# Patient Record
Sex: FEMALE | Race: WHITE | Employment: OTHER | RURAL
[De-identification: names, ages, dates, MRNs, and addresses within clinical notes are randomized per-mention and may not be internally consistent; named-entity substitution may affect disease eponyms.]

---

## 2017-01-13 ENCOUNTER — OFFICE VISIT (OUTPATIENT)
Dept: FAMILY MEDICINE CLINIC | Age: 57
End: 2017-01-13
Payer: MEDICAID

## 2017-01-13 VITALS
OXYGEN SATURATION: 95 % | HEIGHT: 60 IN | SYSTOLIC BLOOD PRESSURE: 136 MMHG | DIASTOLIC BLOOD PRESSURE: 86 MMHG | WEIGHT: 247 LBS | HEART RATE: 75 BPM | BODY MASS INDEX: 48.49 KG/M2

## 2017-01-13 DIAGNOSIS — I10 ESSENTIAL HYPERTENSION: ICD-10-CM

## 2017-01-13 DIAGNOSIS — K74.60 CIRRHOSIS OF LIVER WITHOUT ASCITES, UNSPECIFIED HEPATIC CIRRHOSIS TYPE (HCC): ICD-10-CM

## 2017-01-13 DIAGNOSIS — M19.90 ARTHRITIS: ICD-10-CM

## 2017-01-13 DIAGNOSIS — B18.2 HEP C W/O COMA, CHRONIC (HCC): Primary | ICD-10-CM

## 2017-01-13 DIAGNOSIS — K21.9 GASTROESOPHAGEAL REFLUX DISEASE WITHOUT ESOPHAGITIS: ICD-10-CM

## 2017-01-13 PROCEDURE — 99204 OFFICE O/P NEW MOD 45 MIN: CPT | Performed by: NURSE PRACTITIONER

## 2017-01-13 RX ORDER — MELOXICAM 15 MG/1
15 TABLET ORAL DAILY
Qty: 30 TABLET | Refills: 5 | Status: SHIPPED | OUTPATIENT
Start: 2017-01-13 | End: 2017-03-29

## 2017-01-13 RX ORDER — OMEPRAZOLE 20 MG/1
20 CAPSULE, DELAYED RELEASE ORAL DAILY
COMMUNITY
End: 2017-01-13 | Stop reason: SDUPTHER

## 2017-01-13 RX ORDER — BENAZEPRIL HYDROCHLORIDE 20 MG/1
20 TABLET ORAL DAILY
COMMUNITY
End: 2017-01-13 | Stop reason: SDUPTHER

## 2017-01-13 RX ORDER — TRAMADOL HYDROCHLORIDE 50 MG/1
50 TABLET ORAL EVERY 6 HOURS PRN
COMMUNITY
End: 2017-03-29

## 2017-01-13 RX ORDER — OMEPRAZOLE 20 MG/1
20 CAPSULE, DELAYED RELEASE ORAL DAILY
Qty: 30 CAPSULE | Refills: 5 | Status: SHIPPED | OUTPATIENT
Start: 2017-01-13 | End: 2017-07-05 | Stop reason: SDUPTHER

## 2017-01-13 RX ORDER — BENAZEPRIL HYDROCHLORIDE 20 MG/1
20 TABLET ORAL DAILY
Qty: 30 TABLET | Refills: 5 | Status: SHIPPED | OUTPATIENT
Start: 2017-01-13 | End: 2017-07-05 | Stop reason: SDUPTHER

## 2017-01-13 RX ORDER — CYCLOBENZAPRINE HCL 10 MG
10 TABLET ORAL 3 TIMES DAILY PRN
COMMUNITY
End: 2017-09-07

## 2017-01-13 RX ORDER — MELOXICAM 15 MG/1
15 TABLET ORAL DAILY
COMMUNITY
End: 2017-01-13 | Stop reason: SDUPTHER

## 2017-01-13 ASSESSMENT — ENCOUNTER SYMPTOMS
COUGH: 0
EYES NEGATIVE: 1
ORTHOPNEA: 0
BACK PAIN: 0
SPUTUM PRODUCTION: 0
HEARTBURN: 1
HEMOPTYSIS: 0
RESPIRATORY NEGATIVE: 1
SHORTNESS OF BREATH: 0

## 2017-01-25 ENCOUNTER — OFFICE VISIT (OUTPATIENT)
Dept: GASTROENTEROLOGY | Age: 57
End: 2017-01-25
Payer: MEDICAID

## 2017-01-25 VITALS
BODY MASS INDEX: 49.08 KG/M2 | WEIGHT: 250 LBS | HEIGHT: 60 IN | SYSTOLIC BLOOD PRESSURE: 112 MMHG | HEART RATE: 59 BPM | RESPIRATION RATE: 20 BRPM | DIASTOLIC BLOOD PRESSURE: 78 MMHG | OXYGEN SATURATION: 98 %

## 2017-01-25 DIAGNOSIS — K76.6 PORTAL HYPERTENSION (HCC): ICD-10-CM

## 2017-01-25 DIAGNOSIS — K74.69 OTHER CIRRHOSIS OF LIVER (HCC): ICD-10-CM

## 2017-01-25 DIAGNOSIS — Z86.19 HISTORY OF HEPATITIS C: ICD-10-CM

## 2017-01-25 DIAGNOSIS — Z86.19 HISTORY OF HEPATITIS C: Primary | ICD-10-CM

## 2017-01-25 DIAGNOSIS — K64.8 INTERNAL HEMORRHOID: ICD-10-CM

## 2017-01-25 LAB
ALBUMIN SERPL-MCNC: 3.7 G/DL (ref 3.5–5.2)
ALP BLD-CCNC: 132 U/L (ref 35–104)
ALPHA FETOPROTEIN: 5.8 NG/ML (ref 0–8.3)
ALT SERPL-CCNC: 28 U/L (ref 5–33)
ANION GAP SERPL CALCULATED.3IONS-SCNC: 12 MMOL/L (ref 7–19)
AST SERPL-CCNC: 43 U/L (ref 5–32)
BILIRUB SERPL-MCNC: 1 MG/DL (ref 0.2–1.2)
BUN BLDV-MCNC: 14 MG/DL (ref 6–20)
CALCIUM SERPL-MCNC: 8.9 MG/DL (ref 8.6–10)
CHLORIDE BLD-SCNC: 103 MMOL/L (ref 98–111)
CO2: 25 MMOL/L (ref 22–29)
CREAT SERPL-MCNC: 0.4 MG/DL (ref 0.5–0.9)
GFR NON-AFRICAN AMERICAN: >60
GLOBULIN: 3.5 G/DL
GLUCOSE BLD-MCNC: 101 MG/DL (ref 74–109)
HAV IGM SER IA-ACNC: ABNORMAL
HBV SURFACE AB TITR SER: NORMAL MIU/ML
HCT VFR BLD CALC: 39 % (ref 37–47)
HEMOGLOBIN: 12.9 G/DL (ref 12–16)
HEPATITIS B CORE IGM ANTIBODY: ABNORMAL
HEPATITIS B SURFACE ANTIGEN INTERPRETATION: ABNORMAL
HEPATITIS C ANTIBODY INTERPRETATION: REACTIVE
INR BLD: 1.19 (ref 0.88–1.18)
MCH RBC QN AUTO: 31.2 PG (ref 27–31)
MCHC RBC AUTO-ENTMCNC: 33.1 G/DL (ref 33–37)
MCV RBC AUTO: 94.4 FL (ref 81–99)
PDW BLD-RTO: 13.5 % (ref 11.5–14.5)
PLATELET # BLD: 114 K/UL (ref 130–400)
PMV BLD AUTO: 11.1 FL (ref 7.4–10.4)
POTASSIUM SERPL-SCNC: 4.2 MMOL/L (ref 3.5–5)
PROTHROMBIN TIME: 15 SEC (ref 12–14.6)
RBC # BLD: 4.13 M/UL (ref 4.2–5.4)
SODIUM BLD-SCNC: 140 MMOL/L (ref 136–145)
TOTAL PROTEIN: 7.2 G/DL (ref 6.6–8.7)
WBC # BLD: 3.9 K/UL (ref 4.8–10.8)

## 2017-01-25 PROCEDURE — 99215 OFFICE O/P EST HI 40 MIN: CPT | Performed by: NURSE PRACTITIONER

## 2017-01-25 RX ORDER — NADOLOL 20 MG/1
20 TABLET ORAL DAILY
Qty: 30 TABLET | Refills: 3 | Status: SHIPPED | OUTPATIENT
Start: 2017-01-25 | End: 2017-06-22 | Stop reason: SDUPTHER

## 2017-01-25 ASSESSMENT — ENCOUNTER SYMPTOMS
ALLERGIC/IMMUNOLOGIC NEGATIVE: 1
RECTAL PAIN: 0
BLOOD IN STOOL: 0
SHORTNESS OF BREATH: 0
BACK PAIN: 0
DIARRHEA: 0
TROUBLE SWALLOWING: 0
SORE THROAT: 0
VOMITING: 0
VOICE CHANGE: 0
EYES NEGATIVE: 1
ABDOMINAL PAIN: 0
NAUSEA: 0
CHEST TIGHTNESS: 0
COUGH: 0
CONSTIPATION: 0

## 2017-01-26 ENCOUNTER — OFFICE VISIT (OUTPATIENT)
Dept: FAMILY MEDICINE CLINIC | Age: 57
End: 2017-01-26
Payer: MEDICAID

## 2017-01-26 VITALS
TEMPERATURE: 98.8 F | BODY MASS INDEX: 49.48 KG/M2 | SYSTOLIC BLOOD PRESSURE: 122 MMHG | DIASTOLIC BLOOD PRESSURE: 72 MMHG | HEIGHT: 60 IN | WEIGHT: 252 LBS

## 2017-01-26 DIAGNOSIS — J01.40 ACUTE NON-RECURRENT PANSINUSITIS: ICD-10-CM

## 2017-01-26 DIAGNOSIS — J06.9 ACUTE URI: Primary | ICD-10-CM

## 2017-01-26 PROCEDURE — 99213 OFFICE O/P EST LOW 20 MIN: CPT | Performed by: NURSE PRACTITIONER

## 2017-01-26 RX ORDER — AMOXICILLIN 875 MG/1
875 TABLET, COATED ORAL 2 TIMES DAILY
Qty: 20 TABLET | Refills: 0 | Status: SHIPPED | OUTPATIENT
Start: 2017-01-26 | End: 2017-02-05

## 2017-01-26 RX ORDER — LORATADINE AND PSEUDOEPHEDRINE 10; 240 MG/1; MG/1
1 TABLET, EXTENDED RELEASE ORAL DAILY PRN
Qty: 10 TABLET | Refills: 1 | Status: SHIPPED | OUTPATIENT
Start: 2017-01-26 | End: 2017-03-02

## 2017-01-26 RX ORDER — BENZONATATE 100 MG/1
100-200 CAPSULE ORAL 3 TIMES DAILY PRN
Qty: 30 CAPSULE | Refills: 1 | Status: SHIPPED | OUTPATIENT
Start: 2017-01-26 | End: 2017-03-29

## 2017-01-26 RX ORDER — METHYLPREDNISOLONE 4 MG/1
TABLET ORAL
Qty: 1 KIT | Refills: 0 | Status: SHIPPED | OUTPATIENT
Start: 2017-01-26 | End: 2017-02-01

## 2017-01-28 LAB
EER HCV RNA QNT PCR: NORMAL
HCV RNA QNT REAL-TIME PCR INTERP: NOT DETECTED
HCV RNA, QUANTITATIVE REAL TIME PCR: <1.2 LOG IU
HEPATITIS C RNA PCR QUANT: <15 IU/ML

## 2017-02-01 ENCOUNTER — HOSPITAL ENCOUNTER (OUTPATIENT)
Dept: ULTRASOUND IMAGING | Age: 57
Discharge: HOME | End: 2017-02-01
Payer: MEDICAID

## 2017-02-01 DIAGNOSIS — K74.69 OTHER CIRRHOSIS OF LIVER (HCC): ICD-10-CM

## 2017-02-01 DIAGNOSIS — Z86.19 HISTORY OF HEPATITIS C: ICD-10-CM

## 2017-02-01 PROCEDURE — 76705 ECHO EXAM OF ABDOMEN: CPT

## 2017-02-02 ENCOUNTER — ANESTHESIA EVENT (OUTPATIENT)
Dept: OPERATING ROOM | Age: 57
End: 2017-02-02

## 2017-02-07 ENCOUNTER — HOSPITAL ENCOUNTER (OUTPATIENT)
Age: 57
Setting detail: OUTPATIENT SURGERY
Discharge: HOME | End: 2017-02-07
Attending: INTERNAL MEDICINE | Admitting: INTERNAL MEDICINE
Payer: MEDICAID

## 2017-02-07 ENCOUNTER — ANESTHESIA (OUTPATIENT)
Dept: OPERATING ROOM | Age: 57
End: 2017-02-07
Payer: MEDICAID

## 2017-02-07 ENCOUNTER — HOSPITAL ENCOUNTER (OUTPATIENT)
Age: 57
Setting detail: SPECIMEN
Discharge: HOME | End: 2017-02-07
Payer: MEDICAID

## 2017-02-07 VITALS
RESPIRATION RATE: 18 BRPM | HEIGHT: 60 IN | BODY MASS INDEX: 47.12 KG/M2 | DIASTOLIC BLOOD PRESSURE: 75 MMHG | TEMPERATURE: 97.9 F | HEART RATE: 80 BPM | SYSTOLIC BLOOD PRESSURE: 124 MMHG | WEIGHT: 240 LBS | OXYGEN SATURATION: 98 %

## 2017-02-07 VITALS — SYSTOLIC BLOOD PRESSURE: 105 MMHG | OXYGEN SATURATION: 92 % | DIASTOLIC BLOOD PRESSURE: 62 MMHG

## 2017-02-07 PROCEDURE — 88305 TISSUE EXAM BY PATHOLOGIST: CPT

## 2017-02-07 PROCEDURE — G8907 PT DOC NO EVENTS ON DISCHARG: HCPCS

## 2017-02-07 PROCEDURE — 00740 PR ANESTH,UGI ENDOSCOPY: CPT | Performed by: NURSE ANESTHETIST, CERTIFIED REGISTERED

## 2017-02-07 PROCEDURE — 43239 EGD BIOPSY SINGLE/MULTIPLE: CPT

## 2017-02-07 PROCEDURE — 43239 EGD BIOPSY SINGLE/MULTIPLE: CPT | Performed by: INTERNAL MEDICINE

## 2017-02-07 PROCEDURE — G8918 PT W/O PREOP ORDER IV AB PRO: HCPCS

## 2017-02-07 RX ORDER — PROMETHAZINE HYDROCHLORIDE 25 MG/ML
12.5 INJECTION, SOLUTION INTRAMUSCULAR; INTRAVENOUS
Status: DISCONTINUED | OUTPATIENT
Start: 2017-02-07 | End: 2017-02-07 | Stop reason: HOSPADM

## 2017-02-07 RX ORDER — DIPHENHYDRAMINE HYDROCHLORIDE 50 MG/ML
12.5 INJECTION INTRAMUSCULAR; INTRAVENOUS
Status: DISCONTINUED | OUTPATIENT
Start: 2017-02-07 | End: 2017-02-07 | Stop reason: HOSPADM

## 2017-02-07 RX ORDER — PROPOFOL 10 MG/ML
INJECTION, EMULSION INTRAVENOUS PRN
Status: DISCONTINUED | OUTPATIENT
Start: 2017-02-07 | End: 2017-02-07 | Stop reason: SDUPTHER

## 2017-02-07 RX ORDER — MEPERIDINE HYDROCHLORIDE 25 MG/ML
12.5 INJECTION INTRAMUSCULAR; INTRAVENOUS; SUBCUTANEOUS EVERY 5 MIN PRN
Status: DISCONTINUED | OUTPATIENT
Start: 2017-02-07 | End: 2017-02-07 | Stop reason: HOSPADM

## 2017-02-07 RX ORDER — ONDANSETRON 2 MG/ML
4 INJECTION INTRAMUSCULAR; INTRAVENOUS
Status: DISCONTINUED | OUTPATIENT
Start: 2017-02-07 | End: 2017-02-07 | Stop reason: HOSPADM

## 2017-02-07 RX ORDER — LIDOCAINE HYDROCHLORIDE 10 MG/ML
1 INJECTION, SOLUTION EPIDURAL; INFILTRATION; INTRACAUDAL; PERINEURAL
Status: DISCONTINUED | OUTPATIENT
Start: 2017-02-07 | End: 2017-02-07 | Stop reason: HOSPADM

## 2017-02-07 RX ORDER — HYDRALAZINE HYDROCHLORIDE 20 MG/ML
5 INJECTION INTRAMUSCULAR; INTRAVENOUS EVERY 10 MIN PRN
Status: DISCONTINUED | OUTPATIENT
Start: 2017-02-07 | End: 2017-02-07 | Stop reason: HOSPADM

## 2017-02-07 RX ORDER — SODIUM CHLORIDE, SODIUM LACTATE, POTASSIUM CHLORIDE, CALCIUM CHLORIDE 600; 310; 30; 20 MG/100ML; MG/100ML; MG/100ML; MG/100ML
INJECTION, SOLUTION INTRAVENOUS CONTINUOUS
Status: DISCONTINUED | OUTPATIENT
Start: 2017-02-07 | End: 2017-02-07 | Stop reason: HOSPADM

## 2017-02-07 RX ORDER — LABETALOL HYDROCHLORIDE 5 MG/ML
5 INJECTION, SOLUTION INTRAVENOUS EVERY 10 MIN PRN
Status: DISCONTINUED | OUTPATIENT
Start: 2017-02-07 | End: 2017-02-07 | Stop reason: HOSPADM

## 2017-02-07 RX ADMIN — SODIUM CHLORIDE, SODIUM LACTATE, POTASSIUM CHLORIDE, CALCIUM CHLORIDE: 600; 310; 30; 20 INJECTION, SOLUTION INTRAVENOUS at 11:01

## 2017-02-07 RX ADMIN — PROPOFOL 175 MG: 10 INJECTION, EMULSION INTRAVENOUS at 12:14

## 2017-03-02 ENCOUNTER — OFFICE VISIT (OUTPATIENT)
Dept: GASTROENTEROLOGY | Age: 57
End: 2017-03-02
Payer: MEDICARE

## 2017-03-02 VITALS
DIASTOLIC BLOOD PRESSURE: 80 MMHG | HEART RATE: 70 BPM | WEIGHT: 245 LBS | SYSTOLIC BLOOD PRESSURE: 128 MMHG | BODY MASS INDEX: 48.1 KG/M2 | RESPIRATION RATE: 18 BRPM | HEIGHT: 60 IN

## 2017-03-02 DIAGNOSIS — K76.6 PORTAL HYPERTENSION (HCC): ICD-10-CM

## 2017-03-02 DIAGNOSIS — K74.69 OTHER CIRRHOSIS OF LIVER (HCC): ICD-10-CM

## 2017-03-02 DIAGNOSIS — I85.00 ESOPHAGEAL VARICES DETERMINED BY ENDOSCOPY (HCC): ICD-10-CM

## 2017-03-02 DIAGNOSIS — Z86.19 HISTORY OF HEPATITIS C: Primary | ICD-10-CM

## 2017-03-02 PROCEDURE — 99214 OFFICE O/P EST MOD 30 MIN: CPT | Performed by: NURSE PRACTITIONER

## 2017-03-02 ASSESSMENT — ENCOUNTER SYMPTOMS
VOICE CHANGE: 0
TROUBLE SWALLOWING: 0
ABDOMINAL PAIN: 0
BLOOD IN STOOL: 0
DIARRHEA: 0
ALLERGIC/IMMUNOLOGIC NEGATIVE: 1
CONSTIPATION: 0
SHORTNESS OF BREATH: 0
EYES NEGATIVE: 1
COUGH: 0
RECTAL PAIN: 0
VOMITING: 0
SORE THROAT: 0
BACK PAIN: 0
CHEST TIGHTNESS: 0
NAUSEA: 0

## 2017-03-27 RX ORDER — TRAMADOL HYDROCHLORIDE 50 MG/1
50 TABLET ORAL EVERY 6 HOURS PRN
Status: CANCELLED | OUTPATIENT
Start: 2017-03-27

## 2017-03-27 NOTE — TELEPHONE ENCOUNTER
When I first saw patient I told her I would fill her ultram if needed in the future but we would have to see her and do narcotic contract and drug screen first so she needs to come in for this.

## 2017-03-29 ENCOUNTER — OFFICE VISIT (OUTPATIENT)
Dept: FAMILY MEDICINE CLINIC | Age: 57
End: 2017-03-29
Payer: MEDICARE

## 2017-03-29 VITALS
BODY MASS INDEX: 48.1 KG/M2 | SYSTOLIC BLOOD PRESSURE: 132 MMHG | HEIGHT: 60 IN | DIASTOLIC BLOOD PRESSURE: 80 MMHG | WEIGHT: 245 LBS

## 2017-03-29 DIAGNOSIS — M19.90 ARTHRITIS: Primary | ICD-10-CM

## 2017-03-29 DIAGNOSIS — Z79.899 ENCOUNTER FOR LONG-TERM (CURRENT) USE OF HIGH-RISK MEDICATION: ICD-10-CM

## 2017-03-29 LAB
AMPHETAMINE SCREEN, URINE: NORMAL
BARBITURATE SCREEN, URINE: NORMAL
BENZODIAZEPINE SCREEN, URINE: NORMAL
COCAINE METABOLITE SCREEN URINE: NORMAL
MDMA URINE: NORMAL
METHADONE SCREEN, URINE: NORMAL
METHAMPHETAMINE, URINE: NORMAL
OPIATE SCREEN URINE: NORMAL
OXYCODONE SCREEN URINE: NORMAL
PHENCYCLIDINE SCREEN URINE: NORMAL
PROPOXYPHENE SCREEN, URINE: NORMAL
THC: NORMAL
TRICYCLIC ANTIDEPRESSANTS, UR: NORMAL

## 2017-03-29 PROCEDURE — 3017F COLORECTAL CA SCREEN DOC REV: CPT | Performed by: NURSE PRACTITIONER

## 2017-03-29 PROCEDURE — 3014F SCREEN MAMMO DOC REV: CPT | Performed by: NURSE PRACTITIONER

## 2017-03-29 PROCEDURE — G8484 FLU IMMUNIZE NO ADMIN: HCPCS | Performed by: NURSE PRACTITIONER

## 2017-03-29 PROCEDURE — G8417 CALC BMI ABV UP PARAM F/U: HCPCS | Performed by: NURSE PRACTITIONER

## 2017-03-29 PROCEDURE — 99213 OFFICE O/P EST LOW 20 MIN: CPT | Performed by: NURSE PRACTITIONER

## 2017-03-29 PROCEDURE — 4004F PT TOBACCO SCREEN RCVD TLK: CPT | Performed by: NURSE PRACTITIONER

## 2017-03-29 PROCEDURE — G8427 DOCREV CUR MEDS BY ELIG CLIN: HCPCS | Performed by: NURSE PRACTITIONER

## 2017-03-29 PROCEDURE — 80305 DRUG TEST PRSMV DIR OPT OBS: CPT | Performed by: NURSE PRACTITIONER

## 2017-03-29 RX ORDER — TRAMADOL HYDROCHLORIDE 50 MG/1
50 TABLET ORAL EVERY 6 HOURS PRN
Qty: 120 TABLET | Refills: 2 | Status: SHIPPED | OUTPATIENT
Start: 2017-03-29 | End: 2017-07-05 | Stop reason: SDUPTHER

## 2017-03-29 ASSESSMENT — ENCOUNTER SYMPTOMS
ORTHOPNEA: 0
HEMOPTYSIS: 0
EYES NEGATIVE: 1
BACK PAIN: 0
RESPIRATORY NEGATIVE: 1
COUGH: 0
GASTROINTESTINAL NEGATIVE: 1
SPUTUM PRODUCTION: 0

## 2017-06-22 RX ORDER — NADOLOL 20 MG/1
20 TABLET ORAL DAILY
Qty: 30 TABLET | Refills: 3 | Status: SHIPPED | OUTPATIENT
Start: 2017-06-22 | End: 2017-07-03 | Stop reason: SDUPTHER

## 2017-07-03 RX ORDER — NADOLOL 20 MG/1
20 TABLET ORAL DAILY
Qty: 30 TABLET | Refills: 3 | Status: SHIPPED | OUTPATIENT
Start: 2017-07-03 | End: 2018-02-09 | Stop reason: SDUPTHER

## 2017-07-05 ENCOUNTER — OFFICE VISIT (OUTPATIENT)
Dept: FAMILY MEDICINE CLINIC | Age: 57
End: 2017-07-05
Payer: MEDICARE

## 2017-07-05 VITALS
DIASTOLIC BLOOD PRESSURE: 66 MMHG | WEIGHT: 250 LBS | HEIGHT: 60 IN | SYSTOLIC BLOOD PRESSURE: 118 MMHG | BODY MASS INDEX: 49.08 KG/M2 | HEART RATE: 75 BPM | TEMPERATURE: 99.3 F | OXYGEN SATURATION: 97 %

## 2017-07-05 DIAGNOSIS — H65.01 RIGHT ACUTE SEROUS OTITIS MEDIA, RECURRENCE NOT SPECIFIED: ICD-10-CM

## 2017-07-05 DIAGNOSIS — K21.9 GASTROESOPHAGEAL REFLUX DISEASE, ESOPHAGITIS PRESENCE NOT SPECIFIED: ICD-10-CM

## 2017-07-05 DIAGNOSIS — R51.9 NONINTRACTABLE HEADACHE, UNSPECIFIED CHRONICITY PATTERN, UNSPECIFIED HEADACHE TYPE: Primary | ICD-10-CM

## 2017-07-05 DIAGNOSIS — J06.9 ACUTE URI: ICD-10-CM

## 2017-07-05 DIAGNOSIS — B37.2 INTERTRIGINOUS CANDIDIASIS: ICD-10-CM

## 2017-07-05 DIAGNOSIS — K74.60 CIRRHOSIS OF LIVER WITHOUT ASCITES, UNSPECIFIED HEPATIC CIRRHOSIS TYPE (HCC): ICD-10-CM

## 2017-07-05 DIAGNOSIS — I10 ESSENTIAL HYPERTENSION: ICD-10-CM

## 2017-07-05 PROCEDURE — 4004F PT TOBACCO SCREEN RCVD TLK: CPT | Performed by: NURSE PRACTITIONER

## 2017-07-05 PROCEDURE — G8427 DOCREV CUR MEDS BY ELIG CLIN: HCPCS | Performed by: NURSE PRACTITIONER

## 2017-07-05 PROCEDURE — 3014F SCREEN MAMMO DOC REV: CPT | Performed by: NURSE PRACTITIONER

## 2017-07-05 PROCEDURE — G8417 CALC BMI ABV UP PARAM F/U: HCPCS | Performed by: NURSE PRACTITIONER

## 2017-07-05 PROCEDURE — 99214 OFFICE O/P EST MOD 30 MIN: CPT | Performed by: NURSE PRACTITIONER

## 2017-07-05 PROCEDURE — 3017F COLORECTAL CA SCREEN DOC REV: CPT | Performed by: NURSE PRACTITIONER

## 2017-07-05 RX ORDER — OMEPRAZOLE 20 MG/1
20 CAPSULE, DELAYED RELEASE ORAL DAILY
Qty: 30 CAPSULE | Refills: 5 | Status: SHIPPED | OUTPATIENT
Start: 2017-07-05 | End: 2018-03-05 | Stop reason: SDUPTHER

## 2017-07-05 RX ORDER — NYSTATIN 100000 [USP'U]/G
POWDER TOPICAL
Qty: 45 G | Refills: 1 | Status: SHIPPED | OUTPATIENT
Start: 2017-07-05 | End: 2017-09-07

## 2017-07-05 RX ORDER — TRAMADOL HYDROCHLORIDE 50 MG/1
50 TABLET ORAL EVERY 8 HOURS PRN
Qty: 60 TABLET | Refills: 2 | Status: SHIPPED | OUTPATIENT
Start: 2017-07-05 | End: 2017-09-07 | Stop reason: SDUPTHER

## 2017-07-05 RX ORDER — BENAZEPRIL HYDROCHLORIDE 20 MG/1
20 TABLET ORAL DAILY
Qty: 30 TABLET | Refills: 5 | Status: SHIPPED | OUTPATIENT
Start: 2017-07-05

## 2017-07-05 RX ORDER — CETIRIZINE HYDROCHLORIDE, PSEUDOEPHEDRINE HYDROCHLORIDE 5; 120 MG/1; MG/1
1 TABLET, FILM COATED, EXTENDED RELEASE ORAL 2 TIMES DAILY
Qty: 60 TABLET | Refills: 0 | Status: SHIPPED | OUTPATIENT
Start: 2017-07-05 | End: 2017-08-04

## 2017-07-05 RX ORDER — AMOXICILLIN 875 MG/1
875 TABLET, COATED ORAL 2 TIMES DAILY
Qty: 20 TABLET | Refills: 0 | Status: SHIPPED | OUTPATIENT
Start: 2017-07-05 | End: 2017-07-15

## 2017-07-05 ASSESSMENT — ENCOUNTER SYMPTOMS
NAUSEA: 0
SHORTNESS OF BREATH: 0
SWOLLEN GLANDS: 1
ABDOMINAL PAIN: 0
WHEEZING: 0
CHOKING: 0
SORE THROAT: 1
DIARRHEA: 0
PHOTOPHOBIA: 0
BLURRED VISION: 0
COUGH: 1
VOMITING: 0

## 2017-07-28 DIAGNOSIS — K74.60 CIRRHOSIS OF LIVER WITHOUT ASCITES, UNSPECIFIED HEPATIC CIRRHOSIS TYPE (HCC): Primary | ICD-10-CM

## 2017-08-14 ENCOUNTER — TELEPHONE (OUTPATIENT)
Dept: FAMILY MEDICINE CLINIC | Age: 57
End: 2017-08-14

## 2017-08-14 RX ORDER — GUAIFENESIN AND CODEINE PHOSPHATE 100; 10 MG/5ML; MG/5ML
5-10 SOLUTION ORAL 4 TIMES DAILY PRN
Qty: 240 ML | Refills: 0 | Status: SHIPPED | OUTPATIENT
Start: 2017-08-14 | End: 2017-09-07

## 2017-08-14 RX ORDER — CEFDINIR 300 MG/1
300 CAPSULE ORAL 2 TIMES DAILY
Qty: 20 CAPSULE | Refills: 0 | Status: SHIPPED | OUTPATIENT
Start: 2017-08-14 | End: 2017-08-24

## 2017-08-30 ENCOUNTER — HOSPITAL ENCOUNTER (OUTPATIENT)
Dept: ULTRASOUND IMAGING | Age: 57
Discharge: HOME | End: 2017-08-30
Payer: MEDICARE

## 2017-08-30 ENCOUNTER — TELEPHONE (OUTPATIENT)
Dept: GASTROENTEROLOGY | Age: 57
End: 2017-08-30

## 2017-08-30 DIAGNOSIS — K74.60 CIRRHOSIS OF LIVER WITHOUT ASCITES, UNSPECIFIED HEPATIC CIRRHOSIS TYPE (HCC): ICD-10-CM

## 2017-08-30 LAB
ALBUMIN SERPL-MCNC: 3.9 G/DL (ref 3.5–5.2)
ALP BLD-CCNC: 98 U/L (ref 35–104)
ALT SERPL-CCNC: 20 U/L (ref 5–33)
ANION GAP SERPL CALCULATED.3IONS-SCNC: 16 MMOL/L (ref 7–19)
AST SERPL-CCNC: 44 U/L (ref 5–32)
BILIRUB SERPL-MCNC: 1.4 MG/DL (ref 0.2–1.2)
BUN BLDV-MCNC: 12 MG/DL (ref 6–20)
CALCIUM SERPL-MCNC: 9 MG/DL (ref 8.6–10)
CHLORIDE BLD-SCNC: 98 MMOL/L (ref 98–111)
CO2: 25 MMOL/L (ref 22–29)
CREAT SERPL-MCNC: 0.4 MG/DL (ref 0.5–0.9)
GFR NON-AFRICAN AMERICAN: >60
GLUCOSE BLD-MCNC: 119 MG/DL (ref 74–109)
HCT VFR BLD CALC: 39.4 % (ref 37–47)
HEMOGLOBIN: 14 G/DL (ref 12–16)
MCH RBC QN AUTO: 31.9 PG (ref 27–31)
MCHC RBC AUTO-ENTMCNC: 35.5 G/DL (ref 33–37)
MCV RBC AUTO: 89.7 FL (ref 81–99)
PDW BLD-RTO: 12.9 % (ref 11.5–14.5)
PLATELET # BLD: 137 K/UL (ref 130–400)
PMV BLD AUTO: 10.5 FL (ref 9.4–12.3)
POTASSIUM SERPL-SCNC: 3.9 MMOL/L (ref 3.5–5)
RBC # BLD: 4.39 M/UL (ref 4.2–5.4)
SODIUM BLD-SCNC: 139 MMOL/L (ref 136–145)
TOTAL PROTEIN: 7.8 G/DL (ref 6.6–8.7)
WBC # BLD: 4.3 K/UL (ref 4.8–10.8)

## 2017-08-30 PROCEDURE — 76705 ECHO EXAM OF ABDOMEN: CPT

## 2017-09-07 ENCOUNTER — OFFICE VISIT (OUTPATIENT)
Dept: GASTROENTEROLOGY | Age: 57
End: 2017-09-07
Payer: MEDICARE

## 2017-09-07 VITALS
SYSTOLIC BLOOD PRESSURE: 122 MMHG | OXYGEN SATURATION: 98 % | HEART RATE: 70 BPM | HEIGHT: 60 IN | DIASTOLIC BLOOD PRESSURE: 70 MMHG | WEIGHT: 241.6 LBS | BODY MASS INDEX: 47.43 KG/M2

## 2017-09-07 DIAGNOSIS — K21.9 CHRONIC GERD: ICD-10-CM

## 2017-09-07 DIAGNOSIS — K74.60 CIRRHOSIS OF LIVER WITHOUT ASCITES, UNSPECIFIED HEPATIC CIRRHOSIS TYPE (HCC): Primary | ICD-10-CM

## 2017-09-07 DIAGNOSIS — Z86.19 HISTORY OF HEPATITIS C: ICD-10-CM

## 2017-09-07 PROCEDURE — 3014F SCREEN MAMMO DOC REV: CPT | Performed by: NURSE PRACTITIONER

## 2017-09-07 PROCEDURE — 99214 OFFICE O/P EST MOD 30 MIN: CPT | Performed by: NURSE PRACTITIONER

## 2017-09-07 PROCEDURE — G8427 DOCREV CUR MEDS BY ELIG CLIN: HCPCS | Performed by: NURSE PRACTITIONER

## 2017-09-07 PROCEDURE — 4004F PT TOBACCO SCREEN RCVD TLK: CPT | Performed by: NURSE PRACTITIONER

## 2017-09-07 PROCEDURE — G8417 CALC BMI ABV UP PARAM F/U: HCPCS | Performed by: NURSE PRACTITIONER

## 2017-09-07 PROCEDURE — 3017F COLORECTAL CA SCREEN DOC REV: CPT | Performed by: NURSE PRACTITIONER

## 2017-09-07 RX ORDER — AMITRIPTYLINE HYDROCHLORIDE 10 MG/1
10 TABLET, FILM COATED ORAL NIGHTLY
Qty: 30 TABLET | Refills: 1 | Status: SHIPPED | OUTPATIENT
Start: 2017-09-07 | End: 2017-11-06 | Stop reason: SDUPTHER

## 2017-09-07 RX ORDER — TRAMADOL HYDROCHLORIDE 50 MG/1
50 TABLET ORAL EVERY 12 HOURS PRN
Qty: 60 TABLET | Refills: 2
Start: 2017-09-07

## 2017-09-07 ASSESSMENT — ENCOUNTER SYMPTOMS
VOMITING: 0
ABDOMINAL PAIN: 0
COUGH: 0
BACK PAIN: 1
RECTAL PAIN: 0
SHORTNESS OF BREATH: 1
VOICE CHANGE: 0
SORE THROAT: 0
DIARRHEA: 0
ABDOMINAL DISTENTION: 0
BLOOD IN STOOL: 0
NAUSEA: 0
CONSTIPATION: 0
CHEST TIGHTNESS: 0

## 2017-11-06 RX ORDER — AMITRIPTYLINE HYDROCHLORIDE 10 MG/1
TABLET, FILM COATED ORAL
Qty: 30 TABLET | Refills: 1 | Status: SHIPPED | OUTPATIENT
Start: 2017-11-06 | End: 2018-02-09 | Stop reason: SDUPTHER

## 2018-01-31 DIAGNOSIS — K74.60 CIRRHOSIS OF LIVER WITHOUT ASCITES, UNSPECIFIED HEPATIC CIRRHOSIS TYPE (HCC): Primary | ICD-10-CM

## 2018-02-01 ENCOUNTER — TELEPHONE (OUTPATIENT)
Dept: GASTROENTEROLOGY | Age: 58
End: 2018-02-01

## 2018-02-02 DIAGNOSIS — K74.60 CIRRHOSIS OF LIVER WITHOUT ASCITES, UNSPECIFIED HEPATIC CIRRHOSIS TYPE (HCC): ICD-10-CM

## 2018-02-02 LAB
ALBUMIN SERPL-MCNC: 4 G/DL (ref 3.5–5.2)
ALP BLD-CCNC: 109 U/L (ref 35–104)
ALT SERPL-CCNC: 19 U/L (ref 5–33)
ANION GAP SERPL CALCULATED.3IONS-SCNC: 11 MMOL/L (ref 7–19)
AST SERPL-CCNC: 29 U/L (ref 5–32)
BILIRUB SERPL-MCNC: 1.2 MG/DL (ref 0.2–1.2)
BILIRUBIN DIRECT: 0.3 MG/DL (ref 0–0.3)
BILIRUBIN, INDIRECT: 0.9 MG/DL (ref 0.1–1)
BUN BLDV-MCNC: 18 MG/DL (ref 6–20)
CALCIUM SERPL-MCNC: 9.3 MG/DL (ref 8.6–10)
CHLORIDE BLD-SCNC: 100 MMOL/L (ref 98–111)
CO2: 29 MMOL/L (ref 22–29)
CREAT SERPL-MCNC: <0.5 MG/DL (ref 0.5–0.9)
GFR NON-AFRICAN AMERICAN: >60
GLUCOSE BLD-MCNC: 135 MG/DL (ref 74–109)
HCT VFR BLD CALC: 42.4 % (ref 37–47)
HEMOGLOBIN: 14.5 G/DL (ref 12–16)
INR BLD: 1.16 (ref 0.88–1.18)
MCH RBC QN AUTO: 31.8 PG (ref 27–31)
MCHC RBC AUTO-ENTMCNC: 34.2 G/DL (ref 33–37)
MCV RBC AUTO: 93 FL (ref 81–99)
PDW BLD-RTO: 13.8 % (ref 11.5–14.5)
PLATELET # BLD: 164 K/UL (ref 130–400)
PMV BLD AUTO: 10.9 FL (ref 9.4–12.3)
POTASSIUM SERPL-SCNC: 4.3 MMOL/L (ref 3.5–5)
PROTHROMBIN TIME: 14.8 SEC (ref 12–14.6)
RBC # BLD: 4.56 M/UL (ref 4.2–5.4)
SODIUM BLD-SCNC: 140 MMOL/L (ref 136–145)
TOTAL PROTEIN: 7.7 G/DL (ref 6.6–8.7)
WBC # BLD: 7.2 K/UL (ref 4.8–10.8)

## 2018-02-15 ENCOUNTER — HOSPITAL ENCOUNTER (OUTPATIENT)
Dept: ULTRASOUND IMAGING | Age: 58
Discharge: HOME | End: 2018-02-15
Payer: MEDICARE

## 2018-02-15 DIAGNOSIS — K74.60 CIRRHOSIS OF LIVER WITHOUT ASCITES, UNSPECIFIED HEPATIC CIRRHOSIS TYPE (HCC): ICD-10-CM

## 2018-02-15 PROCEDURE — 76705 ECHO EXAM OF ABDOMEN: CPT

## 2018-03-05 ENCOUNTER — OFFICE VISIT (OUTPATIENT)
Dept: GASTROENTEROLOGY | Age: 58
End: 2018-03-05
Payer: MEDICARE

## 2018-03-05 VITALS
HEIGHT: 60 IN | DIASTOLIC BLOOD PRESSURE: 78 MMHG | HEART RATE: 54 BPM | WEIGHT: 238.6 LBS | SYSTOLIC BLOOD PRESSURE: 124 MMHG | BODY MASS INDEX: 46.84 KG/M2 | OXYGEN SATURATION: 99 %

## 2018-03-05 DIAGNOSIS — K21.9 CHRONIC GERD: ICD-10-CM

## 2018-03-05 DIAGNOSIS — Z86.19 HISTORY OF HEPATITIS C: ICD-10-CM

## 2018-03-05 DIAGNOSIS — K74.60 CIRRHOSIS OF LIVER WITHOUT ASCITES, UNSPECIFIED HEPATIC CIRRHOSIS TYPE (HCC): ICD-10-CM

## 2018-03-05 DIAGNOSIS — R77.2 ELEVATED AFP: ICD-10-CM

## 2018-03-05 DIAGNOSIS — K76.6 PORTAL HYPERTENSION (HCC): ICD-10-CM

## 2018-03-05 DIAGNOSIS — I85.00 ESOPHAGEAL VARICES DETERMINED BY ENDOSCOPY (HCC): Primary | ICD-10-CM

## 2018-03-05 PROCEDURE — 3014F SCREEN MAMMO DOC REV: CPT | Performed by: NURSE PRACTITIONER

## 2018-03-05 PROCEDURE — 4004F PT TOBACCO SCREEN RCVD TLK: CPT | Performed by: NURSE PRACTITIONER

## 2018-03-05 PROCEDURE — 3017F COLORECTAL CA SCREEN DOC REV: CPT | Performed by: NURSE PRACTITIONER

## 2018-03-05 PROCEDURE — G8417 CALC BMI ABV UP PARAM F/U: HCPCS | Performed by: NURSE PRACTITIONER

## 2018-03-05 PROCEDURE — G8484 FLU IMMUNIZE NO ADMIN: HCPCS | Performed by: NURSE PRACTITIONER

## 2018-03-05 PROCEDURE — G8427 DOCREV CUR MEDS BY ELIG CLIN: HCPCS | Performed by: NURSE PRACTITIONER

## 2018-03-05 PROCEDURE — 99214 OFFICE O/P EST MOD 30 MIN: CPT | Performed by: NURSE PRACTITIONER

## 2018-03-05 RX ORDER — NADOLOL 20 MG/1
TABLET ORAL
Qty: 30 TABLET | Refills: 5 | Status: SHIPPED | OUTPATIENT
Start: 2018-03-05 | End: 2018-08-27 | Stop reason: SDUPTHER

## 2018-03-05 RX ORDER — CYCLOBENZAPRINE HCL 10 MG
10 TABLET ORAL 3 TIMES DAILY PRN
COMMUNITY

## 2018-03-05 RX ORDER — AMITRIPTYLINE HYDROCHLORIDE 10 MG/1
TABLET, FILM COATED ORAL
Qty: 30 TABLET | Refills: 5 | Status: SHIPPED | OUTPATIENT
Start: 2018-03-05 | End: 2019-04-29 | Stop reason: SDUPTHER

## 2018-03-05 RX ORDER — OMEPRAZOLE 20 MG/1
20 CAPSULE, DELAYED RELEASE ORAL DAILY
Qty: 30 CAPSULE | Refills: 5 | Status: SHIPPED | OUTPATIENT
Start: 2018-03-05 | End: 2018-08-27 | Stop reason: SDUPTHER

## 2018-03-05 ASSESSMENT — ENCOUNTER SYMPTOMS
VOMITING: 0
ABDOMINAL PAIN: 0
SHORTNESS OF BREATH: 1
ABDOMINAL DISTENTION: 0
CONSTIPATION: 0
BACK PAIN: 1
DIARRHEA: 0
CHEST TIGHTNESS: 0
BLOOD IN STOOL: 0
VOICE CHANGE: 0
SORE THROAT: 0
COUGH: 0
RECTAL PAIN: 0
NAUSEA: 0

## 2018-03-05 NOTE — PROGRESS NOTES
Refill:  5    nadolol (CORGARD) 20 MG tablet     Sig: TAKE ONE TABLET BY MOUTH EVERY DAY     Dispense:  30 tablet     Refill:  5    amitriptyline (ELAVIL) 10 MG tablet     Sig: TAKE ONE TABLET BY MOUTH EVERY NIGHT     Dispense:  30 tablet     Refill:  5     Repeat u/s and labs in 6 months (cbc, bmp, hepatic function, pt/inr, afp)    rto annual or with problems.      Colonoscopy screening due 3/02/2021      Medications reviewed for adjustment:   Amitriptyline - caution advised  Cyclobenzaprine - avoid use with mod-severe impairment  Omeprazole - consider decreased dose  Tramadol - max 100 mg per day

## 2018-03-23 ENCOUNTER — HOSPITAL ENCOUNTER (OUTPATIENT)
Age: 58
Setting detail: SPECIMEN
Discharge: HOME | End: 2018-03-23
Payer: MEDICARE

## 2018-03-23 ENCOUNTER — HOSPITAL ENCOUNTER (OUTPATIENT)
Age: 58
Setting detail: OUTPATIENT SURGERY
Discharge: HOME | End: 2018-03-23
Attending: INTERNAL MEDICINE | Admitting: INTERNAL MEDICINE
Payer: MEDICARE

## 2018-03-23 ENCOUNTER — ANESTHESIA (OUTPATIENT)
Dept: OPERATING ROOM | Age: 58
End: 2018-03-23

## 2018-03-23 ENCOUNTER — ANESTHESIA EVENT (OUTPATIENT)
Dept: OPERATING ROOM | Age: 58
End: 2018-03-23

## 2018-03-23 VITALS
HEIGHT: 60 IN | OXYGEN SATURATION: 96 % | BODY MASS INDEX: 46.33 KG/M2 | WEIGHT: 236 LBS | DIASTOLIC BLOOD PRESSURE: 83 MMHG | SYSTOLIC BLOOD PRESSURE: 123 MMHG | RESPIRATION RATE: 16 BRPM | TEMPERATURE: 98 F | HEART RATE: 72 BPM

## 2018-03-23 VITALS — OXYGEN SATURATION: 98 % | SYSTOLIC BLOOD PRESSURE: 100 MMHG | DIASTOLIC BLOOD PRESSURE: 69 MMHG

## 2018-03-23 PROCEDURE — G8918 PT W/O PREOP ORDER IV AB PRO: HCPCS

## 2018-03-23 PROCEDURE — 88312 SPECIAL STAINS GROUP 1: CPT

## 2018-03-23 PROCEDURE — G8907 PT DOC NO EVENTS ON DISCHARG: HCPCS

## 2018-03-23 PROCEDURE — 43239 EGD BIOPSY SINGLE/MULTIPLE: CPT | Performed by: INTERNAL MEDICINE

## 2018-03-23 PROCEDURE — 88305 TISSUE EXAM BY PATHOLOGIST: CPT

## 2018-03-23 PROCEDURE — 43239 EGD BIOPSY SINGLE/MULTIPLE: CPT

## 2018-03-23 RX ORDER — SODIUM CHLORIDE 9 MG/ML
INJECTION, SOLUTION INTRAVENOUS CONTINUOUS
Status: DISCONTINUED | OUTPATIENT
Start: 2018-03-23 | End: 2018-03-23 | Stop reason: HOSPADM

## 2018-03-23 RX ORDER — GLYCOPYRROLATE 0.2 MG/ML
INJECTION INTRAMUSCULAR; INTRAVENOUS PRN
Status: DISCONTINUED | OUTPATIENT
Start: 2018-03-23 | End: 2018-03-23 | Stop reason: SDUPTHER

## 2018-03-23 RX ORDER — LIDOCAINE HYDROCHLORIDE 10 MG/ML
INJECTION, SOLUTION INFILTRATION; PERINEURAL PRN
Status: DISCONTINUED | OUTPATIENT
Start: 2018-03-23 | End: 2018-03-23 | Stop reason: SDUPTHER

## 2018-03-23 RX ORDER — PROPOFOL 10 MG/ML
INJECTION, EMULSION INTRAVENOUS PRN
Status: DISCONTINUED | OUTPATIENT
Start: 2018-03-23 | End: 2018-03-23 | Stop reason: SDUPTHER

## 2018-03-23 RX ORDER — SODIUM CHLORIDE 9 MG/ML
INJECTION, SOLUTION INTRAVENOUS CONTINUOUS PRN
Status: DISCONTINUED | OUTPATIENT
Start: 2018-03-23 | End: 2018-03-23 | Stop reason: SDUPTHER

## 2018-03-23 RX ADMIN — SODIUM CHLORIDE: 9 INJECTION, SOLUTION INTRAVENOUS at 11:20

## 2018-03-23 RX ADMIN — SODIUM CHLORIDE: 9 INJECTION, SOLUTION INTRAVENOUS at 12:00

## 2018-03-23 RX ADMIN — GLYCOPYRROLATE 0.2 MG: 0.2 INJECTION INTRAMUSCULAR; INTRAVENOUS at 12:04

## 2018-03-23 RX ADMIN — PROPOFOL 100 MG: 10 INJECTION, EMULSION INTRAVENOUS at 12:07

## 2018-03-23 RX ADMIN — LIDOCAINE HYDROCHLORIDE 40 MG: 10 INJECTION, SOLUTION INFILTRATION; PERINEURAL at 12:07

## 2018-03-23 ASSESSMENT — PAIN SCALES - GENERAL
PAINLEVEL_OUTOF10: 0
PAINLEVEL_OUTOF10: 0

## 2018-03-23 ASSESSMENT — LIFESTYLE VARIABLES: SMOKING_STATUS: 1

## 2018-03-23 ASSESSMENT — PAIN - FUNCTIONAL ASSESSMENT: PAIN_FUNCTIONAL_ASSESSMENT: 0-10

## 2018-03-23 ASSESSMENT — PAIN DESCRIPTION - ONSET: ONSET: AWAKENED FROM SLEEP

## 2018-03-23 NOTE — OP NOTE
Endoscopic Procedure Note    Patient: Chris Arnold: 1960  Med Rec#: [id number] Acc#: [id number]     Primary Care Provider Dr. Bob Salazar M.D., MD  Referring Provider: Joya BREWER    Endoscopist: Carli Hartmann MD    Date of Procedure:  3/23/2018    Procedure:   1. EGD with biopsy    Indications:   1. Known cirrhosis   2. Variceal screening    Anesthesia:  Sedation was administered by anesthesia who monitored the patient during the procedure. Estimated Blood Loss: minimal    Procedure:   After reviewing the patient's chart and obtaining informed consent, the patient was placed in the left lateral decubitus position. A forward-viewing Olympus endoscope was lubricated and inserted through the mouth into the oropharynx. Under direct visualization, the upper esophagus was intubated. The scope was advanced to the level of the third portion of duodenum. Scope was slowly withdrawn with careful inspection of the mucosal surfaces. The scope was retroflexed for inspection of the gastric fundus and incisura. Findings and maneuvers are listed in impression below. The patient tolerated the procedure well. The scope was removed. There were no immediate complications. Findings:   Esophagus: abnormal: grade I varices noted in the distal esophagus. These appear to flatten completely with insufflation. There is a small hiatal hernia present. Stomach:  abnormal: erosive mucosal changes suggestive of gastritis noted -  Gastric biopsies were taken from the antrum and body to rule out Helicobacter pylori infection.  - There is no evidence of gastric varices seen. Duodenum: normal      IMPRESSION:  1. Grade I esophageal varices  2. Erosive gastritis       RECOMMENDATIONS:    1. Await path results, the patient will be contacted in 7-10 days with biopsy results. 2.  Repeat EGD in 2 yrs for variceal screening  3. Continue non-selective beta blocker as prescribed.      The results were discussed

## 2018-03-23 NOTE — ANESTHESIA POSTPROCEDURE EVALUATION
Department of Anesthesiology  Postprocedure Note    Patient: Megan Licea  MRN: [id number]  YOB: 1960  Date of evaluation: 3/23/2018  Time:  12:13 PM     Procedure Summary     Date:  03/23/18 Room / Location:  [facility] ENDO 01 / [facility] OR    Anesthesia Start:  1200 Anesthesia Stop:      Procedure:  EGD BIOPSY (N/A ) Diagnosis:  ([address] [place] [address])    Surgeon:  Alexandra Garcia MD Responsible Provider:  Trace Dodson CRNA    Anesthesia Type:  general ASA Status:  3          Anesthesia Type: general    Daniele Phase I:      Daniele Phase II:      Last vitals: Reviewed and per EMR flowsheets.        Anesthesia Post Evaluation    Patient location during evaluation: bedside  Patient participation: complete - patient participated  Level of consciousness: sleepy but conscious  Pain score: 0  Airway patency: patent  Nausea & Vomiting: no nausea and no vomiting  Complications: no  Cardiovascular status: hemodynamically stable and blood pressure returned to baseline  Respiratory status: acceptable and nasal cannula  Hydration status: stable

## 2018-03-23 NOTE — H&P
TRANSORAL BIOPSY SINGLE/MULTIPLE N/A 2/7/2017    Dr Carlos Walter Grade I esophageal varices, gastritis/gastropathy    UPPER GASTROINTESTINAL ENDOSCOPY  03/2016    Dr Perkins-gr II esoph varices; portal hyptensive gastropathy       Social History:  Social History   Substance Use Topics    Smoking status: Current Every Day Smoker     Packs/day: 0.25    Smokeless tobacco: Never Used    Alcohol use No      Comment: Pt states that she stopped drinking 2014? Vital Signs:   Vitals:    03/23/18 1116   BP: 117/77   Pulse: 72   Resp: 20   Temp: 98.2 °F (36.8 °C)   SpO2: 97%        Physical Exam:  Cardiac:  [x]WNL  []Comments:  Pulmonary:  [x]WNL   []Comments:  Neuro/Mental Status:  [x]WNL  []Comments:  Abdominal:  [x]WNL    []Comments:  Other:   []WNL  []Comments:    Informed Consent:  The risks and benefits of the procedure have been discussed with either the patient or if they cannot consent, their representative. Assessment:  Patient examined and appropriate for planned sedation and procedure. Plan:  Proceed with planned sedation and procedure as above.     Estephania Abel MD

## 2018-11-19 RX ORDER — TRAMADOL HYDROCHLORIDE 50 MG/1
TABLET ORAL
Qty: 60 TABLET | Refills: 2 | Status: SHIPPED | OUTPATIENT
Start: 2018-11-19 | End: 2019-03-01 | Stop reason: SDUPTHER

## 2018-11-20 DIAGNOSIS — K74.60 CIRRHOSIS OF LIVER WITHOUT ASCITES, UNSPECIFIED HEPATIC CIRRHOSIS TYPE (HCC): Primary | ICD-10-CM

## 2018-12-05 ENCOUNTER — HOSPITAL ENCOUNTER (OUTPATIENT)
Dept: ULTRASOUND IMAGING | Age: 58
Discharge: HOME | End: 2018-12-05
Payer: MEDICARE

## 2018-12-05 DIAGNOSIS — K74.60 CIRRHOSIS OF LIVER WITHOUT ASCITES, UNSPECIFIED HEPATIC CIRRHOSIS TYPE (HCC): ICD-10-CM

## 2018-12-05 PROCEDURE — 76705 ECHO EXAM OF ABDOMEN: CPT

## 2018-12-21 ENCOUNTER — OFFICE VISIT (OUTPATIENT)
Dept: FAMILY MEDICINE CLINIC | Facility: CLINIC | Age: 58
End: 2018-12-21

## 2018-12-21 ENCOUNTER — RESULTS ENCOUNTER (OUTPATIENT)
Dept: FAMILY MEDICINE CLINIC | Facility: CLINIC | Age: 58
End: 2018-12-21

## 2018-12-21 VITALS
BODY MASS INDEX: 46.13 KG/M2 | OXYGEN SATURATION: 94 % | RESPIRATION RATE: 21 BRPM | TEMPERATURE: 97.2 F | SYSTOLIC BLOOD PRESSURE: 119 MMHG | HEART RATE: 58 BPM | HEIGHT: 60 IN | DIASTOLIC BLOOD PRESSURE: 78 MMHG | WEIGHT: 235 LBS

## 2018-12-21 DIAGNOSIS — J20.8 ACUTE VIRAL BRONCHITIS: ICD-10-CM

## 2018-12-21 DIAGNOSIS — B37.2 CUTANEOUS CANDIDIASIS: Primary | ICD-10-CM

## 2018-12-21 DIAGNOSIS — E11.628 TYPE 2 DIABETES MELLITUS WITH OTHER SKIN COMPLICATION, WITHOUT LONG-TERM CURRENT USE OF INSULIN (HCC): ICD-10-CM

## 2018-12-21 DIAGNOSIS — B34.9 ACUTE VIRAL SYNDROME: ICD-10-CM

## 2018-12-21 PROCEDURE — 99213 OFFICE O/P EST LOW 20 MIN: CPT | Performed by: NURSE PRACTITIONER

## 2018-12-21 RX ORDER — AMITRIPTYLINE HYDROCHLORIDE 10 MG/1
TABLET, FILM COATED ORAL
COMMUNITY
Start: 2018-03-05 | End: 2019-01-07 | Stop reason: DRUGHIGH

## 2018-12-21 RX ORDER — NADOLOL 20 MG/1
20 TABLET ORAL
COMMUNITY
Start: 2018-11-20 | End: 2019-02-20 | Stop reason: SDUPTHER

## 2018-12-21 RX ORDER — OMEPRAZOLE 20 MG/1
CAPSULE, DELAYED RELEASE ORAL
COMMUNITY
Start: 2018-11-20 | End: 2021-02-04 | Stop reason: SDUPTHER

## 2018-12-21 RX ORDER — NYSTATIN 100000 [USP'U]/G
POWDER TOPICAL
Qty: 30 G | Refills: 2 | Status: SHIPPED | OUTPATIENT
Start: 2018-12-21 | End: 2019-02-18 | Stop reason: ALTCHOICE

## 2018-12-21 RX ORDER — TRAMADOL HYDROCHLORIDE 50 MG/1
50 TABLET ORAL
COMMUNITY
Start: 2017-09-07 | End: 2018-12-21 | Stop reason: SDUPTHER

## 2018-12-21 RX ORDER — NYSTATIN 100000 U/G
CREAM TOPICAL
Qty: 30 G | Refills: 2 | Status: SHIPPED | OUTPATIENT
Start: 2018-12-21 | End: 2019-02-18 | Stop reason: ALTCHOICE

## 2018-12-21 RX ORDER — DEXTROMETHORPHAN HYDROBROMIDE AND PROMETHAZINE HYDROCHLORIDE 15; 6.25 MG/5ML; MG/5ML
5 SYRUP ORAL 4 TIMES DAILY PRN
Qty: 240 ML | Refills: 0 | Status: SHIPPED | OUTPATIENT
Start: 2018-12-21 | End: 2019-08-09

## 2018-12-21 RX ORDER — CYCLOBENZAPRINE HCL 10 MG
10 TABLET ORAL
COMMUNITY
End: 2019-04-01 | Stop reason: SDUPTHER

## 2018-12-21 RX ORDER — BENAZEPRIL HYDROCHLORIDE 20 MG/1
20 TABLET ORAL
COMMUNITY
Start: 2017-07-05 | End: 2019-03-16 | Stop reason: SDUPTHER

## 2018-12-21 NOTE — PROGRESS NOTES
"Chief Complaint   Patient presents with   • Rash     under left breast   • Cough        Subjective   Della Gonzalez is a 58 y.o.  female who presents today for rash under breast and in groins.  She also has a cough and stuffy nose.    HPI:  The rash has been present for over a month but has worsened this past week.  She says her sugars have been \"a little high.\"  The area under the breast is worse than the groins.  The stuffy nose and cough started a few days ago.  She relates that last night her throat was very sore last night.  The cough is worse at night and is productive, thick yellow mucus.    Della Gonzalez  has a past medical history of Cirrhosis of liver (CMS/HCC), Diabetes mellitus (CMS/HCC), GERD (gastroesophageal reflux disease), Hypertension, and Liver disease.    No Known Allergies    Current Outpatient Medications:   •  amitriptyline (ELAVIL) 10 MG tablet, TAKE ONE TABLET BY MOUTH EVERY NIGHT, Disp: , Rfl:   •  benazepril (LOTENSIN) 20 MG tablet, Take 20 mg by mouth., Disp: , Rfl:   •  cyclobenzaprine (FLEXERIL) 10 MG tablet, Take 10 mg by mouth., Disp: , Rfl:   •  Ertugliflozin L-PyroglutamicAc (STEGLATRO PO), Take  by mouth., Disp: , Rfl:   •  nadolol (CORGARD) 20 MG tablet, Take 20 mg by mouth., Disp: , Rfl:   •  omeprazole (priLOSEC) 20 MG capsule, TAKE ONE CAPSULE BY MOUTH EVERY DAY, Disp: , Rfl:   •  traMADol (ULTRAM) 50 MG tablet, TAKE ONE TABLET BY MOUTH THREE TIMES A DAY AS NEEDED, Disp: 60 tablet, Rfl: 2  Past Medical History:   Diagnosis Date   • Cirrhosis of liver (CMS/HCC)    • Diabetes mellitus (CMS/HCC)    • GERD (gastroesophageal reflux disease)    • Hypertension    • Liver disease      History reviewed. No pertinent surgical history.  Social History     Socioeconomic History   • Marital status:      Spouse name: Not on file   • Number of children: Not on file   • Years of education: Not on file   • Highest education level: Not on file   Tobacco Use   • Smoking status: Current " "Every Day Smoker     Packs/day: 0.50     Types: Cigarettes   • Smokeless tobacco: Never Used   Substance and Sexual Activity   • Alcohol use: No     Frequency: Never   • Drug use: No   • Sexual activity: Defer     Family History   Problem Relation Age of Onset   • Diabetes Mother    • No Known Problems Father        Family history, surgical history, past medical history, Allergies and med's reviewed with patient today and updated in [facility] EMR.     ROS:  Review of Systems   Constitutional: Positive for fatigue.   HENT: Positive for congestion, sinus pressure, sinus pain and sore throat.    Eyes: Negative.    Respiratory: Positive for cough.    Cardiovascular: Negative.    Gastrointestinal: Negative.    Endocrine: Negative.    Genitourinary: Negative.    Musculoskeletal: Negative.    Skin: Positive for rash.        Under breast and in groin   Allergic/Immunologic: Negative.    Neurological: Negative.    Hematological: Negative.    Psychiatric/Behavioral: Negative.        OBJECTIVE:  Vitals:    12/21/18 1030   BP: 119/78   BP Location: Right arm   Patient Position: Sitting   Cuff Size: Large Adult   Pulse: 58   Resp: 21   Temp: 97.2 °F (36.2 °C)   TempSrc: Temporal   SpO2: 94%   Weight: 107 kg (235 lb)   Height: 152.4 cm (60\")     Physical Exam   Constitutional: She is oriented to person, place, and time.   HENT:   Head: Normocephalic and atraumatic.   Right Ear: External ear normal.   Left Ear: External ear normal.   Nose: Nose normal.   Mouth/Throat: Oropharynx is clear and moist.   Eyes: Conjunctivae and EOM are normal. Pupils are equal, round, and reactive to light.   Neck: Normal range of motion. Neck supple.   Cardiovascular: Normal rate, regular rhythm and normal heart sounds.   Pulmonary/Chest: Effort normal and breath sounds normal.   Abdominal: Soft.   Musculoskeletal: Normal range of motion.   Neurological: She is alert and oriented to person, place, and time.   Skin: Skin is warm and dry. Rash noted.   Shiny " erythematous area under breasts, left much worse than right.  Consistent with candidiasis.  The groins are the same but not as large of an area or not as erythematous as the breast   Psychiatric: She has a normal mood and affect.   Nursing note and vitals reviewed.      ASSESSMENT/ PLAN:    Della was seen today for rash and cough.    Diagnoses and all orders for this visit:    Cutaneous candidiasis  -     nystatin (MYCOSTATIN) 015998 UNIT/GM powder; Apply to groins as directed twice daily until healed  -     nystatin (MYCOSTATIN) 140785 UNIT/GM cream; Apply to area under breasts as directed twice daily    Type 2 diabetes mellitus with other skin complication, without long-term current use of insulin (CMS/[facility])  -     Comprehensive Metabolic Panel; Future  -     Hemoglobin A1c; Future    Acute viral syndrome    Acute viral bronchitis  -     promethazine-dextromethorphan (PROMETHAZINE-DM) 6.25-15 MG/5ML syrup; Take 5 mL by mouth 4 (Four) Times a Day As Needed for Cough.        Orders Placed Today:     New Medications Ordered This Visit   Medications   • nystatin (MYCOSTATIN) 635316 UNIT/GM powder     Sig: Apply to groins as directed twice daily until healed     Dispense:  30 g     Refill:  2   • nystatin (MYCOSTATIN) 011029 UNIT/GM cream     Sig: Apply to area under breasts as directed twice daily     Dispense:  30 g     Refill:  2   • promethazine-dextromethorphan (PROMETHAZINE-DM) 6.25-15 MG/5ML syrup     Sig: Take 5 mL by mouth 4 (Four) Times a Day As Needed for Cough.     Dispense:  240 mL     Refill:  0        Management Plan:     An After Visit Summary was printed and given to the patient at discharge.    Follow-up: Return in about 3 months (around 3/21/2019) for Next scheduled follow up.    Vijaya Henao, APRN 12/21/2018 11:07 AM  This note was electronically signed.

## 2018-12-28 ENCOUNTER — TELEPHONE (OUTPATIENT)
Dept: FAMILY MEDICINE CLINIC | Facility: CLINIC | Age: 58
End: 2018-12-28

## 2018-12-28 NOTE — TELEPHONE ENCOUNTER
----- Message from MELECIO Throne sent at 12/27/2018  8:05 AM CST -----  Needs appointment to discuss abnormal lab values, no urgency.  
Called Pt and left vm to call back so we can make appt.    Thanks.   
T(C): 37 (12 Jul 2017 23:42), Max: 37 (12 Jul 2017 23:42)  T(F): 98.6 (12 Jul 2017 23:42), Max: 98.6 (12 Jul 2017 23:42)  HR: 88 (12 Jul 2017 23:42) (88 - 94)  BP: 100/52 (12 Jul 2017 23:42) (100/52 - 119/52)  BP(mean): --  RR: 18 (12 Jul 2017 23:42) (18 - 20)  SpO2: 94% (12 Jul 2017 23:42) (94% - 98%)        PHYSICAL EXAM:  GENERAL: NAD, well-groomed, well-developed  HEAD:  Atraumatic, Normocephalic  EYES: EOMI, PERRLA, pale conjunctiva and sclera clear  ENMT: No tonsillar erythema, exudates, or enlargement; Moist mucous membranes, No lesions  NECK: Supple, No JVD, Normal thyroid  NERVOUS SYSTEM:  Alert & Oriented X3, Good concentration; Motor Strength 5/5 B/L upper and lower extremities; DTRs 2+ intact and symmetric  CHEST/LUNG: Clear to percussion bilaterally; No rales, rhonchi, wheezing, or rubs  HEART: Regular rate and rhythm; No murmurs, rubs, or gallops  ABDOMEN: Soft, Nontender, Nondistended; Bowel sounds present  EXTREMITIES:  2+ Peripheral Pulses, No clubbing, cyanosis, 1+ edema  LYMPH: No lymphadenopathy noted  SKIN: No rashes or lesions

## 2019-01-07 ENCOUNTER — OFFICE VISIT (OUTPATIENT)
Dept: FAMILY MEDICINE CLINIC | Facility: CLINIC | Age: 59
End: 2019-01-07

## 2019-01-07 VITALS
BODY MASS INDEX: 46.77 KG/M2 | RESPIRATION RATE: 19 BRPM | TEMPERATURE: 97 F | HEIGHT: 60 IN | OXYGEN SATURATION: 94 % | WEIGHT: 238.2 LBS | DIASTOLIC BLOOD PRESSURE: 70 MMHG | SYSTOLIC BLOOD PRESSURE: 108 MMHG | HEART RATE: 60 BPM

## 2019-01-07 DIAGNOSIS — E11.9 TYPE 2 DIABETES MELLITUS WITHOUT COMPLICATION, WITHOUT LONG-TERM CURRENT USE OF INSULIN (HCC): Primary | ICD-10-CM

## 2019-01-07 DIAGNOSIS — R05.9 COUGH: ICD-10-CM

## 2019-01-07 DIAGNOSIS — F51.01 PRIMARY INSOMNIA: ICD-10-CM

## 2019-01-07 DIAGNOSIS — F51.01 PRIMARY INSOMNIA: Primary | ICD-10-CM

## 2019-01-07 DIAGNOSIS — K70.30 ALCOHOLIC CIRRHOSIS OF LIVER WITHOUT ASCITES (HCC): ICD-10-CM

## 2019-01-07 DIAGNOSIS — J01.11 ACUTE RECURRENT FRONTAL SINUSITIS: ICD-10-CM

## 2019-01-07 PROCEDURE — 99213 OFFICE O/P EST LOW 20 MIN: CPT | Performed by: NURSE PRACTITIONER

## 2019-01-07 RX ORDER — AMOXICILLIN 500 MG/1
500 CAPSULE ORAL 3 TIMES DAILY
Qty: 21 CAPSULE | Refills: 0 | Status: SHIPPED | OUTPATIENT
Start: 2019-01-07 | End: 2019-01-14

## 2019-01-07 RX ORDER — AMITRIPTYLINE HYDROCHLORIDE 100 MG/1
100 TABLET, FILM COATED ORAL NIGHTLY
Qty: 30 TABLET | Refills: 5 | Status: SHIPPED | OUTPATIENT
Start: 2019-01-07 | End: 2019-06-25 | Stop reason: SDUPTHER

## 2019-01-07 RX ORDER — AMITRIPTYLINE HYDROCHLORIDE 25 MG/1
25 TABLET, FILM COATED ORAL NIGHTLY
Qty: 30 TABLET | Refills: 5 | Status: SHIPPED | OUTPATIENT
Start: 2019-01-07 | End: 2019-01-07 | Stop reason: DRUGHIGH

## 2019-01-07 NOTE — PROGRESS NOTES
Subjective   Della Gonzalez is a 58 y.o. female for follow up lab work.     History of Present Illness Della presents today to follow up on her labs.  She has been checking her sugars but they continue around 200 most of the time.  She also complains of sinus symptoms and productive cough.  Afebrile.  She continues to smoke but states she is trying to quit.  She further states that the Elavil is working but not strong enough. She otherwise offers no complaints today.    The following portions of the patient's history were reviewed and updated as appropriate: allergies, current medications, past family history, past medical history, past social history, past surgical history and problem list.    Review of Systems   Constitutional: Positive for fatigue.   HENT: Positive for congestion, postnasal drip and sinus pressure.    Eyes: Negative.    Respiratory: Positive for cough and choking.    Gastrointestinal: Negative.    Endocrine: Negative.    Genitourinary: Negative.    Musculoskeletal: Positive for arthralgias.   Allergic/Immunologic: Negative.    Neurological: Positive for headache.   Hematological: Negative.    Psychiatric/Behavioral: Negative.        Objective   Physical Exam   Constitutional: She is oriented to person, place, and time. She appears well-developed and well-nourished.   HENT:   Head: Normocephalic and atraumatic.   Right Ear: External ear normal.   Left Ear: External ear normal.   Nose: Nose normal.   Mouth/Throat: Oropharynx is clear and moist.   Eyes: Conjunctivae and EOM are normal. Pupils are equal, round, and reactive to light.   Neck: Normal range of motion. Neck supple.   Cardiovascular: Normal rate, regular rhythm and normal heart sounds.   Pulmonary/Chest: Effort normal and breath sounds normal.   Abdominal: Soft.   Musculoskeletal: Normal range of motion.   Neurological: She is alert and oriented to person, place, and time.   Skin: Skin is warm and dry.   Psychiatric: She has a normal mood  and affect. Her behavior is normal. Judgment and thought content normal.   Nursing note and vitals reviewed.        Assessment/Plan   Della was seen today for results, rash, nasal congestion and insomnia.    Diagnoses and all orders for this visit:    Type 2 diabetes mellitus without complication, without long-term current use of insulin (CMS/HCC)    Acute recurrent frontal sinusitis  -     amoxicillin (AMOXIL) 500 MG capsule; Take 1 capsule by mouth 3 (Three) Times a Day for 7 days.    Cough    Alcoholic cirrhosis of liver without ascites (CMS/HCC)    Primary insomnia  -     amitriptyline (ELAVIL) 25 MG tablet; Take 1 tablet by mouth Every Night.    Follow up in 3 months with labs prior.

## 2019-01-24 ENCOUNTER — TELEPHONE (OUTPATIENT)
Dept: FAMILY MEDICINE CLINIC | Facility: CLINIC | Age: 59
End: 2019-01-24

## 2019-01-24 NOTE — TELEPHONE ENCOUNTER
PT called wanting test results sent over to [facility], could not provide fax # or phone # PT has appt. On 1/30 and can discuss at that time.

## 2019-02-04 ENCOUNTER — OFFICE VISIT (OUTPATIENT)
Dept: FAMILY MEDICINE CLINIC | Facility: CLINIC | Age: 59
End: 2019-02-04

## 2019-02-04 VITALS
DIASTOLIC BLOOD PRESSURE: 68 MMHG | SYSTOLIC BLOOD PRESSURE: 124 MMHG | TEMPERATURE: 97.6 F | WEIGHT: 232.4 LBS | OXYGEN SATURATION: 97 % | BODY MASS INDEX: 45.62 KG/M2 | HEART RATE: 79 BPM | HEIGHT: 60 IN

## 2019-02-04 DIAGNOSIS — E78.2 MIXED HYPERLIPIDEMIA: Primary | ICD-10-CM

## 2019-02-04 DIAGNOSIS — R63.4 WEIGHT LOSS: ICD-10-CM

## 2019-02-04 DIAGNOSIS — E66.01 MORBIDLY OBESE (HCC): ICD-10-CM

## 2019-02-04 DIAGNOSIS — E11.628 TYPE 2 DIABETES MELLITUS WITH OTHER SKIN COMPLICATION, WITHOUT LONG-TERM CURRENT USE OF INSULIN (HCC): ICD-10-CM

## 2019-02-04 PROBLEM — E11.9 DIABETES MELLITUS: Status: ACTIVE | Noted: 2019-02-04

## 2019-02-04 PROCEDURE — 99213 OFFICE O/P EST LOW 20 MIN: CPT | Performed by: FAMILY MEDICINE

## 2019-02-04 NOTE — PROGRESS NOTES
OFFICE VISIT NOTE:    Della Gonzalez is a 58 y.o. female who presents today for Diabetes (check up, discuss statins).     History of elevated liver enzymes with statins in [place] = not tolerant to them. No recent lipid tests.       Diabetes   She presents for her follow-up diabetic visit. She has type 2 diabetes mellitus. Her disease course has been stable. There are no hypoglycemic associated symptoms. There are no diabetic associated symptoms. Pertinent negatives for diabetes include no chest pain, no fatigue and no weight loss. There are no hypoglycemic complications. Symptoms are stable. There are no diabetic complications. Risk factors for coronary artery disease include diabetes mellitus, family history, dyslipidemia, obesity, sedentary lifestyle, post-menopausal, stress and tobacco exposure. Current diabetic treatment includes oral agent (dual therapy). She is compliant with treatment most of the time. She is following a diabetic and low fat/cholesterol diet. Meal planning includes avoidance of concentrated sweets. She has not had a previous visit with a dietitian. She participates in exercise weekly. There is no change in her home blood glucose trend. An ACE inhibitor/angiotensin II receptor blocker is being taken. She does not see a podiatrist.Eye exam is current.        Past medical/surgical history, Family history, Social history, Allergies and Medications have been reviewed with the patient today and are updated in [facility] EMR. See below.    Past Medical History:   Diagnosis Date   • Cirrhosis of liver (CMS/HCC)    • Diabetes mellitus (CMS/HCC)    • GERD (gastroesophageal reflux disease)    • Hypertension    • Liver disease      History reviewed. No pertinent surgical history.  Family History   Problem Relation Age of Onset   • Diabetes Mother    • No Known Problems Father      Social History     Tobacco Use   • Smoking status: Current Every Day Smoker     Packs/day: 0.50     Types: Cigarettes   •  "Smokeless tobacco: Never Used   Substance Use Topics   • Alcohol use: No     Frequency: Never     Binge frequency: Never   • Drug use: No       Allergies:  Patient has no known allergies.      Review of Systems:    Review of Systems   Constitutional: Negative for activity change, appetite change, fatigue, fever, unexpected weight gain and unexpected weight loss.   Respiratory: Negative for shortness of breath.    Cardiovascular: Negative for chest pain.   Gastrointestinal: Negative for abdominal pain.   Genitourinary: Negative for difficulty urinating.   Skin: Negative for rash.   Neurological: Negative for syncope and headache.         Physical Examination:  Vital Signs:  /68 (BP Location: Left arm, Patient Position: Sitting, Cuff Size: Adult)   Pulse 79   Temp 97.6 °F (36.4 °C) (Tympanic)   Ht 152.4 cm (60\")   Wt 105 kg (232 lb 6.4 oz)   SpO2 97%   BMI 45.39 kg/m²   Physical Exam   Constitutional: She is oriented to person, place, and time. She appears well-developed and well-nourished. No distress.   HENT:   Head: Normocephalic and atraumatic.   Mouth/Throat: Oropharynx is clear and moist.   Neck: Normal range of motion. Neck supple. No JVD present.   Cardiovascular: Normal rate, regular rhythm, normal heart sounds and intact distal pulses.   Pulmonary/Chest: Effort normal and breath sounds normal. No respiratory distress.   Abdominal: Soft. She exhibits no distension. There is no tenderness.   Musculoskeletal: Normal range of motion. She exhibits no edema.   Neurological: She is alert and oriented to person, place, and time. No cranial nerve deficit.   Skin: Skin is warm and dry. Capillary refill takes less than 2 seconds. No rash noted.   Psychiatric: She has a normal mood and affect. Her behavior is normal.   Nursing note and vitals reviewed.      Procedures      ASSESSMENT/ PLAN:    Della was seen today for diabetes.    Diagnoses and all orders for this visit:    Mixed hyperlipidemia  -     " Comprehensive Metabolic Panel; Future  -     Lipid Panel; Future  -     Lipid Panel  -     Comprehensive Metabolic Panel    Type 2 diabetes mellitus with other skin complication, without long-term current use of insulin (CMS/HCC)  -     Comprehensive Metabolic Panel; Future  -     Lipid Panel; Future  -     T4; Future  -     TSH; Future  -     TSH  -     T4  -     Lipid Panel  -     Comprehensive Metabolic Panel    Morbidly obese (CMS/HCC)  -     Comprehensive Metabolic Panel; Future  -     Lipid Panel; Future  -     T4; Future  -     TSH; Future  -     TSH  -     T4  -     Lipid Panel  -     Comprehensive Metabolic Panel    Weight loss  -     T4; Future  -     TSH; Future  -     TSH  -     T4        Specific Patient Instructions:  Regular exercise as tolerated. Aerobic activity 30 minutes per day, 3 times a week recommended for heart health. Risk, benefits, and merits of treatment, alternatives reviewed with the patient and questions answered.      Current Outpatient Medications:   •  amitriptyline (ELAVIL) 100 MG tablet, Take 1 tablet by mouth Every Night., Disp: 30 tablet, Rfl: 5  •  benazepril (LOTENSIN) 20 MG tablet, Take 20 mg by mouth., Disp: , Rfl:   •  cyclobenzaprine (FLEXERIL) 10 MG tablet, Take 10 mg by mouth., Disp: , Rfl:   •  Ertugliflozin L-PyroglutamicAc (STEGLATRO PO), Take  by mouth., Disp: , Rfl:   •  nadolol (CORGARD) 20 MG tablet, Take 20 mg by mouth., Disp: , Rfl:   •  nystatin (MYCOSTATIN) 125269 UNIT/GM cream, Apply to area under breasts as directed twice daily, Disp: 30 g, Rfl: 2  •  nystatin (MYCOSTATIN) 602054 UNIT/GM powder, Apply to groins as directed twice daily until healed, Disp: 30 g, Rfl: 2  •  omeprazole (priLOSEC) 20 MG capsule, TAKE ONE CAPSULE BY MOUTH EVERY DAY, Disp: , Rfl:   •  promethazine-dextromethorphan (PROMETHAZINE-DM) 6.25-15 MG/5ML syrup, Take 5 mL by mouth 4 (Four) Times a Day As Needed for Cough., Disp: 240 mL, Rfl: 0  •  traMADol (ULTRAM) 50 MG tablet, TAKE ONE  TABLET BY MOUTH THREE TIMES A DAY AS NEEDED, Disp: 60 tablet, Rfl: 2    FOLLOW-UP:    Return in about 2 weeks (around 2/18/2019) for Recheck.    I discussed the patients findings and my recommendations with patient.  An After Visit Summary (AVS) was printed and given to the patient at discharge.    Donny Aguayo MD, FAAFP  2/4/2019

## 2019-02-04 NOTE — PATIENT INSTRUCTIONS
Cholesterol  Cholesterol is a white, waxy, fat-like substance that is needed by the human body in small amounts. The liver makes all the cholesterol we need. Cholesterol is carried from the liver by the blood through the blood vessels. Deposits of cholesterol (plaques) may build up on blood vessel (artery) walls. Plaques make the arteries narrower and stiffer. Cholesterol plaques increase the risk for heart attack and stroke.  You cannot feel your cholesterol level even if it is very high. The only way to know that it is high is to have a blood test. Once you know your cholesterol levels, you should keep a record of the test results. Work with your health care provider to keep your levels in the desired range.  What do the results mean?  · Total cholesterol is a rough measure of all the cholesterol in your blood.  · LDL (low-density lipoprotein) is the “bad” cholesterol. This is the type that causes plaque to build up on the artery walls. You want this level to be low.  · HDL (high-density lipoprotein) is the “good” cholesterol because it cleans the arteries and carries the LDL away. You want this level to be high.  · Triglycerides are fat that the body can either burn for energy or store. High levels are closely linked to heart disease.  What are the desired levels of cholesterol?  · Total cholesterol below 200.  · LDL below 100 for people who are at risk, below 70 for people at very high risk.  · HDL above 40 is good. A level of 60 or higher is considered to be protective against heart disease.  · Triglycerides below 150.  How can I lower my cholesterol?  Diet  Follow your diet program as told by your health care provider.  · Choose fish or white meat chicken and turkey, roasted or baked. Limit fatty cuts of red meat, fried foods, and processed meats, such as sausage and lunch meats.  · Eat lots of fresh fruits and vegetables.  · Choose whole grains, beans, pasta, potatoes, and cereals.  · Choose olive oil, corn  oil, or canola oil, and use only small amounts.  · Avoid butter, mayonnaise, shortening, or palm kernel oils.  · Avoid foods with trans fats.  · Drink skim or nonfat milk and eat low-fat or nonfat yogurt and cheeses. Avoid whole milk, cream, ice cream, egg yolks, and full-fat cheeses.  · Healthier desserts include quang food cake, gemma snaps, animal crackers, hard candy, popsicles, and low-fat or nonfat frozen yogurt. Avoid pastries, cakes, pies, and cookies.    Exercise  · Follow your exercise program as told by your health care provider. A regular program:  ? Helps to decrease LDL and raise HDL.  ? Helps with weight control.  · Do things that increase your activity level, such as gardening, walking, and taking the stairs.  · Ask your health care provider about ways that you can be more active in your daily life.    Medicine  · Take over-the-counter and prescription medicines only as told by your health care provider.  ? Medicine may be prescribed by your health care provider to help lower cholesterol and decrease the risk for heart disease. This is usually done if diet and exercise have failed to bring down cholesterol levels.  ? If you have several risk factors, you may need medicine even if your levels are normal.    This information is not intended to replace advice given to you by your health care provider. Make sure you discuss any questions you have with your health care provider.  Document Released: 09/12/2002 Document Revised: 07/15/2017 Document Reviewed: 06/17/2017  [employer] Interactive Patient Education © 2018 [employer]    Diabetes: Insulin non dependent. Nephropathy, Retinopathy, Neuropahty status discussed.  Discussed goals of Diabetes today.  Goal Hgb A1C <7.0 for most patient.  Good BP control is encouraged with Goal BP based on JNC 8 guidelines 2014 <140/90.  Discussed role of Ace-I and ARB with DM.  DM imparts risk equivalence for CAD based on ATP III.  Current guidelines support moderate  intensity statin with goal of 30-50% reduction in LDL unless 10 yr risk ASCVD >7.5 then high intensity should be used. Close monitoring of Lipid levels encouraged. Recommend once yearly eye evaluation by optometry or ophthalmology.  Good foot health discussed and foot exam completed.  Recommended toe health, wear good shoes, cut nails straight across and tend calluses if present. Take medications as encouraged.  Monitor blood sugars as encouraged and bring log to future meetings. Weight needs to be monitored. Monitor portions and caloric intake.  Pneumovax frequency discussed.   a. Labs: CMP, Microalbumin, A1C  b. Encouraged pt to bring glucose logs to each appointment  c. Encouraged self foot exams, and yearly eye exams.  d. Encouraged to lose weight/please see information provided  e. Recommend regular exercise   f. Medications as listed in today's visit

## 2019-02-05 ENCOUNTER — TELEPHONE (OUTPATIENT)
Dept: FAMILY MEDICINE CLINIC | Facility: CLINIC | Age: 59
End: 2019-02-05

## 2019-02-05 DIAGNOSIS — E11.628 TYPE 2 DIABETES MELLITUS WITH OTHER SKIN COMPLICATION, WITHOUT LONG-TERM CURRENT USE OF INSULIN (HCC): Primary | ICD-10-CM

## 2019-02-08 DIAGNOSIS — K74.60 CIRRHOSIS OF LIVER WITHOUT ASCITES, UNSPECIFIED HEPATIC CIRRHOSIS TYPE (HCC): ICD-10-CM

## 2019-02-10 ENCOUNTER — RESULTS ENCOUNTER (OUTPATIENT)
Dept: FAMILY MEDICINE CLINIC | Facility: CLINIC | Age: 59
End: 2019-02-10

## 2019-02-10 DIAGNOSIS — E11.628 TYPE 2 DIABETES MELLITUS WITH OTHER SKIN COMPLICATION, WITHOUT LONG-TERM CURRENT USE OF INSULIN (HCC): ICD-10-CM

## 2019-02-12 ENCOUNTER — CLINICAL SUPPORT (OUTPATIENT)
Dept: FAMILY MEDICINE CLINIC | Facility: CLINIC | Age: 59
End: 2019-02-12

## 2019-02-12 DIAGNOSIS — E11.628 TYPE 2 DIABETES MELLITUS WITH OTHER SKIN COMPLICATION, WITHOUT LONG-TERM CURRENT USE OF INSULIN (HCC): Primary | ICD-10-CM

## 2019-02-12 RX ORDER — OMEPRAZOLE 20 MG/1
CAPSULE, DELAYED RELEASE ORAL
Qty: 30 CAPSULE | Refills: 1 | Status: SHIPPED | OUTPATIENT
Start: 2019-02-12 | End: 2019-03-19 | Stop reason: SDUPTHER

## 2019-02-17 ENCOUNTER — RESULTS ENCOUNTER (OUTPATIENT)
Dept: FAMILY MEDICINE CLINIC | Facility: CLINIC | Age: 59
End: 2019-02-17

## 2019-02-17 DIAGNOSIS — E11.628 TYPE 2 DIABETES MELLITUS WITH OTHER SKIN COMPLICATION, WITHOUT LONG-TERM CURRENT USE OF INSULIN (HCC): ICD-10-CM

## 2019-02-18 ENCOUNTER — OFFICE VISIT (OUTPATIENT)
Dept: FAMILY MEDICINE CLINIC | Facility: CLINIC | Age: 59
End: 2019-02-18

## 2019-02-18 VITALS
HEART RATE: 88 BPM | BODY MASS INDEX: 46.41 KG/M2 | TEMPERATURE: 97 F | OXYGEN SATURATION: 98 % | SYSTOLIC BLOOD PRESSURE: 128 MMHG | DIASTOLIC BLOOD PRESSURE: 62 MMHG | HEIGHT: 60 IN | WEIGHT: 236.4 LBS

## 2019-02-18 DIAGNOSIS — E11.628 TYPE 2 DIABETES MELLITUS WITH OTHER SKIN COMPLICATION, WITHOUT LONG-TERM CURRENT USE OF INSULIN (HCC): Primary | ICD-10-CM

## 2019-02-18 DIAGNOSIS — L20.84 INTRINSIC ECZEMA: ICD-10-CM

## 2019-02-18 DIAGNOSIS — B35.4 TINEA CORPORIS: ICD-10-CM

## 2019-02-18 PROBLEM — R51.9 NONINTRACTABLE HEADACHE: Status: ACTIVE | Noted: 2017-07-05

## 2019-02-18 PROBLEM — K29.70 GASTRITIS WITHOUT BLEEDING: Status: ACTIVE | Noted: 2019-02-18

## 2019-02-18 PROBLEM — I85.00 ESOPHAGEAL VARICES DETERMINED BY ENDOSCOPY (HCC): Status: ACTIVE | Noted: 2018-03-05

## 2019-02-18 PROBLEM — I10 ESSENTIAL HYPERTENSION: Status: ACTIVE | Noted: 2017-07-05

## 2019-02-18 PROBLEM — K74.60 CIRRHOSIS OF LIVER WITHOUT ASCITES: Status: ACTIVE | Noted: 2017-07-05

## 2019-02-18 PROBLEM — R77.2 ELEVATED AFP: Status: ACTIVE | Noted: 2018-03-05

## 2019-02-18 PROBLEM — K76.6 PORTAL HYPERTENSION: Status: ACTIVE | Noted: 2018-03-05

## 2019-02-18 PROBLEM — Z86.19 HISTORY OF HEPATITIS C: Status: ACTIVE | Noted: 2017-09-07

## 2019-02-18 PROCEDURE — 99214 OFFICE O/P EST MOD 30 MIN: CPT | Performed by: FAMILY MEDICINE

## 2019-02-18 RX ORDER — KETOCONAZOLE 20 MG/G
CREAM TOPICAL DAILY
Qty: 60 G | Refills: 2 | Status: SHIPPED | OUTPATIENT
Start: 2019-02-18 | End: 2020-01-07 | Stop reason: SDUPTHER

## 2019-02-18 NOTE — PATIENT INSTRUCTIONS
Body Ringworm  Body ringworm is an infection of the skin that often causes a ring-shaped rash. Body ringworm can affect any part of your skin. It can spread easily to others. Body ringworm is also called tinea corporis.  What are the causes?  This condition is caused by funguses called dermatophytes. The condition develops when these funguses grow out of control on the skin.  You can get this condition if you touch a person or animal that has it. You can also get it if you share clothing, bedding, towels, or any other object with an infected person or pet.  What increases the risk?  This condition is more likely to develop in:  · Athletes who often make skin-to-skin contact with other athletes, such as wrestlers.  · People who share equipment and mats.  · People with a weakened immune system.    What are the signs or symptoms?  Symptoms of this condition include:  · Itchy, raised red spots and bumps.  · Red scaly patches.  · A ring-shaped rash. The rash may have:  ? A clear center.  ? Scales or red bumps at its center.  ? Redness near its borders.  ? Dry and scaly skin on or around it.    How is this diagnosed?  This condition can usually be diagnosed with a skin exam. A skin scraping may be taken from the affected area and examined under a microscope to see if the fungus is present.  How is this treated?  This condition may be treated with:  · An antifungal cream or ointment.  · An antifungal shampoo.  · Antifungal medicines. These may be prescribed if your ringworm is severe, keeps coming back, or lasts a long time.    Follow these instructions at home:  · Take over-the-counter and prescription medicines only as told by your health care provider.  · If you were given an antifungal cream or ointment:  ? Use it as told by your health care provider.  ? Wash the infected area and dry it completely before applying the cream or ointment.  · If you were given an antifungal shampoo:  ? Use it as told by your health care  provider.  ? Leave the shampoo on your body for 3-5 minutes before rinsing.  · While you have a rash:  ? Wear loose clothing to stop clothes from rubbing and irritating it.  ? Wash or change your bed sheets every night.  · If your pet has the same infection, take your pet to see a .  How is this prevented?  · Practice good hygiene.  · Wear sandals or shoes in public places and showers.  · Do not share personal items with others.  · Avoid touching red patches of skin on other people.  · Avoid touching pets that have bald spots.  · If you touch an animal that has a bald spot, wash your hands.  Contact a health care provider if:  · Your rash continues to spread after 7 days of treatment.  · Your rash is not gone in 4 weeks.  · The area around your rash gets red, warm, tender, and swollen.  This information is not intended to replace advice given to you by your health care provider. Make sure you discuss any questions you have with your health care provider.  Document Released: 12/15/2001 Document Revised: 05/25/2017 Document Reviewed: 10/13/2016  [employer] Interactive Patient Education © 2018 [employer]  Eczema  Eczema is a broad term for a group of skin conditions that cause skin to become rough and inflamed. Each type of eczema has different triggers, symptoms, and treatments. Eczema of any type is usually itchy and symptoms range from mild to severe.  Eczema and its symptoms are not spread from person to person (are not contagious). It can appear on different parts of the body at different times. Your eczema may not look the same as someone else's eczema.  What are the types of eczema?  Atopic dermatitis  This is a long-term (chronic) skin disease that keeps coming back (recurring). Usual symptoms are dry skin and small, solid pimples that may swell and leak fluid (weep).  Contact dermatitis  This happens when something irritates the skin and causes a rash. The irritation can come from substances that  "you are allergic to (allergens), such as poison ivy, chemicals, or medicines that were applied to your skin.  Dyshidrotic eczema  This is a form of eczema on the hands and feet. It shows up as very itchy, fluid-filled blisters. It can affect people of any age, but is more common before age 40.  Hand eczema  This causes very itchy areas of skin on the palms and sides of the hands and fingers. This type of eczema is common in industrial jobs where you may be exposed to many different types of irritants.  Lichen simplex chronicus  This type of eczema occurs when a person constantly scratches one area of the body. Repeated scratching of the area leads to thickened skin (lichenification). Lichen simplex chronicus can occur along with other types of eczema. It is more common in adults, but may be seen in children as well.  Nummular eczema  This is a common type of eczema. It has no known cause. It typically causes a red, circular, crusty lesion (plaque) that may be itchy. Scratching may become a habit and can cause bleeding. Nummular eczema occurs most often in people of middle-age or older. It most often affects the hands.  Seborrheic dermatitis  This is a common skin disease that mainly affects the scalp. It may also affect any oily areas of the body, such as the face, sides of nose, eyebrows, ears, eyelids, and chest. It is marked by small scaling and redness of the skin (erythema). This can affect people of all ages. In infants, this condition is known as \"cradle cap.\"  Stasis dermatitis  This is a common skin disease that usually appears on the legs and feet. It most often occurs in people who have a condition that prevents blood from being pumped through the veins in the legs (chronic venous insufficiency). Stasis dermatitis is a chronic condition that needs long-term management.  How is eczema diagnosed?  Your health care provider will examine your skin and review your medical history. He or she may also give you " skin patch tests. These tests involve taking patches that contain possible allergens and placing them on your back. He or she will then check in a few days to see if an allergic reaction occurred.  What are the common treatments?  Treatment for eczema is based on the type of eczema you have. Hydrocortisone steroid medicine can relieve itching quickly and help reduce inflammation. This medicine may be prescribed or obtained over-the-counter, depending on the strength of the medicine that is needed.  Follow these instructions at home:  · Take over-the-counter and prescription medicines only as told by your health care provider.  · Use creams or ointments to moisturize your skin. Do not use lotions.  · Learn what triggers or irritates your symptoms. Avoid these things.  · Treat symptom flare-ups quickly.  · Do not itch your skin. This can make your rash worse.  · Keep all follow-up visits as told by your health care provider. This is important.  Where to find more information:  · The American Academy of Dermatology: www.aad.org  · The National Eczema Association: www.nationaleczema.org  Contact a health care provider if:  · You have serious itching, even with treatment.  · You regularly scratch your skin until it bleeds.  · Your rash looks different than usual.  · Your skin is painful, swollen, or more red than usual.  · You have a fever.  Summary  · There are eight general types of eczema. Each type has different triggers.  · Eczema of any type causes itching that may range from mild to severe.  · Treatment varies based on the type of eczema you have. Hydrocortisone steroid medicine can help with itching and inflammation.  · Protecting your skin is the best way to prevent eczema. Use moisturizers and lotions. Avoid triggers and irritants, and treat flare-ups quickly.  This information is not intended to replace advice given to you by your health care provider. Make sure you discuss any questions you have with your  health care provider.  Document Released: 05/03/2018 Document Revised: 05/03/2018 Document Reviewed: 05/03/2018  [employer] Interactive Patient Education © 2018 [employer]

## 2019-02-18 NOTE — PROGRESS NOTES
OFFICE VISIT NOTE:    Della Gonzalez is a 58 y.o. female who presents today for Diabetes (recheck).     Reports her sugars are doing well.      Diabetes   She presents for her follow-up diabetic visit. She has type 2 diabetes mellitus. MedicAlert identification noted. Her disease course has been fluctuating. There are no hypoglycemic associated symptoms. There are no diabetic associated symptoms. Pertinent negatives for diabetes include no chest pain, no fatigue and no weight loss. There are no hypoglycemic complications. Symptoms are stable. Diabetic complications include peripheral neuropathy. Risk factors for coronary artery disease include diabetes mellitus, family history, hypertension, obesity, sedentary lifestyle, post-menopausal, stress and tobacco exposure. Current diabetic treatment includes diet and oral agent (monotherapy). She is compliant with treatment all of the time. Her weight is fluctuating minimally. She is following a diabetic and low fat/cholesterol diet. Meal planning includes avoidance of concentrated sweets. She has had a previous visit with a dietitian. She participates in exercise weekly. There is no change in her home blood glucose trend. An ACE inhibitor/angiotensin II receptor blocker is being taken. She does not see a podiatrist.Eye exam is current.        Past medical/surgical history, Family history, Social history, Allergies and Medications have been reviewed with the patient today and are updated in [facility] EMR. See below.    Past Medical History:   Diagnosis Date   • Cirrhosis of liver (CMS/HCC)    • Diabetes mellitus (CMS/HCC)    • GERD (gastroesophageal reflux disease)    • Hypertension    • Liver disease      History reviewed. No pertinent surgical history.  Family History   Problem Relation Age of Onset   • Diabetes Mother    • No Known Problems Father      Social History     Tobacco Use   • Smoking status: Current Every Day Smoker     Packs/day: 0.50     Types: Cigarettes   •  "Smokeless tobacco: Never Used   Substance Use Topics   • Alcohol use: No     Frequency: Never     Binge frequency: Never   • Drug use: No       Allergies:  Patient has no known allergies.      Review of Systems:    Review of Systems   Constitutional: Negative for activity change, fatigue, fever, unexpected weight gain and unexpected weight loss.   Respiratory: Negative for shortness of breath.    Cardiovascular: Negative for chest pain.   Gastrointestinal: Negative for abdominal pain.   Genitourinary: Negative for difficulty urinating.   Skin: Negative for rash.   Neurological: Negative for syncope and headache.         Physical Examination:  Vital Signs:  /62 (BP Location: Left arm, Patient Position: Sitting, Cuff Size: Adult)   Pulse 88   Temp 97 °F (36.1 °C) (Tympanic)   Ht 152.4 cm (60\")   Wt 107 kg (236 lb 6.4 oz)   SpO2 98%   BMI 46.17 kg/m²   Physical Exam   Constitutional: She is oriented to person, place, and time. She appears well-developed and well-nourished. No distress.   HENT:   Head: Normocephalic and atraumatic.   Mouth/Throat: Oropharynx is clear and moist.   Neck: Normal range of motion. Neck supple. No JVD present.   Cardiovascular: Normal rate, regular rhythm, normal heart sounds and intact distal pulses.   Pulmonary/Chest: Effort normal and breath sounds normal. No respiratory distress.   Musculoskeletal: Normal range of motion. She exhibits no edema.   Neurological: She is alert and oriented to person, place, and time. No cranial nerve deficit.   Skin: Skin is warm and dry. Capillary refill takes less than 2 seconds. No rash noted.   Bilateral heel eczema and tinea corpora bilateral breasts, L>R.   Psychiatric: She has a normal mood and affect. Her behavior is normal.   Nursing note and vitals reviewed.      Procedures      ASSESSMENT/ PLAN:    Della was seen today for diabetes.    Diagnoses and all orders for this visit:    Type 2 diabetes mellitus with other skin complication, " without long-term current use of insulin (CMS/[facility])  -     Ambulatory Referral to Podiatry  -     hydrocortisone-eucerin; Apply  topically to the appropriate area as directed 2 (Two) Times a Day.    Intrinsic eczema  -     hydrocortisone-eucerin; Apply  topically to the appropriate area as directed 2 (Two) Times a Day.    Tinea corporis  -     ketoconazole (NIZORAL) 2 % cream; Apply  topically to the appropriate area as directed Daily.        Specific Patient Instructions:    MEDICATION Instructions: Encouraged patient to continue routine medicines as prescribed and maintain compliance. Patient instructed to report any adverse side effects or reactions to medicines promptly to the office. Patient instructed to make us aware of any OTC or herbal meds or supplement use.  DIET Recommendations: Diabetic (NCS, low carb, diet exchanges).  EXERCISE Instructions: Discussed with patient the need for routine aerobic activity for cardiovascular fitness, 3 times a week for about 30 minutes.    SMOKING Recommendations: Counseled patient and encouraged them on smoking cessation.  HEALTH MAINTENANCE:  N/A  MISCELLANEOUS Instructions: N/A      Medications at completion of visit:      Current Outpatient Medications:   •  amitriptyline (ELAVIL) 100 MG tablet, Take 1 tablet by mouth Every Night., Disp: 30 tablet, Rfl: 5  •  benazepril (LOTENSIN) 20 MG tablet, Take 20 mg by mouth., Disp: , Rfl:   •  cyclobenzaprine (FLEXERIL) 10 MG tablet, Take 10 mg by mouth., Disp: , Rfl:   •  Ertugliflozin L-PyroglutamicAc (STEGLATRO PO), Take  by mouth., Disp: , Rfl:   •  omeprazole (priLOSEC) 20 MG capsule, TAKE ONE CAPSULE BY MOUTH EVERY DAY, Disp: , Rfl:   •  promethazine-dextromethorphan (PROMETHAZINE-DM) 6.25-15 MG/5ML syrup, Take 5 mL by mouth 4 (Four) Times a Day As Needed for Cough., Disp: 240 mL, Rfl: 0  •  traMADol (ULTRAM) 50 MG tablet, TAKE ONE TABLET BY MOUTH THREE TIMES A DAY AS NEEDED, Disp: 60 tablet, Rfl: 2  •   hydrocortisone-eucerin, Apply  topically to the appropriate area as directed 2 (Two) Times a Day., Disp: 120 g, Rfl: 2  •  ketoconazole (NIZORAL) 2 % cream, Apply  topically to the appropriate area as directed Daily., Disp: 60 g, Rfl: 2  •  nadolol (CORGARD) 20 MG tablet, Take 1 tablet by mouth Daily., Disp: 30 tablet, Rfl: 2    FOLLOW-UP:    Return in about 4 weeks (around 3/18/2019) for Recheck.    I discussed the patients findings and my recommendations with patient.  An After Visit Summary (AVS) was printed and given to the patient at discharge.    Donny Aguayo MD, FAAFP  2/20/2019

## 2019-02-20 RX ORDER — NADOLOL 20 MG/1
20 TABLET ORAL DAILY
Qty: 30 TABLET | Refills: 2 | Status: SHIPPED | OUTPATIENT
Start: 2019-02-20 | End: 2021-03-11 | Stop reason: SDUPTHER

## 2019-02-21 DIAGNOSIS — K74.60 CIRRHOSIS OF LIVER WITHOUT ASCITES, UNSPECIFIED HEPATIC CIRRHOSIS TYPE (HCC): ICD-10-CM

## 2019-02-21 LAB
ALBUMIN SERPL-MCNC: 4.1 G/DL (ref 3.5–5.2)
ALP BLD-CCNC: 177 U/L (ref 35–104)
ALPHA FETOPROTEIN: 3.7 NG/ML (ref 0–8.3)
ALT SERPL-CCNC: 28 U/L (ref 5–33)
ANION GAP SERPL CALCULATED.3IONS-SCNC: 14 MMOL/L (ref 7–19)
AST SERPL-CCNC: 40 U/L (ref 5–32)
BILIRUB SERPL-MCNC: 0.5 MG/DL (ref 0.2–1.2)
BILIRUBIN DIRECT: 0.2 MG/DL (ref 0–0.3)
BILIRUBIN, INDIRECT: 0.3 MG/DL (ref 0.1–1)
BUN BLDV-MCNC: 12 MG/DL (ref 6–20)
CALCIUM SERPL-MCNC: 9 MG/DL (ref 8.6–10)
CHLORIDE BLD-SCNC: 103 MMOL/L (ref 98–111)
CO2: 25 MMOL/L (ref 22–29)
CREAT SERPL-MCNC: <0.5 MG/DL (ref 0.5–0.9)
GFR NON-AFRICAN AMERICAN: >60
GLUCOSE BLD-MCNC: 132 MG/DL (ref 74–109)
HCT VFR BLD CALC: 43.1 % (ref 37–47)
HEMOGLOBIN: 14.2 G/DL (ref 12–16)
INR BLD: 1.1 (ref 0.88–1.18)
MCH RBC QN AUTO: 30.1 PG (ref 27–31)
MCHC RBC AUTO-ENTMCNC: 32.9 G/DL (ref 33–37)
MCV RBC AUTO: 91.5 FL (ref 81–99)
PDW BLD-RTO: 13 % (ref 11.5–14.5)
PLATELET # BLD: 143 K/UL (ref 130–400)
PMV BLD AUTO: 10.8 FL (ref 9.4–12.3)
POTASSIUM SERPL-SCNC: 4.3 MMOL/L (ref 3.5–5)
PROTHROMBIN TIME: 13.6 SEC (ref 12–14.6)
RBC # BLD: 4.71 M/UL (ref 4.2–5.4)
SODIUM BLD-SCNC: 142 MMOL/L (ref 136–145)
TOTAL PROTEIN: 7.7 G/DL (ref 6.6–8.7)
WBC # BLD: 5.7 K/UL (ref 4.8–10.8)

## 2019-03-01 RX ORDER — TRAMADOL HYDROCHLORIDE 50 MG/1
TABLET ORAL
Qty: 60 TABLET | Refills: 2 | Status: SHIPPED | OUTPATIENT
Start: 2019-03-01 | End: 2019-06-25 | Stop reason: SDUPTHER

## 2019-03-18 RX ORDER — GLIMEPIRIDE 2 MG/1
TABLET ORAL
Qty: 30 TABLET | Refills: 5 | Status: SHIPPED | OUTPATIENT
Start: 2019-03-18 | End: 2019-04-01 | Stop reason: SDUPTHER

## 2019-03-18 RX ORDER — BENAZEPRIL HYDROCHLORIDE 20 MG/1
TABLET ORAL
Qty: 30 TABLET | Refills: 5 | Status: SHIPPED | OUTPATIENT
Start: 2019-03-18 | End: 2019-03-21

## 2019-03-21 ENCOUNTER — OFFICE VISIT (OUTPATIENT)
Dept: FAMILY MEDICINE CLINIC | Facility: CLINIC | Age: 59
End: 2019-03-21

## 2019-03-21 VITALS
OXYGEN SATURATION: 96 % | SYSTOLIC BLOOD PRESSURE: 94 MMHG | BODY MASS INDEX: 47.23 KG/M2 | HEART RATE: 64 BPM | HEIGHT: 60 IN | WEIGHT: 240.6 LBS | DIASTOLIC BLOOD PRESSURE: 65 MMHG | TEMPERATURE: 95.6 F

## 2019-03-21 DIAGNOSIS — J01.00 ACUTE NON-RECURRENT MAXILLARY SINUSITIS: ICD-10-CM

## 2019-03-21 DIAGNOSIS — J20.9 ACUTE BRONCHITIS, UNSPECIFIED ORGANISM: ICD-10-CM

## 2019-03-21 DIAGNOSIS — J02.9 ACUTE PHARYNGITIS, UNSPECIFIED ETIOLOGY: Primary | ICD-10-CM

## 2019-03-21 DIAGNOSIS — I15.9 SECONDARY HYPERTENSION: ICD-10-CM

## 2019-03-21 PROCEDURE — 99213 OFFICE O/P EST LOW 20 MIN: CPT | Performed by: NURSE PRACTITIONER

## 2019-03-21 RX ORDER — GUAIFENESIN 600 MG/1
1200 TABLET, EXTENDED RELEASE ORAL 2 TIMES DAILY
Qty: 14 TABLET | Refills: 0 | Status: SHIPPED | OUTPATIENT
Start: 2019-03-21 | End: 2019-08-09

## 2019-03-21 RX ORDER — BENAZEPRIL HYDROCHLORIDE 20 MG/1
10 TABLET ORAL DAILY
Qty: 15 TABLET | Refills: 0 | Status: SHIPPED | OUTPATIENT
Start: 2019-03-21 | End: 2019-11-08 | Stop reason: DRUGHIGH

## 2019-03-21 RX ORDER — AMOXICILLIN AND CLAVULANATE POTASSIUM 875; 125 MG/1; MG/1
1 TABLET, FILM COATED ORAL 2 TIMES DAILY
Qty: 14 TABLET | Refills: 0 | Status: SHIPPED | OUTPATIENT
Start: 2019-03-21 | End: 2019-08-09

## 2019-03-21 NOTE — PROGRESS NOTES
Chief Complaint   Patient presents with   • Sore Throat   • Cough   • Nausea        Subjective   Della Gonzalez is a 58 y.o.  female who presents today for  URI symptoms.    HPI:  Symptoms started 5-6 days ago and started with a sore throat.  Hurts to eat.  Hurts to swallow.  She admits to headache.  She has had fever at night with chills.  She does not have a thermometer.  She admits to nausea, no vomiting.  She has nasal congestion.  She a non-productive cough.  Noted, lower blood pressure today.      Della Gonzalez  has a past medical history of Cirrhosis of liver (CMS/HCC), Diabetes mellitus (CMS/HCC), GERD (gastroesophageal reflux disease), Hypertension, and Liver disease.    No Known Allergies    Current Outpatient Medications:   •  amitriptyline (ELAVIL) 100 MG tablet, Take 1 tablet by mouth Every Night., Disp: 30 tablet, Rfl: 5  •  benazepril (LOTENSIN) 20 MG tablet, TAKE ONE TABLET BY MOUTH EVERY DAY, Disp: 30 tablet, Rfl: 5  •  cyclobenzaprine (FLEXERIL) 10 MG tablet, Take 10 mg by mouth., Disp: , Rfl:   •  Ertugliflozin L-PyroglutamicAc (STEGLATRO PO), Take  by mouth., Disp: , Rfl:   •  glimepiride (AMARYL) 2 MG tablet, TAKE ONE TABLET BY MOUTH EVERY DAY IN THE MORNING, Disp: 30 tablet, Rfl: 5  •  hydrocortisone-eucerin, Apply  topically to the appropriate area as directed 2 (Two) Times a Day., Disp: 120 g, Rfl: 2  •  ketoconazole (NIZORAL) 2 % cream, Apply  topically to the appropriate area as directed Daily., Disp: 60 g, Rfl: 2  •  nadolol (CORGARD) 20 MG tablet, Take 1 tablet by mouth Daily., Disp: 30 tablet, Rfl: 2  •  omeprazole (priLOSEC) 20 MG capsule, TAKE ONE CAPSULE BY MOUTH EVERY DAY, Disp: , Rfl:   •  promethazine-dextromethorphan (PROMETHAZINE-DM) 6.25-15 MG/5ML syrup, Take 5 mL by mouth 4 (Four) Times a Day As Needed for Cough., Disp: 240 mL, Rfl: 0  •  traMADol (ULTRAM) 50 MG tablet, TAKE ONE TABLET BY MOUTH THREE TIMES A DAY AS NEEDED, Disp: 60 tablet, Rfl: 2  Past Medical History:  "  Diagnosis Date   • Cirrhosis of liver (CMS/HCC)    • Diabetes mellitus (CMS/HCC)    • GERD (gastroesophageal reflux disease)    • Hypertension    • Liver disease      No past surgical history on file.  Social History     Socioeconomic History   • Marital status:      Spouse name: Not on file   • Number of children: Not on file   • Years of education: Not on file   • Highest education level: Not on file   Tobacco Use   • Smoking status: Current Every Day Smoker     Packs/day: 0.50     Types: Cigarettes   • Smokeless tobacco: Never Used   Substance and Sexual Activity   • Alcohol use: No     Frequency: Never     Binge frequency: Never   • Drug use: No   • Sexual activity: No     Family History   Problem Relation Age of Onset   • Diabetes Mother    • No Known Problems Father        Family history, surgical history, past medical history, Allergies and med's reviewed with patient today and updated in [facility] EMR.     ROS:  Review of Systems   Constitutional: Positive for chills and fatigue.   HENT: Positive for congestion, sinus pressure, sinus pain and sore throat.    Eyes: Negative.    Respiratory: Positive for cough.    Cardiovascular: Negative.    Gastrointestinal: Positive for nausea.   Endocrine: Negative.    Genitourinary: Negative.    Musculoskeletal: Negative.    Skin: Negative.    Allergic/Immunologic: Negative.    Neurological: Positive for headaches.   Hematological: Negative.    Psychiatric/Behavioral: Negative.        OBJECTIVE:  Vitals:    03/21/19 0920   BP: 94/65   BP Location: Left arm   Patient Position: Sitting   Cuff Size: Adult   Pulse: 64   Temp: 95.6 °F (35.3 °C)   TempSrc: Tympanic   SpO2: 96%   Weight: 109 kg (240 lb 9.6 oz)   Height: 152.4 cm (60\")     Physical Exam   Constitutional: She is oriented to person, place, and time. She appears well-developed and well-nourished.   HENT:   Head: Normocephalic and atraumatic.   Right Ear: External ear normal.   Left Ear: External ear normal. "   Maxillary and ethmoid tenderness.  Ulcerations present in left nare. Oropharynx is beefy red.  TM's are clear.   Eyes: Conjunctivae and EOM are normal. Pupils are equal, round, and reactive to light.   Neck: Normal range of motion. Neck supple.   Cardiovascular: Normal rate, regular rhythm, normal heart sounds and intact distal pulses.   Pulmonary/Chest: Effort normal and breath sounds normal.   Thick small amount of deep yellow mucus produced and visualized.  Pt has harsh, deep frequent cough.   Abdominal: Soft. Bowel sounds are normal.   Musculoskeletal: Normal range of motion.   Neurological: She is alert and oriented to person, place, and time.   Skin: Skin is warm and dry. Capillary refill takes less than 2 seconds.   Psychiatric: She has a normal mood and affect. Her behavior is normal. Judgment and thought content normal.   Nursing note and vitals reviewed.      ASSESSMENT/ PLAN:    Della was seen today for sore throat, cough and nausea.    Diagnoses and all orders for this visit:    Acute pharyngitis, unspecified etiology  -     amoxicillin-clavulanate (AUGMENTIN) 875-125 MG per tablet; Take 1 tablet by mouth 2 (Two) Times a Day.    Acute non-recurrent maxillary sinusitis  -     amoxicillin-clavulanate (AUGMENTIN) 875-125 MG per tablet; Take 1 tablet by mouth 2 (Two) Times a Day.    Acute bronchitis, unspecified organism  -     amoxicillin-clavulanate (AUGMENTIN) 875-125 MG per tablet; Take 1 tablet by mouth 2 (Two) Times a Day.  -     guaiFENesin (MUCINEX) 600 MG 12 hr tablet; Take 2 tablets by mouth 2 (Two) Times a Day.    Secondary hypertension  -     benazepril (LOTENSIN) 20 MG tablet; Take 0.5 tablets by mouth Daily.        Orders Placed Today:     New Medications Ordered This Visit   Medications   • amoxicillin-clavulanate (AUGMENTIN) 875-125 MG per tablet     Sig: Take 1 tablet by mouth 2 (Two) Times a Day.     Dispense:  14 tablet     Refill:  0   • guaiFENesin (MUCINEX) 600 MG 12 hr tablet      Sig: Take 2 tablets by mouth 2 (Two) Times a Day.     Dispense:  14 tablet     Refill:  0   • benazepril (LOTENSIN) 20 MG tablet     Sig: Take 0.5 tablets by mouth Daily.     Dispense:  15 tablet     Refill:  0        Management Plan:     An After Visit Summary was printed and given to the patient at discharge.    Follow-up: Return in about 3 months (around 6/21/2019) for Next scheduled follow up with labs first.    Vijaya Henao, MELECIO 3/21/2019 9:49 AM  This note was electronically signed.

## 2019-04-01 ENCOUNTER — TELEPHONE (OUTPATIENT)
Dept: FAMILY MEDICINE CLINIC | Facility: CLINIC | Age: 59
End: 2019-04-01

## 2019-04-01 RX ORDER — GLIMEPIRIDE 2 MG/1
2 TABLET ORAL EVERY MORNING
Qty: 30 TABLET | Refills: 5 | Status: SHIPPED | OUTPATIENT
Start: 2019-04-01 | End: 2019-06-21 | Stop reason: ALTCHOICE

## 2019-04-01 RX ORDER — CYCLOBENZAPRINE HCL 10 MG
10 TABLET ORAL 3 TIMES DAILY PRN
Qty: 60 TABLET | Refills: 0 | Status: SHIPPED | OUTPATIENT
Start: 2019-04-01 | End: 2019-05-13 | Stop reason: SDUPTHER

## 2019-04-01 NOTE — TELEPHONE ENCOUNTER
PT stated she needs refill of Glimepiride 2 mg. PT also requests that Flexeril dosage be raised. Per PT this is currently being filled at [facility]. Explained that appt may need to be scheduled to discuss changing medications. PT can be reached at [phone].

## 2019-04-24 RX ORDER — ERTUGLIFLOZIN 5 MG/1
TABLET, FILM COATED ORAL
Qty: 30 TABLET | Refills: 5 | Status: SHIPPED | OUTPATIENT
Start: 2019-04-24 | End: 2019-10-21 | Stop reason: SDUPTHER

## 2019-04-29 ENCOUNTER — OFFICE VISIT (OUTPATIENT)
Dept: GASTROENTEROLOGY | Age: 59
End: 2019-04-29
Payer: MEDICARE

## 2019-04-29 VITALS
WEIGHT: 229.6 LBS | HEART RATE: 61 BPM | BODY MASS INDEX: 45.07 KG/M2 | SYSTOLIC BLOOD PRESSURE: 108 MMHG | OXYGEN SATURATION: 99 % | DIASTOLIC BLOOD PRESSURE: 74 MMHG | HEIGHT: 60 IN

## 2019-04-29 DIAGNOSIS — K74.60 CIRRHOSIS OF LIVER WITHOUT ASCITES, UNSPECIFIED HEPATIC CIRRHOSIS TYPE (HCC): Primary | ICD-10-CM

## 2019-04-29 DIAGNOSIS — K80.20 GALLSTONES: ICD-10-CM

## 2019-04-29 DIAGNOSIS — Z86.19 HISTORY OF HEPATITIS C: ICD-10-CM

## 2019-04-29 PROCEDURE — 99214 OFFICE O/P EST MOD 30 MIN: CPT | Performed by: NURSE PRACTITIONER

## 2019-04-29 PROCEDURE — G8427 DOCREV CUR MEDS BY ELIG CLIN: HCPCS | Performed by: NURSE PRACTITIONER

## 2019-04-29 PROCEDURE — 4004F PT TOBACCO SCREEN RCVD TLK: CPT | Performed by: NURSE PRACTITIONER

## 2019-04-29 PROCEDURE — G8417 CALC BMI ABV UP PARAM F/U: HCPCS | Performed by: NURSE PRACTITIONER

## 2019-04-29 PROCEDURE — 3017F COLORECTAL CA SCREEN DOC REV: CPT | Performed by: NURSE PRACTITIONER

## 2019-04-29 RX ORDER — AMITRIPTYLINE HYDROCHLORIDE 10 MG/1
TABLET, FILM COATED ORAL
Qty: 90 TABLET | Refills: 1 | Status: SHIPPED | OUTPATIENT
Start: 2019-04-29 | End: 2021-02-23 | Stop reason: ALTCHOICE

## 2019-04-29 RX ORDER — NADOLOL 20 MG/1
20 TABLET ORAL DAILY
Qty: 90 TABLET | Refills: 1 | Status: SHIPPED | OUTPATIENT
Start: 2019-04-29 | End: 2019-08-22 | Stop reason: SDUPTHER

## 2019-04-29 RX ORDER — OMEPRAZOLE 20 MG/1
CAPSULE, DELAYED RELEASE ORAL
Qty: 90 CAPSULE | Refills: 1 | Status: SHIPPED | OUTPATIENT
Start: 2019-04-29 | End: 2019-08-22 | Stop reason: SDUPTHER

## 2019-04-29 ASSESSMENT — ENCOUNTER SYMPTOMS
NAUSEA: 0
ABDOMINAL DISTENTION: 0
VOICE CHANGE: 0
CONSTIPATION: 0
SORE THROAT: 0
COUGH: 0
DIARRHEA: 0
CHEST TIGHTNESS: 0
RECTAL PAIN: 0
ABDOMINAL PAIN: 0
BLOOD IN STOOL: 0
VOMITING: 0
SHORTNESS OF BREATH: 0
BACK PAIN: 1

## 2019-04-29 NOTE — Clinical Note
She's getting a liver biopsy. I may want extra labs to r/o other liver disease pending the biopsy results. Can get them with her [facility] labs if so.

## 2019-04-29 NOTE — PROGRESS NOTES
Subjective:      Patient ID: Shiv Lewis is a 60 y.o. female  Dr. Stacia Price M.D., MD  No ref. provider found    HPI  Chief Complaint   Patient presents with    Cirrhosis     Patient seen for checkup, cirrhosis. History of alcohol use with last alcohol in 2014. History of hepatitis C treated another facility in 2016. She had viral load rechecked 1/2017, not detected.      Last EGD per Dr. Mervin Estrada 3/2018 noting grade 1 varices and erosive gastritis. 2 yr repeat EGD recommended. She has been prescribed nadolol. Has been doing well with this. She notes some easy bruising - has red spots appear randomly on arms and ankles. Labs recently completed and reviewed:   LIVER Latest Ref Rng & Units 2/21/2019   Alb 3.5 - 5.2 g/dL 4.1   Bili, Tot 0.2 - 1.2 mg/dL 0.5   Bili, Dir 0.0 - 0.3 mg/dL 0.2   Bili, Ind 0.1 - 1.0 mg/dL 0.3   Alk Phos 35 - 104 U/L 177 (H)   AST 5 - 32 U/L 40 (H)   ALT 5 - 33 U/L 28   Ca 8.6 - 10.0 mg/dL 9.0   PT 12.0 - 14.6 sec 13.6   INR 0.88-1.18 1.10   WBC 4.8 - 10.8 K/uL 5.7   RBC 4.20 - 5.40 M/uL 4.71   HEMOGLOBIN 12.0 - 16.0 g/dL 14.2   HEMATOCRIT 37.0 - 47.0 % 43.1   GLUCOSE 74 - 109 mg/dL 132 (H)   CREATININE 0.5 - 0.9 mg/dL <0.5    - 145 mmol/L 142     U/s done 12/2018: The liver appears normal. No focal intrinsic abnormality. There is a   normal hepatopedal portal venous circulation. The gallbladder is markedly distended. There is a large gallstone   measuring 2.8 cm in diameter. Gallbladder wall measures 3 mm in   thickness. Common bile duct measures 5 mm in diameter. The pancreas appear normal.   The spleen appears normal.     I have called radiology earlier today to request review of this u/s since her previous u/s noted cirrhotic changes in liver. She states she is doing well. She has lost some weight trying to manage diabetes.        Family History   Problem Relation Age of Onset    Liver Cancer Neg Hx     Liver Disease Neg Hx     Stomach Cancer Neg Hx  Rectal Cancer Neg Hx     Esophageal Cancer Neg Hx     Colon Cancer Neg Hx     Colon Polyps Neg Hx        Past Medical History:   Diagnosis Date    Arthritis     Asthma     COPD (chronic obstructive pulmonary disease) ([facility] [address]     Esophageal varices (HCC)     GERD (gastroesophageal reflux disease)     Hepatitis C     Hypertension        Past Surgical History:   Procedure Laterality Date    COLONOSCOPY  03/02/2016    Dr Xuan Kilgore bleeding internal hemorrhoids o/w normal; fair prep (5 yr)    NE EGD TRANSORAL BIOPSY SINGLE/MULTIPLE N/A 2/7/2017    Dr Hernando Bella Grade I esophageal varices, gastritis/gastropathy    NE EGD TRANSORAL BIOPSY SINGLE/MULTIPLE N/A 3/23/2018    Dr Rosmery Cifuentes I esophageal varices, erosive/active gastritis-2 yr recall    UPPER GASTROINTESTINAL ENDOSCOPY  03/2016    Dr Dorantes II esoph varices; portal hyptensive gastropathy       Current Outpatient Medications   Medication Sig Dispense Refill    omeprazole (PRILOSEC) 20 MG delayed release capsule TAKE ONE CAPSULE BY MOUTH EVERY DAY 90 capsule 1    amitriptyline (ELAVIL) 10 MG tablet TAKE ONE TABLET BY MOUTH EVERY NIGHT 90 tablet 1    nadolol (CORGARD) 20 MG tablet Take 1 tablet by mouth daily Last refill unless labs are completed 90 tablet 1    cyclobenzaprine (FLEXERIL) 10 MG tablet Take 10 mg by mouth 3 times daily as needed for Muscle spasms      traMADol (ULTRAM) 50 MG tablet Take 1 tablet by mouth every 12 hours as needed for Pain 60 tablet 2    benazepril (LOTENSIN) 20 MG tablet Take 1 tablet by mouth daily 30 tablet 5     No current facility-administered medications for this visit. No Known Allergies     reports that she has been smoking. She has been smoking about 0.50 packs per day. She has never used smokeless tobacco. She reports that she does not drink alcohol or use drugs. Review of Systems   Constitutional: Negative for appetite change, fever and unexpected weight change.    HENT: Negative for sore throat and voice change. Respiratory: Negative for cough, chest tightness and shortness of breath. Cardiovascular: Negative for chest pain, palpitations and leg swelling. Gastrointestinal: Negative for abdominal distention, abdominal pain, blood in stool, constipation, diarrhea, nausea, rectal pain and vomiting. Heartburn   Musculoskeletal: Positive for arthralgias and back pain. Negative for gait problem. Skin: Negative for pallor, rash and wound. Neurological: Negative for dizziness, weakness and light-headedness. Hematological: Negative for adenopathy. Does not bruise/bleed easily. All other systems reviewed and are negative. Objective:   Physical Exam   Constitutional: She is oriented to person, place, and time. She appears well-developed and well-nourished. No distress. /74   Pulse 61   Ht 5' (1.524 m)   Wt 229 lb 9.6 oz (104.1 kg)   SpO2 99%   BMI 44.84 kg/m²      Eyes: Conjunctivae are normal. No scleral icterus. Neck: No tracheal deviation present. Cardiovascular: Normal rate and regular rhythm. Exam reveals no gallop and no friction rub. No murmur heard. Pulmonary/Chest: Effort normal and breath sounds normal. No respiratory distress. She has no wheezes. She has no rhonchi. She has no rales. Abdominal: Soft. Normal appearance and bowel sounds are normal. She exhibits no distension and no mass. There is no hepatomegaly. There is no tenderness. There is no rebound and no guarding. Musculoskeletal: She exhibits no edema. Neurological: She is alert and oriented to person, place, and time. She has normal strength. Skin: Skin is warm, dry and intact. No cyanosis. No pallor. Psychiatric: She has a normal mood and affect. Her behavior is normal. Thought content normal. Cognition and memory are normal.       Assessment:      1. Cirrhosis of liver without ascites, unspecified hepatic cirrhosis type ([address]    2. History of hepatitis C    3.  Gallstones Plan:      Will schedule liver biopsy to confirm cirrhosis. Patient has not had previous biopsy. Results and recommendations to be called after completed. Patient advised on liver biopsy. Benefits and risks discussed including risk of bleeding, infection and death. Information provided to patient. Orders Placed This Encounter   Procedures    US BIOPSY LIVER PERCUTANEOUS     Continue same medications. Refilled. Orders Placed This Encounter   Medications    omeprazole (PRILOSEC) 20 MG delayed release capsule     Sig: TAKE ONE CAPSULE BY MOUTH EVERY DAY     Dispense:  90 capsule     Refill:  1    amitriptyline (ELAVIL) 10 MG tablet     Sig: TAKE ONE TABLET BY MOUTH EVERY NIGHT     Dispense:  90 tablet     Refill:  1    nadolol (CORGARD) 20 MG tablet     Sig: Take 1 tablet by mouth daily Last refill unless labs are completed     Dispense:  90 tablet     Refill:  1     Pt advised to call if any problems r/t medication. Discussed medication including administration and side effects     Labs and u/s due in June. Will see in office after to review. Consider workup for underlying liver disease with next labs done pending results of biopsy.

## 2019-04-29 NOTE — PATIENT INSTRUCTIONS
Patient Education        Percutaneous Liver Biopsy: Before Your Procedure  What is a percutaneous liver biopsy? Percutaneous liver biopsy is a procedure to take a very small sample of your liver tissue. Then a doctor looks at this tissue under a microscope. He or she checks it for infection or other liver problems. Percutaneous (say \"per-kew-GRAZYNA-nee-\") means \"through the skin. \" Sometimes this procedure is called aspiration biopsy or fine-needle aspiration. You will get medicine to help you relax. You will also get a shot of numbing medicine in the biopsy area. Then the doctor puts a long needle through your skin between two of your lower ribs on your right side. The needle goes into your liver to take the tissue sample. The doctor may use X-ray pictures on a screen to help guide the needle into the liver. When the needle goes into the liver, you may feel a pain in your shoulder. This is called referred pain. It's caused by pain that travels along a nerve near the biopsy area. After the doctor gets the sample, he or she removes the needle and puts a bandage on the spot where the needle went in. The procedure takes 15 to 20 minutes. But the needle is in your liver for just a few seconds. After the procedure, you will need to lie on your right side for an hour or two. This can help stop bleeding in the part of your liver where the biopsy was done. You will probably go home the same day. It can take several days to get the results of the biopsy. Your doctor will discuss the results with you. Follow-up care is a key part of your treatment and safety. Be sure to make and go to all appointments, and call your doctor if you are having problems. It's also a good idea to know your test results and keep a list of the medicines you take. What happens before the procedure?   Preparing for the procedure    · Understand exactly what procedure is planned, along with the risks, benefits, and other options.     · Tell will take about 15 to 20 minutes. Going home   · Be sure you have someone to drive you home. Anesthesia and pain medicine make it unsafe for you to drive.     · You will be given more specific instructions about recovering from your procedure. They will cover things like diet, wound care, follow-up care, driving, and getting back to your normal routine. When should you call your doctor? · You have questions or concerns.     · You don't understand how to prepare for your procedure.     · You become ill before the procedure (such as fever, flu, or a cold).     · You need to reschedule or have changed your mind about having the procedure. Where can you learn more? Go to [email or web address] and sign in to your [employer] account. Enter N199 in the [employer] Book box to learn more about \"Percutaneous Liver Biopsy: Before Your Procedure. \"     If you do not have an account, please click on the \"Sign Up Now\" link. Current as of: June 25, 2018  Content Version: 11.9  © 1309-0896 [employer]. Care instructions adapted under license by [facility] ([facility]). If you have questions about a medical condition or this instruction, always ask your healthcare professional. [place] [address] any warranty or liability for your use of this information.        Repeat labs and ultrasound every 6 months

## 2019-05-03 ENCOUNTER — TELEPHONE (OUTPATIENT)
Dept: GASTROENTEROLOGY | Age: 59
End: 2019-05-03

## 2019-05-03 NOTE — TELEPHONE ENCOUNTER
Last OV BG aprn 4-29-19. Liver Biopsy scheduled 5-7-19. No FU scheduled. Patient called today from [phone] leaving me a VM that she needs to know how long she will be at Hospital for her liver biopsy on 5-7-19, states she will be using the [employer] bus to get here and home. I tried calling the patient back and she did not answer, I left her a VM asking her to call me back, I need to make sure the patient knows she has to have someone with her to make sure she gets home safely post BX, she cannot arrive alone on public transportation for the liver biopsy as they will CA her if she does ( I spoke with KW and she did confirm she will be there form 4-7 hours and does have to have someone with her when she arrives and also to see her home safely. Again I have left the patient a VM to call me back. Garo patterson      NOTE: 5-3-19 @ 1:29 pm The patient did call me back and I informed her she will have to bring someone with her or they will not do her Liver Biopsy, she said she will try to make arrangements for someone to come with her and to see her home safely and if she cannot fine someone to come with her she will call us Monday to [place] and reschedule.  Garo patterson

## 2019-05-06 NOTE — TELEPHONE ENCOUNTER
5-6-19 11:00 am    I called this patient today to make sure she did make arrangements for someone to come with her to the Hospital for her liver biopsy and she said yes she did, she will ask at her check in in this person has to stay there the whole time or if they can come back and get her checked out and back home safely.  Garo patterson

## 2019-05-07 ENCOUNTER — HOSPITAL ENCOUNTER (OUTPATIENT)
Dept: ULTRASOUND IMAGING | Age: 59
Discharge: HOME | End: 2019-05-07
Payer: MEDICARE

## 2019-05-07 VITALS
SYSTOLIC BLOOD PRESSURE: 127 MMHG | OXYGEN SATURATION: 99 % | TEMPERATURE: 98 F | HEART RATE: 68 BPM | DIASTOLIC BLOOD PRESSURE: 57 MMHG | RESPIRATION RATE: 20 BRPM

## 2019-05-07 DIAGNOSIS — K74.60 CIRRHOSIS OF LIVER WITHOUT ASCITES, UNSPECIFIED HEPATIC CIRRHOSIS TYPE (HCC): ICD-10-CM

## 2019-05-07 LAB
APTT: 35.5 SEC (ref 26–36.2)
INR BLD: 1.14 (ref 0.88–1.18)
PLATELET # BLD: 176 K/UL (ref 130–400)
PROTHROMBIN TIME: 14 SEC (ref 12–14.6)

## 2019-05-07 PROCEDURE — 85610 PROTHROMBIN TIME: CPT

## 2019-05-07 PROCEDURE — 88313 SPECIAL STAINS GROUP 2: CPT

## 2019-05-07 PROCEDURE — 47000 NEEDLE BIOPSY OF LIVER PERQ: CPT

## 2019-05-07 PROCEDURE — 88307 TISSUE EXAM BY PATHOLOGIST: CPT

## 2019-05-07 PROCEDURE — 85049 AUTOMATED PLATELET COUNT: CPT

## 2019-05-07 PROCEDURE — 85730 THROMBOPLASTIN TIME PARTIAL: CPT

## 2019-05-07 ASSESSMENT — PAIN - FUNCTIONAL ASSESSMENT: PAIN_FUNCTIONAL_ASSESSMENT: 0-10

## 2019-05-07 NOTE — PROGRESS NOTES
Patient returned from ultrasound guided liver biopsy. Patient vitals stable on return. Patient connected to monitor and patient is awake alert oriented and now having lunch. Patient states she has a 2 out of 1-10 pain scale in right flank from the biopsy. Patient discharge instructions reviewed again and patient verbalized understanding. Will continue to monitor.

## 2019-05-07 NOTE — PROGRESS NOTES
Patient vitals stable. Ultrasound called for transport. Discharge and activity instructions were reviewed with the patient and she verbalized understanding.   The instructions will be reviewed again prior to discharge

## 2019-05-07 NOTE — PROGRESS NOTES
LABS have resulted. Called ultrasound to notify Hector Burrows of patient ready for procedure. No answer, will attempt to call back in 10 minutes.   Patient resting in room

## 2019-05-07 NOTE — PROGRESS NOTES
Patient here for ultrasound guided liver biopsy. Patient taken to room and oriented to room and call light. Patient procedure verified and medications reviewed and assessment completed. Blood drawn and sent to lab awaiting results.

## 2019-05-07 NOTE — PROGRESS NOTES
Patient discharge criteria has been met. Patient vitals stable and biopsy site clear of complications. No hematoma or oozing present. Discharge and activity level has been reviewed with the patient and her family and all verbalize understanding. Patient dressed and wheeled to the car with family.

## 2019-05-09 ENCOUNTER — TELEPHONE (OUTPATIENT)
Dept: GASTROENTEROLOGY | Age: 59
End: 2019-05-09

## 2019-05-09 NOTE — TELEPHONE ENCOUNTER
5/9/19  Pt has been notified of results and office visit sche 7/8/19 @ 10:40. Reminder put  In epic to sched US & labs at a later time.   VB

## 2019-05-09 NOTE — TELEPHONE ENCOUNTER
Pt. wants to know if she will get the results of her liver biopsy by phone or if she needs to make a appt. Please contact pt back.

## 2019-05-09 NOTE — TELEPHONE ENCOUNTER
Biopsy confirms cirrhosis. She is due for labs and u/s in June. When these labs are drawn I need to also get labs to r/o underlying liver disease. Please send a message to me and I will order them. She will need to get these done 3-4 weeks before her appt.

## 2019-05-15 RX ORDER — CYCLOBENZAPRINE HCL 10 MG
TABLET ORAL
Qty: 60 TABLET | Refills: 0 | Status: SHIPPED | OUTPATIENT
Start: 2019-05-15 | End: 2019-06-25 | Stop reason: SDUPTHER

## 2019-06-03 ENCOUNTER — TELEPHONE (OUTPATIENT)
Dept: GASTROENTEROLOGY | Age: 59
End: 2019-06-03

## 2019-06-03 DIAGNOSIS — K74.60 CIRRHOSIS OF LIVER WITHOUT ASCITES, UNSPECIFIED HEPATIC CIRRHOSIS TYPE (HCC): Primary | ICD-10-CM

## 2019-06-03 DIAGNOSIS — R77.2 ELEVATED AFP: ICD-10-CM

## 2019-06-03 DIAGNOSIS — K76.6 PORTAL HYPERTENSION (HCC): ICD-10-CM

## 2019-06-03 NOTE — TELEPHONE ENCOUNTER
----- Message from Amparo Sevilla sent at 4/29/2019 12:00 PM CDT -----  Liver labs per su. Check to see if she wants and US live done.

## 2019-06-13 ENCOUNTER — RESULTS ENCOUNTER (OUTPATIENT)
Dept: FAMILY MEDICINE CLINIC | Facility: CLINIC | Age: 59
End: 2019-06-13

## 2019-06-13 ENCOUNTER — CLINICAL SUPPORT (OUTPATIENT)
Dept: FAMILY MEDICINE CLINIC | Facility: CLINIC | Age: 59
End: 2019-06-13

## 2019-06-13 DIAGNOSIS — E78.2 MIXED HYPERLIPIDEMIA: ICD-10-CM

## 2019-06-13 DIAGNOSIS — I15.9 SECONDARY HYPERTENSION: ICD-10-CM

## 2019-06-13 DIAGNOSIS — R53.83 FATIGUE, UNSPECIFIED TYPE: ICD-10-CM

## 2019-06-13 DIAGNOSIS — Z00.00 ANNUAL PHYSICAL EXAM: Primary | ICD-10-CM

## 2019-06-13 DIAGNOSIS — Z00.00 ANNUAL PHYSICAL EXAM: ICD-10-CM

## 2019-06-13 DIAGNOSIS — E11.628 TYPE 2 DIABETES MELLITUS WITH OTHER SKIN COMPLICATION, WITHOUT LONG-TERM CURRENT USE OF INSULIN (HCC): ICD-10-CM

## 2019-06-21 ENCOUNTER — OFFICE VISIT (OUTPATIENT)
Dept: FAMILY MEDICINE CLINIC | Facility: CLINIC | Age: 59
End: 2019-06-21

## 2019-06-21 VITALS
OXYGEN SATURATION: 98 % | TEMPERATURE: 96.9 F | DIASTOLIC BLOOD PRESSURE: 74 MMHG | SYSTOLIC BLOOD PRESSURE: 122 MMHG | BODY MASS INDEX: 44.88 KG/M2 | HEIGHT: 60 IN | HEART RATE: 88 BPM | WEIGHT: 228.6 LBS

## 2019-06-21 DIAGNOSIS — E78.2 MIXED HYPERLIPIDEMIA: ICD-10-CM

## 2019-06-21 DIAGNOSIS — Z00.00 MEDICARE ANNUAL WELLNESS VISIT, SUBSEQUENT: Primary | ICD-10-CM

## 2019-06-21 DIAGNOSIS — E11.628 TYPE 2 DIABETES MELLITUS WITH OTHER SKIN COMPLICATION, WITHOUT LONG-TERM CURRENT USE OF INSULIN (HCC): ICD-10-CM

## 2019-06-21 DIAGNOSIS — I10 ESSENTIAL HYPERTENSION: ICD-10-CM

## 2019-06-21 DIAGNOSIS — K74.60 CIRRHOSIS OF LIVER WITHOUT ASCITES, UNSPECIFIED HEPATIC CIRRHOSIS TYPE (HCC): ICD-10-CM

## 2019-06-21 DIAGNOSIS — Z86.19 HISTORY OF HEPATITIS C: ICD-10-CM

## 2019-06-21 DIAGNOSIS — K76.6 PORTAL HYPERTENSION (HCC): ICD-10-CM

## 2019-06-21 PROBLEM — K80.20 GALLSTONES: Status: ACTIVE | Noted: 2019-04-29

## 2019-06-21 PROCEDURE — G0439 PPPS, SUBSEQ VISIT: HCPCS | Performed by: FAMILY MEDICINE

## 2019-06-21 RX ORDER — METFORMIN HYDROCHLORIDE EXTENDED-RELEASE TABLETS 1000 MG/1
1000 TABLET, FILM COATED, EXTENDED RELEASE ORAL
Qty: 60 TABLET | Refills: 5 | Status: SHIPPED | OUTPATIENT
Start: 2019-06-21 | End: 2019-12-30

## 2019-06-21 NOTE — PATIENT INSTRUCTIONS
Diabetes: Insulin non dependent. Nephropathy, Retinopathy, Neuropahty status discussed.  Discussed goals of Diabetes today.  Goal Hgb A1C <7.0 for most patient.  Good BP control is encouraged with Goal BP based on JNC 8 guidelines 2014 <140/90.  Discussed role of Ace-I and ARB with DM.  DM imparts risk equivalence for CAD based on ATP III.  Current guidelines support moderate intensity statin with goal of 30-50% reduction in LDL unless 10 yr risk ASCVD >7.5 then high intensity should be used. Close monitoring of Lipid levels encouraged. Recommend once yearly eye evaluation by optometry or ophthalmology.  Good foot health discussed and foot exam completed.  Recommended toe health, wear good shoes, cut nails straight across and tend calluses if present. Take medications as encouraged.  Monitor blood sugars as encouraged and bring log to future meetings. Weight needs to be monitored. Monitor portions and caloric intake.  Pneumovax frequency discussed.   a. Labs: CMP, Microalbumin, A1C  b. Encouraged pt to bring glucose logs to each appointment  c. Encouraged self foot exams, and yearly eye exams.  d. Encouraged to lose weight/please see information provided  e. Recommend regular exercise   f. Medications as listed in today's visit      Suspect Essential HTN.Good BP control is encouraged with Goal BP based on JNC 8 guidelines 2014 <140/90 for patients with known cardiac disease and diabetes. (CATALINA. 2014:322 (5):507-520. doi:10.1001/catalina.2013.07661): general population <60 yr old goal BP <140/90 and for those >60 <150/90.  For patients of all ages with Diabetes, CKD, Known CAD <140/90. Recommended to the patient to obtain electronic home BP machine with upper arm blood pressure cuff and to check regularly as instructed.  Keep BP log and bring to subsequent visits. Stable, at goal.  a. LABS: routine for hypertension recommended and ordered if necessary.  b. Recommend if you do not have a home BP machine to obtain an  electronic machine with arm blood pressure cuff.      c. Monitor BP over the next week and keep log to bring back to office. Discussed medication therapy however pt wants to try to control with diet exercise. .  Your provider  has recommended self-monitoring of your blood pressure.  If you do not have a blood pressure cuff you may purchase one from the local pharmacy.  You may ask the pharmacist which brand and model they recommend.  Obtain your blood pressure measurement at least 2x per week.  You should also check your blood pressure if you experience any symptoms of blurred visit, dizziness or headache.  Please record all blood pressure measurements and bring them to next office visit.  If you have any questions about the accuracy of your blood pressure machine please bring it in to the office and our staff will be happy to check accuracy.   d. Encouraged to eat a low sodium heart healthy diet  e. Offered handout on HTN educational topics.  These were provided if patient requested these today.  f. MEDS: as listed in today's visit.  g. Risks/benefits of current and new medications discussed with the patient and or family today.  The patient/family are aware and accept that if there any side effects they should call or return to clinic as soon as possible.  Appropriate F/U discussed for topics addressed today. All questions were answered to the  satisfactory state of patient/family.  Should symptoms fail to improve or worsen they agree to call or return to clinic or to go to the ER. Education handouts were offered on any new Rx if requested.  Discussed the importance of following up with any needed screening tests/labs/specialist appointments and any requested follow-up recommended by me today.  Importance of maintaining follow-up discussed and patient accepts that missed appointments can delay diagnosis and potentially lead to worsening of conditions.      Cholesterol  Cholesterol is a white, waxy, fat-like  substance that is needed by the human body in small amounts. The liver makes all the cholesterol we need. Cholesterol is carried from the liver by the blood through the blood vessels. Deposits of cholesterol (plaques) may build up on blood vessel (artery) walls. Plaques make the arteries narrower and stiffer. Cholesterol plaques increase the risk for heart attack and stroke.  You cannot feel your cholesterol level even if it is very high. The only way to know that it is high is to have a blood test. Once you know your cholesterol levels, you should keep a record of the test results. Work with your health care provider to keep your levels in the desired range.  What do the results mean?  · Total cholesterol is a rough measure of all the cholesterol in your blood.  · LDL (low-density lipoprotein) is the “bad” cholesterol. This is the type that causes plaque to build up on the artery walls. You want this level to be low.  · HDL (high-density lipoprotein) is the “good” cholesterol because it cleans the arteries and carries the LDL away. You want this level to be high.  · Triglycerides are fat that the body can either burn for energy or store. High levels are closely linked to heart disease.  What are the desired levels of cholesterol?  · Total cholesterol below 200.  · LDL below 100 for people who are at risk, below 70 for people at very high risk.  · HDL above 40 is good. A level of 60 or higher is considered to be protective against heart disease.  · Triglycerides below 150.  How can I lower my cholesterol?  Diet  Follow your diet program as told by your health care provider.  · Choose fish or white meat chicken and turkey, roasted or baked. Limit fatty cuts of red meat, fried foods, and processed meats, such as sausage and lunch meats.  · Eat lots of fresh fruits and vegetables.  · Choose whole grains, beans, pasta, potatoes, and cereals.  · Choose olive oil, corn oil, or canola oil, and use only small  amounts.  · Avoid butter, mayonnaise, shortening, or palm kernel oils.  · Avoid foods with trans fats.  · Drink skim or nonfat milk and eat low-fat or nonfat yogurt and cheeses. Avoid whole milk, cream, ice cream, egg yolks, and full-fat cheeses.  · Healthier desserts include quang food cake, gemma snaps, animal crackers, hard candy, popsicles, and low-fat or nonfat frozen yogurt. Avoid pastries, cakes, pies, and cookies.    Exercise  · Follow your exercise program as told by your health care provider. A regular program:  ? Helps to decrease LDL and raise HDL.  ? Helps with weight control.  · Do things that increase your activity level, such as gardening, walking, and taking the stairs.  · Ask your health care provider about ways that you can be more active in your daily life.    Medicine  · Take over-the-counter and prescription medicines only as told by your health care provider.  ? Medicine may be prescribed by your health care provider to help lower cholesterol and decrease the risk for heart disease. This is usually done if diet and exercise have failed to bring down cholesterol levels.  ? If you have several risk factors, you may need medicine even if your levels are normal.    This information is not intended to replace advice given to you by your health care provider. Make sure you discuss any questions you have with your health care provider.  Document Released: 09/12/2002 Document Revised: 07/15/2017 Document Reviewed: 06/17/2017  [employer] Interactive Patient Education © 2019 [employer]      Cholesterol  Cholesterol is a white, waxy, fat-like substance that is needed by the human body in small amounts. The liver makes all the cholesterol we need. Cholesterol is carried from the liver by the blood through the blood vessels. Deposits of cholesterol (plaques) may build up on blood vessel (artery) walls. Plaques make the arteries narrower and stiffer. Cholesterol plaques increase the risk for heart attack  and stroke.  You cannot feel your cholesterol level even if it is very high. The only way to know that it is high is to have a blood test. Once you know your cholesterol levels, you should keep a record of the test results. Work with your health care provider to keep your levels in the desired range.  What do the results mean?  · Total cholesterol is a rough measure of all the cholesterol in your blood.  · LDL (low-density lipoprotein) is the “bad” cholesterol. This is the type that causes plaque to build up on the artery walls. You want this level to be low.  · HDL (high-density lipoprotein) is the “good” cholesterol because it cleans the arteries and carries the LDL away. You want this level to be high.  · Triglycerides are fat that the body can either burn for energy or store. High levels are closely linked to heart disease.  What are the desired levels of cholesterol?  · Total cholesterol below 200.  · LDL below 100 for people who are at risk, below 70 for people at very high risk.  · HDL above 40 is good. A level of 60 or higher is considered to be protective against heart disease.  · Triglycerides below 150.  How can I lower my cholesterol?  Diet  Follow your diet program as told by your health care provider.  · Choose fish or white meat chicken and turkey, roasted or baked. Limit fatty cuts of red meat, fried foods, and processed meats, such as sausage and lunch meats.  · Eat lots of fresh fruits and vegetables.  · Choose whole grains, beans, pasta, potatoes, and cereals.  · Choose olive oil, corn oil, or canola oil, and use only small amounts.  · Avoid butter, mayonnaise, shortening, or palm kernel oils.  · Avoid foods with trans fats.  · Drink skim or nonfat milk and eat low-fat or nonfat yogurt and cheeses. Avoid whole milk, cream, ice cream, egg yolks, and full-fat cheeses.  · Healthier desserts include quang food cake, gemma snaps, animal crackers, hard candy, popsicles, and low-fat or nonfat frozen  yogurt. Avoid pastries, cakes, pies, and cookies.    Exercise  · Follow your exercise program as told by your health care provider. A regular program:  ? Helps to decrease LDL and raise HDL.  ? Helps with weight control.  · Do things that increase your activity level, such as gardening, walking, and taking the stairs.  · Ask your health care provider about ways that you can be more active in your daily life.    Medicine  · Take over-the-counter and prescription medicines only as told by your health care provider.  ? Medicine may be prescribed by your health care provider to help lower cholesterol and decrease the risk for heart disease. This is usually done if diet and exercise have failed to bring down cholesterol levels.  ? If you have several risk factors, you may need medicine even if your levels are normal.    This information is not intended to replace advice given to you by your health care provider. Make sure you discuss any questions you have with your health care provider.  Document Released: 09/12/2002 Document Revised: 07/15/2017 Document Reviewed: 06/17/2017  [employer] Interactive Patient Education © 2019 [employer]      Diabetes Mellitus and Nutrition, Adult  When you have diabetes (diabetes mellitus), it is very important to have healthy eating habits because your blood sugar (glucose) levels are greatly affected by what you eat and drink. Eating healthy foods in the appropriate amounts, at about the same times every day, can help you:  · Control your blood glucose.  · Lower your risk of heart disease.  · Improve your blood pressure.  · Reach or maintain a healthy weight.    Every person with diabetes is different, and each person has different needs for a meal plan. Your health care provider may recommend that you work with a diet and nutrition specialist (dietitian) to make a meal plan that is best for you. Your meal plan may vary depending on factors such as:  · The calories you need.  · The  medicines you take.  · Your weight.  · Your blood glucose, blood pressure, and cholesterol levels.  · Your activity level.  · Other health conditions you have, such as heart or kidney disease.    How do carbohydrates affect me?  Carbohydrates, also called carbs, affect your blood glucose level more than any other type of food. Eating carbs naturally raises the amount of glucose in your blood. Carb counting is a method for keeping track of how many carbs you eat. Counting carbs is important to keep your blood glucose at a healthy level, especially if you use insulin or take certain oral diabetes medicines.  It is important to know how many carbs you can safely have in each meal. This is different for every person. Your dietitian can help you calculate how many carbs you should have at each meal and for each snack.  Foods that contain carbs include:  · Bread, cereal, rice, pasta, and crackers.  · Potatoes and corn.  · Peas, beans, and lentils.  · Milk and yogurt.  · Fruit and juice.  · Desserts, such as cakes, cookies, ice cream, and candy.    How does alcohol affect me?  Alcohol can cause a sudden decrease in blood glucose (hypoglycemia), especially if you use insulin or take certain oral diabetes medicines. Hypoglycemia can be a life-threatening condition. Symptoms of hypoglycemia (sleepiness, dizziness, and confusion) are similar to symptoms of having too much alcohol.  If your health care provider says that alcohol is safe for you, follow these guidelines:  · Limit alcohol intake to no more than 1 drink per day for nonpregnant women and 2 drinks per day for men. One drink equals 12 oz of beer, 5 oz of wine, or 1½ oz of hard liquor.  · Do not drink on an empty stomach.  · Keep yourself hydrated with water, diet soda, or unsweetened iced tea.  · Keep in mind that regular soda, juice, and other mixers may contain a lot of sugar and must be counted as carbs.    What are tips for following this plan?  Reading food  "labels  · Start by checking the serving size on the \"Nutrition Facts\" label of packaged foods and drinks. The amount of calories, carbs, fats, and other nutrients listed on the label is based on one serving of the item. Many items contain more than one serving per package.  · Check the total grams (g) of carbs in one serving. You can calculate the number of servings of carbs in one serving by dividing the total carbs by 15. For example, if a food has 30 g of total carbs, it would be equal to 2 servings of carbs.  · Check the number of grams (g) of saturated and trans fats in one serving. Choose foods that have low or no amount of these fats.  · Check the number of milligrams (mg) of salt (sodium) in one serving. Most people should limit total sodium intake to less than 2,300 mg per day.  · Always check the nutrition information of foods labeled as \"low-fat\" or \"nonfat\". These foods may be higher in added sugar or refined carbs and should be avoided.  · Talk to your dietitian to identify your daily goals for nutrients listed on the label.  Shopping    · Avoid buying canned, premade, or processed foods. These foods tend to be high in fat, sodium, and added sugar.  · Shop around the outside edge of the grocery store. This includes fresh fruits and vegetables, bulk grains, fresh meats, and fresh dairy.  Cooking  · Use low-heat cooking methods, such as baking, instead of high-heat cooking methods like deep frying.  · Cook using healthy oils, such as olive, canola, or sunflower oil.  · Avoid cooking with butter, cream, or high-fat meats.  Meal planning  · Eat meals and snacks regularly, preferably at the same times every day. Avoid going long periods of time without eating.  · Eat foods high in fiber, such as fresh fruits, vegetables, beans, and whole grains. Talk to your dietitian about how many servings of carbs you can eat at each meal.  · Eat 4-6 ounces (oz) of lean protein each day, such as lean meat, chicken, fish, " eggs, or tofu. One oz of lean protein is equal to:  ? 1 oz of meat, chicken, or fish.  ? 1 egg.  ? ¼ cup of tofu.  · Eat some foods each day that contain healthy fats, such as avocado, nuts, seeds, and fish.  Lifestyle  · Check your blood glucose regularly.  · Exercise regularly as told by your health care provider. This may include:  ? 150 minutes of moderate-intensity or vigorous-intensity exercise each week. This could be brisk walking, biking, or water aerobics.  ? Stretching and doing strength exercises, such as yoga or weightlifting, at least 2 times a week.  · Take medicines as told by your health care provider.  · Do not use any products that contain nicotine or tobacco, such as cigarettes and e-cigarettes. If you need help quitting, ask your health care provider.  · Work with a counselor or diabetes educator to identify strategies to manage stress and any emotional and social challenges.  Questions to ask a health care provider  · Do I need to meet with a diabetes educator?  · Do I need to meet with a dietitian?  · What number can I call if I have questions?  · When are the best times to check my blood glucose?  Where to find more information:  · American Diabetes Association: diabetes.org  · Academy of Nutrition and Dietetics: www.eatright.org  · National Finger of Diabetes and Digestive and Kidney Diseases (NIH): www.niddk.nih.gov  Summary  · A healthy meal plan will help you control your blood glucose and maintain a healthy lifestyle.  · Working with a diet and nutrition specialist (dietitian) can help you make a meal plan that is best for you.  · Keep in mind that carbohydrates (carbs) and alcohol have immediate effects on your blood glucose levels. It is important to count carbs and to use alcohol carefully.  This information is not intended to replace advice given to you by your health care provider. Make sure you discuss any questions you have with your health care provider.  Document Released:  09/14/2006 Document Revised: 07/18/2018 Document Reviewed: 01/22/2018  [employer] Interactive Patient Education © 2019 [employer]

## 2019-06-25 ENCOUNTER — TELEPHONE (OUTPATIENT)
Dept: VASCULAR SURGERY | Facility: CLINIC | Age: 59
End: 2019-06-25

## 2019-06-25 DIAGNOSIS — F51.01 PRIMARY INSOMNIA: ICD-10-CM

## 2019-06-25 RX ORDER — TRAMADOL HYDROCHLORIDE 50 MG/1
TABLET ORAL
Qty: 60 TABLET | Refills: 2 | Status: SHIPPED | OUTPATIENT
Start: 2019-06-25 | End: 2020-03-16 | Stop reason: SDUPTHER

## 2019-06-25 RX ORDER — AMITRIPTYLINE HYDROCHLORIDE 100 MG/1
TABLET, FILM COATED ORAL
Qty: 30 TABLET | Refills: 5 | Status: SHIPPED | OUTPATIENT
Start: 2019-06-25 | End: 2019-12-30

## 2019-06-25 RX ORDER — CYCLOBENZAPRINE HCL 10 MG
TABLET ORAL
Qty: 60 TABLET | Refills: 0 | Status: SHIPPED | OUTPATIENT
Start: 2019-06-25 | End: 2019-07-22 | Stop reason: SDUPTHER

## 2019-06-27 ENCOUNTER — TELEPHONE (OUTPATIENT)
Dept: GASTROENTEROLOGY | Age: 59
End: 2019-06-27

## 2019-06-27 NOTE — TELEPHONE ENCOUNTER
I have called Homero Barthel back to confirm the patient's appt time and date. As far as the hippa form, I told Homero Barthel the patient has to do that in person when she comes in on Monday. I have called the patient to let her know she has to update her hippa form on Monday. She expressed understanding.  LM

## 2019-06-27 NOTE — TELEPHONE ENCOUNTER
Last OV with BG johnston 4-29-19. FU scheduled 7-8-19 @ 10:40 am, Liver US scheduled 7-1-19 @ 10:00 am.      Patient called from [phone], said she needs Estuardo to call [employer] at [phone] and speak to Gerald Roberts so arrangements can be made to transport the patient to her Ultrasound appointment and her FU with BG johnston, patient said ok to call her back if you have any questions. I will forward to YANN patterson.  Garo patterson

## 2019-07-01 ENCOUNTER — HOSPITAL ENCOUNTER (OUTPATIENT)
Dept: ULTRASOUND IMAGING | Age: 59
Discharge: HOME | End: 2019-07-01
Payer: MEDICARE

## 2019-07-01 DIAGNOSIS — K76.6 PORTAL HYPERTENSION (HCC): ICD-10-CM

## 2019-07-01 DIAGNOSIS — R77.2 ELEVATED AFP: ICD-10-CM

## 2019-07-01 DIAGNOSIS — K74.60 CIRRHOSIS OF LIVER WITHOUT ASCITES, UNSPECIFIED HEPATIC CIRRHOSIS TYPE (HCC): ICD-10-CM

## 2019-07-01 LAB
ALBUMIN SERPL-MCNC: 4.2 G/DL (ref 3.5–5.2)
ALP BLD-CCNC: 135 U/L (ref 35–104)
ALPHA FETOPROTEIN: 3 NG/ML (ref 0–8.3)
ALT SERPL-CCNC: 25 U/L (ref 5–33)
ANION GAP SERPL CALCULATED.3IONS-SCNC: 11 MMOL/L (ref 7–19)
AST SERPL-CCNC: 29 U/L (ref 5–32)
BILIRUB SERPL-MCNC: 0.8 MG/DL (ref 0.2–1.2)
BILIRUBIN DIRECT: 0.3 MG/DL (ref 0–0.3)
BILIRUBIN, INDIRECT: 0.5 MG/DL (ref 0.1–1)
BUN BLDV-MCNC: 9 MG/DL (ref 8–23)
CALCIUM SERPL-MCNC: 9.2 MG/DL (ref 8.8–10.2)
CHLORIDE BLD-SCNC: 101 MMOL/L (ref 98–111)
CO2: 29 MMOL/L (ref 22–29)
CREAT SERPL-MCNC: <0.5 MG/DL (ref 0.5–0.9)
FERRITIN: 80.6 NG/ML (ref 13–150)
GFR NON-AFRICAN AMERICAN: >60
GLUCOSE BLD-MCNC: 220 MG/DL (ref 74–109)
HAV IGM SER IA-ACNC: ABNORMAL
HCT VFR BLD CALC: 42.9 % (ref 37–47)
HEMOGLOBIN: 14.3 G/DL (ref 12–16)
HEPATITIS B CORE IGM ANTIBODY: ABNORMAL
HEPATITIS B SURFACE ANTIGEN INTERPRETATION: ABNORMAL
HEPATITIS C ANTIBODY INTERPRETATION: REACTIVE
INR BLD: 1.15 (ref 0.88–1.18)
MCH RBC QN AUTO: 30.4 PG (ref 27–31)
MCHC RBC AUTO-ENTMCNC: 33.3 G/DL (ref 33–37)
MCV RBC AUTO: 91.1 FL (ref 81–99)
PDW BLD-RTO: 13 % (ref 11.5–14.5)
PLATELET # BLD: 151 K/UL (ref 130–400)
PMV BLD AUTO: 10.5 FL (ref 9.4–12.3)
POTASSIUM SERPL-SCNC: 4.2 MMOL/L (ref 3.5–5)
PROTHROMBIN TIME: 14.1 SEC (ref 12–14.6)
RBC # BLD: 4.71 M/UL (ref 4.2–5.4)
SODIUM BLD-SCNC: 141 MMOL/L (ref 136–145)
TOTAL PROTEIN: 7.7 G/DL (ref 6.6–8.7)
WBC # BLD: 4.8 K/UL (ref 4.8–10.8)

## 2019-07-01 PROCEDURE — 76705 ECHO EXAM OF ABDOMEN: CPT

## 2019-07-02 LAB — CERULOPLASMIN: 38 MG/DL (ref 16–45)

## 2019-07-03 LAB — ALPHA-1 ANTITRYPSIN: 148 MG/DL (ref 90–200)

## 2019-07-04 LAB
ANA IGG, ELISA: NORMAL
F-ACTIN AB IGG: 22 UNITS (ref 0–19)
IGA: 68 MG/DL (ref 68–408)
IGG: 1480 MG/DL (ref 768–1632)
IGM: 85 MG/DL (ref 35–263)
MITOCHONDRIAL M2 AB, IGG: 2.9 UNITS (ref 0–20)
SMOOTH MUSCLE AB IGG TITER: ABNORMAL

## 2019-07-08 ENCOUNTER — OFFICE VISIT (OUTPATIENT)
Dept: GASTROENTEROLOGY | Age: 59
End: 2019-07-08
Payer: MEDICARE

## 2019-07-08 VITALS
BODY MASS INDEX: 45.12 KG/M2 | HEART RATE: 70 BPM | OXYGEN SATURATION: 98 % | HEIGHT: 60 IN | WEIGHT: 229.8 LBS | SYSTOLIC BLOOD PRESSURE: 104 MMHG | DIASTOLIC BLOOD PRESSURE: 72 MMHG

## 2019-07-08 DIAGNOSIS — I85.00 ESOPHAGEAL VARICES WITHOUT BLEEDING, UNSPECIFIED ESOPHAGEAL VARICES TYPE (HCC): ICD-10-CM

## 2019-07-08 DIAGNOSIS — K80.20 GALLSTONES: ICD-10-CM

## 2019-07-08 DIAGNOSIS — Z86.19 HISTORY OF HEPATITIS C: ICD-10-CM

## 2019-07-08 DIAGNOSIS — K21.9 CHRONIC GERD: ICD-10-CM

## 2019-07-08 DIAGNOSIS — K76.6 PORTAL HYPERTENSION (HCC): ICD-10-CM

## 2019-07-08 DIAGNOSIS — K74.60 CIRRHOSIS OF LIVER WITHOUT ASCITES, UNSPECIFIED HEPATIC CIRRHOSIS TYPE (HCC): Primary | ICD-10-CM

## 2019-07-08 PROCEDURE — 3017F COLORECTAL CA SCREEN DOC REV: CPT | Performed by: NURSE PRACTITIONER

## 2019-07-08 PROCEDURE — G8417 CALC BMI ABV UP PARAM F/U: HCPCS | Performed by: NURSE PRACTITIONER

## 2019-07-08 PROCEDURE — 4004F PT TOBACCO SCREEN RCVD TLK: CPT | Performed by: NURSE PRACTITIONER

## 2019-07-08 PROCEDURE — 99214 OFFICE O/P EST MOD 30 MIN: CPT | Performed by: NURSE PRACTITIONER

## 2019-07-08 PROCEDURE — G8427 DOCREV CUR MEDS BY ELIG CLIN: HCPCS | Performed by: NURSE PRACTITIONER

## 2019-07-08 RX ORDER — METFORMIN HYDROCHLORIDE EXTENDED-RELEASE TABLETS 1000 MG/1
1000 TABLET, FILM COATED, EXTENDED RELEASE ORAL
COMMUNITY
Start: 2019-06-21 | End: 2022-03-28 | Stop reason: ALTCHOICE

## 2019-07-08 ASSESSMENT — ENCOUNTER SYMPTOMS
BACK PAIN: 1
ABDOMINAL PAIN: 0
BLOOD IN STOOL: 0
ABDOMINAL DISTENTION: 0
VOMITING: 0
RECTAL PAIN: 0
NAUSEA: 0
SORE THROAT: 0
VOICE CHANGE: 0
SHORTNESS OF BREATH: 0
CHEST TIGHTNESS: 0
CONSTIPATION: 0
COUGH: 0
DIARRHEA: 0

## 2019-07-08 NOTE — PATIENT INSTRUCTIONS
Cirrhosis    What are the signs and symptoms of cirrhosis? Not all patients have symptoms. If present, symptoms can include:  · Swelling in the abdomen and/or legs  · Fluid build up in the lungs (difficulty breathing)  · Bruising or bleeding easily  · Feeling full and/or tired  · Sleep difficulties  · Itching  · Hair loss   · Yellowing or the skin or whites of the eyes (janudice)  · Confusion - may come on suddenly  · Coma   · Increased risk of infections  · Increased risk of liver cancer    Cirrhosis is often without symptoms until the disease is advanced    What causes cirrhosis? A number of chronic liver diseases can lead to cirrhosis. It can take weeks to many years to develop depending on the underlying cause and other factors. Common causes of cirrhosis include:  · Excessive alcohol use  · Hepatitis B or Hepatitis C  · Non-alcoholic fatty liver disease     Progression from fatty liver disease to cirrhosis is more likely to occur if you have these risk factors:  · Older age  · Obesity  · Type 2 diabetes  · Hypertension  · Hyperlipidemia (high cholesterol, triglycerides)    There is no treatment or cure for cirrhosis. The disease can be managed. The aim of management is to delay progression and to prevent complications. The following measures must be followed to ensure the best outcome. · Strict avoidance of alcohol. · Vaccinate against hepatitis A, influenza and pneumococci  · Avoid medications that could potentially harm your liver. Take only medications prescribed for you by a healthcare provider that is aware of your disease. · Proper nutrition and exercise with weight management  · Maintain good control of diabetes, high blood pressure and cholesterol. · Keep all scheduled appointments for office visits and diagnostic tests. Liver transplant is the only option for cirrhosis and end-stage liver disease.    If you are currently drinking alcohol or using recreational drugs you will not be a

## 2019-07-08 NOTE — PROGRESS NOTES
Subjective:      Patient ID: Alexys Garduno is a 60 y.o. female  Dr. Radha Reyes M.D., MD  No ref. provider found    HPI  Chief Complaint   Patient presents with    Cirrhosis       Patient seen in f/u, cirrhosis. REcent labs and u/s completed. SUGAR neg, F act Ab 22, ASMA 1:40,  AFP normal  Alt 25, ast 29, alk phos 135, tot bili 0.8  hgb 14.3, plt 151  INR 1/15  Other labs available in epic were reviewed. Unremarkable. U/s of liver:   Impression 1. Probable chronic liver disease. Patent portal vein. 2. Gallstone. No evidence of acute cholecystitis. 3. Borderline spleen size, measures less than prior, which could be due to technique. Signed by Dr Johann Reyes on 7/1/2019 11:11 AM     She has history of hepatitis C. Last egd 3/2018 noting grade 1 varices. Liver biopsy completed and positive for cirrhosis. Patient denies any recent fever, illness. No jaundice or icterus. She denies rapid weight gain or abdominal swelling. No confusion or memory problems. No easy bruising or bleeding. No melena, hematemesis. She is taking nadolol for portal hypertension and esophageal varices. Also taking omeprazole daily for this and chronic GERD. She is due for EGD for variceal surveillance in March next year. The patient denies abdominal or flank pain, anorexia, nausea or vomiting, dysphagia, change in bowel habits or black or bloody stools or weight loss. Assessment:      1. Cirrhosis of liver without ascites, unspecified hepatic cirrhosis type ([address]    2. Portal hypertension (HCC)    3. Esophageal varices without bleeding, unspecified esophageal varices type ([address]    4. History of hepatitis C    5. Chronic GERD    6. Gallstones            Plan:      Continue same medications. EGD due 3/2020    rto in 6 mo with u/s of liver and labs prior to appt. Labs needed: cbc, bmp, hepatic function, pt/inr, afp, F-actin Ab, SUGAR    Patient recommended to follow low fat, low sodium diet. tablet by mouth daily 30 tablet 5     No current facility-administered medications for this visit. No Known Allergies     reports that she has been smoking. She has been smoking about 0.50 packs per day. She has never used smokeless tobacco. She reports that she does not drink alcohol or use drugs. Review of Systems   Constitutional: Negative for appetite change, fever and unexpected weight change. HENT: Negative for sore throat and voice change. Respiratory: Negative for cough, chest tightness and shortness of breath. Cardiovascular: Negative for chest pain, palpitations and leg swelling. Gastrointestinal: Negative for abdominal distention, abdominal pain, blood in stool, constipation, diarrhea, nausea, rectal pain and vomiting. Heartburn   Musculoskeletal: Positive for arthralgias and back pain. Negative for gait problem. Skin: Positive for rash (feet). Negative for pallor and wound. Neurological: Negative for dizziness, weakness and light-headedness. Hematological: Negative for adenopathy. Does not bruise/bleed easily. All other systems reviewed and are negative. Objective:   Physical Exam   Constitutional: She is oriented to person, place, and time. She appears well-developed and well-nourished. No distress. /72   Pulse 70   Ht 5' (1.524 m)   Wt 229 lb 12.8 oz (104.2 kg)   SpO2 98%   BMI 44.88 kg/m²      Eyes: Conjunctivae are normal. No scleral icterus. Neck: No tracheal deviation present. Cardiovascular: Normal rate and regular rhythm. Exam reveals no gallop and no friction rub. No murmur heard. Pulmonary/Chest: Effort normal and breath sounds normal. No respiratory distress. She has no wheezes. She has no rhonchi. She has no rales. Abdominal: Soft. Normal appearance and bowel sounds are normal. She exhibits no distension and no mass. There is no hepatomegaly. There is no tenderness. There is no rebound and no guarding.    Musculoskeletal: She

## 2019-07-22 RX ORDER — CYCLOBENZAPRINE HCL 10 MG
TABLET ORAL
Qty: 60 TABLET | Refills: 0 | Status: SHIPPED | OUTPATIENT
Start: 2019-07-22 | End: 2019-10-21 | Stop reason: SDUPTHER

## 2019-08-08 ENCOUNTER — TELEPHONE (OUTPATIENT)
Dept: PODIATRY | Facility: CLINIC | Age: 59
End: 2019-08-08

## 2019-08-09 ENCOUNTER — OFFICE VISIT (OUTPATIENT)
Dept: PODIATRY | Facility: CLINIC | Age: 59
End: 2019-08-09

## 2019-08-09 VITALS
WEIGHT: 225 LBS | DIASTOLIC BLOOD PRESSURE: 78 MMHG | HEIGHT: 60 IN | OXYGEN SATURATION: 98 % | BODY MASS INDEX: 44.17 KG/M2 | SYSTOLIC BLOOD PRESSURE: 130 MMHG | HEART RATE: 86 BPM

## 2019-08-09 DIAGNOSIS — Z72.0 TOBACCO ABUSE: ICD-10-CM

## 2019-08-09 DIAGNOSIS — E11.8 DIABETIC FOOT (HCC): ICD-10-CM

## 2019-08-09 DIAGNOSIS — B35.3 TINEA PEDIS OF BOTH FEET: ICD-10-CM

## 2019-08-09 DIAGNOSIS — E11.49 DIABETES MELLITUS TYPE 2 WITH NEUROLOGICAL MANIFESTATIONS (HCC): ICD-10-CM

## 2019-08-09 DIAGNOSIS — B35.1 ONYCHOMYCOSIS: Primary | ICD-10-CM

## 2019-08-09 PROCEDURE — 11721 DEBRIDE NAIL 6 OR MORE: CPT | Performed by: PODIATRIST

## 2019-08-09 PROCEDURE — 99203 OFFICE O/P NEW LOW 30 MIN: CPT | Performed by: PODIATRIST

## 2019-08-09 RX ORDER — PRENATAL VIT 91/IRON/FOLIC/DHA 28-975-200
COMBINATION PACKAGE (EA) ORAL 2 TIMES DAILY
Qty: 36 G | Refills: 8 | Status: SHIPPED | OUTPATIENT
Start: 2019-08-09 | End: 2019-11-08

## 2019-08-09 RX ORDER — ATORVASTATIN CALCIUM 10 MG/1
10 TABLET, FILM COATED ORAL DAILY
COMMUNITY
End: 2019-12-30

## 2019-08-09 NOTE — PATIENT INSTRUCTIONS
Diabetes Mellitus and Foot Care  Foot care is an important part of your health, especially when you have diabetes. Diabetes may cause you to have problems because of poor blood flow (circulation) to your feet and legs, which can cause your skin to:  · Become thinner and drier.  · Break more easily.  · Heal more slowly.  · Peel and crack.  You may also have nerve damage (neuropathy) in your legs and feet, causing decreased feeling in them. This means that you may not notice minor injuries to your feet that could lead to more serious problems. Noticing and addressing any potential problems early is the best way to prevent future foot problems.  How to care for your feet  Foot hygiene  · Wash your feet daily with warm water and mild soap. Do not use hot water. Then, pat your feet and the areas between your toes until they are completely dry. Do not soak your feet as this can dry your skin.  · Trim your toenails straight across. Do not dig under them or around the cuticle. File the edges of your nails with an emery board or nail file.  · Apply a moisturizing lotion or petroleum jelly to the skin on your feet and to dry, brittle toenails. Use lotion that does not contain alcohol and is unscented. Do not apply lotion between your toes.  Shoes and socks  · Wear clean socks or stockings every day. Make sure they are not too tight. Do not wear knee-high stockings since they may decrease blood flow to your legs.  · Wear shoes that fit properly and have enough cushioning. Always look in your shoes before you put them on to be sure there are no objects inside.  · To break in new shoes, wear them for just a few hours a day. This prevents injuries on your feet.  Wounds, scrapes, corns, and calluses  · Check your feet daily for blisters, cuts, bruises, sores, and redness. If you cannot see the bottom of your feet, use a mirror or ask someone for help.  · Do not cut corns or calluses or try to remove them with medicine.  · If you  find a minor scrape, cut, or break in the skin on your feet, keep it and the skin around it clean and dry. You may clean these areas with mild soap and water. Do not clean the area with peroxide, alcohol, or iodine.  · If you have a wound, scrape, corn, or callus on your foot, look at it several times a day to make sure it is healing and not infected. Check for:  ? Redness, swelling, or pain.  ? Fluid or blood.  ? Warmth.  ? Pus or a bad smell.  General instructions  · Do not cross your legs. This may decrease blood flow to your feet.  · Do not use heating pads or hot water bottles on your feet. They may burn your skin. If you have lost feeling in your feet or legs, you may not know this is happening until it is too late.  · Protect your feet from hot and cold by wearing shoes, such as at the beach or on hot pavement.  · Schedule a complete foot exam at least once a year (annually) or more often if you have foot problems. If you have foot problems, report any cuts, sores, or bruises to your health care provider immediately.  Contact a health care provider if:  · You have a medical condition that increases your risk of infection and you have any cuts, sores, or bruises on your feet.  · You have an injury that is not healing.  · You have redness on your legs or feet.  · You feel burning or tingling in your legs or feet.  · You have pain or cramps in your legs and feet.  · Your legs or feet are numb.  · Your feet always feel cold.  · You have pain around a toenail.  Get help right away if:  · You have a wound, scrape, corn, or callus on your foot and:  ? You have pain, swelling, or redness that gets worse.  ? You have fluid or blood coming from the wound, scrape, corn, or callus.  ? Your wound, scrape, corn, or callus feels warm to the touch.  ? You have pus or a bad smell coming from the wound, scrape, corn, or callus.  ? You have a fever.  ? You have a red line going up your leg.  Summary  · Check your feet every day  for cuts, sores, red spots, swelling, and blisters.  · Moisturize feet and legs daily.  · Wear shoes that fit properly and have enough cushioning.  · If you have foot problems, report any cuts, sores, or bruises to your health care provider immediately.  · Schedule a complete foot exam at least once a year (annually) or more often if you have foot problems.  This information is not intended to replace advice given to you by your health care provider. Make sure you discuss any questions you have with your health care provider.  Document Released: 12/15/2001 Document Revised: 01/30/2019 Document Reviewed: 01/19/2018  [employer] Interactive Patient Education © 2019 [employer]    Athlete's Foot    Athlete's foot (tinea pedis) is a fungal infection of the skin on your feet. It often occurs on the skin that is between or underneath your toes. It can also occur on the soles of your feet. Symptoms include itchy or white and flaky areas on the skin. The infection can spread from person to person (is contagious). It can also spread when a person's bare feet come in contact with the fungus on shower floors or on items such as shoes.  Follow these instructions at home:  Medicines  · Apply or take over-the-counter and prescription medicines only as told by your doctor.  · Apply your antifungal medicine as told by your doctor. Do not stop using the medicine even if your feet start to get better.  Foot care  · Do not scratch your feet.  · Keep your feet dry:  ? Wear cotton or wool socks. Change your socks every day or if they become wet.  ? Wear shoes that allow air to move around, such as sandals or canvas tennis shoes.  · Wash and dry your feet:  ? Every day or as told by your doctor.  ? After exercising.  ? Including the area between your toes.  General instructions  · Do not share any of these items that touch your feet:  ? Towels.  ? Shoes.  ? Nail clippers.  ? Other personal items.  · Protect your feet by wearing sandals in  wet areas, such as locker rooms and shared showers.  · Keep all follow-up visits as told by your doctor. This is important.  · If you have diabetes, keep your blood sugar under control.  Contact a doctor if:  · You have a fever.  · You have swelling, pain, warmth, or redness in your foot.  · Your feet are not getting better with treatment.  · Your symptoms get worse.  · You have new symptoms.  Summary  · Athlete's foot is a fungal infection of the skin on your feet.  · Symptoms include itchy or white and flaky areas on the skin.  · Apply your antifungal medicine as told by your doctor.  · Keep your feet clean and dry.  This information is not intended to replace advice given to you by your health care provider. Make sure you discuss any questions you have with your health care provider.  Document Released: 06/05/2009 Document Revised: 10/08/2018 Document Reviewed: 10/08/2018  [employer] Interactive Patient Education © 2019 [employer]

## 2019-08-22 DIAGNOSIS — K74.60 CIRRHOSIS OF LIVER WITHOUT ASCITES, UNSPECIFIED HEPATIC CIRRHOSIS TYPE (HCC): ICD-10-CM

## 2019-08-22 RX ORDER — OMEPRAZOLE 20 MG/1
CAPSULE, DELAYED RELEASE ORAL
Qty: 90 CAPSULE | Refills: 1 | Status: SHIPPED | OUTPATIENT
Start: 2019-08-22 | End: 2020-03-31

## 2019-08-22 RX ORDER — NADOLOL 20 MG/1
TABLET ORAL
Qty: 90 TABLET | Refills: 1 | Status: SHIPPED | OUTPATIENT
Start: 2019-08-22 | End: 2020-03-31

## 2019-09-16 ENCOUNTER — OFFICE VISIT (OUTPATIENT)
Dept: FAMILY MEDICINE CLINIC | Facility: CLINIC | Age: 59
End: 2019-09-16

## 2019-09-16 VITALS
SYSTOLIC BLOOD PRESSURE: 122 MMHG | DIASTOLIC BLOOD PRESSURE: 60 MMHG | HEIGHT: 60 IN | HEART RATE: 76 BPM | OXYGEN SATURATION: 95 % | WEIGHT: 223.4 LBS | BODY MASS INDEX: 43.86 KG/M2

## 2019-09-16 DIAGNOSIS — E66.01 MORBIDLY OBESE (HCC): ICD-10-CM

## 2019-09-16 DIAGNOSIS — S29.9XXA INJURY OF CHEST WALL, INITIAL ENCOUNTER: ICD-10-CM

## 2019-09-16 DIAGNOSIS — S80.01XA CONTUSION OF RIGHT KNEE, INITIAL ENCOUNTER: ICD-10-CM

## 2019-09-16 DIAGNOSIS — I10 ESSENTIAL HYPERTENSION: ICD-10-CM

## 2019-09-16 DIAGNOSIS — E11.628 TYPE 2 DIABETES MELLITUS WITH OTHER SKIN COMPLICATION, WITHOUT LONG-TERM CURRENT USE OF INSULIN (HCC): Primary | ICD-10-CM

## 2019-09-16 PROCEDURE — 99214 OFFICE O/P EST MOD 30 MIN: CPT | Performed by: FAMILY MEDICINE

## 2019-09-16 NOTE — PROGRESS NOTES
OFFICE VISIT NOTE:    Della Gonzalez is a 59 y.o. female who presents today for Diabetes (f/u) and Fall (bruising).     Fell near her home beginning of last week - sustained bruising, no loss of consciousness. Hurts to cough or move in the chest on the right side. No hemoptysis.   No recent labs - reports her sugars to be doing well.       Diabetes   She presents for her follow-up diabetic visit. She has type 2 diabetes mellitus. Her disease course has been stable. There are no hypoglycemic associated symptoms. There are no diabetic associated symptoms. Pertinent negatives for diabetes include no chest pain, no fatigue and no weight loss. There are no hypoglycemic complications. Symptoms are stable. Risk factors for coronary artery disease include diabetes mellitus, family history, obesity, sedentary lifestyle and stress. Current diabetic treatment includes diet. She is compliant with treatment most of the time. Her weight is stable. She is following a diabetic and low fat/cholesterol diet. Meal planning includes avoidance of concentrated sweets. She participates in exercise intermittently. There is no change in her home blood glucose trend. She does not see a podiatrist.Eye exam is current.   Fall   The accident occurred more than 1 week ago. The fall occurred while walking. She fell from a height of 3 to 5 ft. She landed on hard floor. There was no blood loss. The point of impact was the right shoulder and right knee. The pain is present in the right shoulder and right knee. The symptoms are aggravated by movement. Pertinent negatives include no abdominal pain, fever, numbness or tingling. She has tried acetaminophen, rest and immobilization for the symptoms. The treatment provided moderate relief.        Past medical/surgical history, Family history, Social history, Allergies and Medications have been reviewed with the patient today and are updated in [facility] EMR. See below.    Past Medical History:   Diagnosis Date    • Cirrhosis of liver (CMS/HCC)    • Diabetes mellitus (CMS/HCC)    • GERD (gastroesophageal reflux disease)    • Liver disease      History reviewed. No pertinent surgical history.  Family History   Problem Relation Age of Onset   • Diabetes Mother    • No Known Problems Father      Social History     Tobacco Use   • Smoking status: Current Every Day Smoker     Packs/day: 0.50     Types: Cigarettes   • Smokeless tobacco: Never Used   Substance Use Topics   • Alcohol use: No     Frequency: Never     Binge frequency: Never   • Drug use: No       Allergies:  Patient has no known allergies.    Current Meds:    Current Outpatient Medications:   •  amitriptyline (ELAVIL) 100 MG tablet, TAKE ONE TABLET BY MOUTH EVERY EVENING, Disp: 30 tablet, Rfl: 5  •  atorvastatin (LIPITOR) 10 MG tablet, Take 10 mg by mouth Daily., Disp: , Rfl:   •  benazepril (LOTENSIN) 20 MG tablet, Take 0.5 tablets by mouth Daily., Disp: 15 tablet, Rfl: 0  •  cyclobenzaprine (FLEXERIL) 10 MG tablet, TAKE ONE TABLET BY MOUTH THREE TIMES A DAY AS NEEDED FOR MUSCLE SPASMS, Disp: 60 tablet, Rfl: 0  •  Ertugliflozin L-PyroglutamicAc (STEGLATRO PO), Take  by mouth., Disp: , Rfl:   •  hydrocortisone-eucerin, Apply  topically to the appropriate area as directed 2 (Two) Times a Day., Disp: 120 g, Rfl: 2  •  ketoconazole (NIZORAL) 2 % cream, Apply  topically to the appropriate area as directed Daily., Disp: 60 g, Rfl: 2  •  metFORMIN (FORTAMET) 1000 MG (OSM) 24 hr tablet, Take 1 tablet by mouth Daily With Breakfast., Disp: 60 tablet, Rfl: 5  •  nadolol (CORGARD) 20 MG tablet, Take 1 tablet by mouth Daily., Disp: 30 tablet, Rfl: 2  •  omeprazole (priLOSEC) 20 MG capsule, TAKE ONE CAPSULE BY MOUTH EVERY DAY, Disp: , Rfl:   •  STEGLATRO 5 MG tablet, TAKE ONE TABLET BY MOUTH EVERY DAY, Disp: 30 tablet, Rfl: 5  •  terbinafine (lamISIL) 1 % cream, Apply  topically to the appropriate area as directed 2 (Two) Times a Day., Disp: 36 g, Rfl: 8  •  traMADol (ULTRAM) 50  "MG tablet, TAKE ONE TABLET BY MOUTH THREE TIMES A DAY AS NEEDED, Disp: 60 tablet, Rfl: 2    Review of Systems:  Review of Systems   Constitutional: Negative for activity change, fatigue, fever, unexpected weight gain and unexpected weight loss.   Respiratory: Negative for shortness of breath.    Cardiovascular: Negative for chest pain.   Gastrointestinal: Negative for abdominal pain.   Genitourinary: Negative for difficulty urinating.   Skin: Negative for rash.   Neurological: Negative for tingling, syncope, numbness and headache.       Physical Examination:  Vital Signs:  /60 (BP Location: Left arm, Patient Position: Sitting, Cuff Size: Adult)   Pulse 76   Ht 152.4 cm (60\")   Wt 101 kg (223 lb 6.4 oz)   LMP  (LMP Unknown)   SpO2 95%   Breastfeeding? No   BMI 43.63 kg/m²   Physical Exam   Constitutional: She is oriented to person, place, and time. She appears well-developed and well-nourished. No distress.   HENT:   Head: Normocephalic and atraumatic.   Mouth/Throat: Oropharynx is clear and moist.   Neck: Normal range of motion. Neck supple. No JVD present.   Cardiovascular: Normal rate, regular rhythm, normal heart sounds and intact distal pulses.   Pulmonary/Chest: Effort normal and breath sounds normal. No respiratory distress. She exhibits tenderness (chest wall without contusion right lower chest wall without crepitus.).   Musculoskeletal: Normal range of motion. She exhibits tenderness (right anterior knee on the insertion of the patellar tendon with contusion.). She exhibits no edema.    Della had a diabetic foot exam performed today.   During the foot exam she had a monofilament test performed.    Neurological Sensory Findings -  Unaltered sharp/dull right ankle/foot discrimination and unaltered sharp/dull left ankle/foot discrimination.  Vascular Status -  Her right foot exhibits normal foot vasculature  and no edema. Her left foot exhibits normal foot vasculature  and no edema.  Skin Integrity "  -  Her right foot skin is intact.Her left foot skin is intact..  Neurological: She is alert and oriented to person, place, and time. No cranial nerve deficit.   Skin: Skin is warm and dry. Capillary refill takes less than 2 seconds. No rash noted.   Psychiatric: She has a normal mood and affect. Her behavior is normal.   Nursing note and vitals reviewed.      Procedures    ASSESSMENT/ PLAN:        Problem List Items Addressed This Visit        Cardiovascular and Mediastinum    Essential hypertension    Relevant Orders    Comprehensive Metabolic Panel    Hemoglobin A1c       Digestive    Morbidly obese (CMS/[facility])    Relevant Orders    Comprehensive Metabolic Panel    Hemoglobin A1c       Endocrine    Diabetes mellitus (CMS/[facility]) - Primary    Relevant Orders    Comprehensive Metabolic Panel    Hemoglobin A1c      Other Visit Diagnoses     Injury of chest wall, initial encounter        Contusion of right knee, initial encounter                       Specific Patient Instructions:  MEDICATION Instructions: Encouraged patient to continue routine medicines as prescribed and maintain compliance. Patient instructed to report any adverse side effects or reactions to medicines promptly to the office. Patient instructed to make us aware of any OTC or herbal meds or supplement use.  DIET Recommendations: Patient instructed and provided information on the following nutrition and DIET(s): Diabetic (NCS, low carb, diet exchanges). Calorie restriction for weight reduction and maintenance. Necessity for adequate daily intake of fluids/water.  EXERCISE Instructions: Discussed with patient the need for routine aerobic activity for cardiovascular fitness, 3 times a week for about 30 minutes. Daily exercise for increased fitness and weight reduction goals.    Patient's Body mass index is 43.63 kg/m². BMI is above normal parameters. Recommendations include: exercise counseling and nutrition counseling.      SMOKING  Recommendations: Counseled patient and encouraged them on smoking cessation.  HEALTH MAINTENANCE:  N/A  MISCELLANEOUS Instructions: N/A      Medications ordered or changed this visit:  No orders of the defined types were placed in this encounter.       FOLLOW-UP:  Return in about 3 months (around 12/16/2019) for Recheck.    I discussed the patients findings and my recommendations with patient.  An After Visit Summary (AVS) was printed and given to the patient at discharge.      Donny Aguayo MD, FAAFP  9/16/2019

## 2019-09-16 NOTE — PATIENT INSTRUCTIONS
Chest Wall Pain    Chest wall pain is pain in or around the bones and muscles of your chest. Sometimes, an injury causes this pain. Sometimes, the cause may not be known. This pain may take several weeks or longer to get better.  Follow these instructions at home:  Pay attention to any changes in your symptoms. Take these actions to help with your pain:  · Rest as told by your health care provider.  · Avoid activities that cause pain. These include any activities that use your chest muscles or your abdominal and side muscles to lift heavy items.  · If directed, apply ice to the painful area:  ? Put ice in a plastic bag.  ? Place a towel between your skin and the bag.  ? Leave the ice on for 20 minutes, 2-3 times per day.  · Take over-the-counter and prescription medicines only as told by your health care provider.  · Do not use tobacco products, including cigarettes, chewing tobacco, and e-cigarettes. If you need help quitting, ask your health care provider.  · Keep all follow-up visits as told by your health care provider. This is important.  Contact a health care provider if:  · You have a fever.  · Your chest pain becomes worse.  · You have new symptoms.  Get help right away if:  · You have nausea or vomiting.  · You feel sweaty or light-headed.  · You have a cough with phlegm (sputum) or you cough up blood.  · You develop shortness of breath.  This information is not intended to replace advice given to you by your health care provider. Make sure you discuss any questions you have with your health care provider.  Document Released: 12/18/2006 Document Revised: 04/27/2017 Document Reviewed: 03/14/2016  [employer] Interactive Patient Education © 2019 [employer]

## 2019-09-20 RX ORDER — BENAZEPRIL HYDROCHLORIDE 20 MG/1
TABLET ORAL
Qty: 30 TABLET | Refills: 5 | Status: SHIPPED | OUTPATIENT
Start: 2019-09-20 | End: 2020-01-02

## 2019-10-21 RX ORDER — ERTUGLIFLOZIN 5 MG/1
TABLET, FILM COATED ORAL
Qty: 30 TABLET | Refills: 5 | Status: SHIPPED | OUTPATIENT
Start: 2019-10-21 | End: 2020-01-07 | Stop reason: DRUGHIGH

## 2019-10-21 RX ORDER — CYCLOBENZAPRINE HCL 10 MG
TABLET ORAL
Qty: 60 TABLET | Refills: 0 | Status: SHIPPED | OUTPATIENT
Start: 2019-10-21 | End: 2020-01-02 | Stop reason: ALTCHOICE

## 2019-11-08 ENCOUNTER — OFFICE VISIT (OUTPATIENT)
Dept: FAMILY MEDICINE CLINIC | Facility: CLINIC | Age: 59
End: 2019-11-08

## 2019-11-08 VITALS
WEIGHT: 215.8 LBS | OXYGEN SATURATION: 95 % | BODY MASS INDEX: 42.37 KG/M2 | DIASTOLIC BLOOD PRESSURE: 75 MMHG | SYSTOLIC BLOOD PRESSURE: 111 MMHG | RESPIRATION RATE: 20 BRPM | TEMPERATURE: 97 F | HEIGHT: 60 IN | HEART RATE: 80 BPM

## 2019-11-08 DIAGNOSIS — J01.00 ACUTE NON-RECURRENT MAXILLARY SINUSITIS: ICD-10-CM

## 2019-11-08 DIAGNOSIS — B37.2 CUTANEOUS CANDIDIASIS: ICD-10-CM

## 2019-11-08 DIAGNOSIS — R05.9 COUGH: ICD-10-CM

## 2019-11-08 DIAGNOSIS — J20.9 ACUTE BRONCHITIS, UNSPECIFIED ORGANISM: Primary | ICD-10-CM

## 2019-11-08 PROCEDURE — 99213 OFFICE O/P EST LOW 20 MIN: CPT | Performed by: NURSE PRACTITIONER

## 2019-11-08 RX ORDER — NYSTATIN 100000 U/G
CREAM TOPICAL 2 TIMES DAILY
Qty: 30 G | Refills: 2 | Status: SHIPPED | OUTPATIENT
Start: 2019-11-08 | End: 2019-11-27

## 2019-11-08 RX ORDER — AMOXICILLIN 500 MG/1
500 CAPSULE ORAL 3 TIMES DAILY
Qty: 21 CAPSULE | Refills: 0 | Status: SHIPPED | OUTPATIENT
Start: 2019-11-08 | End: 2019-11-15

## 2019-11-08 RX ORDER — DEXTROMETHORPHAN HYDROBROMIDE AND PROMETHAZINE HYDROCHLORIDE 15; 6.25 MG/5ML; MG/5ML
5 SYRUP ORAL 4 TIMES DAILY PRN
Qty: 240 ML | Refills: 0 | Status: SHIPPED | OUTPATIENT
Start: 2019-11-08 | End: 2020-01-02

## 2019-11-08 NOTE — PROGRESS NOTES
Chief Complaint   Patient presents with   • Nasal Congestion   • Cough   • Rash     under right breast        Subjective   Della Gonzalez is a 59 y.o.  female who presents today for URI and rash.    HPI:  URI:  Symptoms started on Monday with nasal congestion and productive cough of yellow-green mucus.  She has seen streaks of blood in the mucus. Mild sore throat.  No fever.  Headache, mainly painful in sinus area.    RASH:  She has rash present under the right breast.  There is some burning.    Della Gonzalez  has a past medical history of Cirrhosis of liver (CMS/HCC), Diabetes mellitus (CMS/HCC), GERD (gastroesophageal reflux disease), and Liver disease.    No Known Allergies    Current Outpatient Medications:   •  amitriptyline (ELAVIL) 100 MG tablet, TAKE ONE TABLET BY MOUTH EVERY EVENING, Disp: 30 tablet, Rfl: 5  •  atorvastatin (LIPITOR) 10 MG tablet, Take 10 mg by mouth Daily., Disp: , Rfl:   •  benazepril (LOTENSIN) 20 MG tablet, TAKE ONE TABLET BY MOUTH EVERY DAY, Disp: 30 tablet, Rfl: 5  •  cyclobenzaprine (FLEXERIL) 10 MG tablet, TAKE ONE TABLET BY MOUTH THREE TIMES A DAY AS NEEDED FOR MUSCLE SPASMS, Disp: 60 tablet, Rfl: 0  •  hydrocortisone-eucerin, Apply  topically to the appropriate area as directed 2 (Two) Times a Day., Disp: 120 g, Rfl: 2  •  ketoconazole (NIZORAL) 2 % cream, Apply  topically to the appropriate area as directed Daily., Disp: 60 g, Rfl: 2  •  metFORMIN (FORTAMET) 1000 MG (OSM) 24 hr tablet, Take 1 tablet by mouth Daily With Breakfast., Disp: 60 tablet, Rfl: 5  •  nadolol (CORGARD) 20 MG tablet, Take 1 tablet by mouth Daily., Disp: 30 tablet, Rfl: 2  •  omeprazole (priLOSEC) 20 MG capsule, TAKE ONE CAPSULE BY MOUTH EVERY DAY, Disp: , Rfl:   •  STEGLATRO 5 MG tablet, TAKE ONE TABLET BY MOUTH EVERY DAY, Disp: 30 tablet, Rfl: 5  •  traMADol (ULTRAM) 50 MG tablet, TAKE ONE TABLET BY MOUTH THREE TIMES A DAY AS NEEDED, Disp: 60 tablet, Rfl: 2  •  amoxicillin (AMOXIL) 500 MG capsule, Take  1 capsule by mouth 3 (Three) Times a Day for 7 days., Disp: 21 capsule, Rfl: 0  •  nystatin (MYCOSTATIN) 280606 UNIT/GM cream, Apply  topically to the appropriate area as directed 2 (Two) Times a Day., Disp: 30 g, Rfl: 2  •  promethazine-dextromethorphan (PROMETHAZINE-DM) 6.25-15 MG/5ML syrup, Take 5 mL by mouth 4 (Four) Times a Day As Needed for Cough., Disp: 240 mL, Rfl: 0  Past Medical History:   Diagnosis Date   • Cirrhosis of liver (CMS/HCC)    • Diabetes mellitus (CMS/HCC)    • GERD (gastroesophageal reflux disease)    • Liver disease      Past Surgical History:   Procedure Laterality Date   • COLONOSCOPY       Social History     Socioeconomic History   • Marital status:      Spouse name: Not on file   • Number of children: Not on file   • Years of education: Not on file   • Highest education level: Not on file   Tobacco Use   • Smoking status: Current Every Day Smoker     Packs/day: 0.50     Types: Cigarettes   • Smokeless tobacco: Never Used   Substance and Sexual Activity   • Alcohol use: No     Frequency: Never     Binge frequency: Never   • Drug use: No   • Sexual activity: No     Family History   Problem Relation Age of Onset   • Diabetes Mother    • No Known Problems Father        Family history, surgical history, past medical history, Allergies and med's reviewed with patient today and updated in ip.access EMR.     ROS:  Review of Systems   Constitutional: Positive for fatigue. Negative for fever and unexpected weight change.   HENT: Positive for congestion, postnasal drip, sinus pressure and sinus pain. Negative for facial swelling, sore throat and trouble swallowing.    Eyes: Negative.  Negative for photophobia, discharge and visual disturbance.   Respiratory: Positive for cough. Negative for chest tightness and shortness of breath.    Cardiovascular: Negative.  Negative for chest pain and palpitations.   Gastrointestinal: Negative.  Negative for abdominal pain, diarrhea, nausea and vomiting.  "  Endocrine: Negative.  Negative for polydipsia, polyphagia and polyuria.   Genitourinary: Negative.  Negative for dysuria, flank pain and frequency.   Musculoskeletal: Negative.  Negative for back pain, gait problem and neck pain.   Skin: Positive for rash.   Allergic/Immunologic: Negative.    Neurological: Positive for headaches. Negative for dizziness and light-headedness.   Hematological: Negative.    Psychiatric/Behavioral: Negative.  Negative for self-injury and suicidal ideas.       OBJECTIVE:  Vitals:    11/08/19 0921   BP: 111/75   BP Location: Right arm   Patient Position: Sitting   Cuff Size: Large Adult   Pulse: 80   Resp: 20   Temp: 97 °F (36.1 °C)   TempSrc: Temporal   SpO2: 95%   Weight: 97.9 kg (215 lb 12.8 oz)   Height: 152.4 cm (60\")     Physical Exam   Constitutional: She is oriented to person, place, and time. She appears well-developed and well-nourished. No distress.   HENT:   Head: Normocephalic and atraumatic.   Right Ear: External ear normal.   Left Ear: External ear normal.   Thick post nasal drainage observed with mild oropharyngeal erythema.  Tenderness to maxillary and ethmoid sinuses.   Eyes: Conjunctivae and EOM are normal. Pupils are equal, round, and reactive to light.   Neck: Normal range of motion. Neck supple.   Cardiovascular: Normal rate, regular rhythm, normal heart sounds and intact distal pulses.   No murmur heard.  Pulmonary/Chest: Effort normal and breath sounds normal. No respiratory distress.   Frequent harsh cough.   Abdominal: Soft. Bowel sounds are normal. She exhibits no distension. There is no tenderness.   Musculoskeletal: Normal range of motion. She exhibits no edema.   Neurological: She is alert and oriented to person, place, and time.   Skin: Skin is warm and dry. Capillary refill takes less than 2 seconds. Rash noted. She is not diaphoretic. No erythema.   Erythematous shiny rash present under the right breast.   Psychiatric: She has a normal mood and affect. " Her behavior is normal. Judgment and thought content normal.   Nursing note and vitals reviewed.      ASSESSMENT/ PLAN:    Della was seen today for nasal congestion, cough and rash.    Diagnoses and all orders for this visit:    Acute bronchitis, unspecified organism  -     amoxicillin (AMOXIL) 500 MG capsule; Take 1 capsule by mouth 3 (Three) Times a Day for 7 days.  -     promethazine-dextromethorphan (PROMETHAZINE-DM) 6.25-15 MG/5ML syrup; Take 5 mL by mouth 4 (Four) Times a Day As Needed for Cough.    Acute non-recurrent maxillary sinusitis  -     amoxicillin (AMOXIL) 500 MG capsule; Take 1 capsule by mouth 3 (Three) Times a Day for 7 days.  -     promethazine-dextromethorphan (PROMETHAZINE-DM) 6.25-15 MG/5ML syrup; Take 5 mL by mouth 4 (Four) Times a Day As Needed for Cough.    Cough  -     amoxicillin (AMOXIL) 500 MG capsule; Take 1 capsule by mouth 3 (Three) Times a Day for 7 days.  -     promethazine-dextromethorphan (PROMETHAZINE-DM) 6.25-15 MG/5ML syrup; Take 5 mL by mouth 4 (Four) Times a Day As Needed for Cough.    Cutaneous candidiasis  -     nystatin (MYCOSTATIN) 659506 UNIT/GM cream; Apply  topically to the appropriate area as directed 2 (Two) Times a Day.        Orders Placed Today:     New Medications Ordered This Visit   Medications   • nystatin (MYCOSTATIN) 231507 UNIT/GM cream     Sig: Apply  topically to the appropriate area as directed 2 (Two) Times a Day.     Dispense:  30 g     Refill:  2   • amoxicillin (AMOXIL) 500 MG capsule     Sig: Take 1 capsule by mouth 3 (Three) Times a Day for 7 days.     Dispense:  21 capsule     Refill:  0   • promethazine-dextromethorphan (PROMETHAZINE-DM) 6.25-15 MG/5ML syrup     Sig: Take 5 mL by mouth 4 (Four) Times a Day As Needed for Cough.     Dispense:  240 mL     Refill:  0        Management Plan:     An After Visit Summary was printed and given to the patient at discharge.    Follow-up: Return if symptoms worsen or fail to improve.    Vijaya Henao  APRN 11/8/2019 9:56 AM  This note was electronically signed.

## 2019-11-27 ENCOUNTER — TELEPHONE (OUTPATIENT)
Dept: VASCULAR SURGERY | Facility: CLINIC | Age: 59
End: 2019-11-27

## 2019-11-27 ENCOUNTER — OFFICE VISIT (OUTPATIENT)
Dept: FAMILY MEDICINE CLINIC | Facility: CLINIC | Age: 59
End: 2019-11-27

## 2019-11-27 VITALS
WEIGHT: 216.8 LBS | DIASTOLIC BLOOD PRESSURE: 72 MMHG | SYSTOLIC BLOOD PRESSURE: 100 MMHG | RESPIRATION RATE: 18 BRPM | HEART RATE: 63 BPM | BODY MASS INDEX: 42.56 KG/M2 | HEIGHT: 60 IN | TEMPERATURE: 98.3 F | OXYGEN SATURATION: 98 %

## 2019-11-27 DIAGNOSIS — M79.18 MUSCULOSKELETAL PAIN: Primary | ICD-10-CM

## 2019-11-27 DIAGNOSIS — G62.9 NEUROPATHY: ICD-10-CM

## 2019-11-27 LAB
BILIRUB BLD-MCNC: NEGATIVE MG/DL
CLARITY, POC: ABNORMAL
COLOR UR: ABNORMAL
GLUCOSE UR STRIP-MCNC: ABNORMAL MG/DL
KETONES UR QL: NEGATIVE
LEUKOCYTE EST, POC: NEGATIVE
NITRITE UR-MCNC: NEGATIVE MG/ML
PH UR: 5 [PH] (ref 5–8)
PROT UR STRIP-MCNC: NEGATIVE MG/DL
RBC # UR STRIP: ABNORMAL /UL
SP GR UR: 1.01 (ref 1–1.03)
UROBILINOGEN UR QL: NORMAL

## 2019-11-27 PROCEDURE — 96372 THER/PROPH/DIAG INJ SC/IM: CPT | Performed by: NURSE PRACTITIONER

## 2019-11-27 PROCEDURE — 99214 OFFICE O/P EST MOD 30 MIN: CPT | Performed by: NURSE PRACTITIONER

## 2019-11-27 PROCEDURE — 81003 URINALYSIS AUTO W/O SCOPE: CPT | Performed by: NURSE PRACTITIONER

## 2019-11-27 RX ORDER — GABAPENTIN 600 MG/1
600 TABLET ORAL 2 TIMES DAILY
COMMUNITY
End: 2020-03-16 | Stop reason: SDUPTHER

## 2019-11-27 RX ORDER — PREDNISONE 10 MG/1
TABLET ORAL
Qty: 21 TABLET | Refills: 0 | Status: SHIPPED | OUTPATIENT
Start: 2019-11-27 | End: 2020-01-02

## 2019-11-27 RX ORDER — METHYLPREDNISOLONE ACETATE 40 MG/ML
40 INJECTION, SUSPENSION INTRA-ARTICULAR; INTRALESIONAL; INTRAMUSCULAR; SOFT TISSUE ONCE
Status: COMPLETED | OUTPATIENT
Start: 2019-11-27 | End: 2019-11-27

## 2019-11-27 RX ADMIN — METHYLPREDNISOLONE ACETATE 40 MG: 40 INJECTION, SUSPENSION INTRA-ARTICULAR; INTRALESIONAL; INTRAMUSCULAR; SOFT TISSUE at 10:46

## 2019-11-27 NOTE — PROGRESS NOTES
CC: vibrating in arms and flank pain    History:  Della Gonzalez is a 59 y.o. female who presents today for evaluation of the above problems.    Pain on left side ribs, under arm.  Thoracic.  Wraps around to back. Started on Sunday.  Hadn't been doing any unusual activity.  Hurts when she takes a deep breath, but pain is superficial, not deep.   Also hurts when she rolls over.  Pain is sharp, rates at 8-9 currently.  Carrying on conversation and seems comfortable in exam room.  Patient is already taking gabpentin, flexeril and ultram.  Also complains of vibrating sensation in arms bilaterally.  Has had some numbness in her hands, but hasn't noticed any weakness.  No issues with speech or any facial drooping.       HPI  ROS:  Review of Systems   Respiratory:        Painful to take deep breath, but states that pain is superficial, in soft tissue   Musculoskeletal: Positive for back pain (thoracic - reproducible with palpation and movement).   Neurological: Positive for numbness (and tingling - arms bilateral). Negative for weakness.       No Known Allergies  Past Medical History:   Diagnosis Date   • Cirrhosis of liver (CMS/HCC)    • Diabetes mellitus (CMS/HCC)    • GERD (gastroesophageal reflux disease)    • Liver disease      Past Surgical History:   Procedure Laterality Date   • COLONOSCOPY       Family History   Problem Relation Age of Onset   • Diabetes Mother    • No Known Problems Father       reports that she has been smoking cigarettes.  She has been smoking about 0.50 packs per day. She has never used smokeless tobacco. She reports that she does not drink alcohol or use drugs.      Current Outpatient Medications:   •  amitriptyline (ELAVIL) 100 MG tablet, TAKE ONE TABLET BY MOUTH EVERY EVENING, Disp: 30 tablet, Rfl: 5  •  atorvastatin (LIPITOR) 10 MG tablet, Take 10 mg by mouth Daily., Disp: , Rfl:   •  benazepril (LOTENSIN) 20 MG tablet, TAKE ONE TABLET BY MOUTH EVERY DAY, Disp: 30 tablet, Rfl: 5  •   "cyclobenzaprine (FLEXERIL) 10 MG tablet, TAKE ONE TABLET BY MOUTH THREE TIMES A DAY AS NEEDED FOR MUSCLE SPASMS, Disp: 60 tablet, Rfl: 0  •  gabapentin (NEURONTIN) 600 MG tablet, Take 600 mg by mouth 2 (Two) Times a Day., Disp: , Rfl:   •  hydrocortisone-eucerin, Apply  topically to the appropriate area as directed 2 (Two) Times a Day., Disp: 120 g, Rfl: 2  •  ketoconazole (NIZORAL) 2 % cream, Apply  topically to the appropriate area as directed Daily., Disp: 60 g, Rfl: 2  •  metFORMIN (FORTAMET) 1000 MG (OSM) 24 hr tablet, Take 1 tablet by mouth Daily With Breakfast. (Patient taking differently: Take 1,000 mg by mouth 2 (Two) Times a Day With Meals.), Disp: 60 tablet, Rfl: 5  •  nadolol (CORGARD) 20 MG tablet, Take 1 tablet by mouth Daily., Disp: 30 tablet, Rfl: 2  •  omeprazole (priLOSEC) 20 MG capsule, TAKE ONE CAPSULE BY MOUTH EVERY DAY, Disp: , Rfl:   •  promethazine-dextromethorphan (PROMETHAZINE-DM) 6.25-15 MG/5ML syrup, Take 5 mL by mouth 4 (Four) Times a Day As Needed for Cough., Disp: 240 mL, Rfl: 0  •  STEGLATRO 5 MG tablet, TAKE ONE TABLET BY MOUTH EVERY DAY, Disp: 30 tablet, Rfl: 5  •  traMADol (ULTRAM) 50 MG tablet, TAKE ONE TABLET BY MOUTH THREE TIMES A DAY AS NEEDED, Disp: 60 tablet, Rfl: 2  •  nystatin (MYCOSTATIN) 124574 UNIT/GM cream, Apply  topically to the appropriate area as directed 2 (Two) Times a Day., Disp: 30 g, Rfl: 2  •  predniSONE (DELTASONE) 10 MG (21) tablet pack, Use as directed. Start on 11/28/2019., Disp: 21 tablet, Rfl: 0    Current Facility-Administered Medications:   •  methylPREDNISolone acetate (DEPO-medrol) injection 40 mg, 40 mg, Intramuscular, Once, [place], MELECIO    OBJECTIVE:  /72 (BP Location: Left arm, Patient Position: Sitting, Cuff Size: Adult)   Pulse 63   Temp 98.3 °F (36.8 °C) (Temporal)   Resp 18   Ht 152.4 cm (60\")   Wt 98.3 kg (216 lb 12.8 oz)   LMP  (LMP Unknown)   SpO2 98%   BMI 42.34 kg/m²    Physical Exam   Constitutional: She is " oriented to person, place, and time. Vital signs are normal. She appears well-developed and well-nourished.   Cardiovascular: Normal rate and normal heart sounds.   Pulmonary/Chest: Effort normal and breath sounds normal.   Musculoskeletal:        Thoracic back: She exhibits tenderness and pain (with palpation and movement). She exhibits no swelling and no deformity.        Back:    Positive phalen bilaterally    Neurological: She is alert and oriented to person, place, and time.   Psychiatric: She has a normal mood and affect. Her behavior is normal.   Vitals reviewed.      Assessment/Plan    Della was seen today for vibratiing in arms and flank pain.    Diagnoses and all orders for this visit:    Musculoskeletal pain  -     POC Urinalysis Dipstick, Multipro  -     methylPREDNISolone acetate (DEPO-medrol) injection 40 mg  -     predniSONE (DELTASONE) 10 MG (21) tablet pack; Use as directed. Start on 11/28/2019.    Neuropathy  -     methylPREDNISolone acetate (DEPO-medrol) injection 40 mg  -     predniSONE (DELTASONE) 10 MG (21) tablet pack; Use as directed. Start on 11/28/2019.    Vibrating /tingling in arms appears neuropathic, potential carpal tunnel vs nerve compression in elbow, shoulder, or neck.   Urine clear - no infection.  A lot of glucose, but patient is on steglatro.     An After Visit Summary was printed and given to the patient at discharge.  Return if symptoms worsen or fail to improve, for Next scheduled follow up.       MELECIO Villalobos 11/27/2019    Electronically signed.

## 2019-11-27 NOTE — TELEPHONE ENCOUNTER
Left message reminding Mrs Gonzalez of her appointment for Monday, December 2nd, 2019 at 1045 am with Dr White. Also advised if she had any questions or needed to reschedule to please call the office at [phone].

## 2019-12-02 ENCOUNTER — OFFICE VISIT (OUTPATIENT)
Dept: PODIATRY | Facility: CLINIC | Age: 59
End: 2019-12-02

## 2019-12-02 VITALS
DIASTOLIC BLOOD PRESSURE: 60 MMHG | HEART RATE: 62 BPM | OXYGEN SATURATION: 99 % | SYSTOLIC BLOOD PRESSURE: 140 MMHG | BODY MASS INDEX: 42.41 KG/M2 | WEIGHT: 216 LBS | HEIGHT: 60 IN

## 2019-12-02 DIAGNOSIS — E11.49 DIABETES MELLITUS TYPE 2 WITH NEUROLOGICAL MANIFESTATIONS (HCC): ICD-10-CM

## 2019-12-02 DIAGNOSIS — Z72.0 TOBACCO ABUSE: ICD-10-CM

## 2019-12-02 DIAGNOSIS — B35.1 ONYCHOMYCOSIS: Primary | ICD-10-CM

## 2019-12-02 PROCEDURE — 11721 DEBRIDE NAIL 6 OR MORE: CPT | Performed by: PODIATRIST

## 2019-12-02 NOTE — PROGRESS NOTES
[facility] - PODIATRY    Today's Date: 12/02/19    Patient Name: Della Gonzalez  MRN: [id number]  CSN: [id number]  PCP: Donny Aguayo MD  Referring Provider: No ref. provider found    SUBJECTIVE     Chief Complaint   Patient presents with   • Follow-up     pt c/o long, thickened toenails, calluses - denies pain at present time    • Diabetes     pt cannot remember last blood sugar reading -  PCP Dr. Aguayo last visit 9/16/19     HPI: Della Gonzalez, a 59 y.o.female, comes to clinic as a(n) established patient presenting for diabetic foot exam and complaining of thick fungal toenails. Patient has h/o cirrhosis, DM2, GERD, tobacco use. Patient is NIDDM and unsure of last BG level. Admits occasional numbness and tingling to feet. Denies open wounds or sores. States that she used the antifungal cream and the athletes foot improved. Notes that her toenails are long, thick, discolored and crumbly. She is unable to care for them herself adequately. Denies pain. Relates previous treatment(s) including steroid cream. Denies any constitutional symptoms. No other pedal complaints at this time.    Past Medical History:   Diagnosis Date   • Cirrhosis of liver (CMS/HCC)    • Diabetes mellitus (CMS/HCC)    • GERD (gastroesophageal reflux disease)    • Liver disease      Past Surgical History:   Procedure Laterality Date   • COLONOSCOPY       Family History   Problem Relation Age of Onset   • Diabetes Mother    • No Known Problems Father      Social History     Socioeconomic History   • Marital status:      Spouse name: Not on file   • Number of children: Not on file   • Years of education: Not on file   • Highest education level: Not on file   Tobacco Use   • Smoking status: Current Every Day Smoker     Packs/day: 0.50     Types: Cigarettes   • Smokeless tobacco: Never Used   Substance and Sexual Activity   • Alcohol use: No     Frequency: Never     Binge frequency: Never   • Drug use: No   • Sexual  activity: No     No Known Allergies  Current Outpatient Medications   Medication Sig Dispense Refill   • amitriptyline (ELAVIL) 100 MG tablet TAKE ONE TABLET BY MOUTH EVERY EVENING 30 tablet 5   • atorvastatin (LIPITOR) 10 MG tablet Take 10 mg by mouth Daily.     • benazepril (LOTENSIN) 20 MG tablet TAKE ONE TABLET BY MOUTH EVERY DAY 30 tablet 5   • cyclobenzaprine (FLEXERIL) 10 MG tablet TAKE ONE TABLET BY MOUTH THREE TIMES A DAY AS NEEDED FOR MUSCLE SPASMS 60 tablet 0   • gabapentin (NEURONTIN) 600 MG tablet Take 600 mg by mouth 2 (Two) Times a Day.     • hydrocortisone-eucerin Apply  topically to the appropriate area as directed 2 (Two) Times a Day. 120 g 2   • ketoconazole (NIZORAL) 2 % cream Apply  topically to the appropriate area as directed Daily. 60 g 2   • metFORMIN (FORTAMET) 1000 MG (OSM) 24 hr tablet Take 1 tablet by mouth Daily With Breakfast. (Patient taking differently: Take 1,000 mg by mouth 2 (Two) Times a Day With Meals.) 60 tablet 5   • nadolol (CORGARD) 20 MG tablet Take 1 tablet by mouth Daily. 30 tablet 2   • omeprazole (priLOSEC) 20 MG capsule TAKE ONE CAPSULE BY MOUTH EVERY DAY     • predniSONE (DELTASONE) 10 MG (21) tablet pack Use as directed. Start on 11/28/2019. 21 tablet 0   • promethazine-dextromethorphan (PROMETHAZINE-DM) 6.25-15 MG/5ML syrup Take 5 mL by mouth 4 (Four) Times a Day As Needed for Cough. 240 mL 0   • STEGLATRO 5 MG tablet TAKE ONE TABLET BY MOUTH EVERY DAY 30 tablet 5   • traMADol (ULTRAM) 50 MG tablet TAKE ONE TABLET BY MOUTH THREE TIMES A DAY AS NEEDED 60 tablet 2     No current facility-administered medications for this visit.      Review of Systems   Constitutional: Negative for chills and fever.   HENT: Negative for congestion.    Respiratory: Negative for shortness of breath.    Cardiovascular: Positive for leg swelling. Negative for chest pain.   Gastrointestinal: Negative for constipation, diarrhea, nausea and vomiting.   Musculoskeletal: Positive for  arthralgias.        Foot pain   Skin: Positive for rash. Negative for wound.   Neurological: Positive for numbness.       OBJECTIVE     Vitals:    12/02/19 1104   BP: 140/60   Pulse: 62   SpO2: 99%       PHYSICAL EXAM  GEN:   Accompanied by none.     Foot/Ankle Exam:       General:   Diabetic Foot Exam Performed    Appearance: appears stated age and healthy    Orientation: AAOx3    Affect: appropriate    Gait: unimpaired    Assistance: independent    Shoe Gear:  Casual shoes     Right Foot/Ankle:      Vascular   Dorsalis pedis:  2+  Posterior tibial:  2+  Skin Temperature: warm    Edema Grading:  None  CFT:  3  Pedal Hair Growth:  Present  Varicosities: mild varicosities       Neurologic   Light touch sensation:  Diminished  Vibratory sensation:  Diminished  Hot/Cold sensation: diminished    Protective Sensation using [place] Monofilament:  9     Musculoskeletal   Ecchymosis:  None  Tenderness: toenails    Arch:  Normal  Hammertoe:  Absent  Claw toe:  Absent  Mallet toe:  Absent  Hallux valgus: No    Hallux limitus: No       Muscle Strength   Foot dorsiflexion:  5  Foot plantar flexion:  5  Foot inversion:  5  Foot eversion:  5     Range of Motion   Foot and ankle ROM within normal limits       Dermatologic   Skin: tinea (moccasin distribution - improved)    Nails: onychomycosis, abnormally thick, subungual debris and dystrophic nails       Left Foot/Ankle:      Vascular   Dorsalis pedis:  2+  Posterior tibial:  2+  Skin Temperature: warm    Edema Grading:  None  CFT:  3  Pedal Hair Growth:  Present  Varicosities: mild varicosities       Neurologic   Light touch sensation:  Diminished  Vibratory sensation:  Diminished  Hot/cold sensation: diminished    Protective Sensation using [place] Monofilament:  9     Musculoskeletal   Ecchymosis:  None  Tenderness: toenails    Arch:  Normal  Hammertoe:  Absent  Claw toe:  Absent  Mallet Toe:  Absent  Hallux valgus: No    Hallux limitus: No       Muscle  Strength   Foot dorsiflexion:  5  Foot plantar flexion:  5  Foot inversion:  5  Foot eversion:  5     Range of Motion   Foot and ankle ROM within normal limits       Dermatologic   Skin: tinea (moccasin distribution - improved)    Nails: onychomycosis, abnormally thick, subungual debris and dystrophic nails        RADIOLOGY/NUCLEAR:  No results found.    LABORATORY/CULTURE RESULTS:      PATHOLOGY RESULTS:       ASSESSMENT/PLAN     Della was seen today for follow-up and diabetes.    Diagnoses and all orders for this visit:    Onychomycosis    Diabetes mellitus type 2 with neurological manifestations (CMS/[facility])    Tobacco abuse      Comprehensive lower extremity examination and evaluation was performed.  Discussed findings and treatment plan including risks, benefits, and treatment options with patient in detail. Patient agreed with treatment plan.  After verbal consent obtained, nail(s) x10 debrided of length and thickness with nail nipper without incidence  Patient may maintain nails and calluses at home utilizing emery board or pumice stone between visits as needed  Reviewed at home diabetic foot care including daily foot checks   Discussed smoking cessation.   Antifungal cream PRN  An After Visit Summary was printed and given to the patient at discharge, including (if requested) any available informative/educational handouts regarding diagnosis, treatment, or medications. All questions were answered to patient/family satisfaction. Should symptoms fail to improve or worsen they agree to call or return to clinic or to go to the Emergency Department. Discussed the importance of following up with any needed screening tests/labs/specialist appointments and any requested follow-up recommended by me today. Importance of maintaining follow-up discussed and patient accepts that missed appointments can delay diagnosis and potentially lead to worsening of conditions.  Return in about 3 months (around 3/2/2020)., or sooner if  acute issues arise.        This document has been electronically signed by Cortes White DPM on December 2, 2019 11:40 AM

## 2019-12-16 ENCOUNTER — OFFICE VISIT (OUTPATIENT)
Dept: FAMILY MEDICINE CLINIC | Facility: CLINIC | Age: 59
End: 2019-12-16

## 2019-12-16 VITALS
BODY MASS INDEX: 42.33 KG/M2 | OXYGEN SATURATION: 98 % | SYSTOLIC BLOOD PRESSURE: 122 MMHG | DIASTOLIC BLOOD PRESSURE: 60 MMHG | WEIGHT: 215.6 LBS | HEART RATE: 64 BPM | HEIGHT: 60 IN

## 2019-12-16 DIAGNOSIS — R51.9 FRONTAL HEADACHE: ICD-10-CM

## 2019-12-16 DIAGNOSIS — Z23 FLU VACCINE NEED: Primary | ICD-10-CM

## 2019-12-16 DIAGNOSIS — J01.00 ACUTE NON-RECURRENT MAXILLARY SINUSITIS: ICD-10-CM

## 2019-12-16 DIAGNOSIS — R09.1 PLEURISY: ICD-10-CM

## 2019-12-16 DIAGNOSIS — J20.9 ACUTE BRONCHITIS, UNSPECIFIED ORGANISM: ICD-10-CM

## 2019-12-16 PROCEDURE — G0008 ADMIN INFLUENZA VIRUS VAC: HCPCS | Performed by: FAMILY MEDICINE

## 2019-12-16 PROCEDURE — 90674 CCIIV4 VAC NO PRSV 0.5 ML IM: CPT | Performed by: FAMILY MEDICINE

## 2019-12-16 PROCEDURE — 96372 THER/PROPH/DIAG INJ SC/IM: CPT | Performed by: FAMILY MEDICINE

## 2019-12-16 PROCEDURE — 99214 OFFICE O/P EST MOD 30 MIN: CPT | Performed by: FAMILY MEDICINE

## 2019-12-16 RX ORDER — CEFTRIAXONE 500 MG/1
500 INJECTION, POWDER, FOR SOLUTION INTRAMUSCULAR; INTRAVENOUS EVERY 24 HOURS
Status: DISCONTINUED | OUTPATIENT
Start: 2019-12-16 | End: 2019-12-17

## 2019-12-16 RX ORDER — GUAIFENESIN AND DEXTROMETHORPHAN HYDROBROMIDE 600; 30 MG/1; MG/1
1 TABLET, EXTENDED RELEASE ORAL 2 TIMES DAILY PRN
Qty: 30 EACH | Refills: 1 | Status: SHIPPED | OUTPATIENT
Start: 2019-12-16 | End: 2020-01-02

## 2019-12-16 RX ORDER — KETOROLAC TROMETHAMINE 30 MG/ML
30 INJECTION, SOLUTION INTRAMUSCULAR; INTRAVENOUS EVERY 6 HOURS PRN
Status: DISCONTINUED | OUTPATIENT
Start: 2019-12-16 | End: 2019-12-17

## 2019-12-16 RX ORDER — CEPHALEXIN 500 MG/1
500 CAPSULE ORAL 3 TIMES DAILY
Qty: 30 CAPSULE | Refills: 0 | Status: SHIPPED | OUTPATIENT
Start: 2019-12-16 | End: 2020-01-02

## 2019-12-16 RX ADMIN — KETOROLAC TROMETHAMINE 30 MG: 30 INJECTION, SOLUTION INTRAMUSCULAR; INTRAVENOUS at 14:25

## 2019-12-16 NOTE — PROGRESS NOTES
OFFICE VISIT NOTE:    Della Gonzalez is a 59 y.o. female who presents today for Diabetes (3 mth f/u); Nasal Congestion; and Facial Pain.     Recent frontal headache - likely due to sinus congestion and the weather.     Diabetes   She presents for her follow-up diabetic visit. She has type 2 diabetes mellitus. Her disease course has been stable. There are no hypoglycemic associated symptoms. There are no diabetic associated symptoms. Pertinent negatives for diabetes include no chest pain, no fatigue and no weight loss. There are no hypoglycemic complications. Symptoms are stable. Current diabetic treatment includes diet. She is compliant with treatment most of the time. Her weight is stable. She is following a diabetic and low fat/cholesterol diet. Meal planning includes avoidance of concentrated sweets. She participates in exercise intermittently. There is no change in her home blood glucose trend. She does not see a podiatrist.Eye exam is current.   Facial Pain   This is a new problem. The current episode started 1 to 4 weeks ago. The problem occurs constantly. The problem has been unchanged. Pertinent negatives include no abdominal pain, chest pain, fatigue, fever or rash. The symptoms are aggravated by bending. She has tried acetaminophen, drinking and rest for the symptoms. The treatment provided moderate relief.        Past medical/surgical history, Family history, Social history, Allergies and Medications have been reviewed with the patient today and are updated in [facility] EMR. See below.    Past Medical History:   Diagnosis Date   • Cirrhosis of liver (CMS/HCC)    • Diabetes mellitus (CMS/HCC)    • GERD (gastroesophageal reflux disease)    • Liver disease      Past Surgical History:   Procedure Laterality Date   • COLONOSCOPY       Family History   Problem Relation Age of Onset   • Diabetes Mother    • No Known Problems Father      Social History     Tobacco Use   • Smoking status: Current Every Day Smoker      Packs/day: 0.50     Types: Cigarettes   • Smokeless tobacco: Never Used   Substance Use Topics   • Alcohol use: No     Frequency: Never     Binge frequency: Never   • Drug use: No       ALLERGIES:  Patient has no known allergies.    CURRENT MEDS:    Current Outpatient Medications:   •  amitriptyline (ELAVIL) 100 MG tablet, TAKE ONE TABLET BY MOUTH EVERY EVENING, Disp: 30 tablet, Rfl: 5  •  atorvastatin (LIPITOR) 10 MG tablet, Take 10 mg by mouth Daily., Disp: , Rfl:   •  benazepril (LOTENSIN) 20 MG tablet, TAKE ONE TABLET BY MOUTH EVERY DAY, Disp: 30 tablet, Rfl: 5  •  cyclobenzaprine (FLEXERIL) 10 MG tablet, TAKE ONE TABLET BY MOUTH THREE TIMES A DAY AS NEEDED FOR MUSCLE SPASMS, Disp: 60 tablet, Rfl: 0  •  gabapentin (NEURONTIN) 600 MG tablet, Take 600 mg by mouth 2 (Two) Times a Day., Disp: , Rfl:   •  hydrocortisone-eucerin, Apply  topically to the appropriate area as directed 2 (Two) Times a Day., Disp: 120 g, Rfl: 2  •  ketoconazole (NIZORAL) 2 % cream, Apply  topically to the appropriate area as directed Daily., Disp: 60 g, Rfl: 2  •  metFORMIN (FORTAMET) 1000 MG (OSM) 24 hr tablet, Take 1 tablet by mouth Daily With Breakfast. (Patient taking differently: Take 1,000 mg by mouth 2 (Two) Times a Day With Meals.), Disp: 60 tablet, Rfl: 5  •  nadolol (CORGARD) 20 MG tablet, Take 1 tablet by mouth Daily., Disp: 30 tablet, Rfl: 2  •  omeprazole (priLOSEC) 20 MG capsule, TAKE ONE CAPSULE BY MOUTH EVERY DAY, Disp: , Rfl:   •  predniSONE (DELTASONE) 10 MG (21) tablet pack, Use as directed. Start on 11/28/2019., Disp: 21 tablet, Rfl: 0  •  promethazine-dextromethorphan (PROMETHAZINE-DM) 6.25-15 MG/5ML syrup, Take 5 mL by mouth 4 (Four) Times a Day As Needed for Cough., Disp: 240 mL, Rfl: 0  •  STEGLATRO 5 MG tablet, TAKE ONE TABLET BY MOUTH EVERY DAY, Disp: 30 tablet, Rfl: 5  •  traMADol (ULTRAM) 50 MG tablet, TAKE ONE TABLET BY MOUTH THREE TIMES A DAY AS NEEDED, Disp: 60 tablet, Rfl: 2  •  cephalexin (KEFLEX) 500 MG  "capsule, Take 1 capsule by mouth 3 (Three) Times a Day., Disp: 30 capsule, Rfl: 0  •  guaifenesin-dextromethorphan (MUCINEX DM)  MG tablet sustained-release 12 hour tablet, Take 1 tablet by mouth 2 (Two) Times a Day As Needed (cough)., Disp: 30 each, Rfl: 1    REVIEW OF SYSTEMS:  Review of Systems   Constitutional: Negative for activity change, fatigue, fever, unexpected weight gain and unexpected weight loss.   Respiratory: Negative for shortness of breath.    Cardiovascular: Negative for chest pain.   Gastrointestinal: Negative for abdominal pain.   Genitourinary: Negative for difficulty urinating.   Skin: Negative for rash.   Neurological: Negative for syncope and headache.       PHYSICAL EXAMINATION:  Vital Signs:  /60 (BP Location: Left arm, Patient Position: Sitting, Cuff Size: Adult)   Pulse 64   Ht 152.4 cm (60\")   Wt 97.8 kg (215 lb 9.6 oz)   LMP  (LMP Unknown)   SpO2 98%   Breastfeeding No   BMI 42.11 kg/m²   Physical Exam   Constitutional: She is oriented to person, place, and time. She appears well-developed and well-nourished. No distress.   HENT:   Head: Normocephalic and atraumatic.   Nose: Mucosal edema and rhinorrhea present. Right sinus exhibits maxillary sinus tenderness. Left sinus exhibits maxillary sinus tenderness.   Mouth/Throat: Posterior oropharyngeal edema and posterior oropharyngeal erythema present. No oropharyngeal exudate.   Neck: Normal range of motion. Neck supple. No JVD present.   Cardiovascular: Normal rate, regular rhythm, normal heart sounds and intact distal pulses.   Pulmonary/Chest: Effort normal. No respiratory distress. She has rales.   Musculoskeletal: Normal range of motion. She exhibits no edema.   Neurological: She is alert and oriented to person, place, and time. No cranial nerve deficit.   Skin: Skin is warm and dry. Capillary refill takes less than 2 seconds. No rash noted.   Psychiatric: She has a normal mood and affect. Her behavior is normal. "   Nursing note and vitals reviewed.      Procedures    ASSESSMENT/ PLAN:  Problem List Items Addressed This Visit     None      Visit Diagnoses     Flu vaccine need    -  Primary    Relevant Orders    Flucelvax Quad=>4Years ([id number]) (Completed)    Acute non-recurrent maxillary sinusitis        Relevant Medications    cephalexin (KEFLEX) 500 MG capsule    guaifenesin-dextromethorphan (MUCINEX DM)  MG tablet sustained-release 12 hour tablet    Acute bronchitis, unspecified organism        Relevant Medications    cephalexin (KEFLEX) 500 MG capsule    guaifenesin-dextromethorphan (MUCINEX DM)  MG tablet sustained-release 12 hour tablet    Pleurisy        Relevant Medications    guaifenesin-dextromethorphan (MUCINEX DM)  MG tablet sustained-release 12 hour tablet    Frontal headache        Relevant Medications    ketorolac (TORADOL) injection 30 mg (Completed)            Specific Patient Instructions:  MEDICATION Instructions: Encouraged patient to continue routine medicines as prescribed and maintain compliance. Patient instructed to report any adverse side effects or reactions to medicines promptly to the office. Patient instructed to make us aware of any OTC or herbal meds or supplement use.  DIET Recommendations: Patient instructed and provided information on the following nutrition and DIET(s): Calorie restriction for weight reduction and maintenance. Necessity for adequate daily intake of fluids/water.  EXERCISE Instructions: Discussed with patient the need for routine aerobic activity for cardiovascular fitness, 3 times a week for about 30 minutes. Daily exercise for increased fitness and weight reduction goals.    Patient's Body mass index is 42.11 kg/m². BMI is above normal parameters. Recommendations include: exercise counseling and nutrition counseling.      SMOKING Recommendations: Counseled patient and encouraged them on smoking cessation. Discussed the benefits to all body systems with  smoking cessation, including decreased cardiac/lung/stroke/cancer risk.  HEALTH MAINTENANCE:  Counseling provided to patient/family about routine health maintenance and ANNUAL physicals/labs. Counseling on recommended Vaccinations appropriate for age needed.  MISCELLANEOUS Instructions: N/A      Medications or Orders placed this visit:  Orders Placed This Encounter   Procedures   • Flucelvax Quad=>4Years ([id number])       Medications DISCONTINUED this visit:  Medications Discontinued During This Encounter   Medication Reason   • cefTRIAXone (ROCEPHIN) injection 500 mg    • ketorolac (TORADOL) injection 30 mg        FOLLOW-UP:  Return in about 3 months (around 3/16/2020) for Recheck.    I discussed the patients findings and my recommendations with patient.  An After Visit Summary (AVS) was printed and given to the patient at discharge.      Donny Aguayo MD, FAAFP  12/19/2019

## 2019-12-16 NOTE — PATIENT INSTRUCTIONS
Sinusitis, Adult  Sinusitis is inflammation of your sinuses. Sinuses are hollow spaces in the bones around your face. Your sinuses are located:  · Around your eyes.  · In the middle of your forehead.  · Behind your nose.  · In your cheekbones.  Mucus normally drains out of your sinuses. When your nasal tissues become inflamed or swollen, mucus can become trapped or blocked. This allows bacteria, viruses, and fungi to grow, which leads to infection. Most infections of the sinuses are caused by a virus.  Sinusitis can develop quickly. It can last for up to 4 weeks (acute) or for more than 12 weeks (chronic). Sinusitis often develops after a cold.  What are the causes?  This condition is caused by anything that creates swelling in the sinuses or stops mucus from draining. This includes:  · Allergies.  · Asthma.  · Infection from bacteria or viruses.  · Deformities or blockages in your nose or sinuses.  · Abnormal growths in the nose (nasal polyps).  · Pollutants, such as chemicals or irritants in the air.  · Infection from fungi (rare).  What increases the risk?  You are more likely to develop this condition if you:  · Have a weak body defense system (immune system).  · Do a lot of swimming or diving.  · Overuse nasal sprays.  · Smoke.  What are the signs or symptoms?  The main symptoms of this condition are pain and a feeling of pressure around the affected sinuses. Other symptoms include:  · Stuffy nose or congestion.  · Thick drainage from your nose.  · Swelling and warmth over the affected sinuses.  · Headache.  · Upper toothache.  · A cough that may get worse at night.  · Extra mucus that collects in the throat or the back of the nose (postnasal drip).  · Decreased sense of smell and taste.  · Fatigue.  · A fever.  · Sore throat.  · Bad breath.  How is this diagnosed?  This condition is diagnosed based on:  · Your symptoms.  · Your medical history.  · A physical exam.  · Tests to find out if your condition is  acute or chronic. This may include:  ? Checking your nose for nasal polyps.  ? Viewing your sinuses using a device that has a light (endoscope).  ? Testing for allergies or bacteria.  ? Imaging tests, such as an MRI or CT scan.  In rare cases, a bone biopsy may be done to rule out more serious types of fungal sinus disease.  How is this treated?  Treatment for sinusitis depends on the cause and whether your condition is chronic or acute.  · If caused by a virus, your symptoms should go away on their own within 10 days. You may be given medicines to relieve symptoms. They include:  ? Medicines that shrink swollen nasal passages (topical intranasal decongestants).  ? Medicines that treat allergies (antihistamines).  ? A spray that eases inflammation of the nostrils (topical intranasal corticosteroids).  ? Rinses that help get rid of thick mucus in your nose (nasal saline washes).  · If caused by bacteria, your health care provider may recommend waiting to see if your symptoms improve. Most bacterial infections will get better without antibiotic medicine. You may be given antibiotics if you have:  ? A severe infection.  ? A weak immune system.  · If caused by narrow nasal passages or nasal polyps, you may need to have surgery.  Follow these instructions at home:  Medicines  · Take, use, or apply over-the-counter and prescription medicines only as told by your health care provider. These may include nasal sprays.  · If you were prescribed an antibiotic medicine, take it as told by your health care provider. Do not stop taking the antibiotic even if you start to feel better.  Hydrate and humidify    · Drink enough fluid to keep your urine pale yellow. Staying hydrated will help to thin your mucus.  · Use a cool mist humidifier to keep the humidity level in your home above 50%.  · Inhale steam for 10-15 minutes, 3-4 times a day, or as told by your health care provider. You can do this in the bathroom while a hot shower is  running.  · Limit your exposure to cool or dry air.  Rest  · Rest as much as possible.  · Sleep with your head raised (elevated).  · Make sure you get enough sleep each night.  General instructions    · Apply a warm, moist washcloth to your face 3-4 times a day or as told by your health care provider. This will help with discomfort.  · Wash your hands often with soap and water to reduce your exposure to germs. If soap and water are not available, use hand .  · Do not smoke. Avoid being around people who are smoking (secondhand smoke).  · Keep all follow-up visits as told by your health care provider. This is important.  Contact a health care provider if:  · You have a fever.  · Your symptoms get worse.  · Your symptoms do not improve within 10 days.  Get help right away if:  · You have a severe headache.  · You have persistent vomiting.  · You have severe pain or swelling around your face or eyes.  · You have vision problems.  · You develop confusion.  · Your neck is stiff.  · You have trouble breathing.  Summary  · Sinusitis is soreness and inflammation of your sinuses. Sinuses are hollow spaces in the bones around your face.  · This condition is caused by nasal tissues that become inflamed or swollen. The swelling traps or blocks the flow of mucus. This allows bacteria, viruses, and fungi to grow, which leads to infection.  · If you were prescribed an antibiotic medicine, take it as told by your health care provider. Do not stop taking the antibiotic even if you start to feel better.  · Keep all follow-up visits as told by your health care provider. This is important.  This information is not intended to replace advice given to you by your health care provider. Make sure you discuss any questions you have with your health care provider.  Document Released: 12/18/2006 Document Revised: 05/20/2019 Document Reviewed: 05/20/2019  [employer] Interactive Patient Education © 2019 [employer]      Acute  Bronchitis, Adult  Acute bronchitis is when air tubes (bronchi) in the lungs suddenly get swollen. The condition can make it hard to breathe. It can also cause these symptoms:  · A cough.  · Coughing up clear, yellow, or green mucus.  · Wheezing.  · Chest congestion.  · Shortness of breath.  · A fever.  · Body aches.  · Chills.  · A sore throat.  Follow these instructions at home:    Medicines  · Take over-the-counter and prescription medicines only as told by your doctor.  · If you were prescribed an antibiotic medicine, take it as told by your doctor. Do not stop taking the antibiotic even if you start to feel better.  General instructions  · Rest.  · Drink enough fluids to keep your pee (urine) pale yellow.  · Avoid smoking and secondhand smoke. If you smoke and you need help quitting, ask your doctor. Quitting will help your lungs heal faster.  · Use an inhaler, cool mist vaporizer, or humidifier as told by your doctor.  · Keep all follow-up visits as told by your doctor. This is important.  How is this prevented?  To lower your risk of getting this condition again:  · Wash your hands often with soap and water. If you cannot use soap and water, use hand .  · Avoid contact with people who have cold symptoms.  · Try not to touch your hands to your mouth, nose, or eyes.  · Make sure to get the flu shot every year.  Contact a doctor if:  · Your symptoms do not get better in 2 weeks.  Get help right away if:  · You cough up blood.  · You have chest pain.  · You have very bad shortness of breath.  · You become dehydrated.  · You faint (pass out) or keep feeling like you are going to pass out.  · You keep throwing up (vomiting).  · You have a very bad headache.  · Your fever or chills gets worse.  This information is not intended to replace advice given to you by your health care provider. Make sure you discuss any questions you have with your health care provider.  Document Released: 06/05/2009 Document  Revised: 08/01/2018 Document Reviewed: 06/07/2017  [employer] Interactive Patient Education © 2019 [employer] Inc[employer]

## 2019-12-17 RX ORDER — KETOROLAC TROMETHAMINE 30 MG/ML
30 INJECTION, SOLUTION INTRAMUSCULAR; INTRAVENOUS ONCE
Status: COMPLETED | OUTPATIENT
Start: 2019-12-16 | End: 2019-12-16

## 2019-12-17 RX ADMIN — CEFTRIAXONE 500 MG: 500 INJECTION, POWDER, FOR SOLUTION INTRAMUSCULAR; INTRAVENOUS at 14:21

## 2019-12-30 DIAGNOSIS — E11.628 TYPE 2 DIABETES MELLITUS WITH OTHER SKIN COMPLICATION, WITHOUT LONG-TERM CURRENT USE OF INSULIN (HCC): ICD-10-CM

## 2019-12-30 DIAGNOSIS — F51.01 PRIMARY INSOMNIA: ICD-10-CM

## 2019-12-30 RX ORDER — METFORMIN HYDROCHLORIDE 500 MG/1
TABLET, EXTENDED RELEASE ORAL
Qty: 60 TABLET | Refills: 5 | Status: SHIPPED | OUTPATIENT
Start: 2019-12-30 | End: 2020-08-28

## 2019-12-30 RX ORDER — AMITRIPTYLINE HYDROCHLORIDE 100 MG/1
TABLET, FILM COATED ORAL
Qty: 30 TABLET | Refills: 5 | Status: SHIPPED | OUTPATIENT
Start: 2019-12-30 | End: 2020-06-29

## 2019-12-30 RX ORDER — ATORVASTATIN CALCIUM 10 MG/1
TABLET, FILM COATED ORAL
Qty: 30 TABLET | Refills: 5 | Status: SHIPPED | OUTPATIENT
Start: 2019-12-30 | End: 2020-06-29

## 2020-01-02 ENCOUNTER — TELEPHONE (OUTPATIENT)
Dept: FAMILY MEDICINE CLINIC | Facility: CLINIC | Age: 60
End: 2020-01-02

## 2020-01-02 ENCOUNTER — OFFICE VISIT (OUTPATIENT)
Dept: FAMILY MEDICINE CLINIC | Facility: CLINIC | Age: 60
End: 2020-01-02

## 2020-01-02 VITALS
HEIGHT: 60 IN | DIASTOLIC BLOOD PRESSURE: 64 MMHG | RESPIRATION RATE: 22 BRPM | HEART RATE: 65 BPM | SYSTOLIC BLOOD PRESSURE: 95 MMHG | OXYGEN SATURATION: 96 % | BODY MASS INDEX: 40.44 KG/M2 | WEIGHT: 206 LBS

## 2020-01-02 DIAGNOSIS — E66.01 MORBIDLY OBESE (HCC): ICD-10-CM

## 2020-01-02 DIAGNOSIS — I10 ESSENTIAL HYPERTENSION: ICD-10-CM

## 2020-01-02 DIAGNOSIS — R09.1 PLEURISY: Primary | ICD-10-CM

## 2020-01-02 DIAGNOSIS — E11.628 TYPE 2 DIABETES MELLITUS WITH OTHER SKIN COMPLICATION, WITHOUT LONG-TERM CURRENT USE OF INSULIN (HCC): ICD-10-CM

## 2020-01-02 PROCEDURE — 96372 THER/PROPH/DIAG INJ SC/IM: CPT | Performed by: NURSE PRACTITIONER

## 2020-01-02 PROCEDURE — 99214 OFFICE O/P EST MOD 30 MIN: CPT | Performed by: NURSE PRACTITIONER

## 2020-01-02 RX ORDER — METHYLPREDNISOLONE ACETATE 40 MG/ML
40 INJECTION, SUSPENSION INTRA-ARTICULAR; INTRALESIONAL; INTRAMUSCULAR; SOFT TISSUE ONCE
Status: COMPLETED | OUTPATIENT
Start: 2020-01-02 | End: 2020-01-02

## 2020-01-02 RX ORDER — BENAZEPRIL HYDROCHLORIDE 10 MG/1
10 TABLET ORAL DAILY
Qty: 30 TABLET | Refills: 5 | Status: SHIPPED | OUTPATIENT
Start: 2020-01-02 | End: 2020-06-29

## 2020-01-02 RX ORDER — TIZANIDINE 4 MG/1
4 TABLET ORAL
COMMUNITY
Start: 2019-12-30 | End: 2020-07-06 | Stop reason: ALTCHOICE

## 2020-01-02 RX ORDER — NAPROXEN 500 MG/1
500 TABLET ORAL 2 TIMES DAILY WITH MEALS
Qty: 30 TABLET | Refills: 0 | Status: SHIPPED | OUTPATIENT
Start: 2020-01-02 | End: 2020-07-06 | Stop reason: ALTCHOICE

## 2020-01-02 RX ADMIN — METHYLPREDNISOLONE ACETATE 40 MG: 40 INJECTION, SUSPENSION INTRA-ARTICULAR; INTRALESIONAL; INTRAMUSCULAR; SOFT TISSUE at 10:30

## 2020-01-02 NOTE — PROGRESS NOTES
"Chief Complaint   Patient presents with   • Abdominal Pain     Left        Subjective   Della Gonzalez is a 59 y.o.  female who presents today for left sided abdominal pain.    HPI:  ABDOMINAL PAIN:  Patient states it started on the right side about 5 days ago and now it is primarily on the left side.  It is \"like a catch under my ribs\" that is so very painful.   It does hurt worse with a deep breath.  After discussion with patient, the pain is more chest wall pain than abdominal pain.    Della Gonzalez  has a past medical history of Cirrhosis of liver (CMS/HCC), Diabetes mellitus (CMS/HCC), GERD (gastroesophageal reflux disease), and Liver disease.    No Known Allergies    Current Outpatient Medications:   •  amitriptyline (ELAVIL) 100 MG tablet, TAKE ONE TABLET BY MOUTH EVERY EVENING, Disp: 30 tablet, Rfl: 5  •  atorvastatin (LIPITOR) 10 MG tablet, TAKE ONE TABLET BY MOUTH EVERY DAY, Disp: 30 tablet, Rfl: 5  •  benazepril (LOTENSIN) 10 MG tablet, Take 1 tablet by mouth Daily., Disp: 30 tablet, Rfl: 5  •  gabapentin (NEURONTIN) 600 MG tablet, Take 600 mg by mouth 2 (Two) Times a Day., Disp: , Rfl:   •  hydrocortisone-eucerin, Apply  topically to the appropriate area as directed 2 (Two) Times a Day., Disp: 120 g, Rfl: 2  •  ketoconazole (NIZORAL) 2 % cream, Apply  topically to the appropriate area as directed Daily., Disp: 60 g, Rfl: 2  •  metFORMIN ER (GLUCOPHAGE-XR) 500 MG 24 hr tablet, TAKE TWO TABLETS BY MOUTH EVERY DAY WITH BREAKFAST, Disp: 60 tablet, Rfl: 5  •  nadolol (CORGARD) 20 MG tablet, Take 1 tablet by mouth Daily., Disp: 30 tablet, Rfl: 2  •  omeprazole (priLOSEC) 20 MG capsule, TAKE ONE CAPSULE BY MOUTH EVERY DAY, Disp: , Rfl:   •  STEGLATRO 5 MG tablet, TAKE ONE TABLET BY MOUTH EVERY DAY, Disp: 30 tablet, Rfl: 5  •  tiZANidine (ZANAFLEX) 4 MG tablet, Take 4 mg by mouth., Disp: , Rfl:   •  traMADol (ULTRAM) 50 MG tablet, TAKE ONE TABLET BY MOUTH THREE TIMES A DAY AS NEEDED, Disp: 60 tablet, Rfl: " 2  •  naproxen (NAPROSYN) 500 MG tablet, Take 1 tablet by mouth 2 (Two) Times a Day With Meals., Disp: 30 tablet, Rfl: 0  No current facility-administered medications for this visit.   Past Medical History:   Diagnosis Date   • Cirrhosis of liver (CMS/HCC)    • Diabetes mellitus (CMS/HCC)    • GERD (gastroesophageal reflux disease)    • Liver disease      Past Surgical History:   Procedure Laterality Date   • COLONOSCOPY       Social History     Socioeconomic History   • Marital status:      Spouse name: Not on file   • Number of children: Not on file   • Years of education: Not on file   • Highest education level: Not on file   Tobacco Use   • Smoking status: Current Every Day Smoker     Packs/day: 0.50     Types: Cigarettes   • Smokeless tobacco: Never Used   Substance and Sexual Activity   • Alcohol use: No     Frequency: Never     Binge frequency: Never   • Drug use: No   • Sexual activity: Never     Family History   Problem Relation Age of Onset   • Diabetes Mother    • No Known Problems Father        Family history, surgical history, past medical history, Allergies and med's reviewed with patient today and updated in [facility] EMR.     ROS:  Review of Systems   Constitutional: Negative.  Negative for fatigue, fever and unexpected weight change.   HENT: Negative.  Negative for facial swelling, sore throat and trouble swallowing.    Eyes: Negative.  Negative for photophobia, discharge and visual disturbance.   Respiratory: Negative.  Negative for cough, chest tightness and shortness of breath.    Cardiovascular: Negative.  Negative for chest pain and palpitations.   Gastrointestinal: Positive for abdominal pain. Negative for diarrhea, nausea and vomiting.   Endocrine: Negative.  Negative for polydipsia, polyphagia and polyuria.   Genitourinary: Negative.  Negative for dysuria, flank pain and frequency.   Musculoskeletal: Negative.  Negative for back pain, gait problem and neck pain.   Skin: Negative.  Negative  "for rash.   Allergic/Immunologic: Negative.    Neurological: Negative.  Negative for dizziness, light-headedness and headaches.   Hematological: Negative.    Psychiatric/Behavioral: Negative.  Negative for self-injury and suicidal ideas.       OBJECTIVE:  Vitals:    01/02/20 0944   BP: 95/64   BP Location: Right arm   Patient Position: Sitting   Cuff Size: Large Adult   Pulse: 65   Resp: 22   SpO2: 96%   Weight: 93.4 kg (206 lb)   Height: 152.4 cm (60\")     Physical Exam   Constitutional: She is oriented to person, place, and time. She appears well-developed and well-nourished. No distress.   HENT:   Head: Normocephalic and atraumatic.   Eyes: Pupils are equal, round, and reactive to light. Conjunctivae and EOM are normal.   Neck: Normal range of motion. Neck supple.   Cardiovascular: Normal rate, regular rhythm, normal heart sounds and intact distal pulses.   No murmur heard.  Pulmonary/Chest: Effort normal and breath sounds normal. No respiratory distress.   Abdominal: Soft. Bowel sounds are normal. She exhibits no distension. There is no tenderness.   Musculoskeletal: Normal range of motion. She exhibits no edema.   Neurological: She is alert and oriented to person, place, and time.   Skin: Skin is warm and dry. Capillary refill takes less than 2 seconds. She is not diaphoretic. No erythema.   Psychiatric: She has a normal mood and affect. Her behavior is normal. Judgment and thought content normal.   Nursing note and vitals reviewed.      ASSESSMENT/ PLAN:    Della was seen today for abdominal pain.    Diagnoses and all orders for this visit:    Pleurisy  -     naproxen (NAPROSYN) 500 MG tablet; Take 1 tablet by mouth 2 (Two) Times a Day With Meals.  -     methylPREDNISolone acetate (DEPO-medrol) injection 40 mg    Type 2 diabetes mellitus with other skin complication, without long-term current use of insulin (CMS/[facility])  -     Hemoglobin A1c  -     Comprehensive metabolic panel    Essential hypertension  -     " benazepril (LOTENSIN) 10 MG tablet; Take 1 tablet by mouth Daily.    Morbidly obese (CMS/HCC)        Orders Placed Today:     New Medications Ordered This Visit   Medications   • benazepril (LOTENSIN) 10 MG tablet     Sig: Take 1 tablet by mouth Daily.     Dispense:  30 tablet     Refill:  5     Patient will take 1/2 tab of remaining script.  Please d/c refills of the 20 mg.   • naproxen (NAPROSYN) 500 MG tablet     Sig: Take 1 tablet by mouth 2 (Two) Times a Day With Meals.     Dispense:  30 tablet     Refill:  0   • methylPREDNISolone acetate (DEPO-medrol) injection 40 mg        Management Plan:     An After Visit Summary was printed and given to the patient at discharge.    Follow-up: Return for keep scheduled appointment.    Vijaya Henao, MELECIO 1/2/2020 10:59 AM  This note was electronically signed.

## 2020-01-02 NOTE — TELEPHONE ENCOUNTER
This should be under Yani Gonzalez (Patty).  This patient was reporting that her sister in law would miss her appt today.

## 2020-01-02 NOTE — TELEPHONE ENCOUNTER
Patient stated that she could not come to appt today because Timothy is in the hospital at [facility] with fluid around his heart.  Patient wanted providers to know.

## 2020-01-03 LAB
ALBUMIN SERPL-MCNC: 4.4 G/DL (ref 3.6–4.8)
ALBUMIN/GLOB SERPL: 1.6 {RATIO} (ref 1.2–2.2)
ALP SERPL-CCNC: 99 IU/L (ref 39–117)
ALT SERPL-CCNC: 14 IU/L (ref 0–32)
AST SERPL-CCNC: 23 IU/L (ref 0–40)
BILIRUB SERPL-MCNC: 0.9 MG/DL (ref 0–1.2)
BUN SERPL-MCNC: 10 MG/DL (ref 8–27)
BUN/CREAT SERPL: 20 (ref 12–28)
CALCIUM SERPL-MCNC: 9 MG/DL (ref 8.7–10.3)
CHLORIDE SERPL-SCNC: 98 MMOL/L (ref 96–106)
CO2 SERPL-SCNC: 24 MMOL/L (ref 20–29)
CREAT SERPL-MCNC: 0.51 MG/DL (ref 0.57–1)
GLOBULIN SER CALC-MCNC: 2.7 G/DL (ref 1.5–4.5)
GLUCOSE SERPL-MCNC: 355 MG/DL (ref 65–99)
HBA1C MFR BLD: 8.9 % (ref 4.8–5.6)
POTASSIUM SERPL-SCNC: 3.7 MMOL/L (ref 3.5–5.2)
PROT SERPL-MCNC: 7.1 G/DL (ref 6–8.5)
SODIUM SERPL-SCNC: 137 MMOL/L (ref 134–144)

## 2020-01-07 ENCOUNTER — OFFICE VISIT (OUTPATIENT)
Dept: FAMILY MEDICINE CLINIC | Facility: CLINIC | Age: 60
End: 2020-01-07

## 2020-01-07 VITALS
RESPIRATION RATE: 20 BRPM | DIASTOLIC BLOOD PRESSURE: 82 MMHG | SYSTOLIC BLOOD PRESSURE: 130 MMHG | BODY MASS INDEX: 41.03 KG/M2 | WEIGHT: 209 LBS | HEART RATE: 76 BPM | OXYGEN SATURATION: 98 % | HEIGHT: 60 IN

## 2020-01-07 DIAGNOSIS — E11.628 TYPE 2 DIABETES MELLITUS WITH OTHER SKIN COMPLICATION, WITHOUT LONG-TERM CURRENT USE OF INSULIN (HCC): Primary | ICD-10-CM

## 2020-01-07 DIAGNOSIS — E66.01 MORBID OBESITY WITH BMI OF 40.0-44.9, ADULT (HCC): ICD-10-CM

## 2020-01-07 DIAGNOSIS — B35.4 TINEA CORPORIS: ICD-10-CM

## 2020-01-07 DIAGNOSIS — E11.65 UNCONTROLLED TYPE 2 DIABETES MELLITUS WITH HYPERGLYCEMIA (HCC): ICD-10-CM

## 2020-01-07 PROCEDURE — 99213 OFFICE O/P EST LOW 20 MIN: CPT | Performed by: NURSE PRACTITIONER

## 2020-01-07 RX ORDER — KETOCONAZOLE 20 MG/G
CREAM TOPICAL DAILY
Qty: 60 G | Refills: 2 | Status: SHIPPED | OUTPATIENT
Start: 2020-01-07 | End: 2021-02-03

## 2020-01-07 NOTE — PROGRESS NOTES
Chief Complaint   Patient presents with   • Diabetes     FU        Subjective   Della Gonzalez is a 59 y.o.  female who presents today for diabetes follow up.    HPI:  DM:  Patient is here to discuss her recent lab work. Her BG and her A1c are both elevated.  Discussion with patient regarding her diet.  She states she has not cheated much at all over the holidays.  She only had a small amount of infrequent desserts and states she watches daily her carb intake.  RASH:  Patient has scaly patches on hands and feet.  They are burning and itching.    Della Gonzalez  has a past medical history of Cirrhosis of liver (CMS/HCC), Diabetes mellitus (CMS/HCC), GERD (gastroesophageal reflux disease), and Liver disease.    No Known Allergies    Current Outpatient Medications:   •  amitriptyline (ELAVIL) 100 MG tablet, TAKE ONE TABLET BY MOUTH EVERY EVENING, Disp: 30 tablet, Rfl: 5  •  atorvastatin (LIPITOR) 10 MG tablet, TAKE ONE TABLET BY MOUTH EVERY DAY, Disp: 30 tablet, Rfl: 5  •  benazepril (LOTENSIN) 10 MG tablet, Take 1 tablet by mouth Daily., Disp: 30 tablet, Rfl: 5  •  gabapentin (NEURONTIN) 600 MG tablet, Take 600 mg by mouth 2 (Two) Times a Day., Disp: , Rfl:   •  hydrocortisone-eucerin, Apply  topically to the appropriate area as directed 2 (Two) Times a Day., Disp: 120 g, Rfl: 2  •  ketoconazole (NIZORAL) 2 % cream, Apply  topically to the appropriate area as directed Daily., Disp: 60 g, Rfl: 2  •  metFORMIN ER (GLUCOPHAGE-XR) 500 MG 24 hr tablet, TAKE TWO TABLETS BY MOUTH EVERY DAY WITH BREAKFAST, Disp: 60 tablet, Rfl: 5  •  nadolol (CORGARD) 20 MG tablet, Take 1 tablet by mouth Daily., Disp: 30 tablet, Rfl: 2  •  naproxen (NAPROSYN) 500 MG tablet, Take 1 tablet by mouth 2 (Two) Times a Day With Meals., Disp: 30 tablet, Rfl: 0  •  omeprazole (priLOSEC) 20 MG capsule, TAKE ONE CAPSULE BY MOUTH EVERY DAY, Disp: , Rfl:   •  tiZANidine (ZANAFLEX) 4 MG tablet, Take 4 mg by mouth., Disp: , Rfl:   •  traMADol (ULTRAM) 50  MG tablet, TAKE ONE TABLET BY MOUTH THREE TIMES A DAY AS NEEDED, Disp: 60 tablet, Rfl: 2  •  Ertugliflozin L-PyroglutamicAc (STEGLATRO) 15 MG tablet, Take 1 tablet by mouth Every Morning., Disp: 30 tablet, Rfl: 5  Past Medical History:   Diagnosis Date   • Cirrhosis of liver (CMS/HCC)    • Diabetes mellitus (CMS/HCC)    • GERD (gastroesophageal reflux disease)    • Liver disease      Past Surgical History:   Procedure Laterality Date   • COLONOSCOPY       Social History     Socioeconomic History   • Marital status:      Spouse name: Not on file   • Number of children: Not on file   • Years of education: Not on file   • Highest education level: Not on file   Tobacco Use   • Smoking status: Current Every Day Smoker     Packs/day: 0.50     Types: Cigarettes   • Smokeless tobacco: Never Used   Substance and Sexual Activity   • Alcohol use: No     Frequency: Never     Binge frequency: Never   • Drug use: No   • Sexual activity: Never     Family History   Problem Relation Age of Onset   • Diabetes Mother    • No Known Problems Father        Family history, surgical history, past medical history, Allergies and med's reviewed with patient today and updated in [employer] EMR.     ROS:  Review of Systems   Constitutional: Negative.  Negative for fatigue, fever and unexpected weight change.   HENT: Negative.  Negative for facial swelling, sore throat and trouble swallowing.    Eyes: Negative.  Negative for photophobia, discharge and visual disturbance.   Respiratory: Negative.  Negative for cough, chest tightness and shortness of breath.    Cardiovascular: Negative.  Negative for chest pain and palpitations.   Gastrointestinal: Negative.  Negative for abdominal pain, diarrhea, nausea and vomiting.   Endocrine: Negative.  Negative for polydipsia, polyphagia and polyuria.   Genitourinary: Negative.  Negative for dysuria, flank pain and frequency.   Musculoskeletal: Negative.  Negative for back pain, gait problem and neck pain.  "  Skin: Positive for rash.   Allergic/Immunologic: Negative.    Neurological: Negative.  Negative for dizziness, light-headedness and headaches.   Hematological: Negative.    Psychiatric/Behavioral: Negative.  Negative for self-injury and suicidal ideas.       OBJECTIVE:  Vitals:    01/07/20 0916   BP: 130/82   BP Location: Right arm   Patient Position: Sitting   Cuff Size: Large Adult   Pulse: 76   Resp: 20   SpO2: 98%   Weight: 94.8 kg (209 lb)   Height: 152.4 cm (60\")     Physical Exam   Constitutional: She is oriented to person, place, and time. She appears well-developed and well-nourished. No distress.   HENT:   Head: Normocephalic and atraumatic.   Eyes: Pupils are equal, round, and reactive to light. Conjunctivae and EOM are normal.   Neck: Normal range of motion. Neck supple.   Cardiovascular: Normal rate, regular rhythm, normal heart sounds and intact distal pulses.   No murmur heard.  Pulmonary/Chest: Effort normal and breath sounds normal. No respiratory distress.   Abdominal: Soft. Bowel sounds are normal. She exhibits no distension. There is no tenderness.   Musculoskeletal: Normal range of motion. She exhibits no edema.   Neurological: She is alert and oriented to person, place, and time.   Skin: Skin is warm and dry. Capillary refill takes less than 2 seconds. Rash noted. She is not diaphoretic. No erythema.   Dry scaly patches to palms of hands and soles of feet.   Psychiatric: She has a normal mood and affect. Her behavior is normal. Judgment and thought content normal.       ASSESSMENT/ PLAN:    Della was seen today for diabetes.    Diagnoses and all orders for this visit:    Type 2 diabetes mellitus with other skin complication, without long-term current use of insulin (CMS/[facility])  -     Ertugliflozin L-PyroglutamicAc (STEGLATRO) 15 MG tablet; Take 1 tablet by mouth Every Morning.    Uncontrolled type 2 diabetes mellitus with hyperglycemia (CMS/[facility])    Morbid obesity with BMI of 40.0-44.9, adult " (CMS/[facility])    Tinea corporis  -     ketoconazole (NIZORAL) 2 % cream; Apply  topically to the appropriate area as directed Daily.    Patient instructed to continue her dietary control of carb counting.    Orders Placed Today:     New Medications Ordered This Visit   Medications   • Ertugliflozin L-PyroglutamicAc (STEGLATRO) 15 MG tablet     Sig: Take 1 tablet by mouth Every Morning.     Dispense:  30 tablet     Refill:  5     Patient will take 3 daily of current supply of 5 mg.  Please d/c any refills on the 5 mg.   • ketoconazole (NIZORAL) 2 % cream     Sig: Apply  topically to the appropriate area as directed Daily.     Dispense:  60 g     Refill:  2        Management Plan:     An After Visit Summary was printed and given to the patient at discharge.    Follow-up: Return for keep scheduled appointment.    Vijaya Henao, MELECIO 1/7/2020 9:46 AM  This note was electronically signed.

## 2020-01-13 ENCOUNTER — TELEPHONE (OUTPATIENT)
Dept: GASTROENTEROLOGY | Age: 60
End: 2020-01-13

## 2020-01-13 NOTE — TELEPHONE ENCOUNTER
Plan:      Continue same medications. EGD due 3/2020     rto in 6 mo with u/s of liver and labs prior to appt. Labs needed: cbc, bmp, hepatic function, pt/inr, afp, F-actin Ab, SUGAR     Patient recommended to follow low fat, low sodium diet.      Recommend to f/u with pcp for Hepatitis A/B vaccination     Call office with any problems       Dawna Dial will you place the exact orders you want on this patient please.   cma

## 2020-01-20 ENCOUNTER — HOSPITAL ENCOUNTER (OUTPATIENT)
Dept: ULTRASOUND IMAGING | Age: 60
Discharge: HOME | End: 2020-01-20
Payer: MEDICARE

## 2020-01-20 DIAGNOSIS — K74.60 CIRRHOSIS OF LIVER WITHOUT ASCITES, UNSPECIFIED HEPATIC CIRRHOSIS TYPE (HCC): ICD-10-CM

## 2020-01-20 DIAGNOSIS — I85.00 ESOPHAGEAL VARICES WITHOUT BLEEDING, UNSPECIFIED ESOPHAGEAL VARICES TYPE (HCC): ICD-10-CM

## 2020-01-20 DIAGNOSIS — R77.2 ELEVATED AFP: ICD-10-CM

## 2020-01-20 DIAGNOSIS — K76.6 PORTAL HYPERTENSION (HCC): ICD-10-CM

## 2020-01-20 LAB
ALBUMIN SERPL-MCNC: 4.6 G/DL (ref 3.5–5.2)
ALP BLD-CCNC: 141 U/L (ref 35–104)
ALPHA FETOPROTEIN: 3 NG/ML (ref 0–8.3)
ALT SERPL-CCNC: 30 U/L (ref 5–33)
ANION GAP SERPL CALCULATED.3IONS-SCNC: 16 MMOL/L (ref 7–19)
AST SERPL-CCNC: 36 U/L (ref 5–32)
BILIRUB SERPL-MCNC: 0.9 MG/DL (ref 0.2–1.2)
BILIRUBIN DIRECT: 0.3 MG/DL (ref 0–0.3)
BILIRUBIN, INDIRECT: 0.6 MG/DL (ref 0.1–1)
BUN BLDV-MCNC: 15 MG/DL (ref 8–23)
CALCIUM SERPL-MCNC: 9.6 MG/DL (ref 8.8–10.2)
CHLORIDE BLD-SCNC: 99 MMOL/L (ref 98–111)
CO2: 25 MMOL/L (ref 22–29)
CREAT SERPL-MCNC: <0.5 MG/DL (ref 0.5–0.9)
GFR NON-AFRICAN AMERICAN: >60
GLUCOSE BLD-MCNC: 191 MG/DL (ref 74–109)
HCT VFR BLD CALC: 44.2 % (ref 37–47)
HEMOGLOBIN: 14.7 G/DL (ref 12–16)
INR BLD: 1.11 (ref 0.88–1.18)
MCH RBC QN AUTO: 30.6 PG (ref 27–31)
MCHC RBC AUTO-ENTMCNC: 33.3 G/DL (ref 33–37)
MCV RBC AUTO: 91.9 FL (ref 81–99)
PDW BLD-RTO: 13.6 % (ref 11.5–14.5)
PLATELET # BLD: 165 K/UL (ref 130–400)
PMV BLD AUTO: 11 FL (ref 9.4–12.3)
POTASSIUM SERPL-SCNC: 4 MMOL/L (ref 3.5–5)
PROTHROMBIN TIME: 13.7 SEC (ref 12–14.6)
RBC # BLD: 4.81 M/UL (ref 4.2–5.4)
SODIUM BLD-SCNC: 140 MMOL/L (ref 136–145)
TOTAL PROTEIN: 8.1 G/DL (ref 6.6–8.7)
WBC # BLD: 6.2 K/UL (ref 4.8–10.8)

## 2020-01-20 PROCEDURE — 76705 ECHO EXAM OF ABDOMEN: CPT

## 2020-01-21 ENCOUNTER — OFFICE VISIT (OUTPATIENT)
Dept: FAMILY MEDICINE CLINIC | Facility: CLINIC | Age: 60
End: 2020-01-21

## 2020-01-21 VITALS
BODY MASS INDEX: 40.17 KG/M2 | SYSTOLIC BLOOD PRESSURE: 117 MMHG | HEART RATE: 71 BPM | HEIGHT: 60 IN | OXYGEN SATURATION: 97 % | WEIGHT: 204.6 LBS | DIASTOLIC BLOOD PRESSURE: 75 MMHG

## 2020-01-21 DIAGNOSIS — E11.65 UNCONTROLLED TYPE 2 DIABETES MELLITUS WITH HYPERGLYCEMIA (HCC): ICD-10-CM

## 2020-01-21 DIAGNOSIS — E66.01 MORBID OBESITY WITH BMI OF 40.0-44.9, ADULT (HCC): ICD-10-CM

## 2020-01-21 DIAGNOSIS — R63.4 WEIGHT LOSS: ICD-10-CM

## 2020-01-21 DIAGNOSIS — R10.9 RIGHT SIDED ABDOMINAL PAIN: Primary | ICD-10-CM

## 2020-01-21 PROCEDURE — 99213 OFFICE O/P EST LOW 20 MIN: CPT | Performed by: NURSE PRACTITIONER

## 2020-01-21 NOTE — PROGRESS NOTES
Chief Complaint   Patient presents with   • Hair/Scalp Problem   • Abdominal Pain        Subjective   Della Gonzalez is a 59 y.o.  female who presents today for abd pain and scalp problem.    HPI:  ABDOMINAL PAIN:  Patient is having some pain in the right abdomen and ribcage.  It is worse with extreme movement and deep breathing.    SCALP:  Patient has a small area that was open on her scalp.  It is not bothering her at present.  She has been putting triple antibiotic ointment on it.    Della Gonzalez  has a past medical history of Cirrhosis of liver (CMS/HCC), Diabetes mellitus (CMS/HCC), GERD (gastroesophageal reflux disease), and Liver disease.    No Known Allergies    Current Outpatient Medications:   •  amitriptyline (ELAVIL) 100 MG tablet, TAKE ONE TABLET BY MOUTH EVERY EVENING, Disp: 30 tablet, Rfl: 5  •  atorvastatin (LIPITOR) 10 MG tablet, TAKE ONE TABLET BY MOUTH EVERY DAY, Disp: 30 tablet, Rfl: 5  •  benazepril (LOTENSIN) 10 MG tablet, Take 1 tablet by mouth Daily., Disp: 30 tablet, Rfl: 5  •  Ertugliflozin L-PyroglutamicAc (STEGLATRO) 15 MG tablet, Take 1 tablet by mouth Every Morning., Disp: 30 tablet, Rfl: 5  •  gabapentin (NEURONTIN) 600 MG tablet, Take 600 mg by mouth 2 (Two) Times a Day., Disp: , Rfl:   •  hydrocortisone-eucerin, Apply  topically to the appropriate area as directed 2 (Two) Times a Day., Disp: 120 g, Rfl: 2  •  ketoconazole (NIZORAL) 2 % cream, Apply  topically to the appropriate area as directed Daily., Disp: 60 g, Rfl: 2  •  metFORMIN ER (GLUCOPHAGE-XR) 500 MG 24 hr tablet, TAKE TWO TABLETS BY MOUTH EVERY DAY WITH BREAKFAST, Disp: 60 tablet, Rfl: 5  •  nadolol (CORGARD) 20 MG tablet, Take 1 tablet by mouth Daily., Disp: 30 tablet, Rfl: 2  •  naproxen (NAPROSYN) 500 MG tablet, Take 1 tablet by mouth 2 (Two) Times a Day With Meals., Disp: 30 tablet, Rfl: 0  •  omeprazole (priLOSEC) 20 MG capsule, TAKE ONE CAPSULE BY MOUTH EVERY DAY, Disp: , Rfl:   •  tiZANidine (ZANAFLEX) 4 MG  tablet, Take 4 mg by mouth., Disp: , Rfl:   •  traMADol (ULTRAM) 50 MG tablet, TAKE ONE TABLET BY MOUTH THREE TIMES A DAY AS NEEDED, Disp: 60 tablet, Rfl: 2  Past Medical History:   Diagnosis Date   • Cirrhosis of liver (CMS/HCC)    • Diabetes mellitus (CMS/HCC)    • GERD (gastroesophageal reflux disease)    • Liver disease      Past Surgical History:   Procedure Laterality Date   • COLONOSCOPY       Social History     Socioeconomic History   • Marital status:      Spouse name: Not on file   • Number of children: Not on file   • Years of education: Not on file   • Highest education level: Not on file   Tobacco Use   • Smoking status: Current Every Day Smoker     Packs/day: 0.50     Types: Cigarettes   • Smokeless tobacco: Never Used   Substance and Sexual Activity   • Alcohol use: No     Frequency: Never     Binge frequency: Never   • Drug use: No   • Sexual activity: Never     Family History   Problem Relation Age of Onset   • Diabetes Mother    • No Known Problems Father        Family history, surgical history, past medical history, Allergies and med's reviewed with patient today and updated in [employer] EMR.     ROS:  Review of Systems   Constitutional: Negative.  Negative for fatigue, fever and unexpected weight change.   HENT: Negative.  Negative for facial swelling, sore throat and trouble swallowing.    Eyes: Negative.  Negative for photophobia, discharge and visual disturbance.   Respiratory: Negative.  Negative for cough, chest tightness and shortness of breath.    Cardiovascular: Negative.  Negative for chest pain and palpitations.   Gastrointestinal: Positive for abdominal pain. Negative for diarrhea, nausea and vomiting.        Possibly chest wall pain.   Endocrine: Negative.  Negative for polydipsia, polyphagia and polyuria.   Genitourinary: Negative.  Negative for dysuria, flank pain and frequency.   Musculoskeletal: Negative.  Negative for back pain, gait problem and neck pain.   Skin: Negative.   "Negative for rash.   Allergic/Immunologic: Negative.    Neurological: Negative.  Negative for dizziness, light-headedness and headaches.   Hematological: Negative.    Psychiatric/Behavioral: Negative.  Negative for self-injury and suicidal ideas.       OBJECTIVE:  Vitals:    01/21/20 1256   BP: 117/75   BP Location: Right arm   Patient Position: Sitting   Cuff Size: Large Adult   Pulse: 71   SpO2: 97%   Weight: 92.8 kg (204 lb 9.6 oz)   Height: 152.4 cm (60\")     Physical Exam   Constitutional: She is oriented to person, place, and time. She appears well-developed and well-nourished. No distress.   HENT:   Head: Normocephalic and atraumatic.   Eyes: Pupils are equal, round, and reactive to light. Conjunctivae and EOM are normal.   Neck: Normal range of motion. Neck supple.   Cardiovascular: Normal rate, regular rhythm, normal heart sounds and intact distal pulses.   No murmur heard.  Pulmonary/Chest: Effort normal and breath sounds normal. No respiratory distress.   Abdominal: Soft. Bowel sounds are normal. She exhibits no distension. There is no tenderness.   Musculoskeletal: Normal range of motion. She exhibits tenderness. She exhibits no edema.   Right mid  to deep palpation.   Neurological: She is alert and oriented to person, place, and time.   Skin: Skin is warm and dry. Capillary refill takes less than 2 seconds. She is not diaphoretic. No erythema.   Left posterior scalp has a 3mm area that is flat with no drainage and mild erythema.  No purulence noted.  Appears to be healing.   Psychiatric: She has a normal mood and affect. Her behavior is normal. Judgment and thought content normal.   Nursing note and vitals reviewed.      ASSESSMENT/ PLAN:    Della was seen today for hair/scalp problem and abdominal pain.    Diagnoses and all orders for this visit:    Right sided abdominal pain    Weight loss    Uncontrolled type 2 diabetes mellitus with hyperglycemia (CMS/HCC)    Morbid obesity with BMI of " 40.0-44.9, adult (CMS/HCC)    Reassurance given that exam was normal.  Patient to follow up with GI regarding cirrhosis/hepatitis C/ abdominal pain.    Orders Placed Today:     No orders of the defined types were placed in this encounter.       Management Plan:     An After Visit Summary was printed and given to the patient at discharge.    Follow-up: Return for keep scheduled appointment.    MELECIO Thorne 1/21/2020 1:27 PM  This note was electronically signed.

## 2020-01-22 LAB — F-ACTIN AB IGG: 19 UNITS (ref 0–19)

## 2020-01-23 LAB
ANTINUCLEAR AB INTERPRETIVE COMMENT: ABNORMAL
ANTINUCLEAR ANTIBODY, HEP-2, IGG: DETECTED

## 2020-02-03 ENCOUNTER — OFFICE VISIT (OUTPATIENT)
Dept: GASTROENTEROLOGY | Age: 60
End: 2020-02-03
Payer: MEDICARE

## 2020-02-03 VITALS
DIASTOLIC BLOOD PRESSURE: 60 MMHG | BODY MASS INDEX: 39.66 KG/M2 | HEART RATE: 64 BPM | SYSTOLIC BLOOD PRESSURE: 120 MMHG | HEIGHT: 60 IN | OXYGEN SATURATION: 98 % | WEIGHT: 202 LBS

## 2020-02-03 PROCEDURE — 3017F COLORECTAL CA SCREEN DOC REV: CPT | Performed by: NURSE PRACTITIONER

## 2020-02-03 PROCEDURE — 4004F PT TOBACCO SCREEN RCVD TLK: CPT | Performed by: NURSE PRACTITIONER

## 2020-02-03 PROCEDURE — 99214 OFFICE O/P EST MOD 30 MIN: CPT | Performed by: NURSE PRACTITIONER

## 2020-02-03 PROCEDURE — G8417 CALC BMI ABV UP PARAM F/U: HCPCS | Performed by: NURSE PRACTITIONER

## 2020-02-03 PROCEDURE — G8427 DOCREV CUR MEDS BY ELIG CLIN: HCPCS | Performed by: NURSE PRACTITIONER

## 2020-02-03 PROCEDURE — G8482 FLU IMMUNIZE ORDER/ADMIN: HCPCS | Performed by: NURSE PRACTITIONER

## 2020-02-03 RX ORDER — GABAPENTIN 600 MG/1
600 TABLET ORAL 3 TIMES DAILY
COMMUNITY
End: 2022-03-28 | Stop reason: ALTCHOICE

## 2020-02-03 RX ORDER — ATORVASTATIN CALCIUM 10 MG/1
10 TABLET, FILM COATED ORAL DAILY
COMMUNITY

## 2020-02-03 ASSESSMENT — ENCOUNTER SYMPTOMS
SHORTNESS OF BREATH: 0
SORE THROAT: 0
CONSTIPATION: 0
ABDOMINAL DISTENTION: 0
ABDOMINAL PAIN: 0
VOICE CHANGE: 0
NAUSEA: 0
CHEST TIGHTNESS: 0
RECTAL PAIN: 0
DIARRHEA: 0
VOMITING: 0
BLOOD IN STOOL: 0
BACK PAIN: 1
COUGH: 0

## 2020-02-03 NOTE — PROGRESS NOTES
APPOINTMENT FOR LAB WORK BEFORE NEXT REFILL DUE 90 tablet 1    metFORMIN, OSM, (FORTAMET) 1000 MG extended release tablet Take 1,000 mg by mouth daily (with breakfast)       Ertugliflozin L-PyroglutamicAc (STEGLATRO) 5 MG TABS Take 5 mg by mouth daily       amitriptyline (ELAVIL) 10 MG tablet TAKE ONE TABLET BY MOUTH EVERY NIGHT 90 tablet 1    cyclobenzaprine (FLEXERIL) 10 MG tablet Take 10 mg by mouth 3 times daily as needed for Muscle spasms      traMADol (ULTRAM) 50 MG tablet Take 1 tablet by mouth every 12 hours as needed for Pain 60 tablet 2    benazepril (LOTENSIN) 20 MG tablet Take 1 tablet by mouth daily 30 tablet 5     No current facility-administered medications for this visit. No Known Allergies     reports that she has been smoking. She has been smoking about 0.25 packs per day. She has never used smokeless tobacco. She reports that she does not drink alcohol or use drugs. Review of Systems   Constitutional: Negative for appetite change, fever and unexpected weight change. HENT: Negative for sore throat and voice change. Respiratory: Negative for cough, chest tightness and shortness of breath. Cardiovascular: Negative for chest pain, palpitations and leg swelling. Gastrointestinal: Negative for abdominal distention, abdominal pain, blood in stool, constipation, diarrhea, nausea, rectal pain and vomiting. Heartburn   Musculoskeletal: Positive for arthralgias and back pain. Negative for gait problem. Skin: Positive for rash (feet). Negative for pallor and wound. Neurological: Negative for dizziness, weakness and light-headedness. Hematological: Negative for adenopathy. Does not bruise/bleed easily. All other systems reviewed and are negative. Objective:   Physical Exam  Constitutional:       General: She is not in acute distress. Appearance: Normal appearance. She is well-developed.       Comments: /60   Pulse 64   Ht 5' (1.524 m)   Wt 202 lb

## 2020-02-03 NOTE — PATIENT INSTRUCTIONS
Schedule endoscopy  Nothing to eat or drink after midnight. You will not be able to drive for 24 hours after the procedure due to sedation. Bring a  with you the day of the procedure. No aspirin, ibuprofen, naproxen, fish oil or vitamin E for 5 days before procedure. Continue current medications. If you are on blood thinners, clearance from the prescribing physician will be obtained before your procedure is scheduled. If biopsies are taken during the procedure they will be sent to a pathologist for analysis. You will be notified by mail of the pathology results in 2-3 weeks. Your physician may also schedule a follow up appointment with the nurse practitioner to discuss pathology, symptoms or to check if you have had any problems related to your procedure. If you prefer not to return to the office after your procedure please discuss this with your physician on the day of your procedure. Return to office in 6 months with labs and ultrasound before appointment.

## 2020-03-03 ENCOUNTER — ANESTHESIA EVENT (OUTPATIENT)
Dept: ENDOSCOPY | Age: 60
End: 2020-03-03
Payer: MEDICARE

## 2020-03-03 ENCOUNTER — HOSPITAL ENCOUNTER (OUTPATIENT)
Age: 60
Setting detail: OUTPATIENT SURGERY
Discharge: HOME | End: 2020-03-03
Attending: INTERNAL MEDICINE | Admitting: INTERNAL MEDICINE
Payer: MEDICARE

## 2020-03-03 ENCOUNTER — TELEPHONE (OUTPATIENT)
Dept: GASTROENTEROLOGY | Age: 60
End: 2020-03-03

## 2020-03-03 ENCOUNTER — ANESTHESIA (OUTPATIENT)
Dept: ENDOSCOPY | Age: 60
End: 2020-03-03
Payer: MEDICARE

## 2020-03-03 VITALS
RESPIRATION RATE: 24 BRPM | OXYGEN SATURATION: 98 % | DIASTOLIC BLOOD PRESSURE: 91 MMHG | SYSTOLIC BLOOD PRESSURE: 117 MMHG

## 2020-03-03 VITALS
RESPIRATION RATE: 20 BRPM | BODY MASS INDEX: 39.85 KG/M2 | WEIGHT: 203 LBS | HEIGHT: 60 IN | TEMPERATURE: 98.1 F | DIASTOLIC BLOOD PRESSURE: 86 MMHG | SYSTOLIC BLOOD PRESSURE: 168 MMHG | HEART RATE: 64 BPM | OXYGEN SATURATION: 99 %

## 2020-03-03 LAB
GLUCOSE BLD-MCNC: 175 MG/DL (ref 70–99)
PERFORMED ON: ABNORMAL

## 2020-03-03 PROCEDURE — 2580000003 HC RX 258: Performed by: INTERNAL MEDICINE

## 2020-03-03 PROCEDURE — 3700000000 HC ANESTHESIA ATTENDED CARE: Performed by: INTERNAL MEDICINE

## 2020-03-03 PROCEDURE — 2720000010 HC SURG SUPPLY STERILE: Performed by: INTERNAL MEDICINE

## 2020-03-03 PROCEDURE — 43244 EGD VARICES LIGATION: CPT | Performed by: INTERNAL MEDICINE

## 2020-03-03 PROCEDURE — 3700000001 HC ADD 15 MINUTES (ANESTHESIA): Performed by: INTERNAL MEDICINE

## 2020-03-03 PROCEDURE — 3609012300 HC EGD BAND LIGATION ESOPHGEAL/GASTRIC VARICES: Performed by: INTERNAL MEDICINE

## 2020-03-03 PROCEDURE — 6360000002 HC RX W HCPCS: Performed by: INTERNAL MEDICINE

## 2020-03-03 PROCEDURE — 7100000011 HC PHASE II RECOVERY - ADDTL 15 MIN: Performed by: INTERNAL MEDICINE

## 2020-03-03 PROCEDURE — 6360000002 HC RX W HCPCS: Performed by: NURSE ANESTHETIST, CERTIFIED REGISTERED

## 2020-03-03 PROCEDURE — 82947 ASSAY GLUCOSE BLOOD QUANT: CPT

## 2020-03-03 PROCEDURE — 7100000010 HC PHASE II RECOVERY - FIRST 15 MIN: Performed by: INTERNAL MEDICINE

## 2020-03-03 PROCEDURE — 2500000003 HC RX 250 WO HCPCS: Performed by: NURSE ANESTHETIST, CERTIFIED REGISTERED

## 2020-03-03 PROCEDURE — 6370000000 HC RX 637 (ALT 250 FOR IP): Performed by: INTERNAL MEDICINE

## 2020-03-03 RX ORDER — PROPOFOL 10 MG/ML
INJECTION, EMULSION INTRAVENOUS PRN
Status: DISCONTINUED | OUTPATIENT
Start: 2020-03-03 | End: 2020-03-03 | Stop reason: SDUPTHER

## 2020-03-03 RX ORDER — SODIUM CHLORIDE, SODIUM LACTATE, POTASSIUM CHLORIDE, CALCIUM CHLORIDE 600; 310; 30; 20 MG/100ML; MG/100ML; MG/100ML; MG/100ML
INJECTION, SOLUTION INTRAVENOUS CONTINUOUS
Status: DISCONTINUED | OUTPATIENT
Start: 2020-03-03 | End: 2020-03-03 | Stop reason: HOSPADM

## 2020-03-03 RX ORDER — METOCLOPRAMIDE HYDROCHLORIDE 5 MG/ML
10 INJECTION INTRAMUSCULAR; INTRAVENOUS EVERY 6 HOURS
Status: DISCONTINUED | OUTPATIENT
Start: 2020-03-03 | End: 2020-03-03

## 2020-03-03 RX ORDER — LIDOCAINE HYDROCHLORIDE 20 MG/ML
INJECTION, SOLUTION INFILTRATION; PERINEURAL PRN
Status: DISCONTINUED | OUTPATIENT
Start: 2020-03-03 | End: 2020-03-03 | Stop reason: SDUPTHER

## 2020-03-03 RX ORDER — ONDANSETRON 2 MG/ML
INJECTION INTRAMUSCULAR; INTRAVENOUS PRN
Status: DISCONTINUED | OUTPATIENT
Start: 2020-03-03 | End: 2020-03-03 | Stop reason: SDUPTHER

## 2020-03-03 RX ORDER — METOCLOPRAMIDE HYDROCHLORIDE 5 MG/ML
5 INJECTION INTRAMUSCULAR; INTRAVENOUS EVERY 6 HOURS
Status: DISCONTINUED | OUTPATIENT
Start: 2020-03-03 | End: 2020-03-03 | Stop reason: HOSPADM

## 2020-03-03 RX ADMIN — LIDOCAINE HYDROCHLORIDE 40 MG: 20 INJECTION, SOLUTION INFILTRATION; PERINEURAL at 12:11

## 2020-03-03 RX ADMIN — PROPOFOL 200 MG: 10 INJECTION, EMULSION INTRAVENOUS at 12:11

## 2020-03-03 RX ADMIN — LIDOCAINE HYDROCHLORIDE: 20 SOLUTION ORAL; TOPICAL at 13:07

## 2020-03-03 RX ADMIN — SODIUM CHLORIDE, POTASSIUM CHLORIDE, SODIUM LACTATE AND CALCIUM CHLORIDE: 600; 310; 30; 20 INJECTION, SOLUTION INTRAVENOUS at 10:53

## 2020-03-03 RX ADMIN — METOCLOPRAMIDE 5 MG: 5 INJECTION, SOLUTION INTRAMUSCULAR; INTRAVENOUS at 13:06

## 2020-03-03 RX ADMIN — ONDANSETRON HYDROCHLORIDE 4 MG: 2 INJECTION, SOLUTION INTRAMUSCULAR; INTRAVENOUS at 12:25

## 2020-03-03 ASSESSMENT — PAIN - FUNCTIONAL ASSESSMENT: PAIN_FUNCTIONAL_ASSESSMENT: 0-10

## 2020-03-03 ASSESSMENT — PAIN DESCRIPTION - PAIN TYPE: TYPE: ACUTE PAIN

## 2020-03-03 ASSESSMENT — PAIN SCALES - GENERAL: PAINLEVEL_OUTOF10: 10

## 2020-03-03 NOTE — ANESTHESIA PRE PROCEDURE
07/08/19 104/72       NPO Status: Time of last liquid consumption: 2100                        Time of last solid consumption: 1900                        Date of last liquid consumption: 03/02/20                        Date of last solid food consumption: 03/02/20    BMI:   Wt Readings from Last 3 Encounters:   03/03/20 203 lb (92.1 kg)   02/03/20 202 lb (91.6 kg)   07/08/19 229 lb 12.8 oz (104.2 kg)     Body mass index is 39.65 kg/m². CBC:   Lab Results   Component Value Date    WBC 6.2 01/20/2020    RBC 4.81 01/20/2020    HGB 14.7 01/20/2020    HCT 44.2 01/20/2020    MCV 91.9 01/20/2020    RDW 13.6 01/20/2020     01/20/2020       CMP:   Lab Results   Component Value Date     01/20/2020    K 4.0 01/20/2020    CL 99 01/20/2020    CO2 25 01/20/2020    BUN 15 01/20/2020    CREATININE <0.5 01/20/2020    LABGLOM >60 01/20/2020    GLUCOSE 191 01/20/2020    PROT 8.1 01/20/2020    CALCIUM 9.6 01/20/2020    BILITOT 0.9 01/20/2020    ALKPHOS 141 01/20/2020    AST 36 01/20/2020    ALT 30 01/20/2020       POC Tests: No results for input(s): POCGLU, POCNA, POCK, POCCL, POCBUN, POCHEMO, POCHCT in the last 72 hours.     Coags:   Lab Results   Component Value Date    PROTIME 13.7 01/20/2020    INR 1.11 01/20/2020    APTT 35.5 05/07/2019       HCG (If Applicable): No results found for: PREGTESTUR, PREGSERUM, HCG, HCGQUANT     ABGs: No results found for: PHART, PO2ART, YVW3PJP, IMX8ARY, BEART, N8UZOFIN     Type & Screen (If Applicable):  No results found for: LABABO, [address]    Anesthesia Evaluation  Patient summary reviewed and Nursing notes reviewed no history of anesthetic complications:   Airway: Mallampati: II  TM distance: >3 FB   Neck ROM: full  Mouth opening: < 3 FB Dental: normal exam         Pulmonary:normal exam    (+) COPD:  asthma:                            Cardiovascular:    (+) hypertension:, hyperlipidemia         Beta Blocker:  Dose within 24 Hrs         Neuro/Psych:   (+) headaches:, GI/Hepatic/Renal:   (+) GERD:, hepatitis: C, liver disease: esophageal varices,          ROS comment: bmi 40. Endo/Other: Negative Endo/Other ROS                    Abdominal:           Vascular: negative vascular ROS. Anesthesia Plan      general and TIVA     ASA 3       Induction: intravenous. Anesthetic plan and risks discussed with patient.                       DANIELLA Romero - CRNA   3/3/2020

## 2020-03-03 NOTE — TELEPHONE ENCOUNTER
Sonya Brooks,    Per Dr Toshia Salomon today:    IMPRESSION:  1. Esophageal varices - banded as above        RECOMMENDATIONS:    1. Liquid diet x 2-3 days  2. Repeat EGD in 6-8 weeks with further banding as indicated. 3.  Reglan 5 mg in recovery. 4.  Schedule f/u OV with GI APRN in 4 weeks.      The results were discussed with the patient and family.   A copy of the images obtained were given to the patient.      Quentin Garcia MD  3/3/2020  12:23 PM

## 2020-03-04 NOTE — TELEPHONE ENCOUNTER
Called and spoke with patient, repeat EGD scheduled for 4/18/20 to arrive at 10:30AM.  Advised patient NPO after midnight, may take morning medications with sip of water. Advised to check in at Outpatient Registration at the [facility]. Patient is not on any blood thinners. Patient instructed to have someone to drive them home as well.  Patient verbally understood

## 2020-03-04 NOTE — TELEPHONE ENCOUNTER
3/4/20 - LM asking patient to call back to schedule EGD [id number] banding in 6-8 weeks (around April 14-28)

## 2020-03-16 DIAGNOSIS — M79.2 NEUROPATHIC PAIN: Primary | ICD-10-CM

## 2020-03-16 RX ORDER — GABAPENTIN 600 MG/1
600 TABLET ORAL 2 TIMES DAILY
Qty: 60 TABLET | Refills: 0 | Status: SHIPPED | OUTPATIENT
Start: 2020-03-16 | End: 2021-04-27 | Stop reason: DRUGHIGH

## 2020-03-16 RX ORDER — TRAMADOL HYDROCHLORIDE 50 MG/1
50 TABLET ORAL 3 TIMES DAILY PRN
Qty: 90 TABLET | Refills: 0 | Status: SHIPPED | OUTPATIENT
Start: 2020-03-16 | End: 2021-04-21 | Stop reason: SDUPTHER

## 2020-03-17 ENCOUNTER — TELEPHONE (OUTPATIENT)
Dept: FAMILY MEDICINE CLINIC | Facility: CLINIC | Age: 60
End: 2020-03-17

## 2020-03-17 NOTE — TELEPHONE ENCOUNTER
I have asked to see if Mrs Lilly can sign off on this since Olivier is out of the office today. If she can then I will fax to PACS

## 2020-03-17 NOTE — TELEPHONE ENCOUNTER
Alisha Talley from [facility] faxed a referral over for pt on 3/16/2020 to go to [place] on 3/19/20.     Alisha Ventura can be reached at [phone] if not received    12:00 PM on 3/18/20 is deadline or referral will be canceled.

## 2020-03-18 ENCOUNTER — TELEPHONE (OUTPATIENT)
Dept: PODIATRY | Facility: CLINIC | Age: 60
End: 2020-03-18

## 2020-03-19 ENCOUNTER — OFFICE VISIT (OUTPATIENT)
Dept: PODIATRY | Facility: CLINIC | Age: 60
End: 2020-03-19

## 2020-03-19 VITALS
HEART RATE: 63 BPM | HEIGHT: 60 IN | WEIGHT: 202.8 LBS | SYSTOLIC BLOOD PRESSURE: 145 MMHG | OXYGEN SATURATION: 98 % | DIASTOLIC BLOOD PRESSURE: 80 MMHG | BODY MASS INDEX: 39.82 KG/M2

## 2020-03-19 DIAGNOSIS — E11.40 TYPE 2 DIABETES MELLITUS WITH DIABETIC NEUROPATHY, WITH LONG-TERM CURRENT USE OF INSULIN (HCC): ICD-10-CM

## 2020-03-19 DIAGNOSIS — Z79.4 TYPE 2 DIABETES MELLITUS WITH DIABETIC NEUROPATHY, WITH LONG-TERM CURRENT USE OF INSULIN (HCC): ICD-10-CM

## 2020-03-19 DIAGNOSIS — Z72.0 TOBACCO ABUSE: ICD-10-CM

## 2020-03-19 DIAGNOSIS — B35.1 ONYCHOMYCOSIS: Primary | ICD-10-CM

## 2020-03-19 PROCEDURE — 99213 OFFICE O/P EST LOW 20 MIN: CPT | Performed by: PODIATRIST

## 2020-03-19 PROCEDURE — 11721 DEBRIDE NAIL 6 OR MORE: CPT | Performed by: PODIATRIST

## 2020-03-19 NOTE — PROGRESS NOTES
[facility] - PODIATRY    Today's Date: 03/19/20    Patient Name: Della Gonzalez  MRN: [id number]  CSN: [id number]  PCP: Donny Augayo MD  Referring Provider: No ref. provider found    SUBJECTIVE     Chief Complaint   Patient presents with   • Follow-up     Pt is here today for f/u on long, thickened toenails, calluses - denies pain at present time - pt last seen PCP on 12/17/2019   • Diabetes     pt last blood sugar reading is 170mg/dl      HPI: Della Gonzalez, a 59 y.o.female, comes to clinic as a(n) established patient presenting for diabetic foot exam and complaining of thick fungal toenails. Patient has h/o cirrhosis, DM2, GERD, tobacco use. 1/2 ppd tobacco use. Patient is NIDDM with last stated BG level of 170mg/dl. Admits occasional numbness and tingling to feet. Denies open wounds or sores.  Notes that her toenails are long, thick, discolored and crumbly. She is unable to care for them herself adequately. Denies pain. Relates previous treatment(s) including steroid cream. Denies any constitutional symptoms. No other pedal complaints at this time.    Past Medical History:   Diagnosis Date   • Cirrhosis of liver (CMS/HCC)    • Diabetes mellitus (CMS/HCC)    • GERD (gastroesophageal reflux disease)    • Liver disease      Past Surgical History:   Procedure Laterality Date   • COLONOSCOPY       Family History   Problem Relation Age of Onset   • Diabetes Mother    • No Known Problems Father      Social History     Socioeconomic History   • Marital status:      Spouse name: Not on file   • Number of children: Not on file   • Years of education: Not on file   • Highest education level: Not on file   Tobacco Use   • Smoking status: Current Every Day Smoker     Packs/day: 0.50     Types: Cigarettes   • Smokeless tobacco: Never Used   Substance and Sexual Activity   • Alcohol use: No     Frequency: Never     Binge frequency: Never   • Drug use: No   • Sexual activity: Never     No Known  Allergies  Current Outpatient Medications   Medication Sig Dispense Refill   • amitriptyline (ELAVIL) 100 MG tablet TAKE ONE TABLET BY MOUTH EVERY EVENING 30 tablet 5   • atorvastatin (LIPITOR) 10 MG tablet TAKE ONE TABLET BY MOUTH EVERY DAY 30 tablet 5   • benazepril (LOTENSIN) 10 MG tablet Take 1 tablet by mouth Daily. 30 tablet 5   • Ertugliflozin L-PyroglutamicAc (STEGLATRO) 15 MG tablet Take 1 tablet by mouth Every Morning. 30 tablet 5   • gabapentin (NEURONTIN) 600 MG tablet Take 1 tablet by mouth 2 (Two) Times a Day. 60 tablet 0   • hydrocortisone-eucerin Apply  topically to the appropriate area as directed 2 (Two) Times a Day. 120 g 2   • ketoconazole (NIZORAL) 2 % cream Apply  topically to the appropriate area as directed Daily. 60 g 2   • metFORMIN ER (GLUCOPHAGE-XR) 500 MG 24 hr tablet TAKE TWO TABLETS BY MOUTH EVERY DAY WITH BREAKFAST 60 tablet 5   • nadolol (CORGARD) 20 MG tablet Take 1 tablet by mouth Daily. 30 tablet 2   • naproxen (NAPROSYN) 500 MG tablet Take 1 tablet by mouth 2 (Two) Times a Day With Meals. 30 tablet 0   • omeprazole (priLOSEC) 20 MG capsule TAKE ONE CAPSULE BY MOUTH EVERY DAY     • tiZANidine (ZANAFLEX) 4 MG tablet Take 4 mg by mouth.     • traMADol (ULTRAM) 50 MG tablet Take 1 tablet by mouth 3 (Three) Times a Day As Needed (TID as needed). 90 tablet 0     No current facility-administered medications for this visit.      Review of Systems   Constitutional: Negative for chills and fever.   HENT: Negative for congestion.    Respiratory: Negative for shortness of breath.    Cardiovascular: Positive for leg swelling. Negative for chest pain.   Gastrointestinal: Negative for constipation, diarrhea, nausea and vomiting.   Musculoskeletal: Positive for arthralgias.        Foot pain   Skin: Negative for wound.   Neurological: Positive for numbness.       OBJECTIVE     Vitals:    03/19/20 1003   BP: 145/80   Pulse: 63   SpO2: 98%       PHYSICAL EXAM  GEN:   Accompanied by none.        Foot/Ankle Exam:       General:   Diabetic Foot Exam Performed    Appearance: appears stated age and healthy    Orientation: AAOx3    Affect: appropriate    Gait: unimpaired    Assistance: independent    Shoe Gear:  Casual shoes    VASCULAR      Right Foot Vascularity   Dorsalis pedis:  2+  Posterior tibial:  2+  Skin Temperature: warm    Edema Grading:  None  CFT:  3  Pedal Hair Growth:  Present  Varicosities: mild varicosities       Left Foot Vascularity   Dorsalis pedis:  2+  Posterior tibial:  2+  Skin Temperature: warm    Edema Grading:  None  CFT:  3  Pedal Hair Growth:  Present  Varicosities: mild varicosities        NEUROLOGIC     Right Foot Neurologic   Light touch sensation:  Diminished  Vibratory sensation:  Diminished  Hot/Cold sensation: diminished    Protective Sensation using [place] Monofilament:  9     Left Foot Neurologic   Light touch sensation:  Diminished  Vibratory sensation:  Diminished  Hot/cold sensation: diminished    Protective Sensation using [place] Monofilament:  9     MUSCULOSKELETAL      Right Foot Musculoskeletal   Ecchymosis:  None  Tenderness: toenails    Arch:  Normal  Hallux valgus: No    Hallux limitus: No       Left Foot Musculoskeletal   Ecchymosis:  None  Tenderness: toenails    Arch:  Normal  Hallux valgus: No    Hallux limitus: No       MUSCLE STRENGTH     Right Foot Muscle Strength   Foot dorsiflexion:  5  Foot plantar flexion:  5  Foot inversion:  5  Foot eversion:  5     Left Foot Muscle Strength   Foot dorsiflexion:  5  Foot plantar flexion:  5  Foot inversion:  5  Foot eversion:  5     RANGE OF MOTION      Right Foot Range of Motion   Foot and ankle ROM within normal limits       Left Foot Range of Motion   Foot and ankle ROM within normal limits       DERMATOLOGIC     Right Foot Dermatologic   Skin: skin intact    Skin: no right foot tinea (moccasin distribution - improved)    Nails: onychomycosis, abnormally thick, subungual debris and dystrophic  nails       Left Foot Dermatologic   Skin: skin intact    Skin: no left foot tinea (moccasin distribution - improved)    Nails: onychomycosis, abnormally thick, subungual debris and dystrophic nails        RADIOLOGY/NUCLEAR:  No results found.    LABORATORY/CULTURE RESULTS:      PATHOLOGY RESULTS:       ASSESSMENT/PLAN     Della was seen today for follow-up and diabetes.    Diagnoses and all orders for this visit:    Onychomycosis    Type 2 diabetes mellitus with diabetic neuropathy, with long-term current use of insulin (CMS/[facility])    Tobacco abuse      Comprehensive lower extremity examination and evaluation was performed.  Discussed findings and treatment plan including risks, benefits, and treatment options with patient in detail. Patient agreed with treatment plan.  After verbal consent obtained, nail(s) x10 debrided of length and thickness with nail nipper without incidence  Patient may maintain nails and calluses at home utilizing emery board or pumice stone between visits as needed  Reviewed at home diabetic foot care including daily foot checks   Discussed smoking cessation.   Antifungal cream PRN  An After Visit Summary was printed and given to the patient at discharge, including (if requested) any available informative/educational handouts regarding diagnosis, treatment, or medications. All questions were answered to patient/family satisfaction. Should symptoms fail to improve or worsen they agree to call or return to clinic or to go to the Emergency Department. Discussed the importance of following up with any needed screening tests/labs/specialist appointments and any requested follow-up recommended by me today. Importance of maintaining follow-up discussed and patient accepts that missed appointments can delay diagnosis and potentially lead to worsening of conditions.  Return in about 3 months (around 6/19/2020) for Recheck., or sooner if acute issues arise.        This document has been electronically  signed by Cortes White DPM on March 19, 2020 10:28

## 2020-03-25 PROBLEM — K21.9 GASTROESOPHAGEAL REFLUX DISEASE: Status: RESOLVED | Noted: 2017-07-05 | Resolved: 2020-03-24

## 2020-03-26 ENCOUNTER — TELEPHONE (OUTPATIENT)
Dept: FAMILY MEDICINE CLINIC | Facility: CLINIC | Age: 60
End: 2020-03-26

## 2020-03-30 ENCOUNTER — TELEPHONE (OUTPATIENT)
Dept: GASTROENTEROLOGY | Age: 60
End: 2020-03-30

## 2020-03-31 RX ORDER — OMEPRAZOLE 20 MG/1
CAPSULE, DELAYED RELEASE ORAL
Qty: 90 CAPSULE | Refills: 1 | Status: SHIPPED | OUTPATIENT
Start: 2020-03-31 | End: 2020-10-07

## 2020-03-31 RX ORDER — NADOLOL 20 MG/1
TABLET ORAL
Qty: 90 TABLET | Refills: 1 | Status: SHIPPED | OUTPATIENT
Start: 2020-03-31 | End: 2020-10-07

## 2020-04-08 ENCOUNTER — TELEPHONE (OUTPATIENT)
Dept: URGENT CARE | Age: 60
End: 2020-04-08

## 2020-04-10 ENCOUNTER — TELEPHONE (OUTPATIENT)
Dept: GASTROENTEROLOGY | Age: 60
End: 2020-04-10

## 2020-04-14 ENCOUNTER — ANESTHESIA EVENT (OUTPATIENT)
Dept: ENDOSCOPY | Age: 60
End: 2020-04-14
Payer: MEDICARE

## 2020-04-14 ENCOUNTER — ANESTHESIA (OUTPATIENT)
Dept: ENDOSCOPY | Age: 60
End: 2020-04-14
Payer: MEDICARE

## 2020-04-14 ENCOUNTER — TELEPHONE (OUTPATIENT)
Dept: GASTROENTEROLOGY | Age: 60
End: 2020-04-14

## 2020-04-14 ENCOUNTER — HOSPITAL ENCOUNTER (OUTPATIENT)
Age: 60
Setting detail: OUTPATIENT SURGERY
Discharge: HOME | End: 2020-04-14
Attending: INTERNAL MEDICINE | Admitting: INTERNAL MEDICINE
Payer: MEDICARE

## 2020-04-14 VITALS
DIASTOLIC BLOOD PRESSURE: 77 MMHG | OXYGEN SATURATION: 97 % | WEIGHT: 202 LBS | RESPIRATION RATE: 18 BRPM | SYSTOLIC BLOOD PRESSURE: 175 MMHG | HEART RATE: 67 BPM | TEMPERATURE: 97.8 F | BODY MASS INDEX: 39.66 KG/M2 | HEIGHT: 60 IN

## 2020-04-14 VITALS
SYSTOLIC BLOOD PRESSURE: 150 MMHG | RESPIRATION RATE: 27 BRPM | DIASTOLIC BLOOD PRESSURE: 96 MMHG | OXYGEN SATURATION: 96 %

## 2020-04-14 PROBLEM — I85.10 SECONDARY ESOPHAGEAL VARICES WITHOUT BLEEDING (HCC): Status: ACTIVE | Noted: 2018-03-05

## 2020-04-14 LAB
GLUCOSE BLD-MCNC: 180 MG/DL (ref 70–99)
PERFORMED ON: ABNORMAL

## 2020-04-14 PROCEDURE — 3700000000 HC ANESTHESIA ATTENDED CARE: Performed by: INTERNAL MEDICINE

## 2020-04-14 PROCEDURE — 6360000002 HC RX W HCPCS: Performed by: INTERNAL MEDICINE

## 2020-04-14 PROCEDURE — 7100000010 HC PHASE II RECOVERY - FIRST 15 MIN: Performed by: INTERNAL MEDICINE

## 2020-04-14 PROCEDURE — 2500000003 HC RX 250 WO HCPCS

## 2020-04-14 PROCEDURE — 3609017100 HC EGD: Performed by: INTERNAL MEDICINE

## 2020-04-14 PROCEDURE — 2580000003 HC RX 258: Performed by: INTERNAL MEDICINE

## 2020-04-14 PROCEDURE — 6360000002 HC RX W HCPCS

## 2020-04-14 PROCEDURE — 2720000010 HC SURG SUPPLY STERILE: Performed by: INTERNAL MEDICINE

## 2020-04-14 PROCEDURE — 82947 ASSAY GLUCOSE BLOOD QUANT: CPT

## 2020-04-14 PROCEDURE — 7100000011 HC PHASE II RECOVERY - ADDTL 15 MIN: Performed by: INTERNAL MEDICINE

## 2020-04-14 PROCEDURE — 3700000001 HC ADD 15 MINUTES (ANESTHESIA): Performed by: INTERNAL MEDICINE

## 2020-04-14 PROCEDURE — 43244 EGD VARICES LIGATION: CPT | Performed by: INTERNAL MEDICINE

## 2020-04-14 RX ORDER — SODIUM CHLORIDE, SODIUM LACTATE, POTASSIUM CHLORIDE, CALCIUM CHLORIDE 600; 310; 30; 20 MG/100ML; MG/100ML; MG/100ML; MG/100ML
INJECTION, SOLUTION INTRAVENOUS CONTINUOUS
Status: DISCONTINUED | OUTPATIENT
Start: 2020-04-14 | End: 2020-04-14 | Stop reason: HOSPADM

## 2020-04-14 RX ORDER — LIDOCAINE HYDROCHLORIDE 10 MG/ML
INJECTION, SOLUTION EPIDURAL; INFILTRATION; INTRACAUDAL; PERINEURAL PRN
Status: DISCONTINUED | OUTPATIENT
Start: 2020-04-14 | End: 2020-04-14 | Stop reason: SDUPTHER

## 2020-04-14 RX ORDER — PROPOFOL 10 MG/ML
INJECTION, EMULSION INTRAVENOUS PRN
Status: DISCONTINUED | OUTPATIENT
Start: 2020-04-14 | End: 2020-04-14 | Stop reason: SDUPTHER

## 2020-04-14 RX ORDER — ONDANSETRON 2 MG/ML
4 INJECTION INTRAMUSCULAR; INTRAVENOUS ONCE
Status: COMPLETED | OUTPATIENT
Start: 2020-04-14 | End: 2020-04-14

## 2020-04-14 RX ADMIN — ONDANSETRON 4 MG: 2 INJECTION INTRAMUSCULAR; INTRAVENOUS at 12:04

## 2020-04-14 RX ADMIN — SODIUM CHLORIDE, POTASSIUM CHLORIDE, SODIUM LACTATE AND CALCIUM CHLORIDE: 600; 310; 30; 20 INJECTION, SOLUTION INTRAVENOUS at 09:20

## 2020-04-14 RX ADMIN — HYDROMORPHONE HYDROCHLORIDE 0.25 MG: 1 INJECTION, SOLUTION INTRAMUSCULAR; INTRAVENOUS; SUBCUTANEOUS at 12:14

## 2020-04-14 RX ADMIN — PROPOFOL 200 MG: 10 INJECTION, EMULSION INTRAVENOUS at 11:23

## 2020-04-14 RX ADMIN — LIDOCAINE HYDROCHLORIDE 3 ML: 10 INJECTION, SOLUTION EPIDURAL; INFILTRATION; INTRACAUDAL; PERINEURAL at 11:23

## 2020-04-14 ASSESSMENT — PAIN DESCRIPTION - DESCRIPTORS: DESCRIPTORS: HEADACHE

## 2020-04-14 ASSESSMENT — PAIN DESCRIPTION - LOCATION: LOCATION: THROAT

## 2020-04-14 ASSESSMENT — LIFESTYLE VARIABLES: SMOKING_STATUS: 1

## 2020-04-14 ASSESSMENT — COPD QUESTIONNAIRES: CAT_SEVERITY: MODERATE

## 2020-04-14 ASSESSMENT — PAIN SCALES - GENERAL
PAINLEVEL_OUTOF10: 4
PAINLEVEL_OUTOF10: 4

## 2020-04-14 ASSESSMENT — PAIN - FUNCTIONAL ASSESSMENT: PAIN_FUNCTIONAL_ASSESSMENT: 0-10

## 2020-04-14 NOTE — ANESTHESIA PRE PROCEDURE
Problem List   Diagnosis Code    Nonintractable headache R51    Cirrhosis of liver without ascites (HCC) K74.60    Essential hypertension I10    Chronic GERD K21.9    History of hepatitis C Z86.19    Elevated AFP R77.2    Portal hypertension (HCC) K76.6    Es[facility] ([facility] I85.00    Gastritis without bleeding K29.70    Gallstones K80.20       Past Medical History:        Diagnosis Date    Arthritis     Asthma     Cirrhosis ([facility]     COPD (chronic obstructive pulmonary disease) (HCC)     Esophageal varices (HCC)     GERD (gastroesophageal reflux disease)     Hepatitis C     Hyperlipidemia     Hypertension        Past Surgical History:        Procedure Laterality Date    COLONOSCOPY  03/02/2016    Dr Cee Vernon bleeding internal hemorrhoids o/w normal; fair prep (5 yr)    NM EGD TRANSORAL BIOPSY SINGLE/MULTIPLE N/A 2/7/2017    Dr Maddie Lombardi Grade I esophageal varices, gastritis/gastropathy    NM EGD TRANSORAL BIOPSY SINGLE/MULTIPLE N/A 3/23/2018    Dr Catherine Bravo I esophageal varices, erosive/active gastritis-2 yr recall    UPPER GASTROINTESTINAL ENDOSCOPY  03/2016    Dr Perkins-gr II esoph varices; portal hyptensive gastropathy    UPPER GASTROINTESTINAL ENDOSCOPY N/A 3/3/2020    Dr Amanda Torres-w/variceal banding x 4, 3 columns of Grade II varices, repeat in 6-8 wks       Social History:    Social History     Tobacco Use    Smoking status: Current Every Day Smoker     Packs/day: 0.25     Years: 45.00     Pack years: 11.25    Smokeless tobacco: Never Used   Substance Use Topics    Alcohol use: No     Comment: Pt states that she stopped drinking 2014?                                 Ready to quit: Not Answered  Counseling given: Not Answered      Vital Signs (Current):   Vitals:    04/14/20 0906   BP: (!) 152/92   Pulse: 61   Resp: 20   Temp: 97.8 °F (36.6 °C)   TempSrc: Temporal   SpO2: 98%   Weight: 202 lb (91.6 kg)   Height: 5' (1.524 m) BP Readings from Last 3 Encounters:   04/14/20 (!) 152/92   03/03/20 (!) 168/86   03/03/20 (!) 117/91       NPO Status: Time of last liquid consumption: 2000                        Time of last solid consumption: 1900                        Date of last liquid consumption: 04/13/20                        Date of last solid food consumption: 04/13/20    BMI:   Wt Readings from Last 3 Encounters:   04/14/20 202 lb (91.6 kg)   03/03/20 203 lb (92.1 kg)   02/03/20 202 lb (91.6 kg)     Body mass index is 39.45 kg/m². CBC:   Lab Results   Component Value Date    WBC 6.2 01/20/2020    RBC 4.81 01/20/2020    HGB 14.7 01/20/2020    HCT 44.2 01/20/2020    MCV 91.9 01/20/2020    RDW 13.6 01/20/2020     01/20/2020       CMP:   Lab Results   Component Value Date     01/20/2020    K 4.0 01/20/2020    CL 99 01/20/2020    CO2 25 01/20/2020    BUN 15 01/20/2020    CREATININE <0.5 01/20/2020    LABGLOM >60 01/20/2020    GLUCOSE 191 01/20/2020    PROT 8.1 01/20/2020    CALCIUM 9.6 01/20/2020    BILITOT 0.9 01/20/2020    ALKPHOS 141 01/20/2020    AST 36 01/20/2020    ALT 30 01/20/2020       POC Tests:   Recent Labs     04/14/20  0918   POCGLU 180*       Coags:   Lab Results   Component Value Date    PROTIME 13.7 01/20/2020    INR 1.11 01/20/2020    APTT 35.5 05/07/2019       HCG (If Applicable): No results found for: PREGTESTUR, PREGSERUM, HCG, HCGQUANT     ABGs: No results found for: PHART, PO2ART, RSD1QKV, BKZ3UHM, BEART, Y7RXGYGI     Type & Screen (If Applicable):  No results found for: LABABO, [address]    Anesthesia Evaluation  Patient summary reviewed and Nursing notes reviewed no history of anesthetic complications:   Airway: Mallampati: II  TM distance: >3 FB   Neck ROM: full  Mouth opening: < 3 FB Dental:          Pulmonary:normal exam    (+) COPD: moderate,  asthma: allergic asthma, current smoker          Patient smoked on day of surgery.                  Cardiovascular:Negative CV ROS    (+) hypertension: moderate, hyperlipidemia        Rhythm: regular             Beta Blocker:  Not on Beta Blocker         Neuro/Psych:   (+) headaches: migraine headaches,             GI/Hepatic/Renal:   (+) GERD:, hepatitis (Pt states this is resolved): C, liver disease: portal hypertension, esophageal varices, morbid obesity          Endo/Other:    (+) DiabetesType II DM, well controlled, , .          Pt had PAT visit. Abdominal:   (+) obese,     Abdomen: soft. Vascular:                                      Anesthesia Plan      general     ASA 3       Induction: intravenous. Anesthetic plan and risks discussed with patient.                       Devante Sherman, APRN - CRNA   4/14/2020

## 2020-04-14 NOTE — H&P
Patient Name: Namrata Renee  : 1960  MRN: [id number]  DATE: 20    Allergies: No Known Allergies     ENDOSCOPY  History and Physical    Procedure:    [] Diagnostic Colonoscopy       [] Screening Colonoscopy  [x] EGD      [] ERCP      [] EUS       [] Other    [x] Previous office notes/History and Physical reviewed from the patients chart. Please see EMR for further details of HPI. I have examined the patient's status immediately prior to the procedure and:      Indications/HPI:    []Abdominal Pain   []Cancer- GI/Lung     []Fhx of colon CA/polyps  []History of Polyps  []Barretts            []Melena  []Abnormal Imaging              []Dysphagia              []Persistent Pneumonia   []Anemia                            []Food Impaction        []History of Polyps  [] GI Bleed             []Pulmonary nodule/Mass   []Change in bowel habits []Heartburn/Reflux  []Rectal Bleed (BRBPR)  []Chest Pain - Non Cardiac []Heme (+) Stool []Ulcers  []Constipation  []Hemoptysis  [x]Varices  []Diarrhea  []Hypoxemia    []Nausea/Vomiting   []Screening   []Crohns/Colitis  []Other:     Anesthesia:   [x] MAC [] Moderate Sedation   [] General   [] None     ROS: 12 pt Review of Symptoms was negative unless mentioned above    Medications:   Prior to Admission medications    Medication Sig Start Date End Date Taking?  Authorizing Provider   omeprazole (PRILOSEC) 20 MG delayed release capsule TAKE ONE CAPSULE BY MOUTH EVERY DAY 3/31/20  Yes DANIELLA Aguila   nadolol (CORGARD) 20 MG tablet TAKE ONE TABLET BY MOUTH EVERY DAY -- MUST MAKE APPOINTMENT FOR LAB WORK BEFORE NEXT REFILL 3/31/20  Yes DANIELLA Aguila   atorvastatin (LIPITOR) 10 MG tablet Take 10 mg by mouth daily   Yes Historical Provider, MD   metFORMIN, OSM, (FORTAMET) 1000 MG extended release tablet Take 1,000 mg by mouth daily (with breakfast)  19  Yes Historical Provider, MD   Ertugliflozin L-PyroglutamicAc (STEGLATRO) 5 MG TABS Take 5 mg by mouth daily

## 2020-04-21 ENCOUNTER — TELEPHONE (OUTPATIENT)
Dept: GASTROENTEROLOGY | Age: 60
End: 2020-04-21

## 2020-06-08 ENCOUNTER — TELEPHONE (OUTPATIENT)
Dept: GASTROENTEROLOGY | Age: 60
End: 2020-06-08

## 2020-06-23 ENCOUNTER — TELEPHONE (OUTPATIENT)
Dept: PODIATRY | Facility: CLINIC | Age: 60
End: 2020-06-23

## 2020-06-24 ENCOUNTER — OFFICE VISIT (OUTPATIENT)
Dept: PODIATRY | Facility: CLINIC | Age: 60
End: 2020-06-24

## 2020-06-24 VITALS
OXYGEN SATURATION: 98 % | BODY MASS INDEX: 38.95 KG/M2 | WEIGHT: 198.4 LBS | SYSTOLIC BLOOD PRESSURE: 130 MMHG | HEART RATE: 65 BPM | DIASTOLIC BLOOD PRESSURE: 80 MMHG | HEIGHT: 60 IN

## 2020-06-24 DIAGNOSIS — E11.42 TYPE 2 DIABETES MELLITUS WITH DIABETIC POLYNEUROPATHY, WITHOUT LONG-TERM CURRENT USE OF INSULIN (HCC): ICD-10-CM

## 2020-06-24 DIAGNOSIS — Z72.0 TOBACCO ABUSE: ICD-10-CM

## 2020-06-24 DIAGNOSIS — B35.3 TINEA PEDIS OF BOTH FEET: Primary | ICD-10-CM

## 2020-06-24 DIAGNOSIS — B35.1 ONYCHOMYCOSIS: ICD-10-CM

## 2020-06-24 PROCEDURE — 11721 DEBRIDE NAIL 6 OR MORE: CPT | Performed by: NURSE PRACTITIONER

## 2020-06-24 PROCEDURE — 99213 OFFICE O/P EST LOW 20 MIN: CPT | Performed by: NURSE PRACTITIONER

## 2020-06-24 RX ORDER — PRENATAL VIT 91/IRON/FOLIC/DHA 28-975-200
COMBINATION PACKAGE (EA) ORAL 2 TIMES DAILY
Qty: 42 G | Refills: 5 | Status: SHIPPED | OUTPATIENT
Start: 2020-06-24 | End: 2020-07-15

## 2020-06-24 NOTE — PROGRESS NOTES
"    [facility] - PODIATRY    Today's Date: 06/24/20    Patient Name: Della Gonzalez  MRN: [id number]  CSN: [id number]  PCP: Donny Aguayo MD  Referring Provider: No ref. provider found    SUBJECTIVE     Chief Complaint   Patient presents with   • Follow-up     Pt is here today for 3 month f/u on diabetic foot/nail care - Pt denies pain at this moment - Pt presents with thickened and long toenails with discoloration - PCP 12/17/2019   • Diabetes     Pt last blood sugar reading is 140mg/dl.      HPI: Della Gonzalez, a 60 y.o.female, comes to clinic as a(n) established patient presenting for diabetic foot exam and complaining of thick fungal toenails and blistering of feet. Patient has h/o cirrhosis, DM2, GERD, tobacco use. 1/2 ppd tobacco use. Patient is NIDDM with last stated BG level of 140mg/dl. Admits occasional numbness and tingling to feet but overall notes retained sensation. Denies open wounds or sores but does note that she had \"blisters\" between her 4th and 5th toes of the left foot and on the dorsum of the left foot yesterday. Admits that she believes she has had cream for athletes foot previously but is not completely sure.  Notes that her toenails are long, thick, discolored and crumbly. She is unable to care for them herself adequately. Denies pain. Relates previous treatment(s) including steroid cream. Denies any constitutional symptoms. No other pedal complaints at this time.    Past Medical History:   Diagnosis Date   • Cirrhosis of liver (CMS/HCC)    • Diabetes mellitus (CMS/HCC)    • GERD (gastroesophageal reflux disease)    • Liver disease      Past Surgical History:   Procedure Laterality Date   • COLONOSCOPY       Family History   Problem Relation Age of Onset   • Diabetes Mother    • No Known Problems Father      Social History     Socioeconomic History   • Marital status:      Spouse name: Not on file   • Number of children: Not on file   • Years of education: Not on file   "   • Highest education level: Not on file   Tobacco Use   • Smoking status: Current Every Day Smoker     Packs/day: 0.50     Types: Cigarettes   • Smokeless tobacco: Never Used   Substance and Sexual Activity   • Alcohol use: No     Frequency: Never     Binge frequency: Never   • Drug use: No   • Sexual activity: Never     No Known Allergies  Current Outpatient Medications   Medication Sig Dispense Refill   • amitriptyline (ELAVIL) 100 MG tablet TAKE ONE TABLET BY MOUTH EVERY EVENING 30 tablet 5   • atorvastatin (LIPITOR) 10 MG tablet TAKE ONE TABLET BY MOUTH EVERY DAY 30 tablet 5   • benazepril (LOTENSIN) 10 MG tablet Take 1 tablet by mouth Daily. 30 tablet 5   • Ertugliflozin L-PyroglutamicAc (STEGLATRO) 15 MG tablet Take 1 tablet by mouth Every Morning. 30 tablet 5   • gabapentin (NEURONTIN) 600 MG tablet Take 1 tablet by mouth 2 (Two) Times a Day. 60 tablet 0   • hydrocortisone-eucerin Apply  topically to the appropriate area as directed 2 (Two) Times a Day. 120 g 2   • ketoconazole (NIZORAL) 2 % cream Apply  topically to the appropriate area as directed Daily. 60 g 2   • metFORMIN ER (GLUCOPHAGE-XR) 500 MG 24 hr tablet TAKE TWO TABLETS BY MOUTH EVERY DAY WITH BREAKFAST 60 tablet 5   • nadolol (CORGARD) 20 MG tablet Take 1 tablet by mouth Daily. 30 tablet 2   • naproxen (NAPROSYN) 500 MG tablet Take 1 tablet by mouth 2 (Two) Times a Day With Meals. 30 tablet 0   • omeprazole (priLOSEC) 20 MG capsule TAKE ONE CAPSULE BY MOUTH EVERY DAY     • tiZANidine (ZANAFLEX) 4 MG tablet Take 4 mg by mouth.     • traMADol (ULTRAM) 50 MG tablet Take 1 tablet by mouth 3 (Three) Times a Day As Needed (TID as needed). 90 tablet 0   • terbinafine (lamISIL) 1 % cream Apply  topically to the appropriate area as directed 2 (Two) Times a Day for 21 days. 42 g 5     No current facility-administered medications for this visit.      Review of Systems   Constitutional: Negative for chills and fever.   HENT: Negative for congestion.     Respiratory: Negative for shortness of breath.    Cardiovascular: Positive for leg swelling. Negative for chest pain.   Gastrointestinal: Negative for constipation, diarrhea, nausea and vomiting.   Musculoskeletal: Positive for arthralgias. Negative for gait problem.        Foot pain   Skin: Negative for wound.        Blisters on feet with dry, flaky skin   Neurological: Positive for numbness.       OBJECTIVE     Vitals:    06/24/20 0958   BP: 130/80   Pulse: 65   SpO2: 98%       PHYSICAL EXAM  GEN:   Accompanied by none.     Foot/Ankle Exam:       General:   Appearance: appears stated age and healthy    Orientation: AAOx3    Affect: appropriate    Gait: unimpaired    Assistance: independent    Shoe Gear:  Casual shoes    VASCULAR      Right Foot Vascularity   Dorsalis pedis:  2+  Posterior tibial:  2+  Skin Temperature: warm    Edema Grading:  None  CFT:  3  Pedal Hair Growth:  Present  Varicosities: mild varicosities       Left Foot Vascularity   Dorsalis pedis:  2+  Posterior tibial:  2+  Skin Temperature: warm    Edema Grading:  None  CFT:  3  Pedal Hair Growth:  Present  Varicosities: mild varicosities        NEUROLOGIC     Right Foot Neurologic   Light touch sensation:  Diminished  Vibratory sensation:  Diminished  Hot/Cold sensation: diminished    Protective Sensation using [place] Monofilament:  Diminished     Left Foot Neurologic   Light touch sensation:  Diminished  Vibratory sensation:  Diminished  Hot/cold sensation: diminished    Protective Sensation using [place] Monofilament:  Diminished     MUSCULOSKELETAL      Right Foot Musculoskeletal   Ecchymosis:  None  Tenderness: toenails    Arch:  Normal  Hallux valgus: No    Hallux limitus: No       Left Foot Musculoskeletal   Ecchymosis:  None  Tenderness: toenails    Arch:  Normal  Hallux valgus: No    Hallux limitus: No       MUSCLE STRENGTH     Right Foot Muscle Strength   Foot dorsiflexion:  5  Foot plantar flexion:  5  Foot  inversion:  5  Foot eversion:  5     Left Foot Muscle Strength   Foot dorsiflexion:  5  Foot plantar flexion:  5  Foot inversion:  5  Foot eversion:  5     RANGE OF MOTION      Right Foot Range of Motion   Foot and ankle ROM within normal limits       Left Foot Range of Motion   Foot and ankle ROM within normal limits       DERMATOLOGIC     Right Foot Dermatologic   Skin: skin intact    Skin: no right foot tinea (moccasin distribution - improved)    Nails: onychomycosis, abnormally thick, subungual debris and dystrophic nails       Left Foot Dermatologic   Skin: skin intact    Skin: no left foot tinea (moccasin distribution - improved)    Nails: onychomycosis, abnormally thick, subungual debris and dystrophic nails        RADIOLOGY/NUCLEAR:  No results found.    LABORATORY/CULTURE RESULTS:      PATHOLOGY RESULTS:       ASSESSMENT/PLAN     Della was seen today for follow-up and diabetes.    Diagnoses and all orders for this visit:    Tinea pedis of both feet  -     terbinafine (lamISIL) 1 % cream; Apply  topically to the appropriate area as directed 2 (Two) Times a Day for 21 days.    Onychomycosis    Type 2 diabetes mellitus with diabetic polyneuropathy, without long-term current use of insulin (CMS/[facility])    Tobacco abuse      Comprehensive lower extremity examination and evaluation was performed.  Discussed findings and treatment plan including risks, benefits, and treatment options with patient in detail. Patient agreed with treatment plan.  After verbal consent obtained, nail(s) x10 debrided of length and thickness with nail nipper without incidence  Patient may maintain nails and calluses at home utilizing emery board or pumice stone between visits as needed  Reviewed at home diabetic foot care including daily foot checks   Discussed smoking cessation.   RX for Lamisil 1% cream to be applied twice daily for at least 3 weeks then may use PRN.   Advised on cleaning measures including washing socks in hot water and  bleach if able and spraying shoes at least once weekly with Lysol to kill fungal spores.   Continue monitoring blood sugar and maintain diabetic control.   An After Visit Summary was printed and given to the patient at discharge, including (if requested) any available informative/educational handouts regarding diagnosis, treatment, or medications. All questions were answered to patient/family satisfaction. Should symptoms fail to improve or worsen they agree to call or return to clinic or to go to the Emergency Department. Discussed the importance of following up with any needed screening tests/labs/specialist appointments and any requested follow-up recommended by me today. Importance of maintaining follow-up discussed and patient accepts that missed appointments can delay diagnosis and potentially lead to worsening of conditions.  Return in about 3 months (around 9/24/2020)., or sooner if acute issues arise.        This document has been electronically signed by MELECIO Hassan on June 24, 2020 10:24

## 2020-06-24 NOTE — PATIENT INSTRUCTIONS
Athlete's Foot    Athlete's foot (tinea pedis) is a fungal infection of the skin on your feet. It often occurs on the skin that is between or underneath your toes. It can also occur on the soles of your feet. Symptoms include itchy or white and flaky areas on the skin. The infection can spread from person to person (is contagious). It can also spread when a person's bare feet come in contact with the fungus on shower floors or on items such as shoes.  Follow these instructions at home:  Medicines  · Apply or take over-the-counter and prescription medicines only as told by your doctor.  · Apply your antifungal medicine as told by your doctor. Do not stop using the medicine even if your feet start to get better.  Foot care  · Do not scratch your feet.  · Keep your feet dry:  ? Wear cotton or wool socks. Change your socks every day or if they become wet.  ? Wear shoes that allow air to move around, such as sandals or canvas tennis shoes.  · Wash and dry your feet:  ? Every day or as told by your doctor.  ? After exercising.  ? Including the area between your toes.  General instructions  · Do not share any of these items that touch your feet:  ? Towels.  ? Shoes.  ? Nail clippers.  ? Other personal items.  · Protect your feet by wearing sandals in wet areas, such as locker rooms and shared showers.  · Keep all follow-up visits as told by your doctor. This is important.  · If you have diabetes, keep your blood sugar under control.  Contact a doctor if:  · You have a fever.  · You have swelling, pain, warmth, or redness in your foot.  · Your feet are not getting better with treatment.  · Your symptoms get worse.  · You have new symptoms.  Summary  · Athlete's foot is a fungal infection of the skin on your feet.  · Symptoms include itchy or white and flaky areas on the skin.  · Apply your antifungal medicine as told by your doctor.  · Keep your feet clean and dry.  This information is not intended to replace advice given  to you by your health care provider. Make sure you discuss any questions you have with your health care provider.  Document Released: 06/05/2009 Document Revised: 12/13/2018 Document Reviewed: 10/08/2018  Elsevier Patient Education © 2020 [employer]

## 2020-06-29 DIAGNOSIS — F51.01 PRIMARY INSOMNIA: ICD-10-CM

## 2020-06-29 DIAGNOSIS — I10 ESSENTIAL HYPERTENSION: ICD-10-CM

## 2020-06-29 RX ORDER — BENAZEPRIL HYDROCHLORIDE 10 MG/1
TABLET ORAL
Qty: 30 TABLET | Refills: 5 | Status: SHIPPED | OUTPATIENT
Start: 2020-06-29 | End: 2021-01-05 | Stop reason: SDUPTHER

## 2020-06-29 RX ORDER — AMITRIPTYLINE HYDROCHLORIDE 100 MG/1
TABLET, FILM COATED ORAL
Qty: 30 TABLET | Refills: 5 | Status: SHIPPED | OUTPATIENT
Start: 2020-06-29 | End: 2021-01-15 | Stop reason: ALTCHOICE

## 2020-06-29 RX ORDER — ATORVASTATIN CALCIUM 10 MG/1
TABLET, FILM COATED ORAL
Qty: 30 TABLET | Refills: 5 | Status: SHIPPED | OUTPATIENT
Start: 2020-06-29 | End: 2021-01-05 | Stop reason: SDUPTHER

## 2020-07-02 ENCOUNTER — CLINICAL SUPPORT (OUTPATIENT)
Dept: FAMILY MEDICINE CLINIC | Facility: CLINIC | Age: 60
End: 2020-07-02

## 2020-07-02 DIAGNOSIS — E78.2 MIXED HYPERLIPIDEMIA: ICD-10-CM

## 2020-07-02 DIAGNOSIS — E11.65 UNCONTROLLED TYPE 2 DIABETES MELLITUS WITH HYPERGLYCEMIA (HCC): ICD-10-CM

## 2020-07-02 DIAGNOSIS — Z00.00 MEDICARE ANNUAL WELLNESS VISIT, SUBSEQUENT: Primary | ICD-10-CM

## 2020-07-02 DIAGNOSIS — I10 ESSENTIAL HYPERTENSION: ICD-10-CM

## 2020-07-03 LAB
ALBUMIN SERPL-MCNC: 4.4 G/DL (ref 3.8–4.8)
ALBUMIN/GLOB SERPL: 1.5 {RATIO} (ref 1.2–2.2)
ALP SERPL-CCNC: 149 IU/L (ref 39–117)
ALT SERPL-CCNC: 37 IU/L (ref 0–32)
AST SERPL-CCNC: 39 IU/L (ref 0–40)
BASOPHILS # BLD AUTO: 0.1 X10E3/UL (ref 0–0.2)
BASOPHILS NFR BLD AUTO: 2 %
BILIRUB SERPL-MCNC: 0.9 MG/DL (ref 0–1.2)
BUN SERPL-MCNC: 10 MG/DL (ref 8–27)
BUN/CREAT SERPL: 19 (ref 12–28)
CALCIUM SERPL-MCNC: 9.3 MG/DL (ref 8.7–10.3)
CHLORIDE SERPL-SCNC: 100 MMOL/L (ref 96–106)
CHOLEST SERPL-MCNC: 167 MG/DL (ref 100–199)
CO2 SERPL-SCNC: 24 MMOL/L (ref 20–29)
CREAT SERPL-MCNC: 0.53 MG/DL (ref 0.57–1)
EOSINOPHIL # BLD AUTO: 0.2 X10E3/UL (ref 0–0.4)
EOSINOPHIL NFR BLD AUTO: 4 %
ERYTHROCYTE [DISTWIDTH] IN BLOOD BY AUTOMATED COUNT: 13.6 % (ref 11.7–15.4)
GLOBULIN SER CALC-MCNC: 2.9 G/DL (ref 1.5–4.5)
GLUCOSE SERPL-MCNC: 174 MG/DL (ref 65–99)
HBA1C MFR BLD: 8.5 % (ref 4.8–5.6)
HCT VFR BLD AUTO: 43.7 % (ref 34–46.6)
HDLC SERPL-MCNC: 48 MG/DL
HGB BLD-MCNC: 14.5 G/DL (ref 11.1–15.9)
IMM GRANULOCYTES # BLD AUTO: 0 X10E3/UL (ref 0–0.1)
IMM GRANULOCYTES NFR BLD AUTO: 0 %
LDLC SERPL CALC-MCNC: 91 MG/DL (ref 0–99)
LDLC/HDLC SERPL: 1.9 RATIO (ref 0–3.2)
LYMPHOCYTES # BLD AUTO: 1.2 X10E3/UL (ref 0.7–3.1)
LYMPHOCYTES NFR BLD AUTO: 19 %
MCH RBC QN AUTO: 29.5 PG (ref 26.6–33)
MCHC RBC AUTO-ENTMCNC: 33.2 G/DL (ref 31.5–35.7)
MCV RBC AUTO: 89 FL (ref 79–97)
MONOCYTES # BLD AUTO: 0.5 X10E3/UL (ref 0.1–0.9)
MONOCYTES NFR BLD AUTO: 8 %
NEUTROPHILS # BLD AUTO: 4.1 X10E3/UL (ref 1.4–7)
NEUTROPHILS NFR BLD AUTO: 67 %
PLATELET # BLD AUTO: 180 X10E3/UL (ref 150–450)
POTASSIUM SERPL-SCNC: 3.9 MMOL/L (ref 3.5–5.2)
PROT SERPL-MCNC: 7.3 G/DL (ref 6–8.5)
RBC # BLD AUTO: 4.91 X10E6/UL (ref 3.77–5.28)
SODIUM SERPL-SCNC: 139 MMOL/L (ref 134–144)
T4 SERPL-MCNC: 7.1 UG/DL (ref 4.5–12)
TRIGL SERPL-MCNC: 138 MG/DL (ref 0–149)
TSH SERPL DL<=0.005 MIU/L-ACNC: 4.2 UIU/ML (ref 0.45–4.5)
VLDLC SERPL CALC-MCNC: 28 MG/DL (ref 5–40)
WBC # BLD AUTO: 6.1 X10E3/UL (ref 3.4–10.8)

## 2020-07-06 ENCOUNTER — TELEPHONE (OUTPATIENT)
Dept: FAMILY MEDICINE CLINIC | Facility: CLINIC | Age: 60
End: 2020-07-06

## 2020-07-06 ENCOUNTER — OFFICE VISIT (OUTPATIENT)
Dept: FAMILY MEDICINE CLINIC | Facility: CLINIC | Age: 60
End: 2020-07-06

## 2020-07-06 VITALS
DIASTOLIC BLOOD PRESSURE: 60 MMHG | TEMPERATURE: 96 F | BODY MASS INDEX: 39.34 KG/M2 | OXYGEN SATURATION: 97 % | WEIGHT: 200.4 LBS | SYSTOLIC BLOOD PRESSURE: 120 MMHG | HEART RATE: 65 BPM | HEIGHT: 60 IN

## 2020-07-06 DIAGNOSIS — S83.411A SPRAIN OF MEDIAL COLLATERAL LIGAMENT OF RIGHT KNEE, INITIAL ENCOUNTER: ICD-10-CM

## 2020-07-06 DIAGNOSIS — I10 ESSENTIAL HYPERTENSION: ICD-10-CM

## 2020-07-06 DIAGNOSIS — M25.571 ACUTE RIGHT ANKLE PAIN: Primary | ICD-10-CM

## 2020-07-06 DIAGNOSIS — M79.2 NEUROPATHIC PAIN: ICD-10-CM

## 2020-07-06 DIAGNOSIS — E11.628 TYPE 2 DIABETES MELLITUS WITH OTHER SKIN COMPLICATION, WITHOUT LONG-TERM CURRENT USE OF INSULIN (HCC): ICD-10-CM

## 2020-07-06 PROCEDURE — 99214 OFFICE O/P EST MOD 30 MIN: CPT | Performed by: FAMILY MEDICINE

## 2020-07-06 RX ORDER — NABUMETONE 500 MG/1
500 TABLET, FILM COATED ORAL 2 TIMES DAILY PRN
Qty: 60 TABLET | Refills: 5 | Status: SHIPPED | OUTPATIENT
Start: 2020-07-06 | End: 2022-05-11 | Stop reason: SDUPTHER

## 2020-07-06 NOTE — PATIENT INSTRUCTIONS
Diabetes: Insulin non dependent. Nephropathy, Retinopathy, Neuropahty status discussed.  Discussed goals of Diabetes today.  Goal Hgb A1C <7.0 for most patient.  Good BP control is encouraged with Goal BP based on JNC 8 guidelines 2014 <140/90.  Discussed role of Ace-I and ARB with DM.  DM imparts risk equivalence for CAD based on ATP III.  Current guidelines support moderate intensity statin with goal of 30-50% reduction in LDL unless 10 yr risk ASCVD >7.5 then high intensity should be used. Close monitoring of Lipid levels encouraged. Recommend once yearly eye evaluation by optometry or ophthalmology.  Good foot health discussed and foot exam completed.  Recommended toe health, wear good shoes, cut nails straight across and tend calluses if present. Take medications as encouraged.  Monitor blood sugars as encouraged and bring log to future meetings. Weight needs to be monitored. Monitor portions and caloric intake.  Pneumovax frequency discussed.   a. Labs: CMP, Microalbumin, A1C  b. Encouraged pt to bring glucose logs to each appointment  c. Encouraged self foot exams, and yearly eye exams.  d. Encouraged to lose weight/please see information provided  e. Recommend regular exercise   f. Medications as listed in today's visit        Ankle Pain  The ankle joint holds your body weight and allows you to move around. Ankle pain can occur on either side or the back of one ankle or both ankles. Ankle pain may be sharp and burning or dull and aching. There may be tenderness, stiffness, redness, or warmth around the ankle. Many things can cause ankle pain, including an injury to the area and overuse of the ankle.  Follow these instructions at home:  Activity  · Rest your ankle as told by your health care provider. Avoid any activities that cause ankle pain.  · Do not use the injured limb to support your body weight until your health care provider says that you can. Use crutches as told by your health care provider.  · Do  exercises as told by your health care provider.  · Ask your health care provider when it is safe to drive if you have a brace on your ankle.  If you have a brace:  · Wear the brace as told by your health care provider. Remove it only as told by your health care provider.  · Loosen the brace if your toes tingle, become numb, or turn cold and blue.  · Keep the brace clean.  · If the brace is not waterproof:  ? Do not let it get wet.  ? Cover it with a watertight covering when you take a bath or shower.  If you were given an elastic bandage:    · Remove it when you take a bath or a shower.  · Try not to move your ankle very much, but wiggle your toes from time to time. This helps to prevent swelling.  · Adjust the bandage to make it more comfortable if it feels too tight.  · Loosen the bandage if you have numbness or tingling in your foot or if your foot turns cold and blue.  Managing pain, stiffness, and swelling    · If directed, put ice on the painful area.  ? If you have a removable brace or elastic bandage, remove it as told by your health care provider.  ? Put ice in a plastic bag.  ? Place a towel between your skin and the bag.  ? Leave the ice on for 20 minutes, 2-3 times a day.  · Move your toes often to avoid stiffness and to lessen swelling.  · Raise (elevate) your ankle above the level of your heart while you are sitting or lying down.  General instructions  · Record information about your pain. Writing down the following may be helpful for you and your health care provider:  ? How often you have ankle pain.  ? Where the pain is located.  ? What the pain feels like.  · If treatment involves wearing a prescribed shoe or insole, make sure you wear it correctly and for as long as told by your health care provider.  · Take over-the-counter and prescription medicines only as told by your health care provider.  · Keep all follow-up visits as told by your health care provider. This is important.  Contact a health  care provider if:  · Your pain gets worse.  · Your pain is not relieved with medicines.  · You have a fever or chills.  · You are having more trouble with walking.  · You have new symptoms.  Get help right away if:  · Your foot, leg, toes, or ankle:  ? Tingles or becomes numb.  ? Becomes swollen.  ? Turns pale or blue.  Summary  · Ankle pain can occur on either side or the back of one ankle or both ankles.  · Ankle pain may be sharp and burning or dull and aching.  · Rest your ankle as told by your health care provider. If told, apply ice to the area.  · Take over-the-counter and prescription medicines only as told by your health care provider.  This information is not intended to replace advice given to you by your health care provider. Make sure you discuss any questions you have with your health care provider.  Document Released: 06/07/2011 Document Revised: 04/07/2020 Document Reviewed: 06/26/2019  Elsevier Patient Education © 2020 [employer].    Knee Sprain, Adult    A knee sprain is a stretch or tear in a knee ligament. Knee ligaments are bands of tissue that connect bones in the knee to each other.  What are the causes?  This condition often results from:  · A fall.  · An injury to the knee.  What are the signs or symptoms?  Symptoms of this condition include:  · Trouble bending the leg.  · Swelling in the knee.  · Bruising around the knee.  · Tenderness or pain in the knee.  · Muscle spasms around the knee.  How is this diagnosed?  This condition may be diagnosed based on:  · A physical exam.  · What happened just before you started to have symptoms.  · Tests, including:  ? An X-ray. This may be done to make sure no bones are broken.  ? An MRI. This may be done to check if the ligament is torn.  ? Stress testing of the knee. This may be done to check ligament damage.  How is this treated?  Treatment for this condition may involve:  · Keeping the knee still (immobilized) with a cast, brace, or  splint.  · Applying ice to the knee. This helps with pain and swelling.  · Keeping the knee raised (elevated) above the level of your heart when you are resting. This helps with pain and swelling.  · Taking medicine for pain.  · Exercises to prevent or limit permanent weakness or stiffness in your knee.  · Surgery to reconnect the ligament to the bone or to reconstruct it. This may be needed if the ligament tore all the way.  Follow these instructions at home:  If you have a splint or brace:  · Wear the splint or brace as told by your health care provider. Remove it only as told by your health care provider.  · Loosen the splint or brace if your toes tingle, become numb, or turn cold and blue.  · Keep the splint or brace clean.  · If the splint or brace is not waterproof:  ? Do not let it get wet.  ? Cover it with a watertight covering when you take a bath or a shower.  If you have a cast:  · Do not stick anything inside the cast to scratch your skin. Doing that increases your risk of infection.  · Check the skin around the cast every day. Tell your health care provider about any concerns.  · You may put lotion on dry skin around the edges of the cast. Do not put lotion on the skin underneath the cast.  · Keep the cast clean.  · If the cast is not waterproof:  ? Do not let it get wet.  ? Cover it with a watertight covering when you take a bath or a shower.  Managing pain, stiffness, and swelling    · If directed, put ice on the injured area.  ? If you have a removable splint or brace, remove it as told by your health care provider.  ? Put ice in a plastic bag.  ? Place a towel between your skin and the bag or between your cast and the bag.  ? Leave the ice on for 20 minutes, 2-3 times a day.  · Gently move your toes often to avoid stiffness and to lessen swelling.  · Elevate the injured area above the level of your heart while you are sitting or lying down.  · Take over-the-counter and prescription medicines only as  told by your health care provider.  General instructions  · Do exercises as told by your health care provider.  · Keep all follow-up visits as told by your health care provider. This is important.  Contact a health care provider if:  · You have pain that gets worse.  · The cast, brace, or splint does not fit right.  · The cast, brace, or splint gets damaged.  Get help right away if:  · You cannot use your injured joint to support any of your body weight (cannot bear weight).  · You cannot move the injured joint.  · You cannot walk more than a few steps without pain or without your knee buckling.  · You have significant pain, swelling, or numbness below the cast, brace, or splint.  This information is not intended to replace advice given to you by your health care provider. Make sure you discuss any questions you have with your health care provider.  Document Released: 12/18/2006 Document Revised: 04/10/2020 Document Reviewed: 07/07/2017  Elsevier Patient Education © 2020 [employer] Inc.

## 2020-07-06 NOTE — TELEPHONE ENCOUNTER
Pt called, stated that at appt today, her and Dr. Aguayo discussed bug bite that she has above eye. She was under the impression an antibiotic was to be sent to [facility]. Please advise.

## 2020-07-06 NOTE — PROGRESS NOTES
OFFICE VISIT NOTE:    Della Gonzalez is a 60 y.o. female who presents today for Ankle Pain (R, x 1 week).     Over the weekend, was bitten by some bug likely - left temple area. Tender and swollen, without fluctuance.     Leg Pain    The incident occurred more than 1 week ago. The incident occurred at home. There was no injury mechanism. The pain is present in the right knee (right lower leg (from knee to ankle)). The quality of the pain is described as aching, shooting, cramping and burning. The pain is severe. The pain has been fluctuating since onset. Associated symptoms include numbness. Pertinent negatives include no inability to bear weight, loss of motion, muscle weakness or tingling. She reports no foreign bodies present. The symptoms are aggravated by movement and weight bearing. She has tried acetaminophen for the symptoms. The treatment provided moderate relief.   Diabetes   She presents for her follow-up diabetic visit. She has type 2 diabetes mellitus. Her disease course has been stable. There are no hypoglycemic associated symptoms. There are no diabetic associated symptoms. Pertinent negatives for diabetes include no chest pain, no fatigue and no weight loss. There are no hypoglycemic complications. Symptoms are stable. Current diabetic treatment includes diet. She is compliant with treatment most of the time. Her weight is stable. She is following a diabetic and low fat/cholesterol diet. Meal planning includes avoidance of concentrated sweets. She participates in exercise three times a week. There is no change in her home blood glucose trend. An ACE inhibitor/angiotensin II receptor blocker is being taken. She does not see a podiatrist.Eye exam is current.        Past medical/surgical history, Family history, Social history, Allergies and Medications have been reviewed with the patient today and are updated in [facility] EMR. See below.  Past Medical History:   Diagnosis Date   • Cirrhosis of liver (CMS/HCC)     • Diabetes mellitus (CMS/HCC)    • GERD (gastroesophageal reflux disease)    • Liver disease      Past Surgical History:   Procedure Laterality Date   • COLONOSCOPY       Family History   Problem Relation Age of Onset   • Diabetes Mother    • No Known Problems Father      Social History     Tobacco Use   • Smoking status: Current Every Day Smoker     Packs/day: 0.50     Types: Cigarettes   • Smokeless tobacco: Never Used   Substance Use Topics   • Alcohol use: No     Frequency: Never     Binge frequency: Never   • Drug use: No       ALLERGIES:  Patient has no known allergies.    CURRENT MEDS:    Current Outpatient Medications:   •  amitriptyline (ELAVIL) 100 MG tablet, TAKE ONE TABLET BY MOUTH EVERY EVENING, Disp: 30 tablet, Rfl: 5  •  atorvastatin (LIPITOR) 10 MG tablet, TAKE ONE TABLET BY MOUTH EVERY DAY, Disp: 30 tablet, Rfl: 5  •  benazepril (LOTENSIN) 10 MG tablet, TAKE ONE TABLET BY MOUTH EVERY DAY, Disp: 30 tablet, Rfl: 5  •  Ertugliflozin L-PyroglutamicAc (STEGLATRO) 15 MG tablet, Take 1 tablet by mouth Every Morning., Disp: 30 tablet, Rfl: 5  •  gabapentin (NEURONTIN) 600 MG tablet, Take 1 tablet by mouth 2 (Two) Times a Day., Disp: 60 tablet, Rfl: 0  •  hydrocortisone-eucerin, Apply  topically to the appropriate area as directed 2 (Two) Times a Day., Disp: 120 g, Rfl: 2  •  ketoconazole (NIZORAL) 2 % cream, Apply  topically to the appropriate area as directed Daily., Disp: 60 g, Rfl: 2  •  metFORMIN ER (GLUCOPHAGE-XR) 500 MG 24 hr tablet, TAKE TWO TABLETS BY MOUTH EVERY DAY WITH BREAKFAST, Disp: 60 tablet, Rfl: 5  •  nadolol (CORGARD) 20 MG tablet, Take 1 tablet by mouth Daily., Disp: 30 tablet, Rfl: 2  •  omeprazole (priLOSEC) 20 MG capsule, TAKE ONE CAPSULE BY MOUTH EVERY DAY, Disp: , Rfl:   •  terbinafine (lamISIL) 1 % cream, Apply  topically to the appropriate area as directed 2 (Two) Times a Day for 21 days., Disp: 42 g, Rfl: 5  •  traMADol (ULTRAM) 50 MG tablet, Take 1 tablet by mouth 3 (Three)  "Times a Day As Needed (TID as needed)., Disp: 90 tablet, Rfl: 0  •  nabumetone (RELAFEN) 500 MG tablet, Take 1 tablet by mouth 2 (Two) Times a Day As Needed for Moderate Pain ., Disp: 60 tablet, Rfl: 5    REVIEW OF SYSTEMS:  Review of Systems   Constitutional: Negative for activity change, fatigue, fever, unexpected weight gain and unexpected weight loss.   Respiratory: Negative for shortness of breath.    Cardiovascular: Negative for chest pain.   Gastrointestinal: Negative for abdominal pain.   Genitourinary: Negative for difficulty urinating.   Musculoskeletal: Positive for arthralgias.   Skin: Negative for rash.   Neurological: Positive for numbness. Negative for tingling, syncope and headache.       PHYSICAL EXAMINATION:  Vital Signs:  /60 (BP Location: Left arm, Patient Position: Sitting, Cuff Size: Adult)   Pulse 65   Temp 96 °F (35.6 °C) (Skin)   Ht 152.4 cm (60\")   Wt 90.9 kg (200 lb 6.4 oz)   LMP  (LMP Unknown)   SpO2 97%   Breastfeeding No   BMI 39.14 kg/m²   Vitals:    07/06/20 0937   Patient Position: Sitting       Physical Exam   Constitutional: She is oriented to person, place, and time. She appears well-developed and well-nourished. No distress.   HENT:   Head: Normocephalic and atraumatic.   Mouth/Throat: Oropharynx is clear and moist.   Neck: Normal range of motion. Neck supple. No JVD present.   Cardiovascular: Normal rate, regular rhythm, normal heart sounds and intact distal pulses.   Pulmonary/Chest: Effort normal and breath sounds normal. No respiratory distress.   Musculoskeletal: Normal range of motion. She exhibits tenderness (right MCL area and anterior right ankle near syndesmosis area.). She exhibits no edema.   Neurological: She is alert and oriented to person, place, and time. A sensory deficit (DPN as before) is present. No cranial nerve deficit.   Skin: Skin is warm and dry. Capillary refill takes less than 2 seconds. No rash noted.   Psychiatric: She has a normal mood " and affect. Her behavior is normal.   Nursing note and vitals reviewed.      Procedures    ASSESSMENT/ PLAN:  Problem List Items Addressed This Visit        Cardiovascular and Mediastinum    Essential hypertension       Endocrine    Diabetes mellitus (CMS/HCC)      Other Visit Diagnoses     Acute right ankle pain    -  Primary    Relevant Medications    nabumetone (RELAFEN) 500 MG tablet    Other Relevant Orders    XR Ankle 3+ View Right    Neuropathic pain        Sprain of medial collateral ligament of right knee, initial encounter        Relevant Medications    nabumetone (RELAFEN) 500 MG tablet    Other Relevant Orders    XR Knee 1 or 2 View Right            Specific Patient Instructions:  MEDICATION Instructions: Encouraged patient to continue routine medicines as prescribed and maintain compliance. Patient instructed to report any adverse side effects or reactions to medicines promptly to the office. Patient instructed to make us aware of any OTC or herbal meds or supplement use.    DIET Recommendations: Patient instructed and provided information on the following nutrition and diet recommendations: Calorie restriction for weight reduction and maintenance. Necessity for adequate daily intake of fluids/water. Also, diet information provided in AVS for specifics.    EXERCISE Instructions: Discussed with patient the need for routine aerobic activity for cardiovascular fitness, 3 times a week for about 30 minutes. Daily exercise for increased fitness and weight reduction goals.    SMOKING Recommendations: Counseled patient and encouraged them on smoking and tobacco cessation if applicable. Discussed the benefits to all body systems with smoking/tobacco cessation, including decreased cardiac/lung/stroke/cancer risk. Encouraged no vaping use.    HEALTH MAINTENANCE:  Counseling provided to patient/family about routine health maintenance and ANNUAL physicals/labs. Counseling on recommended Vaccinations appropriate for  age needed.        Patient's Body mass index is 39.14 kg/m². BMI is above normal parameters. Recommendations include: exercise counseling and nutrition counseling.      Medications or Orders placed this visit:  Orders Placed This Encounter   Procedures   • XR Knee 1 or 2 View Right     Standing Status:   Future     Standing Expiration Date:   7/6/2021     Scheduling Instructions:      Pt requests [facility].     Order Specific Question:   Reason for Exam:     Answer:   right knee pain, medial MCL area   • XR Ankle 3+ View Right     Standing Status:   Future     Standing Expiration Date:   7/6/2021     Scheduling Instructions:      Pt requests [facility].     Order Specific Question:   Reason for Exam:     Answer:   right ankle pain, no trauma, near syndesmosis area.       Medications DISCONTINUED this visit:  Medications Discontinued During This Encounter   Medication Reason   • naproxen (NAPROSYN) 500 MG tablet Alternate therapy   • tiZANidine (ZANAFLEX) 4 MG tablet Alternate therapy       FOLLOW-UP:  Return in about 3 months (around 10/6/2020) for Recheck, Next scheduled follow up.    I discussed the patients findings and my recommendations with patient.  An After Visit Summary (AVS) was printed and given to the patient at discharge.        Donny Aguayo MD, FAAFP  7/6/2020

## 2020-07-07 DIAGNOSIS — M25.571 ACUTE RIGHT ANKLE PAIN: ICD-10-CM

## 2020-07-09 ENCOUNTER — TELEPHONE (OUTPATIENT)
Dept: FAMILY MEDICINE CLINIC | Facility: CLINIC | Age: 60
End: 2020-07-09

## 2020-07-09 RX ORDER — CEPHALEXIN 500 MG/1
500 CAPSULE ORAL 3 TIMES DAILY
Qty: 21 CAPSULE | Refills: 0 | Status: SHIPPED | OUTPATIENT
Start: 2020-07-09 | End: 2020-07-16

## 2020-07-09 NOTE — TELEPHONE ENCOUNTER
Pt called, stated that she has a very painful spider bite on her face near her eye. She said it is swelling and needs draining. Pt requested appt for today, however there were no slots to do do. Please advise.

## 2020-07-09 NOTE — TELEPHONE ENCOUNTER
"Called Pt, advised that medication had been sent to pharmacy for her. Pt voiced understanding and stated \"she will try that.\"  "

## 2020-07-22 ENCOUNTER — TELEPHONE (OUTPATIENT)
Dept: GASTROENTEROLOGY | Age: 60
End: 2020-07-22

## 2020-07-22 NOTE — TELEPHONE ENCOUNTER
Last OV BG aprn 2-3-20. Had appointment yesterday and she said she CA but in Epic is says no show? Patient called from [phone] said she needs to get some labs done and is asking for a return call to discuss this. I do not see any active lab orders in Epic. I will forward to [facility] to review and call the patient to discuss. Patient did not mention RS her FU.  [facility]

## 2020-07-24 ENCOUNTER — OFFICE VISIT (OUTPATIENT)
Dept: FAMILY MEDICINE CLINIC | Facility: CLINIC | Age: 60
End: 2020-07-24

## 2020-07-24 VITALS
HEART RATE: 58 BPM | TEMPERATURE: 96.3 F | OXYGEN SATURATION: 95 % | DIASTOLIC BLOOD PRESSURE: 57 MMHG | WEIGHT: 199.2 LBS | BODY MASS INDEX: 39.11 KG/M2 | HEIGHT: 60 IN | SYSTOLIC BLOOD PRESSURE: 93 MMHG

## 2020-07-24 DIAGNOSIS — Z00.00 MEDICARE ANNUAL WELLNESS VISIT, SUBSEQUENT: Primary | ICD-10-CM

## 2020-07-24 DIAGNOSIS — Z87.891 PERSONAL HISTORY OF NICOTINE DEPENDENCE: ICD-10-CM

## 2020-07-24 DIAGNOSIS — I10 ESSENTIAL HYPERTENSION: ICD-10-CM

## 2020-07-24 DIAGNOSIS — Z78.0 POST-MENOPAUSAL: ICD-10-CM

## 2020-07-24 DIAGNOSIS — E66.01 CLASS 2 SEVERE OBESITY WITH SERIOUS COMORBIDITY AND BODY MASS INDEX (BMI) OF 38.0 TO 38.9 IN ADULT, UNSPECIFIED OBESITY TYPE (HCC): ICD-10-CM

## 2020-07-24 DIAGNOSIS — E11.65 UNCONTROLLED TYPE 2 DIABETES MELLITUS WITH HYPERGLYCEMIA (HCC): ICD-10-CM

## 2020-07-24 DIAGNOSIS — E78.2 MIXED HYPERLIPIDEMIA: ICD-10-CM

## 2020-07-24 DIAGNOSIS — F17.200 TOBACCO USE DISORDER: ICD-10-CM

## 2020-07-24 DIAGNOSIS — Z12.31 ENCOUNTER FOR SCREENING MAMMOGRAM FOR BREAST CANCER: ICD-10-CM

## 2020-07-24 DIAGNOSIS — E11.42 TYPE 2 DIABETES MELLITUS WITH DIABETIC POLYNEUROPATHY, WITHOUT LONG-TERM CURRENT USE OF INSULIN (HCC): Primary | ICD-10-CM

## 2020-07-24 PROCEDURE — G0439 PPPS, SUBSEQ VISIT: HCPCS | Performed by: NURSE PRACTITIONER

## 2020-07-24 NOTE — PROGRESS NOTES
The ABCs of the Annual Wellness Visit  Subsequent Medicare Wellness Visit    Chief Complaint   Patient presents with   • Medicare Wellness-subsequent       Subjective   History of Present Illness:  Della Gonzalez is a 60 y.o. female who presents for a Subsequent Medicare Wellness Visit.    HEALTH RISK ASSESSMENT    Recent Hospitalizations:  No hospitalization(s) within the last year.    Current Medical Providers:  Patient Care Team:  Donny Aguayo MD as PCP - General (Family Medicine)  Vijaya Henao APRN as PCP - Claims Attributed    Smoking Status:  Social History     Tobacco Use   Smoking Status Current Every Day Smoker   • Packs/day: 0.50   • Years: 40.00   • Pack years: 20.00   • Types: Cigarettes   Smokeless Tobacco Never Used       Alcohol Consumption:  Social History     Substance and Sexual Activity   Alcohol Use No   • Frequency: Never   • Binge frequency: Never       Depression Screen:   PHQ-2/PHQ-9 Depression Screening 7/24/2020   Little interest or pleasure in doing things 0   Feeling down, depressed, or hopeless 0   Trouble falling or staying asleep, or sleeping too much 0   Feeling tired or having little energy 0   Poor appetite or overeating 0   Feeling bad about yourself - or that you are a failure or have let yourself or your family down 0   Trouble concentrating on things, such as reading the newspaper or watching television 0   Moving or speaking so slowly that other people could have noticed. Or the opposite - being so fidgety or restless that you have been moving around a lot more than usual 0   Thoughts that you would be better off dead, or of hurting yourself in some way 0   Total Score 0       Fall Risk Screen:  STEADI Fall Risk Assessment was completed, and patient is at HIGH risk for falls. Assessment completed on:7/6/2020    Health Habits and Functional and Cognitive Screening:  Functional & Cognitive Status 7/24/2020   Do you have difficulty preparing food and eating? No   Do  you have difficulty bathing yourself, getting dressed or grooming yourself? No   Do you have difficulty using the toilet? No   Do you have difficulty moving around from place to place? No   Do you have trouble with steps or getting out of a bed or a chair? No   Current Diet Well Balanced Diet   Dental Exam Not up to date   Eye Exam Not up to date   Exercise (times per week) 3 times per week   Current Exercise Activities Include Walking   Do you need help using the phone?  No   Are you deaf or do you have serious difficulty hearing?  No   Do you need help with transportation? Yes   Do you need help shopping? No   Do you need help preparing meals?  No   Do you need help with housework?  No   Do you need help with laundry? No   Do you need help taking your medications? No   Do you need help managing money? No   Do you ever drive or ride in a car without wearing a seat belt? No   Have you felt unusual stress, anger or loneliness in the last month? Yes   Who do you live with? Alone   If you need help, do you have trouble finding someone available to you? No   Have you been bothered in the last four weeks by sexual problems? No   Do you have difficulty concentrating, remembering or making decisions? No         Does the patient have evidence of cognitive impairment? No    Asprin use counseling:Start ASA 81 mg daily     Age-appropriate Screening Schedule:  Refer to the list below for future screening recommendations based on patient's age, sex and/or medical conditions. Orders for these recommended tests are listed in the plan section. The patient has been provided with a written plan.    Health Maintenance   Topic Date Due   • URINE MICROALBUMIN  07/24/2020 (Originally 6/13/2020)   • MAMMOGRAM  08/24/2020 (Originally 12/4/2019)   • DIABETIC EYE EXAM  08/24/2020 (Originally 11/19/2018)   • ZOSTER VACCINE (1 of 2) 07/06/2021 (Originally 4/17/2009)   • TDAP/TD VACCINES (1 - Tdap) 07/30/2021 (Originally 4/17/1971)   •  COLONOSCOPY  02/16/2028 (Originally 11/19/2018)   • INFLUENZA VACCINE  08/01/2020   • PAP SMEAR  11/06/2020   • HEMOGLOBIN A1C  01/02/2021   • DIABETIC FOOT EXAM  03/19/2021   • LIPID PANEL  07/02/2021   • PNEUMOCOCCAL VACCINE (19-64 MEDIUM RISK)  Discontinued          The following portions of the patient's history were reviewed and updated as appropriate: allergies, current medications, past family history, past medical history, past social history, past surgical history and problem list.    Outpatient Medications Prior to Visit   Medication Sig Dispense Refill   • amitriptyline (ELAVIL) 100 MG tablet TAKE ONE TABLET BY MOUTH EVERY EVENING 30 tablet 5   • atorvastatin (LIPITOR) 10 MG tablet TAKE ONE TABLET BY MOUTH EVERY DAY 30 tablet 5   • benazepril (LOTENSIN) 10 MG tablet TAKE ONE TABLET BY MOUTH EVERY DAY 30 tablet 5   • Ertugliflozin L-PyroglutamicAc (STEGLATRO) 15 MG tablet Take 1 tablet by mouth Every Morning. 30 tablet 5   • gabapentin (NEURONTIN) 600 MG tablet Take 1 tablet by mouth 2 (Two) Times a Day. 60 tablet 0   • hydrocortisone-eucerin Apply  topically to the appropriate area as directed 2 (Two) Times a Day. 120 g 2   • ketoconazole (NIZORAL) 2 % cream Apply  topically to the appropriate area as directed Daily. 60 g 2   • metFORMIN ER (GLUCOPHAGE-XR) 500 MG 24 hr tablet TAKE TWO TABLETS BY MOUTH EVERY DAY WITH BREAKFAST 60 tablet 5   • nabumetone (RELAFEN) 500 MG tablet Take 1 tablet by mouth 2 (Two) Times a Day As Needed for Moderate Pain . 60 tablet 5   • nadolol (CORGARD) 20 MG tablet Take 1 tablet by mouth Daily. 30 tablet 2   • omeprazole (priLOSEC) 20 MG capsule TAKE ONE CAPSULE BY MOUTH EVERY DAY     • traMADol (ULTRAM) 50 MG tablet Take 1 tablet by mouth 3 (Three) Times a Day As Needed (TID as needed). 90 tablet 0     No facility-administered medications prior to visit.        Patient Active Problem List   Diagnosis   • Diabetes mellitus (CMS/HCC)   • Morbidly obese (CMS/HCC)   • Mixed  hyperlipidemia   • Cirrhosis of liver without ascites (CMS/HCC)   • Elevated AFP   • Esophageal varices determined by endoscopy (CMS/HCC)   • Essential hypertension   • Gastritis without bleeding   • History of hepatitis C   • Nonintractable headache   • Portal hypertension (CMS/HCC)   • Gallstones       Advanced Care Planning:  ACP discussion was declined by the patient. Patient does not have an advance directive, declines further assistance.    Review of Systems   Constitutional: Negative.  Negative for fatigue, fever and unexpected weight change.   HENT: Negative.  Negative for facial swelling, sore throat and trouble swallowing.    Eyes: Negative.  Negative for photophobia, discharge and visual disturbance.   Respiratory: Negative.  Negative for cough, chest tightness and shortness of breath.    Cardiovascular: Negative.  Negative for chest pain and palpitations.   Gastrointestinal: Negative.  Negative for abdominal pain, diarrhea, nausea and vomiting.   Endocrine: Negative.  Negative for polydipsia, polyphagia and polyuria.   Genitourinary: Negative.  Negative for dysuria, flank pain and frequency.   Musculoskeletal: Positive for arthralgias and back pain. Negative for gait problem and neck pain.        Chronic, stable.   Skin: Negative.  Negative for rash.   Allergic/Immunologic: Negative.    Neurological: Negative.  Negative for dizziness, light-headedness and headaches.   Hematological: Negative.    Psychiatric/Behavioral: Negative.  Negative for self-injury and suicidal ideas.       Compared to one year ago, the patient feels her physical health is the same.  Compared to one year ago, the patient feels her mental health is the same.    Reviewed chart for potential of high risk medication in the elderly: yes  Reviewed chart for potential of harmful drug interactions in the elderly:yes    Objective         Vitals:    07/24/20 1004   BP: 93/57   BP Location: Right arm   Patient Position: Sitting   Cuff Size:  "Adult   Pulse: 58   Temp: 96.3 °F (35.7 °C)   SpO2: 95%   Weight: 90.4 kg (199 lb 3.2 oz)   Height: 152.4 cm (60\")   PainSc: 0-No pain       Body mass index is 38.9 kg/m².  Discussed the patient's BMI with her. The BMI is above average; BMI management plan is completed.    Physical Exam   Constitutional: She is oriented to person, place, and time. She appears well-developed and well-nourished. No distress.   HENT:   Head: Normocephalic and atraumatic.   Eyes: Pupils are equal, round, and reactive to light. Conjunctivae and EOM are normal.   Neck: Normal range of motion. Neck supple.   Cardiovascular: Normal rate, regular rhythm, normal heart sounds and intact distal pulses.   No murmur heard.  Pulmonary/Chest: Effort normal and breath sounds normal. No respiratory distress.   Abdominal: Soft. Bowel sounds are normal. She exhibits no distension. There is no tenderness.   Musculoskeletal: Normal range of motion. She exhibits no edema.    Della had a diabetic foot exam performed today.   During the foot exam she had a monofilament test performed.    Neurological Sensory Findings - Unaltered hot/cold right ankle/foot discrimination and unaltered hot/cold left ankle/foot discrimination. Unaltered sharp/dull right ankle/foot discrimination and unaltered sharp/dull left ankle/foot discrimination. No right ankle/foot altered proprioception and no left ankle/foot altered proprioception  Vascular Status -  Her right foot exhibits no edema. Her left foot exhibits normal foot vasculature  and no edema.  Skin Integrity  -  Her right foot skin is intact.Her left foot skin is intact..  Neurological: She is alert and oriented to person, place, and time.   Skin: Skin is warm and dry. Capillary refill takes less than 2 seconds. She is not diaphoretic. No erythema.   Psychiatric: She has a normal mood and affect. Her behavior is normal. Judgment and thought content normal.   Nursing note and vitals reviewed.      Lab Results " "  Component Value Date     (H) 07/02/2020    CHLPL 167 07/02/2020    TRIG 138 07/02/2020    HDL 48 07/02/2020    LDL 91 07/02/2020    VLDL 28 07/02/2020    HGBA1C 8.5 (H) 07/02/2020        Assessment/Plan   Medicare Risks and Personalized Health Plan  CMS Preventative Services Quick Reference  Advance Directive Discussion  Breast Cancer/Mammogram Screening  Cardiovascular risk  Chronic Pain   Depression/Dysphoria  Diabetic Lab Screening   Immunizations Discussed/Encouraged (specific immunizations; Pneumococcal 23 and Prevnar )  Lung Cancer Risk  Obesity/Overweight   Osteoprorosis Risk  Polypharmacy    The above risks/problems have been discussed with the patient.  Pertinent information has been shared with the patient in the After Visit Summary.  Follow up plans and orders are seen below in the Assessment/Plan Section.    Diagnoses and all orders for this visit:    1. Medicare annual wellness visit, subsequent (Primary)    2. Uncontrolled type 2 diabetes mellitus with hyperglycemia (CMS/[facility])    3. Mixed hyperlipidemia    4. Essential hypertension    5. Encounter for screening mammogram for breast cancer  -     Mammo Screening Bilateral With CAD; Future    6. Tobacco use disorder  -     CT Chest Low Dose Wo; Future    7. Post-menopausal  -     DEXA Assess Fracture Vertebral; Future    8. Personal history of nicotine dependence   -     CT Chest Low Dose Wo; Future    9. Class 2 severe obesity with serious comorbidity and body mass index (BMI) of 38.0 to 38.9 in adult, unspecified obesity type (CMS/[facility])    Patient encouraged to \"step up\" her low impact exercises to minimum of 30 minutes at least 3 days weekly.  Patient encouraged to start \"carb counting\" and handout with instructions provided.  Patient encouraged to continue attempts at smoking cessation.  Patient encouraged to continue social distancing with use of mask in public spaces.      Follow Up:  Return in about 3 months (around 10/24/2020) for Next " scheduled follow up with CMP and Hgb A1c prior.     An After Visit Summary and PPPS were given to the patient.

## 2020-07-28 DIAGNOSIS — E11.628 TYPE 2 DIABETES MELLITUS WITH OTHER SKIN COMPLICATION, WITHOUT LONG-TERM CURRENT USE OF INSULIN (HCC): ICD-10-CM

## 2020-07-31 RX ORDER — ERTUGLIFLOZIN 15 MG/1
TABLET, FILM COATED ORAL
Qty: 90 TABLET | Refills: 0 | Status: SHIPPED | OUTPATIENT
Start: 2020-07-31 | End: 2020-11-12

## 2020-08-04 ENCOUNTER — HOSPITAL ENCOUNTER (OUTPATIENT)
Dept: ULTRASOUND IMAGING | Age: 60
Discharge: HOME | End: 2020-08-04
Payer: MEDICARE

## 2020-08-04 DIAGNOSIS — K74.60 CIRRHOSIS OF LIVER WITHOUT ASCITES, UNSPECIFIED HEPATIC CIRRHOSIS TYPE (HCC): ICD-10-CM

## 2020-08-04 LAB
ALBUMIN SERPL-MCNC: 4.4 G/DL (ref 3.5–5.2)
ALP BLD-CCNC: 120 U/L (ref 35–104)
ALPHA FETOPROTEIN: 3.1 NG/ML (ref 0–8.3)
ALT SERPL-CCNC: 20 U/L (ref 5–33)
ANION GAP SERPL CALCULATED.3IONS-SCNC: 15 MMOL/L (ref 7–19)
AST SERPL-CCNC: 24 U/L (ref 5–32)
BILIRUB SERPL-MCNC: 0.7 MG/DL (ref 0.2–1.2)
BUN BLDV-MCNC: 10 MG/DL (ref 8–23)
CALCIUM SERPL-MCNC: 9.7 MG/DL (ref 8.8–10.2)
CHLORIDE BLD-SCNC: 103 MMOL/L (ref 98–111)
CO2: 26 MMOL/L (ref 22–29)
CREAT SERPL-MCNC: 0.4 MG/DL (ref 0.5–0.9)
GFR AFRICAN AMERICAN: >59
GFR NON-AFRICAN AMERICAN: >60
GLUCOSE BLD-MCNC: 172 MG/DL (ref 74–109)
HCT VFR BLD CALC: 40.5 % (ref 37–47)
HEMOGLOBIN: 13.6 G/DL (ref 12–16)
INR BLD: 1.06 (ref 0.88–1.18)
MCH RBC QN AUTO: 29.4 PG (ref 27–31)
MCHC RBC AUTO-ENTMCNC: 33.6 G/DL (ref 33–37)
MCV RBC AUTO: 87.5 FL (ref 81–99)
PDW BLD-RTO: 13.6 % (ref 11.5–14.5)
PLATELET # BLD: 124 K/UL (ref 130–400)
PMV BLD AUTO: 11 FL (ref 9.4–12.3)
POTASSIUM SERPL-SCNC: 4.3 MMOL/L (ref 3.5–5)
PROTHROMBIN TIME: 13.7 SEC (ref 12–14.6)
RBC # BLD: 4.63 M/UL (ref 4.2–5.4)
SODIUM BLD-SCNC: 144 MMOL/L (ref 136–145)
TOTAL PROTEIN: 7.6 G/DL (ref 6.6–8.7)
WBC # BLD: 4.6 K/UL (ref 4.8–10.8)

## 2020-08-04 PROCEDURE — 76705 ECHO EXAM OF ABDOMEN: CPT

## 2020-08-05 DIAGNOSIS — Z12.31 ENCOUNTER FOR SCREENING MAMMOGRAM FOR BREAST CANCER: ICD-10-CM

## 2020-08-05 DIAGNOSIS — Z78.0 POST-MENOPAUSAL: ICD-10-CM

## 2020-08-10 ENCOUNTER — DOCUMENTATION (OUTPATIENT)
Dept: CT IMAGING | Facility: HOSPITAL | Age: 60
End: 2020-08-10

## 2020-08-10 ENCOUNTER — HOSPITAL ENCOUNTER (OUTPATIENT)
Dept: CT IMAGING | Facility: HOSPITAL | Age: 60
Discharge: HOME | End: 2020-08-10
Admitting: NURSE PRACTITIONER

## 2020-08-10 DIAGNOSIS — Z87.891 PERSONAL HISTORY OF NICOTINE DEPENDENCE: ICD-10-CM

## 2020-08-10 DIAGNOSIS — F17.200 TOBACCO USE DISORDER: ICD-10-CM

## 2020-08-10 PROCEDURE — G0297 LDCT FOR LUNG CA SCREEN: HCPCS

## 2020-08-11 ENCOUNTER — OFFICE VISIT (OUTPATIENT)
Dept: GASTROENTEROLOGY | Age: 60
End: 2020-08-11
Payer: MEDICARE

## 2020-08-11 VITALS
HEIGHT: 60 IN | WEIGHT: 196 LBS | BODY MASS INDEX: 38.48 KG/M2 | SYSTOLIC BLOOD PRESSURE: 120 MMHG | HEART RATE: 78 BPM | OXYGEN SATURATION: 98 % | DIASTOLIC BLOOD PRESSURE: 70 MMHG

## 2020-08-11 PROCEDURE — 4004F PT TOBACCO SCREEN RCVD TLK: CPT | Performed by: NURSE PRACTITIONER

## 2020-08-11 PROCEDURE — 99214 OFFICE O/P EST MOD 30 MIN: CPT | Performed by: NURSE PRACTITIONER

## 2020-08-11 PROCEDURE — G8417 CALC BMI ABV UP PARAM F/U: HCPCS | Performed by: NURSE PRACTITIONER

## 2020-08-11 PROCEDURE — 3017F COLORECTAL CA SCREEN DOC REV: CPT | Performed by: NURSE PRACTITIONER

## 2020-08-11 PROCEDURE — G8427 DOCREV CUR MEDS BY ELIG CLIN: HCPCS | Performed by: NURSE PRACTITIONER

## 2020-08-11 ASSESSMENT — ENCOUNTER SYMPTOMS
NAUSEA: 0
VOICE CHANGE: 0
SORE THROAT: 0
RECTAL PAIN: 0
DIARRHEA: 0
VOMITING: 0
CONSTIPATION: 0
BACK PAIN: 1
ABDOMINAL DISTENTION: 0
COUGH: 0
BLOOD IN STOOL: 0
CHEST TIGHTNESS: 0
SHORTNESS OF BREATH: 0
ABDOMINAL PAIN: 0

## 2020-08-11 NOTE — PROGRESS NOTES
Subjective:      Patient ID: Selena Scott is a 63 y.o. female  Dr. Vandana Townsend M.D., MD  No ref. provider found    HPI  Chief Complaint   Patient presents with    Cirrhosis       Patient with cirrhosis seen for 6 mo f/u. Labs and u/s have been completed and reviewed. MELD = 7  U/s of liver:   Impression 1. Appearance of the liver consistent with reported history of cirrhosis. 2. Apparent increased spleen size compared to 1/20/2020, now measuring up to 14.4 cm, previously up to 12.9 cm. 3. Gallstone. No evidence of acute cholecystitis. Signed by Dr Kristina Melissa on 8/4/2020 1:25 PM     She is taking nadolol 20 mg for portal hypertension and esophageal varices. Also taking omeprazole 20 mg daily for this and chronic GERD. She is due for EGD for variceal surveillance in March. Patient denies any recent fever, illness. No jaundice or icterus. She denies rapid weight gain or abdominal swelling. No confusion or memory problems. No easy bruising or bleeding. No melena, hematemesis. She has history of hepatitis C. Liver biopsy has been completed and positive for cirrhosis. She had EGDs x2 earlier this year with banding of varices. She is overdue for repeat EGD with possible banding. Last EGD 4/14/2020. Assessment:      1. Cirrhosis of liver without ascites, unspecified hepatic cirrhosis type ([address]    2. Portal hypertension ([address]    3. Secondary esophageal varices without bleeding ([address]    4. Gastritis determined by biopsy    5. Chronic GERD    6. History of hepatitis C            Plan:      Repeat labs and u/s liver in 6 mo with f/u after. Schedule EGD  Surveillance and possible banding esophageal varices    Nothing to eat or drink after midnight. No driving for 24 hours after procedure. Bring a  to procedure. No aspirin, NSAIDs, fish oil 5 days before procedure.     I have discussed the benefits, alternatives, and risks (including bleeding, perforation and death) for pursuing Endoscopy (EGD/Colonscopy/EUS/ERCP) with the patient and they are willing to continue. We also discussed the need for anesthesia, IV access, proper dietary changes, medication changes if necessary, and need for bowel prep (if ordered) prior to their Endoscopic procedure. They are aware they must have someone accompany them to their scheduled procedure to drive them home - they agree to the above and are willing to continue.        Plan for anesthesia: MAC        Family History   Problem Relation Age of Onset    Liver Cancer Neg Hx     Liver Disease Neg Hx     Stomach Cancer Neg Hx     Rectal Cancer Neg Hx     Esophageal Cancer Neg Hx     Colon Cancer Neg Hx     Colon Polyps Neg Hx        Past Medical History:   Diagnosis Date    Arthritis     Asthma     [facility]     COPD (chronic obstructive pulmonary disease) (HCC)     Esophageal varices (HCC)     GERD (gastroesophageal reflux disease)     Hepatitis C     Hyperlipidemia     Hypertension        Past Surgical History:   Procedure Laterality Date    COLONOSCOPY  03/02/2016    Dr Robles Goodwin bleeding internal hemorrhoids o/w normal; fair prep (5 yr)    NE EGD TRANSORAL BIOPSY SINGLE/MULTIPLE N/A 2/7/2017    Dr Cristino Leal Grade I esophageal varices, gastritis/gastropathy    NE EGD TRANSORAL BIOPSY SINGLE/MULTIPLE N/A 3/23/2018    Dr Gabrielle Hope I esophageal varices, erosive/active gastritis-2 yr recall    UPPER GASTROINTESTINAL ENDOSCOPY  03/02/2016    Dr Dorantes II esoph varices; portal hyptensive gastropathy    UPPER GASTROINTESTINAL ENDOSCOPY N/A 3/3/2020    Dr Kale Torres-w/variceal banding x 4, 3 columns of Grade II varices, repeat in 6-8 wks    UPPER GASTROINTESTINAL ENDOSCOPY N/A 4/14/2020    Dr Kale Robbw/variceal banding x 3-grade I-II varices, repeat in 8 wks    US GUIDED LIVER BIOPSY PERCUTANEOUS  05/07/2019    Dr. Yossi Hunt; Grade 2, Stage 4, iron stain (-)       Current Outpatient Medications   Medication Sig Dispense Refill    omeprazole (PRILOSEC) 20 MG delayed release capsule TAKE ONE CAPSULE BY MOUTH EVERY DAY 90 capsule 1    nadolol (CORGARD) 20 MG tablet TAKE ONE TABLET BY MOUTH EVERY DAY -- MUST MAKE APPOINTMENT FOR LAB WORK BEFORE NEXT REFILL 90 tablet 1    gabapentin (NEURONTIN) 600 MG tablet Take 600 mg by mouth 3 times daily.  atorvastatin (LIPITOR) 10 MG tablet Take 10 mg by mouth daily      metFORMIN, OSM, (FORTAMET) 1000 MG extended release tablet Take 1,000 mg by mouth daily (with breakfast)       Ertugliflozin L-PyroglutamicAc (STEGLATRO) 5 MG TABS Take 5 mg by mouth daily       amitriptyline (ELAVIL) 10 MG tablet TAKE ONE TABLET BY MOUTH EVERY NIGHT 90 tablet 1    cyclobenzaprine (FLEXERIL) 10 MG tablet Take 10 mg by mouth 3 times daily as needed for Muscle spasms      traMADol (ULTRAM) 50 MG tablet Take 1 tablet by mouth every 12 hours as needed for Pain 60 tablet 2    benazepril (LOTENSIN) 20 MG tablet Take 1 tablet by mouth daily 30 tablet 5     No current facility-administered medications for this visit. No Known Allergies     reports that she has been smoking. She has a 11.25 pack-year smoking history. She has never used smokeless tobacco. She reports that she does not drink alcohol or use drugs. Review of Systems   Constitutional: Negative for appetite change, fever and unexpected weight change. HENT: Negative for sore throat and voice change. Respiratory: Negative for cough, chest tightness and shortness of breath. Cardiovascular: Negative for chest pain, palpitations and leg swelling. Gastrointestinal: Negative for abdominal distention, abdominal pain, blood in stool, constipation, diarrhea, nausea, rectal pain and vomiting. Heartburn   Musculoskeletal: Positive for arthralgias and back pain. Negative for gait problem. Skin: Negative for pallor, rash and wound. Neurological: Negative for dizziness, weakness and light-headedness.    Hematological: Negative for adenopathy. Does not bruise/bleed easily. All other systems reviewed and are negative. Objective:   Physical Exam  Constitutional:       General: She is not in acute distress. Appearance: Normal appearance. She is well-developed. She is obese. Comments: /70   Pulse 78   Ht 5' (1.524 m)   Wt 196 lb (88.9 kg)   SpO2 98%   BMI 38.28 kg/m²      Eyes:      General: No scleral icterus. Conjunctiva/sclera: Conjunctivae normal.   Neck:      Trachea: No tracheal deviation. Cardiovascular:      Rate and Rhythm: Normal rate and regular rhythm. Heart sounds: No murmur. No friction rub. No gallop. Pulmonary:      Effort: Pulmonary effort is normal. No respiratory distress. Breath sounds: Normal breath sounds. No wheezing, rhonchi or rales. Abdominal:      General: Bowel sounds are normal. There is no distension. Palpations: Abdomen is soft. There is no hepatomegaly or mass. Tenderness: There is no abdominal tenderness. There is no guarding or rebound. Skin:     General: Skin is warm and dry. Coloration: Skin is not pale. Neurological:      Mental Status: She is alert and oriented to person, place, and time. Psychiatric:         Behavior: Behavior normal.         Thought Content:  Thought content normal.

## 2020-08-20 ENCOUNTER — OFFICE VISIT (OUTPATIENT)
Age: 60
End: 2020-08-20

## 2020-08-20 VITALS — HEART RATE: 78 BPM | OXYGEN SATURATION: 93 % | TEMPERATURE: 98.3 F

## 2020-08-21 LAB — SARS-COV-2, NAA: NOT DETECTED

## 2020-08-25 ENCOUNTER — ANESTHESIA EVENT (OUTPATIENT)
Dept: ENDOSCOPY | Age: 60
End: 2020-08-25
Payer: MEDICARE

## 2020-08-25 ENCOUNTER — HOSPITAL ENCOUNTER (OUTPATIENT)
Age: 60
Setting detail: OUTPATIENT SURGERY
Discharge: HOME | End: 2020-08-25
Attending: INTERNAL MEDICINE | Admitting: INTERNAL MEDICINE
Payer: MEDICARE

## 2020-08-25 ENCOUNTER — TELEPHONE (OUTPATIENT)
Dept: GASTROENTEROLOGY | Age: 60
End: 2020-08-25

## 2020-08-25 ENCOUNTER — ANESTHESIA (OUTPATIENT)
Dept: ENDOSCOPY | Age: 60
End: 2020-08-25
Payer: MEDICARE

## 2020-08-25 VITALS
HEIGHT: 60 IN | WEIGHT: 195 LBS | DIASTOLIC BLOOD PRESSURE: 76 MMHG | OXYGEN SATURATION: 100 % | BODY MASS INDEX: 38.28 KG/M2 | TEMPERATURE: 98.4 F | SYSTOLIC BLOOD PRESSURE: 146 MMHG | HEART RATE: 77 BPM | RESPIRATION RATE: 18 BRPM

## 2020-08-25 VITALS
SYSTOLIC BLOOD PRESSURE: 108 MMHG | OXYGEN SATURATION: 89 % | RESPIRATION RATE: 22 BRPM | DIASTOLIC BLOOD PRESSURE: 82 MMHG

## 2020-08-25 LAB
GLUCOSE BLD-MCNC: 154 MG/DL (ref 70–99)
PERFORMED ON: ABNORMAL

## 2020-08-25 PROCEDURE — 7100000011 HC PHASE II RECOVERY - ADDTL 15 MIN: Performed by: INTERNAL MEDICINE

## 2020-08-25 PROCEDURE — 6360000002 HC RX W HCPCS: Performed by: NURSE ANESTHETIST, CERTIFIED REGISTERED

## 2020-08-25 PROCEDURE — 2500000003 HC RX 250 WO HCPCS: Performed by: NURSE ANESTHETIST, CERTIFIED REGISTERED

## 2020-08-25 PROCEDURE — 2709999900 HC NON-CHARGEABLE SUPPLY: Performed by: INTERNAL MEDICINE

## 2020-08-25 PROCEDURE — 7100000010 HC PHASE II RECOVERY - FIRST 15 MIN: Performed by: INTERNAL MEDICINE

## 2020-08-25 PROCEDURE — 43244 EGD VARICES LIGATION: CPT | Performed by: INTERNAL MEDICINE

## 2020-08-25 PROCEDURE — 3609012300 HC EGD BAND LIGATION ESOPHGEAL/GASTRIC VARICES: Performed by: INTERNAL MEDICINE

## 2020-08-25 PROCEDURE — 82947 ASSAY GLUCOSE BLOOD QUANT: CPT

## 2020-08-25 PROCEDURE — 2580000003 HC RX 258: Performed by: NURSE ANESTHETIST, CERTIFIED REGISTERED

## 2020-08-25 PROCEDURE — 2720000010 HC SURG SUPPLY STERILE: Performed by: INTERNAL MEDICINE

## 2020-08-25 PROCEDURE — 2580000003 HC RX 258: Performed by: INTERNAL MEDICINE

## 2020-08-25 PROCEDURE — 3700000001 HC ADD 15 MINUTES (ANESTHESIA): Performed by: INTERNAL MEDICINE

## 2020-08-25 PROCEDURE — 3700000000 HC ANESTHESIA ATTENDED CARE: Performed by: INTERNAL MEDICINE

## 2020-08-25 RX ORDER — PROPOFOL 10 MG/ML
INJECTION, EMULSION INTRAVENOUS PRN
Status: DISCONTINUED | OUTPATIENT
Start: 2020-08-25 | End: 2020-08-25 | Stop reason: SDUPTHER

## 2020-08-25 RX ORDER — LIDOCAINE HYDROCHLORIDE 10 MG/ML
INJECTION, SOLUTION INFILTRATION; PERINEURAL PRN
Status: DISCONTINUED | OUTPATIENT
Start: 2020-08-25 | End: 2020-08-25 | Stop reason: SDUPTHER

## 2020-08-25 RX ORDER — SODIUM CHLORIDE, SODIUM LACTATE, POTASSIUM CHLORIDE, CALCIUM CHLORIDE 600; 310; 30; 20 MG/100ML; MG/100ML; MG/100ML; MG/100ML
INJECTION, SOLUTION INTRAVENOUS CONTINUOUS PRN
Status: DISCONTINUED | OUTPATIENT
Start: 2020-08-25 | End: 2020-08-25 | Stop reason: SDUPTHER

## 2020-08-25 RX ORDER — PROMETHAZINE HYDROCHLORIDE 25 MG/ML
6.25 INJECTION, SOLUTION INTRAMUSCULAR; INTRAVENOUS
Status: DISCONTINUED | OUTPATIENT
Start: 2020-08-25 | End: 2020-08-25 | Stop reason: HOSPADM

## 2020-08-25 RX ORDER — DIPHENHYDRAMINE HYDROCHLORIDE 50 MG/ML
12.5 INJECTION INTRAMUSCULAR; INTRAVENOUS
Status: DISCONTINUED | OUTPATIENT
Start: 2020-08-25 | End: 2020-08-25 | Stop reason: HOSPADM

## 2020-08-25 RX ORDER — SODIUM CHLORIDE, SODIUM LACTATE, POTASSIUM CHLORIDE, CALCIUM CHLORIDE 600; 310; 30; 20 MG/100ML; MG/100ML; MG/100ML; MG/100ML
INJECTION, SOLUTION INTRAVENOUS CONTINUOUS
Status: DISCONTINUED | OUTPATIENT
Start: 2020-08-25 | End: 2020-08-25 | Stop reason: HOSPADM

## 2020-08-25 RX ORDER — ONDANSETRON 2 MG/ML
4 INJECTION INTRAMUSCULAR; INTRAVENOUS
Status: DISCONTINUED | OUTPATIENT
Start: 2020-08-25 | End: 2020-08-25 | Stop reason: HOSPADM

## 2020-08-25 RX ORDER — FENTANYL CITRATE 50 UG/ML
INJECTION, SOLUTION INTRAMUSCULAR; INTRAVENOUS PRN
Status: DISCONTINUED | OUTPATIENT
Start: 2020-08-25 | End: 2020-08-25 | Stop reason: SDUPTHER

## 2020-08-25 RX ADMIN — PROPOFOL 250 MG: 10 INJECTION, EMULSION INTRAVENOUS at 11:56

## 2020-08-25 RX ADMIN — SODIUM CHLORIDE, SODIUM LACTATE, POTASSIUM CHLORIDE, AND CALCIUM CHLORIDE: 600; 310; 30; 20 INJECTION, SOLUTION INTRAVENOUS at 11:52

## 2020-08-25 RX ADMIN — SODIUM CHLORIDE, POTASSIUM CHLORIDE, SODIUM LACTATE AND CALCIUM CHLORIDE: 600; 310; 30; 20 INJECTION, SOLUTION INTRAVENOUS at 10:47

## 2020-08-25 RX ADMIN — LIDOCAINE HYDROCHLORIDE 50 MG: 10 INJECTION, SOLUTION INFILTRATION; PERINEURAL at 11:56

## 2020-08-25 RX ADMIN — FENTANYL CITRATE 100 MCG: 50 INJECTION INTRAMUSCULAR; INTRAVENOUS at 11:56

## 2020-08-25 ASSESSMENT — PAIN - FUNCTIONAL ASSESSMENT: PAIN_FUNCTIONAL_ASSESSMENT: 0-10

## 2020-08-25 ASSESSMENT — LIFESTYLE VARIABLES: SMOKING_STATUS: 1

## 2020-08-25 ASSESSMENT — PAIN SCALES - GENERAL: PAINLEVEL_OUTOF10: 0

## 2020-08-25 NOTE — TELEPHONE ENCOUNTER
Beck Cherry,    Per Dr Carmel Hunter today:    IMPRESSION:  1. Esophageal varices s/p banding x 3     RECOMMENDATIONS:    1. Continue current meds  2. Repeat EGD in 3 months   3. Follow up as planned in GI clinic re: varices/cirrhosis.      The results were discussed with the patient and family.   A copy of the images obtained were given to the patient.   Delvis Mora MD  8/25/2020  12:10 PM

## 2020-08-25 NOTE — OP NOTE
Endoscopic Procedure Note    Patient: Julia Ped: 1960  Med Rec#: [id number] Acc#: [id number]     Primary Care Provider Dr. Shankea Perrin M.D., MD  Referring Provider: Sangeetha BREWER    Endoscopist: Keturah Swanson MD    Date of Procedure:  8/25/2020    Procedure:   1. EGD with variceal banding    Indications:   1. Known esophageal varices      Anesthesia:  Sedation was administered by anesthesia who monitored the patient during the procedure. Estimated Blood Loss: minimal    Procedure:   After reviewing the patient's chart and obtaining informed consent, the patient was placed in the left lateral decubitus position. A forward-viewing Olympus endoscope was lubricated and inserted through the mouth into the oropharynx. Under direct visualization, the upper esophagus was intubated. The scope was advanced to the level of the third portion of duodenum. Scope was slowly withdrawn with careful inspection of the mucosal surfaces. The scope was retroflexed for inspection of the gastric fundus and incisura. Findings and maneuvers are listed in impression below. The patient tolerated the procedure well. The scope was removed. There were no immediate complications. Findings:   Esophagus: abnormal: in the distal esophagus there was noted grade I-II varices. There was evidence of previous banding. Prophylactic banding was pursued with successful deployment of 3 bands. There appeared to be complete eradication of varices. Stomach:  abnormal: there were mucosal changes suggestive of portal hypertensive gastropathy noted. Duodenum: normal      IMPRESSION:  1. Esophageal varices s/p banding x 3     RECOMMENDATIONS:    1. Continue current meds  2. Repeat EGD in 3 months   3. Follow up as planned in GI clinic re: varices/cirrhosis. The results were discussed with the patient and family. A copy of the images obtained were given to the patient.      Aura Blackwell MD  8/25/2020  12:10 PM

## 2020-08-25 NOTE — ANESTHESIA POSTPROCEDURE EVALUATION
Department of Anesthesiology  Postprocedure Note    Patient: Juan Antonio Kaur  MRN: [id number]  YOB: 1960  Date of evaluation: 8/25/2020  Time:  12:09 PM     Procedure Summary     Date:  08/25/20 Room / Location:  [address]    Anesthesia Start:  7389 Anesthesia Stop:  0469    Procedure:  EGD BAND LIGATION (N/A ) Diagnosis:  (CIRRHOSIS, ESOPH VARICES)    Surgeon:  Bruce Avalos MD Responsible Provider:  DANIELLA Norman CRNA    Anesthesia Type:  general, TIVA ASA Status:  3          Anesthesia Type: general, TIVA    Daniele Phase I: Daniele Score: 10    Daniele Phase II:      Last vitals: Reviewed and per EMR flowsheets. Anesthesia Post Evaluation    Patient location during evaluation: PACU  Patient participation: complete - patient participated  Level of consciousness: awake and alert  Pain score: 0  Airway patency: patent  Nausea & Vomiting: no nausea and no vomiting  Complications: no  Cardiovascular status: hemodynamically stable and blood pressure returned to baseline  Respiratory status: acceptable and nasal cannula  Hydration status: stable  Comments: Vital Signs Stable. Exchanging well. PACU RN received care.

## 2020-08-25 NOTE — H&P
Patient Name: Magdalena Steward  : 1960  MRN: [id number]  DATE: 20    Allergies: No Known Allergies     ENDOSCOPY  History and Physical    Procedure:    [] Diagnostic Colonoscopy       [] Screening Colonoscopy  [x] EGD      [] ERCP      [] EUS       [] Other    [x] Previous office notes/History and Physical reviewed from the patients chart. Please see EMR for further details of HPI. I have examined the patient's status immediately prior to the procedure and:      Indications/HPI:    []Abdominal Pain   []Cancer- GI/Lung     []Fhx of colon CA/polyps  []History of Polyps  []Barretts            []Melena  []Abnormal Imaging              []Dysphagia              []Persistent Pneumonia   []Anemia                            []Food Impaction        []History of Polyps  [] GI Bleed             []Pulmonary nodule/Mass   []Change in bowel habits []Heartburn/Reflux  []Rectal Bleed (BRBPR)  []Chest Pain - Non Cardiac []Heme (+) Stool []Ulcers  []Constipation  []Hemoptysis  [x]Varices  []Diarrhea  []Hypoxemia    []Nausea/Vomiting   []Screening   []Crohns/Colitis  []Other:     Anesthesia:   [x] MAC [] Moderate Sedation   [] General   [] None     ROS: 12 pt Review of Symptoms was negative unless mentioned above    Medications:   Prior to Admission medications    Medication Sig Start Date End Date Taking? Authorizing Provider   omeprazole (PRILOSEC) 20 MG delayed release capsule TAKE ONE CAPSULE BY MOUTH EVERY DAY 3/31/20   DANIELLA Handy   nadolol (CORGARD) 20 MG tablet TAKE ONE TABLET BY MOUTH EVERY DAY -- MUST MAKE APPOINTMENT FOR LAB WORK BEFORE NEXT REFILL 3/31/20   DANIELLA Handy   gabapentin (NEURONTIN) 600 MG tablet Take 600 mg by mouth 3 times daily.     Historical Provider, MD   atorvastatin (LIPITOR) 10 MG tablet Take 10 mg by mouth daily    Historical Provider, MD   metFORMIN, OSM, (FORTAMET) 1000 MG extended release tablet Take 1,000 mg by mouth daily (with breakfast)  19   Historical Provider, MD   Ertugliflozin L-PyroglutamicAc (STEGLATRO) 5 MG TABS Take 5 mg by mouth daily  4/24/19   Historical Provider, MD   amitriptyline (ELAVIL) 10 MG tablet TAKE ONE TABLET BY MOUTH EVERY NIGHT 4/29/19   DANIELLA Shaffer   cyclobenzaprine (FLEXERIL) 10 MG tablet Take 10 mg by mouth 3 times daily as needed for Muscle spasms    Historical Provider, MD   traMADol (ULTRAM) 50 MG tablet Take 1 tablet by mouth every 12 hours as needed for Pain 9/7/17   DANIELLA Briones CNP   benazepril (LOTENSIN) 20 MG tablet Take 1 tablet by mouth daily 7/5/17   DANIELLA Burrell CNP       Past Medical History:  Past Medical History:   Diagnosis Date    Arthritis     Asthma     [facility] [facility]     COPD (chronic obstructive pulmonary disease) ([facility] [facility]     Esophageal varices (HCC)     GERD (gastroesophageal reflux disease)     Hepatitis C     Hyperlipidemia     Hypertension        Past Surgical History:  Past Surgical History:   Procedure Laterality Date    COLONOSCOPY  03/02/2016    Dr Gia Lee bleeding internal hemorrhoids o/w normal; fair prep (5 yr)    MO EGD TRANSORAL BIOPSY SINGLE/MULTIPLE N/A 2/7/2017    Dr Stella Tesfaye Grade I esophageal varices, gastritis/gastropathy    MO EGD TRANSORAL BIOPSY SINGLE/MULTIPLE N/A 3/23/2018    Dr Francisco Reyesper I esophageal varices, erosive/active gastritis-2 yr recall    UPPER GASTROINTESTINAL ENDOSCOPY  03/02/2016    Dr Perkins-gr II esoph varices; portal hyptensive gastropathy    UPPER GASTROINTESTINAL ENDOSCOPY N/A 3/3/2020    Dr Natalie Torres-marisa/variceal banding x 4, 3 columns of Grade II varices, repeat in 6-8 wks    UPPER GASTROINTESTINAL ENDOSCOPY N/A 4/14/2020    Dr Natalie Robbw/variceal banding x 3-grade I-II varices, repeat in 8 wks    US GUIDED LIVER BIOPSY PERCUTANEOUS  05/07/2019    Dr. Abdullahi Phan; Grade 2, Stage 4, iron stain (-)       Social History:  Social History     Tobacco Use    Smoking status: Current Every Day Smoker     Packs/day: 0.25 Years: 45.00     Pack years: 11.25    Smokeless tobacco: Never Used   Substance Use Topics    Alcohol use: No     Comment: Pt states that she stopped drinking 2014?  Drug use: No     Types: Cocaine     Comment: Pt stopped use on this 2014?- Pt did not have to do any rehab       Vital Signs: There were no vitals filed for this visit. Physical Exam:  Cardiac:  [x]WNL  []Comments:  Pulmonary:  [x]WNL   []Comments:  Neuro/Mental Status:  [x]WNL  []Comments:  Abdominal:  [x]WNL    []Comments:  Other:   []WNL  []Comments:    Informed Consent:  The risks and benefits of the procedure have been discussed with either the patient or if they cannot consent, their representative. Assessment:  Patient examined and appropriate for planned sedation and procedure. Plan:  Proceed with planned sedation and procedure as above.          Promise Aggarwal MD

## 2020-08-25 NOTE — ANESTHESIA PRE PROCEDURE
Encounters:   08/11/20 120/70   04/14/20 (!) 175/77   04/14/20 (!) 150/96       NPO Status:                                                                                 BMI:   Wt Readings from Last 3 Encounters:   08/11/20 196 lb (88.9 kg)   04/14/20 202 lb (91.6 kg)   03/03/20 203 lb (92.1 kg)     There is no height or weight on file to calculate BMI.    CBC:   Lab Results   Component Value Date    WBC 4.6 08/04/2020    RBC 4.63 08/04/2020    HGB 13.6 08/04/2020    HCT 40.5 08/04/2020    MCV 87.5 08/04/2020    RDW 13.6 08/04/2020     08/04/2020       CMP:   Lab Results   Component Value Date     08/04/2020    K 4.3 08/04/2020     08/04/2020    CO2 26 08/04/2020    BUN 10 08/04/2020    CREATININE 0.4 08/04/2020    GFRAA >59 08/04/2020    LABGLOM >60 08/04/2020    GLUCOSE 172 08/04/2020    PROT 7.6 08/04/2020    CALCIUM 9.7 08/04/2020    BILITOT 0.7 08/04/2020    ALKPHOS 120 08/04/2020    AST 24 08/04/2020    ALT 20 08/04/2020       POC Tests: No results for input(s): POCGLU, POCNA, POCK, POCCL, POCBUN, POCHEMO, POCHCT in the last 72 hours.     Coags:   Lab Results   Component Value Date    PROTIME 13.7 08/04/2020    INR 1.06 08/04/2020    APTT 35.5 05/07/2019       HCG (If Applicable): No results found for: PREGTESTUR, PREGSERUM, HCG, HCGQUANT     ABGs: No results found for: PHART, PO2ART, OWQ5AVA, VIZ7HMQ, BEART, D8YYOUFK     Type & Screen (If Applicable):  No results found for: LABABO, LABRH    Drug/Infectious Status (If Applicable):  No results found for: HIV, HEPCAB    COVID-19 Screening (If Applicable):   Lab Results   Component Value Date    COVID19 NOT DETECTED 08/20/2020         Anesthesia Evaluation  Patient summary reviewed no history of anesthetic complications:   Airway: Mallampati: II  TM distance: >3 FB   Neck ROM: full  Mouth opening: > = 3 FB Dental: normal exam         Pulmonary: breath sounds clear to auscultation  (+) COPD:  asthma: current smoker          Patient smoked on day of surgery. Cardiovascular:    (+) hypertension:,                   Neuro/Psych:   (+) headaches:,             GI/Hepatic/Renal:   (+) GERD:, morbid obesity          Endo/Other:    (+) Diabetes, : arthritis:., .                 Abdominal:           Vascular:                                      Anesthesia Plan      general and TIVA     ASA 3       Induction: intravenous. Anesthetic plan and risks discussed with patient.         Attending anesthesiologist reviewed and agrees with Lopez Monae Vei [address], APRN - CRNA   8/25/2020

## 2020-08-27 DIAGNOSIS — E11.628 TYPE 2 DIABETES MELLITUS WITH OTHER SKIN COMPLICATION, WITHOUT LONG-TERM CURRENT USE OF INSULIN (HCC): ICD-10-CM

## 2020-08-28 RX ORDER — METFORMIN HYDROCHLORIDE 500 MG/1
TABLET, EXTENDED RELEASE ORAL
Qty: 60 TABLET | Refills: 2 | Status: SHIPPED | OUTPATIENT
Start: 2020-08-28 | End: 2020-10-22 | Stop reason: RX

## 2020-09-10 ENCOUNTER — OFFICE VISIT (OUTPATIENT)
Dept: FAMILY MEDICINE CLINIC | Facility: CLINIC | Age: 60
End: 2020-09-10

## 2020-09-10 VITALS
HEART RATE: 81 BPM | HEIGHT: 60 IN | SYSTOLIC BLOOD PRESSURE: 116 MMHG | TEMPERATURE: 96.8 F | OXYGEN SATURATION: 96 % | WEIGHT: 201.6 LBS | DIASTOLIC BLOOD PRESSURE: 78 MMHG | BODY MASS INDEX: 39.58 KG/M2

## 2020-09-10 DIAGNOSIS — E11.65 UNCONTROLLED TYPE 2 DIABETES MELLITUS WITH HYPERGLYCEMIA (HCC): ICD-10-CM

## 2020-09-10 DIAGNOSIS — L02.01 CUTANEOUS ABSCESS OF FACE: Primary | ICD-10-CM

## 2020-09-10 PROCEDURE — 10060 I&D ABSCESS SIMPLE/SINGLE: CPT | Performed by: NURSE PRACTITIONER

## 2020-09-10 PROCEDURE — 99213 OFFICE O/P EST LOW 20 MIN: CPT | Performed by: NURSE PRACTITIONER

## 2020-09-10 RX ORDER — AMOXICILLIN AND CLAVULANATE POTASSIUM 875; 125 MG/1; MG/1
1 TABLET, FILM COATED ORAL 2 TIMES DAILY
Qty: 14 TABLET | Refills: 0 | Status: SHIPPED | OUTPATIENT
Start: 2020-09-10 | End: 2020-09-17

## 2020-09-10 NOTE — PROGRESS NOTES
"Chief Complaint   Patient presents with   • Skin Lesion     chin, painful        Subjective   Della Gonzalez is a 60 y.o.  female who presents today for skin lesion on chin.    HPI:  CHIN LESION:  Patient states she is unsure why she developed this area on her chin but thinks it started as a bug bite. Yesterday it really started swelling and when she awoke this morning it was VERY tender and \"looks like it is going to pop.\"  It hurts to talk or chew.  She denies fever or chills.    Della Gonzalez  has a past medical history of Cirrhosis of liver (CMS/HCC), Diabetes mellitus (CMS/HCC), GERD (gastroesophageal reflux disease), and Liver disease.    No Known Allergies    Current Outpatient Medications:   •  amitriptyline (ELAVIL) 100 MG tablet, TAKE ONE TABLET BY MOUTH EVERY EVENING, Disp: 30 tablet, Rfl: 5  •  amoxicillin-clavulanate (Augmentin) 875-125 MG per tablet, Take 1 tablet by mouth 2 (Two) Times a Day for 7 days., Disp: 14 tablet, Rfl: 0  •  atorvastatin (LIPITOR) 10 MG tablet, TAKE ONE TABLET BY MOUTH EVERY DAY, Disp: 30 tablet, Rfl: 5  •  benazepril (LOTENSIN) 10 MG tablet, TAKE ONE TABLET BY MOUTH EVERY DAY, Disp: 30 tablet, Rfl: 5  •  gabapentin (NEURONTIN) 600 MG tablet, Take 1 tablet by mouth 2 (Two) Times a Day., Disp: 60 tablet, Rfl: 0  •  hydrocortisone-eucerin, Apply  topically to the appropriate area as directed 2 (Two) Times a Day., Disp: 120 g, Rfl: 2  •  ketoconazole (NIZORAL) 2 % cream, Apply  topically to the appropriate area as directed Daily., Disp: 60 g, Rfl: 2  •  metFORMIN ER (GLUCOPHAGE-XR) 500 MG 24 hr tablet, TAKE TWO TABLETS BY MOUTH EVERY DAY WITH BREAKFAST, Disp: 60 tablet, Rfl: 2  •  nabumetone (RELAFEN) 500 MG tablet, Take 1 tablet by mouth 2 (Two) Times a Day As Needed for Moderate Pain ., Disp: 60 tablet, Rfl: 5  •  nadolol (CORGARD) 20 MG tablet, Take 1 tablet by mouth Daily., Disp: 30 tablet, Rfl: 2  •  omeprazole (priLOSEC) 20 MG capsule, TAKE ONE CAPSULE BY MOUTH EVERY " DAY, Disp: , Rfl:   •  STEGLATRO 15 MG tablet, TAKE ONE TABLET BY MOUTH EVERY MORNING, Disp: 90 tablet, Rfl: 0  •  traMADol (ULTRAM) 50 MG tablet, Take 1 tablet by mouth 3 (Three) Times a Day As Needed (TID as needed)., Disp: 90 tablet, Rfl: 0  Past Medical History:   Diagnosis Date   • Cirrhosis of liver (CMS/HCC)    • Diabetes mellitus (CMS/HCC)    • GERD (gastroesophageal reflux disease)    • Liver disease      Past Surgical History:   Procedure Laterality Date   • COLONOSCOPY       Social History     Socioeconomic History   • Marital status: Single     Spouse name: Not on file   • Number of children: Not on file   • Years of education: Not on file   • Highest education level: Not on file   Tobacco Use   • Smoking status: Current Every Day Smoker     Packs/day: 0.50     Years: 40.00     Pack years: 20.00     Types: Cigarettes   • Smokeless tobacco: Never Used   Substance and Sexual Activity   • Alcohol use: No     Frequency: Never     Binge frequency: Never   • Drug use: No   • Sexual activity: Not Currently     Partners: Male     Family History   Problem Relation Age of Onset   • Diabetes Mother    • No Known Problems Father        Family history, surgical history, past medical history, Allergies and med's reviewed with patient today and updated in [employer] EMR.     ROS:  Review of Systems   Constitutional: Negative.  Negative for fatigue, fever and unexpected weight change.   HENT: Negative.  Negative for facial swelling, sore throat and trouble swallowing.    Eyes: Negative.  Negative for photophobia, discharge and visual disturbance.   Respiratory: Negative.  Negative for cough, chest tightness and shortness of breath.    Cardiovascular: Negative.  Negative for chest pain and palpitations.   Gastrointestinal: Negative.  Negative for abdominal pain, diarrhea, nausea and vomiting.   Endocrine: Negative.  Negative for polydipsia, polyphagia and polyuria.   Genitourinary: Negative.  Negative for dysuria, flank pain  "and frequency.   Musculoskeletal: Negative.  Negative for back pain, gait problem and neck pain.   Skin: Positive for color change. Negative for rash.        Area on chin.   Allergic/Immunologic: Negative.    Neurological: Negative.  Negative for dizziness, light-headedness and headaches.   Hematological: Negative.    Psychiatric/Behavioral: Negative.  Negative for self-injury and suicidal ideas.       OBJECTIVE:  Vitals:    09/10/20 1108   BP: 116/78   BP Location: Right arm   Patient Position: Sitting   Cuff Size: Adult   Pulse: 81   Temp: 96.8 °F (36 °C)   TempSrc: Infrared   SpO2: 96%   Weight: 91.4 kg (201 lb 9.6 oz)   Height: 152.4 cm (60\")     Physical Exam   Constitutional: She is oriented to person, place, and time. She appears well-developed and well-nourished. No distress.   HENT:   Head: Normocephalic and atraumatic.   Eyes: Pupils are equal, round, and reactive to light. Conjunctivae and EOM are normal.   Neck: Normal range of motion. Neck supple.   Cardiovascular: Normal rate, regular rhythm, normal heart sounds and intact distal pulses.   No murmur heard.  Pulmonary/Chest: Effort normal and breath sounds normal. No respiratory distress.   Abdominal: Soft. Bowel sounds are normal. She exhibits no distension. There is no tenderness.   Musculoskeletal: Normal range of motion. She exhibits no edema.   Neurological: She is alert and oriented to person, place, and time.   Skin: Skin is warm and dry. Capillary refill takes less than 2 seconds. She is not diaphoretic. There is erythema.   4 cm area at base of right chin with induration and pointed fluctuant center with visible purulence.  SEE PROCEDURE NOTE.   Psychiatric: She has a normal mood and affect. Her behavior is normal. Judgment and thought content normal.   Nursing note and vitals reviewed.      ASSESSMENT/ PLAN:    Della was seen today for skin lesion.    Diagnoses and all orders for this visit:    Cutaneous abscess of face  -     " amoxicillin-clavulanate (Augmentin) 875-125 MG per tablet; Take 1 tablet by mouth 2 (Two) Times a Day for 7 days.    Uncontrolled type 2 diabetes mellitus with hyperglycemia (CMS/[facility])    SEE PROCEDURE NOTE same encounter.    Orders Placed Today:     New Medications Ordered This Visit   Medications   • amoxicillin-clavulanate (Augmentin) 875-125 MG per tablet     Sig: Take 1 tablet by mouth 2 (Two) Times a Day for 7 days.     Dispense:  14 tablet     Refill:  0        Management Plan:     An After Visit Summary was printed and given to the patient at discharge.    Follow-up: Return for keep scheduled appt.    Vijaya Henao, APRN 9/10/2020 17:17  This note was electronically signed.

## 2020-09-10 NOTE — PROGRESS NOTES
Procedure   Incision & Drainage  Date/Time: 9/10/2020 11:17 AM  Performed by: Vijaya Henao APRN  Authorized by: Vijaya Henao APRN   Type: abscess  Body area: head/neck  Location details: face  Anesthesia: local infiltration    Anesthesia:  Local Anesthetic: lidocaine 1% without epinephrine  Anesthetic total: 1 mL    Sedation:  Patient sedated: no    Needle gauge: 18 (area of fluctuance opened with 18 ga )  Drainage: purulent  Drainage amount: moderate  Wound treatment: wound left open  Packing material: none  Patient tolerance: Patient tolerated the procedure well with no immediate complications  Comments: Informed consent gained.  Left open and covered with gauze dressing. Instructed to apply warm Epsom salt soaks twice daily x 15 minutes each.

## 2020-09-16 NOTE — PROGRESS NOTES
Subjective:      Patient ID: Lety Muhammad is a 63 y.o. female  MD Desi Whitaker M.D., MD    HPI  Chief Complaint   Patient presents with    Cirrhosis         Patient recently seen and evaluated for cirrhosis with review of labs and u/s. No additional labs since that time. She has some easy bruising and bleeding. Reports no problems currently with memory or confusion. She denies hematochezia, melena, hematemesis. No abdominal swelling or rapid weight gain    She is prescribed nadolol 20 mg daily for portal hypertension and esophageal varices  She is also prescribed omeprazole 20 mg daily for chronic GERD and ulcer prevention. REcent EGD 8/25/2020 with banding of varices. She is due for repeat EGD with banding in 3 months. Also noted portal hypertensive gastropathy. Assessment:      1. Cirrhosis of liver without ascites, unspecified hepatic cirrhosis type ([address]    2. Portal hypertension ([address]    3. Secondary esophageal varices without bleeding ([address]    4. Gastritis determined by biopsy    5. Chronic GERD            Plan:      Schedule EGD  Possible variceal banding    Nothing to eat or drink after midnight. No driving for 24 hours after procedure. Bring a  to procedure. No aspirin, NSAIDs, fish oil 5 days before procedure. I have discussed the benefits, alternatives, and risks (including bleeding, perforation and death)  for pursuing Endoscopy (EGD/Colonscopy/EUS/ERCP) with the patient and they are willing to continue. We also discussed the need for anesthesia, IV access, proper dietary changes, medication changes if necessary, and need for bowel prep (if ordered) prior to their Endoscopic procedure. They are aware they must have someone accompany them to their scheduled procedure to drive them home - they agree to the above and are willing to continue. Plan for anesthesia: MAC    Continue same medications.    Refill prn    rto in 6 months with cbc, cmp, pt/inr, afp and u/s of liver prior to appt.                Family History   Adopted: Yes   Problem Relation Age of Onset    Liver Cancer Neg Hx     Liver Disease Neg Hx     Stomach Cancer Neg Hx     Rectal Cancer Neg Hx     Esophageal Cancer Neg Hx     Colon Cancer Neg Hx     Colon Polyps Neg Hx        Past Medical History:   Diagnosis Date    Arthritis     Asthma     [facility] ([facility] [address]     COPD (chronic obstructive pulmonary disease) (HCC)     Esophageal varices (HCC)     GERD (gastroesophageal reflux disease)     Hepatitis C     Hyperlipidemia     Hypertension        Past Surgical History:   Procedure Laterality Date    COLONOSCOPY  03/02/2016    Dr Steve Hernandez bleeding internal hemorrhoids o/w normal; fair prep (5 yr)    NE EGD TRANSORAL BIOPSY SINGLE/MULTIPLE N/A 2/7/2017    Dr Mariano Louis Grade I esophageal varices, gastritis/gastropathy    NE EGD TRANSORAL BIOPSY SINGLE/MULTIPLE N/A 3/23/2018    Dr Fannie Torres I esophageal varices, erosive/active gastritis-2 yr recall    UPPER GASTROINTESTINAL ENDOSCOPY  03/02/2016    Dr Droantes II esoph varices; portal hyptensive gastropathy    UPPER GASTROINTESTINAL ENDOSCOPY N/A 3/3/2020    Dr Vidhya Torres-marisa/variceal banding x 4, 3 columns of Grade II varices, repeat in 6-8 wks    UPPER GASTROINTESTINAL ENDOSCOPY N/A 4/14/2020    Dr Vidhya Blum/variceal banding x 3-grade I-II varices, repeat in 8 wks    UPPER GASTROINTESTINAL ENDOSCOPY N/A 8/25/2020    Dr LUIS FELIPE Blum/variceal banding x 3, grade 1-2 varices, portal hypertensive gastropathy, repeat in 3 months    US GUIDED LIVER BIOPSY PERCUTANEOUS  05/07/2019    Dr. Rhett Snider; Grade 2, Stage 4, iron stain (-)       Current Outpatient Medications   Medication Sig Dispense Refill    tiZANidine (ZANAFLEX) 4 MG tablet       omeprazole (PRILOSEC) 20 MG delayed release capsule TAKE ONE CAPSULE BY MOUTH EVERY DAY 90 capsule 1    nadolol (CORGARD) 20 MG tablet TAKE ONE TABLET BY MOUTH EVERY DAY -- MUST MAKE APPOINTMENT FOR LAB WORK BEFORE NEXT REFILL 90 tablet 1    gabapentin (NEURONTIN) 600 MG tablet Take 600 mg by mouth 3 times daily.  atorvastatin (LIPITOR) 10 MG tablet Take 10 mg by mouth daily      metFORMIN, OSM, (FORTAMET) 1000 MG extended release tablet Take 1,000 mg by mouth daily (with breakfast)       Ertugliflozin L-PyroglutamicAc (STEGLATRO) 5 MG TABS Take 5 mg by mouth daily       amitriptyline (ELAVIL) 10 MG tablet TAKE ONE TABLET BY MOUTH EVERY NIGHT 90 tablet 1    cyclobenzaprine (FLEXERIL) 10 MG tablet Take 10 mg by mouth 3 times daily as needed for Muscle spasms      traMADol (ULTRAM) 50 MG tablet Take 1 tablet by mouth every 12 hours as needed for Pain 60 tablet 2    benazepril (LOTENSIN) 20 MG tablet Take 1 tablet by mouth daily 30 tablet 5     No current facility-administered medications for this visit. No Known Allergies     reports that she has been smoking. She has a 11.25 pack-year smoking history. She has never used smokeless tobacco. She reports that she does not drink alcohol or use drugs. Review of Systems   Constitutional: Negative for appetite change, fever and unexpected weight change. HENT: Negative for sore throat and voice change. Respiratory: Negative for cough, chest tightness and shortness of breath. Cardiovascular: Negative for chest pain, palpitations and leg swelling. Gastrointestinal: Negative for abdominal distention, abdominal pain, blood in stool, constipation, diarrhea, nausea, rectal pain and vomiting. Heartburn   Musculoskeletal: Positive for arthralgias and back pain. Negative for gait problem. Skin: Negative for pallor, rash and wound. Neurological: Negative for dizziness, weakness and light-headedness. Hematological: Negative for adenopathy. Does not bruise/bleed easily. All other systems reviewed and are negative. Objective:   Physical Exam  Constitutional:       General: She is not in acute distress. Appearance: Normal appearance. She is well-developed. Comments: /70   Pulse 66   Ht 5' (1.524 m)   Wt 201 lb (91.2 kg)   SpO2 96%   BMI 39.26 kg/m²      Eyes:      General: No scleral icterus. Conjunctiva/sclera: Conjunctivae normal.   Neck:      Trachea: No tracheal deviation. Cardiovascular:      Rate and Rhythm: Normal rate and regular rhythm. Heart sounds: No murmur. No friction rub. No gallop. Pulmonary:      Effort: Pulmonary effort is normal. No respiratory distress. Breath sounds: Normal breath sounds. No wheezing, rhonchi or rales. Abdominal:      General: Bowel sounds are normal. There is no distension. Palpations: Abdomen is soft. There is no hepatomegaly or mass. Tenderness: There is no abdominal tenderness. There is no guarding or rebound. Skin:     General: Skin is warm and dry. Coloration: Skin is not pale. Neurological:      Mental Status: She is alert and oriented to person, place, and time. Psychiatric:         Behavior: Behavior normal.         Thought Content:  Thought content normal.

## 2020-09-22 ENCOUNTER — OFFICE VISIT (OUTPATIENT)
Dept: GASTROENTEROLOGY | Age: 60
End: 2020-09-22
Payer: MEDICARE

## 2020-09-22 VITALS
OXYGEN SATURATION: 96 % | SYSTOLIC BLOOD PRESSURE: 115 MMHG | HEIGHT: 60 IN | WEIGHT: 201 LBS | DIASTOLIC BLOOD PRESSURE: 70 MMHG | HEART RATE: 66 BPM | BODY MASS INDEX: 39.46 KG/M2

## 2020-09-22 PROCEDURE — 4004F PT TOBACCO SCREEN RCVD TLK: CPT | Performed by: NURSE PRACTITIONER

## 2020-09-22 PROCEDURE — G8417 CALC BMI ABV UP PARAM F/U: HCPCS | Performed by: NURSE PRACTITIONER

## 2020-09-22 PROCEDURE — 3017F COLORECTAL CA SCREEN DOC REV: CPT | Performed by: NURSE PRACTITIONER

## 2020-09-22 PROCEDURE — G8427 DOCREV CUR MEDS BY ELIG CLIN: HCPCS | Performed by: NURSE PRACTITIONER

## 2020-09-22 PROCEDURE — 99214 OFFICE O/P EST MOD 30 MIN: CPT | Performed by: NURSE PRACTITIONER

## 2020-09-22 RX ORDER — TIZANIDINE 4 MG/1
4 TABLET ORAL EVERY 6 HOURS PRN
COMMUNITY
Start: 2020-08-27 | End: 2021-08-23

## 2020-09-22 ASSESSMENT — ENCOUNTER SYMPTOMS
NAUSEA: 0
CHEST TIGHTNESS: 0
BLOOD IN STOOL: 0
COUGH: 0
SHORTNESS OF BREATH: 0
SORE THROAT: 0
ABDOMINAL PAIN: 0
RECTAL PAIN: 0
ABDOMINAL DISTENTION: 0
VOMITING: 0
VOICE CHANGE: 0
DIARRHEA: 0
BACK PAIN: 1
CONSTIPATION: 0

## 2020-09-24 ENCOUNTER — OFFICE VISIT (OUTPATIENT)
Dept: FAMILY MEDICINE CLINIC | Facility: CLINIC | Age: 60
End: 2020-09-24

## 2020-09-24 VITALS
BODY MASS INDEX: 40.25 KG/M2 | DIASTOLIC BLOOD PRESSURE: 79 MMHG | OXYGEN SATURATION: 98 % | HEIGHT: 60 IN | TEMPERATURE: 98.4 F | HEART RATE: 69 BPM | SYSTOLIC BLOOD PRESSURE: 121 MMHG | WEIGHT: 205 LBS

## 2020-09-24 DIAGNOSIS — L02.01 CUTANEOUS ABSCESS OF FACE: Primary | ICD-10-CM

## 2020-09-24 PROCEDURE — 99213 OFFICE O/P EST LOW 20 MIN: CPT | Performed by: NURSE PRACTITIONER

## 2020-09-24 PROCEDURE — 10060 I&D ABSCESS SIMPLE/SINGLE: CPT | Performed by: NURSE PRACTITIONER

## 2020-09-24 RX ORDER — AMOXICILLIN AND CLAVULANATE POTASSIUM 875; 125 MG/1; MG/1
1 TABLET, FILM COATED ORAL 2 TIMES DAILY
Qty: 20 TABLET | Refills: 0 | Status: SHIPPED | OUTPATIENT
Start: 2020-09-24 | End: 2020-10-04

## 2020-09-24 NOTE — PROGRESS NOTES
Procedure   Incision & Drainage    Date/Time: 9/24/2020 1:18 PM  Performed by: Vijaya Henao APRN  Authorized by: Vijaya Henao APRN   Type: abscess  Body area: head/neck  Location details: face  Anesthesia: local infiltration    Anesthesia:  Local Anesthetic: lidocaine 1% without epinephrine  Anesthetic total: 1 mL    Sedation:  Patient sedated: no    Scalpel size: 11  Drainage: purulent and  bloody  Drainage amount: moderate  Wound treatment: wound left open  Packing material: none  Patient tolerance: patient tolerated the procedure well with no immediate complications  Comments: Informed consent gained.  The area was interrogated with the tip of an 11 blade and immediate return of thick purulent material following a small amount of very thick yellow purulence and then bloody drainage.  Pressure was relieved.  A bandaid was placed.  Patient was instructed to place warm moist compress on the area BID until it is no longer open.  She verbalized understanding.

## 2020-09-24 NOTE — PROGRESS NOTES
Chief Complaint   Patient presents with   • Wound Check     recurrent abscess on chin        Subjective   Della Gonzalez is a 60 y.o.  female who presents today for abscess has returned.    HPI:  ABSCESS: Patient was treated earlier this month for an abscess along the jawline on the right.  She presents today with a similar area that is on the center of her chin along the jawline.  It started yesterday and today is very tight and painful.  She has not used any treatment on it.    Della Gonzalez  has a past medical history of Cirrhosis of liver (CMS/HCC), Diabetes mellitus (CMS/HCC), GERD (gastroesophageal reflux disease), and Liver disease.    No Known Allergies    Current Outpatient Medications:   •  amitriptyline (ELAVIL) 100 MG tablet, TAKE ONE TABLET BY MOUTH EVERY EVENING, Disp: 30 tablet, Rfl: 5  •  atorvastatin (LIPITOR) 10 MG tablet, TAKE ONE TABLET BY MOUTH EVERY DAY, Disp: 30 tablet, Rfl: 5  •  benazepril (LOTENSIN) 10 MG tablet, TAKE ONE TABLET BY MOUTH EVERY DAY, Disp: 30 tablet, Rfl: 5  •  gabapentin (NEURONTIN) 600 MG tablet, Take 1 tablet by mouth 2 (Two) Times a Day., Disp: 60 tablet, Rfl: 0  •  hydrocortisone-eucerin, Apply  topically to the appropriate area as directed 2 (Two) Times a Day., Disp: 120 g, Rfl: 2  •  ketoconazole (NIZORAL) 2 % cream, Apply  topically to the appropriate area as directed Daily., Disp: 60 g, Rfl: 2  •  metFORMIN ER (GLUCOPHAGE-XR) 500 MG 24 hr tablet, TAKE TWO TABLETS BY MOUTH EVERY DAY WITH BREAKFAST, Disp: 60 tablet, Rfl: 2  •  nabumetone (RELAFEN) 500 MG tablet, Take 1 tablet by mouth 2 (Two) Times a Day As Needed for Moderate Pain ., Disp: 60 tablet, Rfl: 5  •  nadolol (CORGARD) 20 MG tablet, Take 1 tablet by mouth Daily., Disp: 30 tablet, Rfl: 2  •  omeprazole (priLOSEC) 20 MG capsule, TAKE ONE CAPSULE BY MOUTH EVERY DAY, Disp: , Rfl:   •  STEGLATRO 15 MG tablet, TAKE ONE TABLET BY MOUTH EVERY MORNING, Disp: 90 tablet, Rfl: 0  •  traMADol (ULTRAM) 50 MG tablet,  Take 1 tablet by mouth 3 (Three) Times a Day As Needed (TID as needed)., Disp: 90 tablet, Rfl: 0  •  amoxicillin-clavulanate (Augmentin) 875-125 MG per tablet, Take 1 tablet by mouth 2 (Two) Times a Day for 10 days., Disp: 20 tablet, Rfl: 0  Past Medical History:   Diagnosis Date   • Cirrhosis of liver (CMS/HCC)    • Diabetes mellitus (CMS/HCC)    • GERD (gastroesophageal reflux disease)    • Liver disease      Past Surgical History:   Procedure Laterality Date   • COLONOSCOPY       Social History     Socioeconomic History   • Marital status: Single     Spouse name: Not on file   • Number of children: Not on file   • Years of education: Not on file   • Highest education level: Not on file   Tobacco Use   • Smoking status: Current Every Day Smoker     Packs/day: 0.50     Years: 40.00     Pack years: 20.00     Types: Cigarettes   • Smokeless tobacco: Never Used   Substance and Sexual Activity   • Alcohol use: No     Frequency: Never     Binge frequency: Never   • Drug use: No   • Sexual activity: Not Currently     Partners: Male     Family History   Problem Relation Age of Onset   • Diabetes Mother    • No Known Problems Father        Family history, surgical history, past medical history, Allergies and med's reviewed with patient today and updated in [employer] EMR.     ROS:  Review of Systems   Constitutional: Negative.  Negative for fatigue, fever and unexpected weight change.   HENT: Negative.  Negative for facial swelling, sore throat and trouble swallowing.    Eyes: Negative.  Negative for photophobia, discharge and visual disturbance.   Respiratory: Negative.  Negative for cough, chest tightness and shortness of breath.    Cardiovascular: Negative.  Negative for chest pain and palpitations.   Gastrointestinal: Negative.  Negative for abdominal pain, diarrhea, nausea and vomiting.   Endocrine: Negative.  Negative for polydipsia, polyphagia and polyuria.   Genitourinary: Negative.  Negative for dysuria, flank pain and  "frequency.   Musculoskeletal: Negative.  Negative for back pain, gait problem and neck pain.   Skin: Positive for color change. Negative for rash.        \"hard tender area on my chin.\"   Allergic/Immunologic: Negative.    Neurological: Negative.  Negative for dizziness, light-headedness and headaches.   Hematological: Negative.    Psychiatric/Behavioral: Negative.  Negative for self-injury and suicidal ideas.       OBJECTIVE:  Vitals:    09/24/20 1302   BP: 121/79   BP Location: Right arm   Patient Position: Sitting   Cuff Size: Large Adult   Pulse: 69   Temp: 98.4 °F (36.9 °C)   SpO2: 98%   Weight: 93 kg (205 lb)   Height: 152.4 cm (60\")     Physical Exam  Vitals signs and nursing note reviewed.   Constitutional:       General: She is not in acute distress.     Appearance: Normal appearance. She is well-developed. She is obese. She is not diaphoretic.   HENT:      Head: Normocephalic and atraumatic.   Eyes:      Conjunctiva/sclera: Conjunctivae normal.      Pupils: Pupils are equal, round, and reactive to light.   Neck:      Musculoskeletal: Normal range of motion and neck supple.   Cardiovascular:      Rate and Rhythm: Normal rate and regular rhythm.      Heart sounds: Normal heart sounds. No murmur.   Pulmonary:      Effort: Pulmonary effort is normal. No respiratory distress.      Breath sounds: Normal breath sounds.   Musculoskeletal: Normal range of motion.   Skin:     General: Skin is warm and dry.      Capillary Refill: Capillary refill takes less than 2 seconds.      Findings: Erythema present.      Comments: Abscess measuring 2 cm center jawline with pointed purulent center.  SEE PROCEDURE NOTE.   Neurological:      Mental Status: She is alert and oriented to person, place, and time.   Psychiatric:         Mood and Affect: Mood normal.         Behavior: Behavior normal.         Thought Content: Thought content normal.         Judgment: Judgment normal.         ASSESSMENT/ PLAN:    Della was seen today for " wound check.    Diagnoses and all orders for this visit:    Cutaneous abscess of face  -     amoxicillin-clavulanate (Augmentin) 875-125 MG per tablet; Take 1 tablet by mouth 2 (Two) Times a Day for 10 days.        Orders Placed Today:     New Medications Ordered This Visit   Medications   • amoxicillin-clavulanate (Augmentin) 875-125 MG per tablet     Sig: Take 1 tablet by mouth 2 (Two) Times a Day for 10 days.     Dispense:  20 tablet     Refill:  0        Management Plan:     An After Visit Summary was printed and given to the patient at discharge.    Follow-up: Return for keep follow up appt.    Vijaya Henao, APRN 9/24/2020 17:18 CDT  This note was electronically signed.

## 2020-09-25 NOTE — PROGRESS NOTES
[facility] - PODIATRY    Today's Date: 09/28/20    Patient Name: Della Gonzalez  MRN: [id number]  CSN: [id number]  PCP: Donny Aguayo MD  Referring Provider: No ref. provider found    SUBJECTIVE     Chief Complaint   Patient presents with   • Follow-up     Pt is here today for 3 month f/u on diabetic foot/nail care - Pt denies pain at this moment - Pt presents with thickened and long toenails with discoloration - PCP 07/06/2020   • Diabetes     pt is unaware of the last blood sugar reading       HPI: Della Gonzalez, a 60 y.o.female, comes to clinic as a(n) established patient presenting for diabetic foot exam, complaining of ingrown toenail and complaining of thick fungal toenails. Patient has h/o cirrhosis, DM2, GERD, tobacco use. Continues daily tobacco use. Patient is NIDDM and unsure of last BG level. Admits occasional numbness and tingling to feet but overall notes retained sensation.  Notes that her toenails are long, thick, discolored and crumbly. She is unable to care for them herself adequately. Relates pain in the hallux nails bilaterally and feels that they are somewhat ingrown.  Denies pain at the present time. Relates previous treatment(s) including care by podiatrist. Denies any constitutional symptoms. No other pedal complaints at this time.    Past Medical History:   Diagnosis Date   • Cirrhosis of liver (CMS/HCC)    • Diabetes mellitus (CMS/HCC)    • GERD (gastroesophageal reflux disease)    • Liver disease      Past Surgical History:   Procedure Laterality Date   • COLONOSCOPY       Family History   Problem Relation Age of Onset   • Diabetes Mother    • No Known Problems Father      Social History     Socioeconomic History   • Marital status: Single     Spouse name: Not on file   • Number of children: Not on file   • Years of education: Not on file   • Highest education level: Not on file   Tobacco Use   • Smoking status: Current Every Day Smoker     Packs/day: 0.50     Years:  40.00     Pack years: 20.00     Types: Cigarettes   • Smokeless tobacco: Never Used   Substance and Sexual Activity   • Alcohol use: No     Frequency: Never     Binge frequency: Never   • Drug use: No   • Sexual activity: Not Currently     Partners: Male     No Known Allergies  Current Outpatient Medications   Medication Sig Dispense Refill   • amitriptyline (ELAVIL) 100 MG tablet TAKE ONE TABLET BY MOUTH EVERY EVENING 30 tablet 5   • amoxicillin-clavulanate (Augmentin) 875-125 MG per tablet Take 1 tablet by mouth 2 (Two) Times a Day for 10 days. 20 tablet 0   • atorvastatin (LIPITOR) 10 MG tablet TAKE ONE TABLET BY MOUTH EVERY DAY 30 tablet 5   • benazepril (LOTENSIN) 10 MG tablet TAKE ONE TABLET BY MOUTH EVERY DAY 30 tablet 5   • gabapentin (NEURONTIN) 600 MG tablet Take 1 tablet by mouth 2 (Two) Times a Day. 60 tablet 0   • hydrocortisone-eucerin Apply  topically to the appropriate area as directed 2 (Two) Times a Day. 120 g 2   • ketoconazole (NIZORAL) 2 % cream Apply  topically to the appropriate area as directed Daily. 60 g 2   • metFORMIN ER (GLUCOPHAGE-XR) 500 MG 24 hr tablet TAKE TWO TABLETS BY MOUTH EVERY DAY WITH BREAKFAST 60 tablet 2   • nabumetone (RELAFEN) 500 MG tablet Take 1 tablet by mouth 2 (Two) Times a Day As Needed for Moderate Pain . 60 tablet 5   • nadolol (CORGARD) 20 MG tablet Take 1 tablet by mouth Daily. 30 tablet 2   • omeprazole (priLOSEC) 20 MG capsule TAKE ONE CAPSULE BY MOUTH EVERY DAY     • STEGLATRO 15 MG tablet TAKE ONE TABLET BY MOUTH EVERY MORNING 90 tablet 0   • traMADol (ULTRAM) 50 MG tablet Take 1 tablet by mouth 3 (Three) Times a Day As Needed (TID as needed). 90 tablet 0     No current facility-administered medications for this visit.      Review of Systems   Constitutional: Negative for chills and fever.   HENT: Negative for congestion.    Respiratory: Negative for shortness of breath.    Cardiovascular: Positive for leg swelling. Negative for chest pain.      Gastrointestinal: Negative for constipation, diarrhea, nausea and vomiting.   Musculoskeletal: Positive for arthralgias. Negative for gait problem.        Foot pain   Skin: Negative for wound.        IGTN   Neurological: Positive for numbness.       OBJECTIVE     Vitals:    09/28/20 1049   BP: 121/65   Pulse: 60   SpO2: 99%       PHYSICAL EXAM  GEN:   Accompanied by none.     Foot/Ankle Exam:       General:   Appearance: appears stated age and healthy and obesity    Orientation: AAOx3    Affect: appropriate    Gait: unimpaired    Assistance: independent    Shoe Gear:  Casual shoes    VASCULAR      Right Foot Vascularity   Dorsalis pedis:  2+  Posterior tibial:  2+  Skin Temperature: warm    Edema Grading:  None  CFT:  3  Pedal Hair Growth:  Present  Varicosities: mild varicosities       Left Foot Vascularity   Dorsalis pedis:  2+  Posterior tibial:  2+  Skin Temperature: warm    Edema Grading:  None  CFT:  3  Pedal Hair Growth:  Present  Varicosities: mild varicosities        NEUROLOGIC     Right Foot Neurologic   Light touch sensation:  Diminished  Vibratory sensation:  Diminished  Hot/Cold sensation: diminished    Protective Sensation using [place] Monofilament:  Diminished     Left Foot Neurologic   Light touch sensation:  Diminished  Vibratory sensation:  Diminished  Hot/cold sensation: diminished    Protective Sensation using [place] Monofilament:  Diminished     MUSCULOSKELETAL      Right Foot Musculoskeletal   Ecchymosis:  None  Tenderness: toenails and toe 1    Arch:  Normal  Hallux valgus: No    Hallux limitus: No       Left Foot Musculoskeletal   Ecchymosis:  None  Tenderness: toenails and toe 1    Arch:  Normal  Hallux valgus: No    Hallux limitus: No       MUSCLE STRENGTH     Right Foot Muscle Strength   Foot dorsiflexion:  5  Foot plantar flexion:  5  Foot inversion:  5  Foot eversion:  5     Left Foot Muscle Strength   Foot dorsiflexion:  5  Foot plantar flexion:  5  Foot inversion:   5  Foot eversion:  5     RANGE OF MOTION      Right Foot Range of Motion   Foot and ankle ROM within normal limits       Left Foot Range of Motion   Foot and ankle ROM within normal limits       DERMATOLOGIC     Right Foot Dermatologic   Skin: skin intact    Nails: onychomycosis, abnormally thick, subungual debris, dystrophic nails and ingrown toenail (hallux bilateral border)       Left Foot Dermatologic   Skin: skin intact    Nails: onychomycosis, abnormally thick, subungual debris, dystrophic nails and ingrown toenail (hallux medial border)        RADIOLOGY/NUCLEAR:  No results found.    LABORATORY/CULTURE RESULTS:      PATHOLOGY RESULTS:       ASSESSMENT/PLAN     Della was seen today for follow-up and diabetes.    Diagnoses and all orders for this visit:    Ingrown toenail    Onychomycosis    Tinea pedis of both feet    Type 2 diabetes mellitus with diabetic polyneuropathy, without long-term current use of insulin (CMS/[facility])    Tobacco abuse      Comprehensive lower extremity examination and evaluation was performed.  Discussed findings and treatment plan including risks, benefits, and treatment options with patient in detail. Patient agreed with treatment plan.  After verbal consent obtained, nail(s) x10 debrided of length and thickness with nail nipper without incidence  After verbal consent obtained, nail(s) x2 debrided of offending borders with nail nipper without incidence  Patient may maintain nails and calluses at home utilizing emery board or pumice stone between visits as needed  Reviewed at home diabetic foot care including daily foot checks   Smoking cessation needed;not ready to quit.    Continue monitoring blood sugar and maintain diabetic control.   Patient's Body mass index is 39.84 kg/m². BMI is above normal parameters. Recommendations include: educational material.  An After Visit Summary was printed and given to the patient at discharge, including (if requested) any available  informative/educational handouts regarding diagnosis, treatment, or medications. All questions were answered to patient/family satisfaction. Should symptoms fail to improve or worsen they agree to call or return to clinic or to go to the Emergency Department. Discussed the importance of following up with any needed screening tests/labs/specialist appointments and any requested follow-up recommended by me today. Importance of maintaining follow-up discussed and patient accepts that missed appointments can delay diagnosis and potentially lead to worsening of conditions.  Return in about 3 months (around 12/28/2020)., or sooner if acute issues arise.        This document has been electronically signed by MELECIO Hassan on September 28, 2020 11:33 CDT

## 2020-09-28 ENCOUNTER — OFFICE VISIT (OUTPATIENT)
Dept: PODIATRY | Facility: CLINIC | Age: 60
End: 2020-09-28

## 2020-09-28 VITALS
HEIGHT: 60 IN | HEART RATE: 60 BPM | DIASTOLIC BLOOD PRESSURE: 65 MMHG | SYSTOLIC BLOOD PRESSURE: 121 MMHG | WEIGHT: 204 LBS | BODY MASS INDEX: 40.05 KG/M2 | OXYGEN SATURATION: 99 %

## 2020-09-28 DIAGNOSIS — B35.3 TINEA PEDIS OF BOTH FEET: ICD-10-CM

## 2020-09-28 DIAGNOSIS — E11.42 TYPE 2 DIABETES MELLITUS WITH DIABETIC POLYNEUROPATHY, WITHOUT LONG-TERM CURRENT USE OF INSULIN (HCC): ICD-10-CM

## 2020-09-28 DIAGNOSIS — Z72.0 TOBACCO ABUSE: ICD-10-CM

## 2020-09-28 DIAGNOSIS — B35.1 ONYCHOMYCOSIS: ICD-10-CM

## 2020-09-28 DIAGNOSIS — L60.0 INGROWN TOENAIL: Primary | ICD-10-CM

## 2020-09-28 PROCEDURE — 11721 DEBRIDE NAIL 6 OR MORE: CPT | Performed by: NURSE PRACTITIONER

## 2020-09-28 NOTE — PATIENT INSTRUCTIONS
Obesity, Adult  Obesity is having too much body fat. Being obese means that your weight is more than what is healthy for you.  BMI is a number that explains how much body fat you have. If you have a BMI of 30 or more, you are obese. Obesity is often caused by eating or drinking more calories than your body uses. Changing your lifestyle can help you lose weight.  Obesity can cause serious health problems, such as:  · Stroke.  · Coronary artery disease (CAD).  · Type 2 diabetes.  · Some types of cancer, including cancers of the colon, breast, uterus, and gallbladder.  · Osteoarthritis.  · High blood pressure (hypertension).  · High cholesterol.  · Sleep apnea.  · Gallbladder stones.  · Infertility problems.  What are the causes?  · Eating meals each day that are high in calories, sugar, and fat.  · Being born with genes that may make you more likely to become obese.  · Having a medical condition that causes obesity.  · Taking certain medicines.  · Sitting a lot (having a sedentary lifestyle).  · Not getting enough sleep.  · Drinking a lot of drinks that have sugar in them.  What increases the risk?  · Having a family history of obesity.  · Being an  woman.  · Being a  man.  · Living in an area with limited access to:  ? [place], recreation centers, or sidewalks.  ? Healthy food choices, such as grocery stores and [employer].  What are the signs or symptoms?  The main sign is having too much body fat.  How is this treated?  · Treatment for this condition often includes changing your lifestyle. Treatment may include:  ? Changing your diet. This may include making a healthy meal plan.  ? Exercise. This may include activity that causes your heart to beat faster (aerobic exercise) and strength training. Work with your doctor to design a program that works for you.  ? Medicine to help you lose weight. This may be used if you are not able to lose 1 pound a week after 6 weeks of healthy eating and  more exercise.  ? Treating conditions that cause the obesity.  ? Surgery. Options may include gastric banding and gastric bypass. This may be done if:  § Other treatments have not helped to improve your condition.  § You have a BMI of 40 or higher.  § You have life-threatening health problems related to obesity.  Follow these instructions at home:  Eating and drinking    · Follow advice from your doctor about what to eat and drink. Your doctor may tell you to:  ? Limit fast food, sweets, and processed snack foods.  ? Choose low-fat options. For example, choose low-fat milk instead of whole milk.  ? Eat 5 or more servings of fruits or vegetables each day.  ? Eat at home more often. This gives you more control over what you eat.  ? Choose healthy foods when you eat out.  ? Learn to read food labels. This will help you learn how much food is in 1 serving.  ? Keep low-fat snacks available.  ? Avoid drinks that have a lot of sugar in them. These include soda, fruit juice, iced tea with sugar, and flavored milk.  · Drink enough water to keep your pee (urine) pale yellow.  · Do not go on fad diets.  Physical activity  · Exercise often, as told by your doctor. Most adults should get up to 150 minutes of moderate-intensity exercise every week.Ask your doctor:  ? What types of exercise are safe for you.  ? How often you should exercise.  · Warm up and stretch before being active.  · Do slow stretching after being active (cool down).  · Rest between times of being active.  Lifestyle  · Work with your doctor and a food expert (dietitian) to set a weight-loss goal that is best for you.  · Limit your screen time.  · Find ways to reward yourself that do not involve food.  · Do not drink alcohol if:  ? Your doctor tells you not to drink.  ? You are pregnant, may be pregnant, or are planning to become pregnant.  · If you drink alcohol:  ? Limit how much you use to:  § 0-1 drink a day for women.  § 0-2 drinks a day for men.  ? Be  aware of how much alcohol is in your drink. In the [place] one drink equals one 12 oz bottle of beer (355 mL), one 5 oz glass of wine (148 mL), or one 1½ oz glass of hard liquor (44 mL).  General instructions  · Keep a weight-loss journal. This can help you keep track of:  ? The food that you eat.  ? How much exercise you get.  · Take over-the-counter and prescription medicines only as told by your doctor.  · Take vitamins and supplements only as told by your doctor.  · Think about joining a support group.  · Keep all follow-up visits as told by your doctor. This is important.  Contact a doctor if:  · You cannot meet your weight loss goal after you have changed your diet and lifestyle for 6 weeks.  Get help right away if you:  · Are having trouble breathing.  · Are having thoughts of harming yourself.  Summary  · Obesity is having too much body fat.  · Being obese means that your weight is more than what is healthy for you.  · Work with your doctor to set a weight-loss goal.  · Get regular exercise as told by your doctor.  This information is not intended to replace advice given to you by your health care provider. Make sure you discuss any questions you have with your health care provider.  Document Released: 03/11/2013 Document Revised: 08/22/2019 Document Reviewed: 08/22/2019  [employer]vier Patient Education © 2020 [employer]

## 2020-10-07 RX ORDER — OMEPRAZOLE 20 MG/1
CAPSULE, DELAYED RELEASE ORAL
Qty: 90 CAPSULE | Refills: 1 | Status: SHIPPED | OUTPATIENT
Start: 2020-10-07

## 2020-10-07 RX ORDER — NADOLOL 20 MG/1
TABLET ORAL
Qty: 90 TABLET | Refills: 1 | Status: SHIPPED | OUTPATIENT
Start: 2020-10-07

## 2020-10-15 ENCOUNTER — OFFICE VISIT (OUTPATIENT)
Dept: FAMILY MEDICINE CLINIC | Facility: CLINIC | Age: 60
End: 2020-10-15

## 2020-10-15 VITALS
BODY MASS INDEX: 39.93 KG/M2 | HEART RATE: 78 BPM | TEMPERATURE: 96.6 F | WEIGHT: 203.4 LBS | DIASTOLIC BLOOD PRESSURE: 80 MMHG | HEIGHT: 60 IN | SYSTOLIC BLOOD PRESSURE: 130 MMHG | OXYGEN SATURATION: 98 %

## 2020-10-15 DIAGNOSIS — N90.7 LABIAL CYST: Primary | ICD-10-CM

## 2020-10-15 DIAGNOSIS — L08.9 INFECTED CYST OF SKIN: ICD-10-CM

## 2020-10-15 DIAGNOSIS — L98.9 PERINEAL IRRITATION IN FEMALE: ICD-10-CM

## 2020-10-15 DIAGNOSIS — L72.9 INFECTED CYST OF SKIN: ICD-10-CM

## 2020-10-15 PROCEDURE — 99213 OFFICE O/P EST LOW 20 MIN: CPT | Performed by: NURSE PRACTITIONER

## 2020-10-15 RX ORDER — NADOLOL 20 MG/1
TABLET ORAL
COMMUNITY
Start: 2020-10-07 | End: 2020-10-15 | Stop reason: SDUPTHER

## 2020-10-15 RX ORDER — AMOXICILLIN AND CLAVULANATE POTASSIUM 875; 125 MG/1; MG/1
1 TABLET, FILM COATED ORAL 2 TIMES DAILY
Qty: 14 TABLET | Refills: 0 | Status: SHIPPED | OUTPATIENT
Start: 2020-10-15 | End: 2020-10-22

## 2020-10-15 RX ORDER — OMEPRAZOLE 20 MG/1
CAPSULE, DELAYED RELEASE ORAL
COMMUNITY
Start: 2020-10-07 | End: 2020-10-15 | Stop reason: SDUPTHER

## 2020-10-15 RX ORDER — NYSTATIN 100000 U/G
CREAM TOPICAL 2 TIMES DAILY
Qty: 30 G | Refills: 0 | Status: SHIPPED | OUTPATIENT
Start: 2020-10-15 | End: 2021-02-04 | Stop reason: SDUPTHER

## 2020-10-15 RX ORDER — TIZANIDINE 4 MG/1
TABLET ORAL
COMMUNITY
Start: 2020-10-02 | End: 2022-05-16 | Stop reason: SDUPTHER

## 2020-10-15 NOTE — PROGRESS NOTES
"Chief Complaint   Patient presents with   • Mass     opening of vaginal area        Subjective   Della Gonzalez is a 60 y.o.  female who presents today for mass.    HPI:  Patient states she has noted a \"hard mass on my labia that is tender.\"  She first noticed this a few days ago and it has worsened.  She has done nothing differently since this started.    Della Gonzalez  has a past medical history of Cirrhosis of liver (CMS/HCC), Diabetes mellitus (CMS/HCC), GERD (gastroesophageal reflux disease), and Liver disease.    No Known Allergies    Current Outpatient Medications:   •  amitriptyline (ELAVIL) 100 MG tablet, TAKE ONE TABLET BY MOUTH EVERY EVENING, Disp: 30 tablet, Rfl: 5  •  amoxicillin-clavulanate (Augmentin) 875-125 MG per tablet, Take 1 tablet by mouth 2 (Two) Times a Day for 7 days., Disp: 14 tablet, Rfl: 0  •  atorvastatin (LIPITOR) 10 MG tablet, TAKE ONE TABLET BY MOUTH EVERY DAY, Disp: 30 tablet, Rfl: 5  •  benazepril (LOTENSIN) 10 MG tablet, TAKE ONE TABLET BY MOUTH EVERY DAY, Disp: 30 tablet, Rfl: 5  •  gabapentin (NEURONTIN) 600 MG tablet, Take 1 tablet by mouth 2 (Two) Times a Day., Disp: 60 tablet, Rfl: 0  •  hydrocortisone-eucerin, Apply  topically to the appropriate area as directed 2 (Two) Times a Day., Disp: 120 g, Rfl: 2  •  ketoconazole (NIZORAL) 2 % cream, Apply  topically to the appropriate area as directed Daily., Disp: 60 g, Rfl: 2  •  metFORMIN ER (GLUCOPHAGE-XR) 500 MG 24 hr tablet, TAKE TWO TABLETS BY MOUTH EVERY DAY WITH BREAKFAST, Disp: 60 tablet, Rfl: 2  •  nabumetone (RELAFEN) 500 MG tablet, Take 1 tablet by mouth 2 (Two) Times a Day As Needed for Moderate Pain ., Disp: 60 tablet, Rfl: 5  •  nadolol (CORGARD) 20 MG tablet, Take 1 tablet by mouth Daily., Disp: 30 tablet, Rfl: 2  •  nystatin (MYCOSTATIN) 732094 UNIT/GM cream, Apply  topically to the appropriate area as directed 2 (two) times a day., Disp: 30 g, Rfl: 0  •  omeprazole (priLOSEC) 20 MG capsule, TAKE ONE CAPSULE BY " MOUTH EVERY DAY, Disp: , Rfl:   •  STEGLATRO 15 MG tablet, TAKE ONE TABLET BY MOUTH EVERY MORNING, Disp: 90 tablet, Rfl: 0  •  tiZANidine (ZANAFLEX) 4 MG tablet, , Disp: , Rfl:   •  traMADol (ULTRAM) 50 MG tablet, Take 1 tablet by mouth 3 (Three) Times a Day As Needed (TID as needed)., Disp: 90 tablet, Rfl: 0  Past Medical History:   Diagnosis Date   • Cirrhosis of liver (CMS/HCC)    • Diabetes mellitus (CMS/HCC)    • GERD (gastroesophageal reflux disease)    • Liver disease      Past Surgical History:   Procedure Laterality Date   • COLONOSCOPY       Social History     Socioeconomic History   • Marital status: Single     Spouse name: Not on file   • Number of children: Not on file   • Years of education: Not on file   • Highest education level: Not on file   Tobacco Use   • Smoking status: Current Every Day Smoker     Packs/day: 0.50     Years: 40.00     Pack years: 20.00     Types: Cigarettes   • Smokeless tobacco: Never Used   Substance and Sexual Activity   • Alcohol use: No     Frequency: Never     Binge frequency: Never   • Drug use: No   • Sexual activity: Not Currently     Partners: Male     Family History   Problem Relation Age of Onset   • Diabetes Mother    • No Known Problems Father        Family history, surgical history, past medical history, Allergies and med's reviewed with patient today and updated in [employer] EMR.     ROS:  Review of Systems   Constitutional: Negative.  Negative for fatigue, fever and unexpected weight change.   HENT: Negative.  Negative for facial swelling, sore throat and trouble swallowing.    Eyes: Negative.  Negative for photophobia, discharge and visual disturbance.   Respiratory: Negative.  Negative for cough, chest tightness and shortness of breath.    Cardiovascular: Negative.  Negative for chest pain and palpitations.   Gastrointestinal: Negative.  Negative for abdominal pain, diarrhea, nausea and vomiting.   Endocrine: Negative.  Negative for polydipsia, polyphagia and  "polyuria.   Genitourinary: Positive for genital sores. Negative for dysuria, flank pain and frequency.   Musculoskeletal: Negative.  Negative for back pain, gait problem and neck pain.   Skin: Negative.  Negative for rash.   Allergic/Immunologic: Negative.    Neurological: Negative.  Negative for dizziness, light-headedness and headaches.   Hematological: Negative.    Psychiatric/Behavioral: Negative.  Negative for self-injury and suicidal ideas.       OBJECTIVE:  Vitals:    10/15/20 1500   BP: 130/80   BP Location: Right arm   Patient Position: Sitting   Cuff Size: Adult   Pulse: 78   Temp: 96.6 °F (35.9 °C)   TempSrc: Temporal   SpO2: 98%   Weight: 92.3 kg (203 lb 6.4 oz)   Height: 152.4 cm (60\")     Physical Exam  Vitals signs and nursing note reviewed. Exam conducted with a chaperone present.   Constitutional:       Appearance: She is obese.   HENT:      Head: Normocephalic.   Neck:      Musculoskeletal: Normal range of motion and neck supple.   Cardiovascular:      Rate and Rhythm: Normal rate and regular rhythm.      Heart sounds: Normal heart sounds.   Pulmonary:      Effort: Pulmonary effort is normal.      Breath sounds: Normal breath sounds.   Genitourinary:     Exam position: Lithotomy position.      Labia:         Left: Tenderness and lesion present.        Skin:     General: Skin is dry.      Capillary Refill: Capillary refill takes less than 2 seconds.   Neurological:      Mental Status: She is oriented to person, place, and time. Mental status is at baseline.   Psychiatric:         Mood and Affect: Mood normal.         Behavior: Behavior normal.         Thought Content: Thought content normal.         Judgment: Judgment normal.         ASSESSMENT/ PLAN:    Diagnoses and all orders for this visit:    1. Labial cyst (Primary)  -     amoxicillin-clavulanate (Augmentin) 875-125 MG per tablet; Take 1 tablet by mouth 2 (Two) Times a Day for 7 days.  Dispense: 14 tablet; Refill: 0    2. Infected cyst of " skin    3. Perineal irritation in female  -     nystatin (MYCOSTATIN) 920606 UNIT/GM cream; Apply  topically to the appropriate area as directed 2 (two) times a day.  Dispense: 30 g; Refill: 0        Orders Placed Today:     New Medications Ordered This Visit   Medications   • amoxicillin-clavulanate (Augmentin) 875-125 MG per tablet     Sig: Take 1 tablet by mouth 2 (Two) Times a Day for 7 days.     Dispense:  14 tablet     Refill:  0   • nystatin (MYCOSTATIN) 077462 UNIT/GM cream     Sig: Apply  topically to the appropriate area as directed 2 (two) times a day.     Dispense:  30 g     Refill:  0        Management Plan:     An After Visit Summary was printed and given to the patient at discharge.    Follow-up: Return for keep appt next week.    MELECIO Thorne 10/15/2020 20:01 CDT  This note was electronically signed.

## 2020-10-22 ENCOUNTER — OFFICE VISIT (OUTPATIENT)
Dept: FAMILY MEDICINE CLINIC | Facility: CLINIC | Age: 60
End: 2020-10-22

## 2020-10-22 VITALS
DIASTOLIC BLOOD PRESSURE: 78 MMHG | SYSTOLIC BLOOD PRESSURE: 113 MMHG | TEMPERATURE: 97.1 F | HEIGHT: 60 IN | WEIGHT: 206.6 LBS | HEART RATE: 61 BPM | OXYGEN SATURATION: 97 % | BODY MASS INDEX: 40.56 KG/M2

## 2020-10-22 DIAGNOSIS — E66.01 MORBID (SEVERE) OBESITY DUE TO EXCESS CALORIES (HCC): ICD-10-CM

## 2020-10-22 DIAGNOSIS — E11.628 TYPE 2 DIABETES MELLITUS WITH OTHER SKIN COMPLICATION, WITHOUT LONG-TERM CURRENT USE OF INSULIN (HCC): ICD-10-CM

## 2020-10-22 DIAGNOSIS — Z23 NEED FOR IMMUNIZATION AGAINST INFLUENZA: Primary | ICD-10-CM

## 2020-10-22 DIAGNOSIS — E11.65 TYPE 2 DIABETES MELLITUS WITH HYPERGLYCEMIA, WITHOUT LONG-TERM CURRENT USE OF INSULIN (HCC): ICD-10-CM

## 2020-10-22 DIAGNOSIS — I10 ESSENTIAL HYPERTENSION: ICD-10-CM

## 2020-10-22 LAB — HBA1C MFR BLD: 8.6 %

## 2020-10-22 PROCEDURE — 83036 HEMOGLOBIN GLYCOSYLATED A1C: CPT | Performed by: NURSE PRACTITIONER

## 2020-10-22 PROCEDURE — 90471 IMMUNIZATION ADMIN: CPT | Performed by: NURSE PRACTITIONER

## 2020-10-22 PROCEDURE — 99214 OFFICE O/P EST MOD 30 MIN: CPT | Performed by: NURSE PRACTITIONER

## 2020-10-22 PROCEDURE — 90686 IIV4 VACC NO PRSV 0.5 ML IM: CPT | Performed by: NURSE PRACTITIONER

## 2020-10-22 NOTE — PROGRESS NOTES
Chief Complaint   Patient presents with   • Diabetes     3MTH FU        Subjective   Della Gonzalez is a 60 y.o.  female who presents today for diabetes follow up.    HPI:  DM:  Patient presents for follow up of diabetes.  She denies hypoglycemic or hyperglycemic episodes.  No new problems.  She checks her BG once daily.  High of 250 and low of 130 since last visit.  She completes foot check at least weekly with no skin breakdown.  She is scheduled for an eye exam next Thursday [place]. [place].    Della Gonzalez  has a past medical history of Cirrhosis of liver (CMS/HCC), Diabetes mellitus (CMS/HCC), GERD (gastroesophageal reflux disease), and Liver disease.    No Known Allergies    Current Outpatient Medications:   •  amitriptyline (ELAVIL) 100 MG tablet, TAKE ONE TABLET BY MOUTH EVERY EVENING, Disp: 30 tablet, Rfl: 5  •  atorvastatin (LIPITOR) 10 MG tablet, TAKE ONE TABLET BY MOUTH EVERY DAY, Disp: 30 tablet, Rfl: 5  •  benazepril (LOTENSIN) 10 MG tablet, TAKE ONE TABLET BY MOUTH EVERY DAY, Disp: 30 tablet, Rfl: 5  •  gabapentin (NEURONTIN) 600 MG tablet, Take 1 tablet by mouth 2 (Two) Times a Day., Disp: 60 tablet, Rfl: 0  •  hydrocortisone-eucerin, Apply  topically to the appropriate area as directed 2 (Two) Times a Day., Disp: 120 g, Rfl: 2  •  ketoconazole (NIZORAL) 2 % cream, Apply  topically to the appropriate area as directed Daily., Disp: 60 g, Rfl: 2  •  nabumetone (RELAFEN) 500 MG tablet, Take 1 tablet by mouth 2 (Two) Times a Day As Needed for Moderate Pain ., Disp: 60 tablet, Rfl: 5  •  nadolol (CORGARD) 20 MG tablet, Take 1 tablet by mouth Daily., Disp: 30 tablet, Rfl: 2  •  nystatin (MYCOSTATIN) 125607 UNIT/GM cream, Apply  topically to the appropriate area as directed 2 (two) times a day., Disp: 30 g, Rfl: 0  •  omeprazole (priLOSEC) 20 MG capsule, TAKE ONE CAPSULE BY MOUTH EVERY DAY, Disp: , Rfl:   •  STEGLATRO 15 MG tablet, TAKE ONE TABLET BY MOUTH EVERY MORNING, Disp: 90 tablet,  Rfl: 0  •  tiZANidine (ZANAFLEX) 4 MG tablet, , Disp: , Rfl:   •  traMADol (ULTRAM) 50 MG tablet, Take 1 tablet by mouth 3 (Three) Times a Day As Needed (TID as needed)., Disp: 90 tablet, Rfl: 0  •  linagliptin (TRADJENTA) 5 MG tablet tablet, Take 1 tablet by mouth Daily., Disp: 30 tablet, Rfl: 2  •  metFORMIN (Glucophage) 1000 MG tablet, Take 1 tablet by mouth 2 (Two) Times a Day With Meals., Disp: 60 tablet, Rfl: 5  Past Medical History:   Diagnosis Date   • Cirrhosis of liver (CMS/HCC)    • Diabetes mellitus (CMS/HCC)    • GERD (gastroesophageal reflux disease)    • Liver disease      Past Surgical History:   Procedure Laterality Date   • COLONOSCOPY       Social History     Socioeconomic History   • Marital status: Single     Spouse name: Not on file   • Number of children: Not on file   • Years of education: Not on file   • Highest education level: Not on file   Tobacco Use   • Smoking status: Current Every Day Smoker     Packs/day: 0.50     Years: 40.00     Pack years: 20.00     Types: Cigarettes   • Smokeless tobacco: Never Used   Substance and Sexual Activity   • Alcohol use: No     Frequency: Never     Binge frequency: Never   • Drug use: No   • Sexual activity: Not Currently     Partners: Male     Family History   Problem Relation Age of Onset   • Diabetes Mother    • No Known Problems Father        Family history, surgical history, past medical history, Allergies and med's reviewed with patient today and updated in [email or web address] EMR.     ROS:  Review of Systems   Constitutional: Negative.  Negative for fatigue, fever and unexpected weight change.   HENT: Negative.  Negative for facial swelling, sore throat and trouble swallowing.    Eyes: Negative.  Negative for photophobia, discharge and visual disturbance.   Respiratory: Negative.  Negative for cough, chest tightness and shortness of breath.    Cardiovascular: Negative.  Negative for chest pain and palpitations.   Gastrointestinal: Negative.  Negative for  "abdominal pain, diarrhea, nausea and vomiting.   Endocrine: Negative.  Negative for polydipsia, polyphagia and polyuria.   Genitourinary: Negative.  Negative for dysuria, flank pain and frequency.   Musculoskeletal: Negative.  Negative for back pain, gait problem and neck pain.   Skin: Negative.  Negative for rash.   Allergic/Immunologic: Negative.    Neurological: Negative.  Negative for dizziness, light-headedness and headaches.   Hematological: Negative.    Psychiatric/Behavioral: Negative.  Negative for self-injury and suicidal ideas.       OBJECTIVE:  Vitals:    10/22/20 0814   BP: 113/78   BP Location: Right arm   Patient Position: Sitting   Cuff Size: Large Adult   Pulse: 61   Temp: 97.1 °F (36.2 °C)   SpO2: 97%   Weight: 93.7 kg (206 lb 9.6 oz)   Height: 152.4 cm (60\")     Physical Exam  Vitals signs and nursing note reviewed.   Constitutional:       General: She is not in acute distress.     Appearance: She is well-developed. She is not diaphoretic.   HENT:      Head: Normocephalic and atraumatic.   Eyes:      Conjunctiva/sclera: Conjunctivae normal.      Pupils: Pupils are equal, round, and reactive to light.   Neck:      Musculoskeletal: Normal range of motion and neck supple.   Cardiovascular:      Rate and Rhythm: Normal rate and regular rhythm.      Heart sounds: Normal heart sounds. No murmur.   Pulmonary:      Effort: Pulmonary effort is normal. No respiratory distress.      Breath sounds: Normal breath sounds.   Abdominal:      General: Bowel sounds are normal. There is no distension.      Palpations: Abdomen is soft.      Tenderness: There is no abdominal tenderness.   Musculoskeletal: Normal range of motion.   Skin:     General: Skin is warm and dry.      Capillary Refill: Capillary refill takes less than 2 seconds.      Findings: No erythema.   Neurological:      Mental Status: She is alert and oriented to person, place, and time.   Psychiatric:         Behavior: Behavior normal.         Thought " Content: Thought content normal.         Judgment: Judgment normal.         ASSESSMENT/ PLAN:    Diagnoses and all orders for this visit:    1. Need for immunization against influenza (Primary)  -     FluLaval Quad >6 Months ([id number])    2. Type 2 diabetes mellitus with other skin complication, without long-term current use of insulin (CMS/[facility])  -     POC Glycosylated Hemoglobin (Hb A1C)    3. Type 2 diabetes mellitus with hyperglycemia, without long-term current use of insulin (CMS/[facility])  -     metFORMIN (Glucophage) 1000 MG tablet; Take 1 tablet by mouth 2 (Two) Times a Day With Meals.  Dispense: 60 tablet; Refill: 5  -     linagliptin (TRADJENTA) 5 MG tablet tablet; Take 1 tablet by mouth Daily.  Dispense: 30 tablet; Refill: 2    4. Essential hypertension    5. Morbid (severe) obesity due to excess calories (CMS/[facility])    Patient is going to continue daily exercise (walking) and continue low carb diet as well as changes as noted above in treatment regimen.    Orders Placed Today:     New Medications Ordered This Visit   Medications   • metFORMIN (Glucophage) 1000 MG tablet     Sig: Take 1 tablet by mouth 2 (Two) Times a Day With Meals.     Dispense:  60 tablet     Refill:  5     D/C refills of Metformin  mg   • linagliptin (TRADJENTA) 5 MG tablet tablet     Sig: Take 1 tablet by mouth Daily.     Dispense:  30 tablet     Refill:  2        Management Plan:     An After Visit Summary was printed and given to the patient at discharge.    Follow-up: Return in about 3 months (around 1/22/2021) for Next scheduled follow up with Hgb A1c and CMP prior.    Vijaya Henao, MELECIO 10/22/2020 08:55 CDT  This note was electronically signed.

## 2020-10-24 ENCOUNTER — RESULTS ENCOUNTER (OUTPATIENT)
Dept: FAMILY MEDICINE CLINIC | Facility: CLINIC | Age: 60
End: 2020-10-24

## 2020-10-24 DIAGNOSIS — E11.42 TYPE 2 DIABETES MELLITUS WITH DIABETIC POLYNEUROPATHY, WITHOUT LONG-TERM CURRENT USE OF INSULIN (HCC): ICD-10-CM

## 2020-10-24 DIAGNOSIS — I10 ESSENTIAL HYPERTENSION: ICD-10-CM

## 2020-10-30 ENCOUNTER — OFFICE VISIT (OUTPATIENT)
Dept: FAMILY MEDICINE CLINIC | Facility: CLINIC | Age: 60
End: 2020-10-30

## 2020-10-30 VITALS
WEIGHT: 207 LBS | SYSTOLIC BLOOD PRESSURE: 121 MMHG | HEIGHT: 60 IN | HEART RATE: 74 BPM | DIASTOLIC BLOOD PRESSURE: 78 MMHG | TEMPERATURE: 98.3 F | OXYGEN SATURATION: 98 % | BODY MASS INDEX: 40.64 KG/M2

## 2020-10-30 DIAGNOSIS — B02.9 HERPES ZOSTER WITHOUT COMPLICATION: Primary | ICD-10-CM

## 2020-10-30 DIAGNOSIS — R21 RASH OF FACE: ICD-10-CM

## 2020-10-30 PROCEDURE — 99213 OFFICE O/P EST LOW 20 MIN: CPT | Performed by: NURSE PRACTITIONER

## 2020-10-30 RX ORDER — VALACYCLOVIR HYDROCHLORIDE 1 G/1
1000 TABLET, FILM COATED ORAL 3 TIMES DAILY
Qty: 30 TABLET | Refills: 0 | Status: SHIPPED | OUTPATIENT
Start: 2020-10-30 | End: 2020-12-01

## 2020-10-30 NOTE — PROGRESS NOTES
Nicole Gnozalez is a 60 y.o. female.     History of Present Illness  Patient developed a rash on her left temple 2 days ago.  It is tender to touch and has a burning, tingling sensation.  She has put SHAILA on it with no improvement.    The following portions of the patient's history were reviewed and updated as appropriate: allergies, current medications, past family history, past medical history, past social history, past surgical history and problem list.    Review of Systems   Constitutional: Negative.  Negative for fatigue, fever and unexpected weight change.   HENT: Negative.  Negative for facial swelling, sore throat and trouble swallowing.    Eyes: Negative.  Negative for photophobia, discharge and visual disturbance.   Respiratory: Negative.  Negative for cough, chest tightness and shortness of breath.    Cardiovascular: Negative.  Negative for chest pain and palpitations.   Gastrointestinal: Negative.  Negative for abdominal pain, diarrhea, nausea and vomiting.   Endocrine: Negative.  Negative for polydipsia, polyphagia and polyuria.   Genitourinary: Negative.  Negative for dysuria, flank pain and frequency.   Musculoskeletal: Negative.  Negative for back pain, gait problem and neck pain.   Skin: Positive for rash.   Allergic/Immunologic: Negative.    Neurological: Negative.  Negative for dizziness, light-headedness and headaches.   Hematological: Negative.    Psychiatric/Behavioral: Negative.  Negative for self-injury and suicidal ideas.       Objective   Physical Exam  Vitals signs and nursing note reviewed.   Constitutional:       Appearance: Normal appearance. She is obese.   HENT:      Head: Normocephalic and atraumatic.      Nose: Nose normal.      Mouth/Throat:      Mouth: Mucous membranes are moist.      Pharynx: Oropharynx is clear.   Eyes:      Extraocular Movements: Extraocular movements intact.      Conjunctiva/sclera: Conjunctivae normal.      Pupils: Pupils are equal, round, and  reactive to light.   Neck:      Musculoskeletal: Normal range of motion and neck supple.   Cardiovascular:      Rate and Rhythm: Normal rate and regular rhythm.      Heart sounds: Normal heart sounds.   Pulmonary:      Effort: Pulmonary effort is normal.      Breath sounds: Normal breath sounds.   Skin:     General: Skin is warm and dry.      Findings: Erythema and rash present.      Comments: Vesicular rash with mild erythema present to the left temple in an area of 4.5 cm.  The area is not near the eye but into the hairline.   Neurological:      Mental Status: She is alert.         Assessment/Plan   Diagnoses and all orders for this visit:    1. Herpes zoster without complication (Primary)  -     valACYclovir (Valtrex) 1000 MG tablet; Take 1 tablet by mouth 3 (Three) Times a Day.  Dispense: 30 tablet; Refill: 0    2. Rash of face       Keep scheduled appt.

## 2020-11-11 DIAGNOSIS — E11.628 TYPE 2 DIABETES MELLITUS WITH OTHER SKIN COMPLICATION, WITHOUT LONG-TERM CURRENT USE OF INSULIN (HCC): ICD-10-CM

## 2020-11-12 RX ORDER — ERTUGLIFLOZIN 15 MG/1
TABLET, FILM COATED ORAL
Qty: 90 TABLET | Refills: 1 | Status: SHIPPED | OUTPATIENT
Start: 2020-11-12 | End: 2021-01-15 | Stop reason: SDUPTHER

## 2020-11-30 ENCOUNTER — OFFICE VISIT (OUTPATIENT)
Age: 60
End: 2020-11-30

## 2020-11-30 VITALS — TEMPERATURE: 96.8 F | OXYGEN SATURATION: 95 % | HEART RATE: 65 BPM

## 2020-12-01 ENCOUNTER — OFFICE VISIT (OUTPATIENT)
Dept: FAMILY MEDICINE CLINIC | Facility: CLINIC | Age: 60
End: 2020-12-01

## 2020-12-01 VITALS
HEIGHT: 60 IN | WEIGHT: 206 LBS | OXYGEN SATURATION: 98 % | HEART RATE: 68 BPM | BODY MASS INDEX: 40.44 KG/M2 | DIASTOLIC BLOOD PRESSURE: 84 MMHG | SYSTOLIC BLOOD PRESSURE: 123 MMHG | TEMPERATURE: 97.1 F

## 2020-12-01 DIAGNOSIS — L02.01 CUTANEOUS ABSCESS OF FACE: Primary | ICD-10-CM

## 2020-12-01 DIAGNOSIS — E66.01 MORBID (SEVERE) OBESITY DUE TO EXCESS CALORIES (HCC): ICD-10-CM

## 2020-12-01 DIAGNOSIS — E11.628 TYPE 2 DIABETES MELLITUS WITH OTHER SKIN COMPLICATION, WITHOUT LONG-TERM CURRENT USE OF INSULIN (HCC): ICD-10-CM

## 2020-12-01 PROCEDURE — 99213 OFFICE O/P EST LOW 20 MIN: CPT | Performed by: NURSE PRACTITIONER

## 2020-12-01 RX ORDER — AMOXICILLIN AND CLAVULANATE POTASSIUM 875; 125 MG/1; MG/1
1 TABLET, FILM COATED ORAL 2 TIMES DAILY
Qty: 20 TABLET | Refills: 0 | Status: SHIPPED | OUTPATIENT
Start: 2020-12-01 | End: 2020-12-28

## 2020-12-01 NOTE — PROGRESS NOTES
"Chief Complaint   Patient presents with   • Skin Lesion   • Med Refill     Zanaflex        Subjective   Della Gonzalez is a 60 y.o.  female who presents today for skin lesion/med refill.    HPI:  Patient has an area on the skin of the left temple that is very tender.  She is unsure if this is another abscess or shingles or exactly what?  She \"just knows that it hurts!\"  She denies fever or any other area of skin pain or abnormality.  Regarding the zanaflex, patient admits that she has tizanidine and did not realize that it was the generic name of zanaflex.    Della Gonzalez  has a past medical history of Cirrhosis of liver (CMS/HCC), Diabetes mellitus (CMS/HCC), GERD (gastroesophageal reflux disease), and Liver disease.    No Known Allergies    Current Outpatient Medications:   •  amitriptyline (ELAVIL) 100 MG tablet, TAKE ONE TABLET BY MOUTH EVERY EVENING, Disp: 30 tablet, Rfl: 5  •  atorvastatin (LIPITOR) 10 MG tablet, TAKE ONE TABLET BY MOUTH EVERY DAY, Disp: 30 tablet, Rfl: 5  •  benazepril (LOTENSIN) 10 MG tablet, TAKE ONE TABLET BY MOUTH EVERY DAY, Disp: 30 tablet, Rfl: 5  •  gabapentin (NEURONTIN) 600 MG tablet, Take 1 tablet by mouth 2 (Two) Times a Day., Disp: 60 tablet, Rfl: 0  •  hydrocortisone-eucerin, Apply  topically to the appropriate area as directed 2 (Two) Times a Day., Disp: 120 g, Rfl: 2  •  ketoconazole (NIZORAL) 2 % cream, Apply  topically to the appropriate area as directed Daily., Disp: 60 g, Rfl: 2  •  linagliptin (TRADJENTA) 5 MG tablet tablet, Take 1 tablet by mouth Daily., Disp: 30 tablet, Rfl: 2  •  metFORMIN (Glucophage) 1000 MG tablet, Take 1 tablet by mouth 2 (Two) Times a Day With Meals., Disp: 60 tablet, Rfl: 5  •  nabumetone (RELAFEN) 500 MG tablet, Take 1 tablet by mouth 2 (Two) Times a Day As Needed for Moderate Pain ., Disp: 60 tablet, Rfl: 5  •  nadolol (CORGARD) 20 MG tablet, Take 1 tablet by mouth Daily., Disp: 30 tablet, Rfl: 2  •  nystatin (MYCOSTATIN) 077366 UNIT/GM " cream, Apply  topically to the appropriate area as directed 2 (two) times a day., Disp: 30 g, Rfl: 0  •  omeprazole (priLOSEC) 20 MG capsule, TAKE ONE CAPSULE BY MOUTH EVERY DAY, Disp: , Rfl:   •  Steglatro 15 MG tablet, TAKE ONE TABLET BY MOUTH EVERY MORNING, Disp: 90 tablet, Rfl: 1  •  tiZANidine (ZANAFLEX) 4 MG tablet, , Disp: , Rfl:   •  traMADol (ULTRAM) 50 MG tablet, Take 1 tablet by mouth 3 (Three) Times a Day As Needed (TID as needed)., Disp: 90 tablet, Rfl: 0  •  amoxicillin-clavulanate (Augmentin) 875-125 MG per tablet, Take 1 tablet by mouth 2 (Two) Times a Day., Disp: 20 tablet, Rfl: 0  Past Medical History:   Diagnosis Date   • Cirrhosis of liver (CMS/HCC)    • Diabetes mellitus (CMS/HCC)    • GERD (gastroesophageal reflux disease)    • Liver disease      Past Surgical History:   Procedure Laterality Date   • COLONOSCOPY       Social History     Socioeconomic History   • Marital status: Single     Spouse name: Not on file   • Number of children: Not on file   • Years of education: Not on file   • Highest education level: Not on file   Tobacco Use   • Smoking status: Current Every Day Smoker     Packs/day: 0.50     Years: 40.00     Pack years: 20.00     Types: Cigarettes   • Smokeless tobacco: Never Used   Substance and Sexual Activity   • Alcohol use: No     Frequency: Never     Binge frequency: Never   • Drug use: No   • Sexual activity: Not Currently     Partners: Male     Family History   Problem Relation Age of Onset   • Diabetes Mother    • No Known Problems Father        Family history, surgical history, past medical history, Allergies and med's reviewed with patient today and updated in [facility] EMR.     ROS:  Review of Systems   Constitutional: Negative for fatigue, fever and unexpected weight change.   HENT: Negative.  Negative for facial swelling, sore throat and trouble swallowing.    Eyes: Negative.  Negative for photophobia, discharge and visual disturbance.   Respiratory: Negative.  Negative  "for cough, chest tightness and shortness of breath.    Cardiovascular: Negative.  Negative for chest pain and palpitations.   Gastrointestinal: Negative.  Negative for abdominal pain, diarrhea, nausea and vomiting.   Endocrine: Negative.  Negative for polydipsia, polyphagia and polyuria.   Genitourinary: Negative.  Negative for dysuria, flank pain and frequency.   Musculoskeletal: Negative.  Negative for back pain, gait problem and neck pain.   Skin: Positive for color change. Negative for rash.   Allergic/Immunologic: Negative.    Neurological: Positive for weakness and numbness. Negative for dizziness, light-headedness and headaches.   Hematological: Negative.    Psychiatric/Behavioral: Negative.  Negative for self-injury and suicidal ideas.       OBJECTIVE:  Vitals:    12/01/20 1342   BP: 123/84   BP Location: Right arm   Patient Position: Sitting   Cuff Size: Large Adult   Pulse: 68   Temp: 97.1 °F (36.2 °C)   SpO2: 98%   Weight: 93.4 kg (206 lb)   Height: 152.4 cm (60\")     Physical Exam  Vitals signs and nursing note reviewed.   Constitutional:       General: She is not in acute distress.     Appearance: Normal appearance. She is well-developed. She is obese. She is not diaphoretic.   HENT:      Head: Normocephalic and atraumatic.   Eyes:      Conjunctiva/sclera: Conjunctivae normal.      Pupils: Pupils are equal, round, and reactive to light.   Neck:      Musculoskeletal: Normal range of motion and neck supple.   Cardiovascular:      Rate and Rhythm: Normal rate and regular rhythm.      Heart sounds: Normal heart sounds. No murmur.   Pulmonary:      Effort: Pulmonary effort is normal. No respiratory distress.      Breath sounds: Normal breath sounds.   Abdominal:      General: Bowel sounds are normal. There is no distension.      Palpations: Abdomen is soft.      Tenderness: There is no abdominal tenderness.   Musculoskeletal: Normal range of motion.   Skin:     General: Skin is warm and dry.      Capillary " Refill: Capillary refill takes less than 2 seconds.      Findings: Erythema and lesion present.      Comments: Left temple in the hairline is a 2 cm pointed fluctuant area that is purplish in color, very tender to touch, consistent with a boil/abscess.   Neurological:      Mental Status: She is alert and oriented to person, place, and time.   Psychiatric:         Behavior: Behavior normal.         Thought Content: Thought content normal.         Judgment: Judgment normal.         ASSESSMENT/ PLAN:    Diagnoses and all orders for this visit:    1. Cutaneous abscess of face (Primary)  -     amoxicillin-clavulanate (Augmentin) 875-125 MG per tablet; Take 1 tablet by mouth 2 (Two) Times a Day.  Dispense: 20 tablet; Refill: 0    2. Type 2 diabetes mellitus with other skin complication, without long-term current use of insulin (CMS/[facility])    3. Morbid (severe) obesity due to excess calories (CMS/[facility])    Patient instructed to use warm compresses to the affected area but NO squeezing!    Orders Placed Today:     New Medications Ordered This Visit   Medications   • amoxicillin-clavulanate (Augmentin) 875-125 MG per tablet     Sig: Take 1 tablet by mouth 2 (Two) Times a Day.     Dispense:  20 tablet     Refill:  0        Management Plan:     An After Visit Summary was printed and given to the patient at discharge.    Follow-up: Return for keep woody appt.  PRN for worsening or concerns.    Vijaya Henao, APRN 12/1/2020 16:56 CST  This note was electronically signed.

## 2020-12-02 ENCOUNTER — TELEPHONE (OUTPATIENT)
Dept: FAMILY MEDICINE CLINIC | Facility: CLINIC | Age: 60
End: 2020-12-02

## 2020-12-02 ENCOUNTER — TELEPHONE (OUTPATIENT)
Dept: GASTROENTEROLOGY | Age: 60
End: 2020-12-02

## 2020-12-02 LAB — SARS-COV-2, NAA: NOT DETECTED

## 2020-12-02 NOTE — TELEPHONE ENCOUNTER
Shira Putnam called to schedule a procedure. Please be advised that the best time to call her to accommodate their needs is Anytime. Thank you.

## 2020-12-02 NOTE — TELEPHONE ENCOUNTER
Pt called this morning and stated that we should be getting a fax that Dr Orlin Cruz will need to fill out for pt to get transportation to her [facility] on 12/4/20. Pt lives in [place].

## 2020-12-03 ENCOUNTER — ANESTHESIA EVENT (OUTPATIENT)
Dept: ENDOSCOPY | Age: 60
End: 2020-12-03
Payer: MEDICARE

## 2020-12-04 ENCOUNTER — ANESTHESIA (OUTPATIENT)
Dept: ENDOSCOPY | Age: 60
End: 2020-12-04
Payer: MEDICARE

## 2020-12-04 ENCOUNTER — HOSPITAL ENCOUNTER (OUTPATIENT)
Age: 60
Setting detail: OUTPATIENT SURGERY
Discharge: HOME | End: 2020-12-04
Attending: INTERNAL MEDICINE | Admitting: INTERNAL MEDICINE
Payer: MEDICARE

## 2020-12-04 VITALS
OXYGEN SATURATION: 93 % | SYSTOLIC BLOOD PRESSURE: 174 MMHG | TEMPERATURE: 98.8 F | BODY MASS INDEX: 40.44 KG/M2 | HEIGHT: 60 IN | DIASTOLIC BLOOD PRESSURE: 86 MMHG | HEART RATE: 85 BPM | WEIGHT: 206 LBS | RESPIRATION RATE: 18 BRPM

## 2020-12-04 VITALS — DIASTOLIC BLOOD PRESSURE: 94 MMHG | SYSTOLIC BLOOD PRESSURE: 195 MMHG | OXYGEN SATURATION: 95 %

## 2020-12-04 LAB
GLUCOSE BLD-MCNC: 144 MG/DL (ref 70–99)
PERFORMED ON: ABNORMAL

## 2020-12-04 PROCEDURE — 3609012300 HC EGD BAND LIGATION ESOPHGEAL/GASTRIC VARICES: Performed by: INTERNAL MEDICINE

## 2020-12-04 PROCEDURE — 2580000003 HC RX 258: Performed by: INTERNAL MEDICINE

## 2020-12-04 PROCEDURE — 2709999900 HC NON-CHARGEABLE SUPPLY: Performed by: INTERNAL MEDICINE

## 2020-12-04 PROCEDURE — 3700000001 HC ADD 15 MINUTES (ANESTHESIA): Performed by: INTERNAL MEDICINE

## 2020-12-04 PROCEDURE — 82947 ASSAY GLUCOSE BLOOD QUANT: CPT

## 2020-12-04 PROCEDURE — 2500000003 HC RX 250 WO HCPCS: Performed by: NURSE ANESTHETIST, CERTIFIED REGISTERED

## 2020-12-04 PROCEDURE — 7100000010 HC PHASE II RECOVERY - FIRST 15 MIN: Performed by: INTERNAL MEDICINE

## 2020-12-04 PROCEDURE — 6360000002 HC RX W HCPCS: Performed by: NURSE ANESTHETIST, CERTIFIED REGISTERED

## 2020-12-04 PROCEDURE — 2720000010 HC SURG SUPPLY STERILE: Performed by: INTERNAL MEDICINE

## 2020-12-04 PROCEDURE — 7100000011 HC PHASE II RECOVERY - ADDTL 15 MIN: Performed by: INTERNAL MEDICINE

## 2020-12-04 PROCEDURE — 3700000000 HC ANESTHESIA ATTENDED CARE: Performed by: INTERNAL MEDICINE

## 2020-12-04 PROCEDURE — 43244 EGD VARICES LIGATION: CPT | Performed by: INTERNAL MEDICINE

## 2020-12-04 PROCEDURE — 6360000002 HC RX W HCPCS: Performed by: INTERNAL MEDICINE

## 2020-12-04 PROCEDURE — 6370000000 HC RX 637 (ALT 250 FOR IP): Performed by: INTERNAL MEDICINE

## 2020-12-04 RX ORDER — ONDANSETRON 4 MG/1
4 TABLET, FILM COATED ORAL EVERY 8 HOURS PRN
Qty: 45 TABLET | Refills: 2 | Status: SHIPPED | OUTPATIENT
Start: 2020-12-04

## 2020-12-04 RX ORDER — SODIUM CHLORIDE, SODIUM LACTATE, POTASSIUM CHLORIDE, CALCIUM CHLORIDE 600; 310; 30; 20 MG/100ML; MG/100ML; MG/100ML; MG/100ML
INJECTION, SOLUTION INTRAVENOUS CONTINUOUS
Status: DISCONTINUED | OUTPATIENT
Start: 2020-12-04 | End: 2020-12-04 | Stop reason: HOSPADM

## 2020-12-04 RX ORDER — ONDANSETRON 2 MG/ML
4 INJECTION INTRAMUSCULAR; INTRAVENOUS ONCE
Status: DISCONTINUED | OUTPATIENT
Start: 2020-12-04 | End: 2020-12-04 | Stop reason: HOSPADM

## 2020-12-04 RX ORDER — PROPOFOL 10 MG/ML
INJECTION, EMULSION INTRAVENOUS PRN
Status: DISCONTINUED | OUTPATIENT
Start: 2020-12-04 | End: 2020-12-04 | Stop reason: SDUPTHER

## 2020-12-04 RX ORDER — MORPHINE SULFATE 4 MG/ML
2 INJECTION, SOLUTION INTRAMUSCULAR; INTRAVENOUS ONCE
Status: COMPLETED | OUTPATIENT
Start: 2020-12-04 | End: 2020-12-04

## 2020-12-04 RX ORDER — AMOXICILLIN AND CLAVULANATE POTASSIUM 875; 125 MG/1; MG/1
1 TABLET, FILM COATED ORAL 2 TIMES DAILY
Status: ON HOLD | COMMUNITY
End: 2021-03-09 | Stop reason: ALTCHOICE

## 2020-12-04 RX ORDER — SUCRALFATE ORAL 1 G/10ML
1 SUSPENSION ORAL 4 TIMES DAILY
Qty: 1200 ML | Refills: 3 | Status: SHIPPED | OUTPATIENT
Start: 2020-12-04 | End: 2021-02-23 | Stop reason: ALTCHOICE

## 2020-12-04 RX ORDER — ONDANSETRON 2 MG/ML
INJECTION INTRAMUSCULAR; INTRAVENOUS PRN
Status: DISCONTINUED | OUTPATIENT
Start: 2020-12-04 | End: 2020-12-04 | Stop reason: SDUPTHER

## 2020-12-04 RX ORDER — LIDOCAINE HYDROCHLORIDE 20 MG/ML
INJECTION, SOLUTION INFILTRATION; PERINEURAL PRN
Status: DISCONTINUED | OUTPATIENT
Start: 2020-12-04 | End: 2020-12-04 | Stop reason: SDUPTHER

## 2020-12-04 RX ADMIN — PROPOFOL 250 MG: 10 INJECTION, EMULSION INTRAVENOUS at 13:26

## 2020-12-04 RX ADMIN — LIDOCAINE HYDROCHLORIDE 40 MG: 20 INJECTION, SOLUTION INFILTRATION; PERINEURAL at 13:26

## 2020-12-04 RX ADMIN — Medication 2 MG: at 14:17

## 2020-12-04 RX ADMIN — ONDANSETRON HYDROCHLORIDE 4 MG: 2 INJECTION, SOLUTION INTRAMUSCULAR; INTRAVENOUS at 13:44

## 2020-12-04 RX ADMIN — LIDOCAINE HYDROCHLORIDE: 20 SOLUTION ORAL; TOPICAL at 14:06

## 2020-12-04 RX ADMIN — SODIUM CHLORIDE, POTASSIUM CHLORIDE, SODIUM LACTATE AND CALCIUM CHLORIDE: 600; 310; 30; 20 INJECTION, SOLUTION INTRAVENOUS at 10:13

## 2020-12-04 ASSESSMENT — PAIN SCALES - GENERAL
PAINLEVEL_OUTOF10: 10
PAINLEVEL_OUTOF10: 7
PAINLEVEL_OUTOF10: 10
PAINLEVEL_OUTOF10: 10

## 2020-12-04 ASSESSMENT — LIFESTYLE VARIABLES: SMOKING_STATUS: 1

## 2020-12-04 ASSESSMENT — PAIN DESCRIPTION - PAIN TYPE
TYPE: ACUTE PAIN
TYPE: ACUTE PAIN

## 2020-12-04 ASSESSMENT — PAIN - FUNCTIONAL ASSESSMENT: PAIN_FUNCTIONAL_ASSESSMENT: 0-10

## 2020-12-04 NOTE — ANESTHESIA POSTPROCEDURE EVALUATION
Department of Anesthesiology  Postprocedure Note    Patient: Chetan Augustin  MRN: [id number]  YOB: 1960  Date of evaluation: 12/4/2020  Time:  1:45 PM     Procedure Summary     Date:  12/04/20 Room / Location:  [address]    Anesthesia Start:  6678 Anesthesia Stop:      Procedure:  EGD BAND LIGATION (N/A ) Diagnosis:  ([address] [place] VARICES, CIRRHOSIS)    Surgeon:  Janelle Ballard MD Responsible Provider:  DANIELLA Guardado CRNA    Anesthesia Type:  general, TIVA ASA Status:  3          Anesthesia Type: general, TIVA    Daniele Phase I:      Daniele Phase II:      Last vitals: Reviewed and per EMR flowsheets.        Anesthesia Post Evaluation    Patient location during evaluation: bedside  Patient participation: complete - patient participated  Level of consciousness: sleepy but conscious  Pain score: 0  Airway patency: patent  Nausea & Vomiting: no nausea and no vomiting  Complications: no  Cardiovascular status: hemodynamically stable and blood pressure returned to baseline  Respiratory status: acceptable and nasal cannula  Hydration status: stable

## 2020-12-04 NOTE — OP NOTE
Endoscopic Procedure Note    Patient: Aly Grade: 1960  Med Rec#: [id number] Acc#: [id number]     Primary Care Provider Dr. Tushar Sanders M.D., MD  Referring Provider: Jasvir BREWER    Endoscopist: Deb Garcia MD    Date of Procedure:  12/4/2020    Procedure:   1. EGD with variceal banding x 5     Indications:   1. Cirrhosis with known esophageal varices s/p recent banding      Anesthesia:  Sedation was administered by anesthesia who monitored the patient during the procedure. Estimated Blood Loss: minimal    Procedure:   After reviewing the patient's chart and obtaining informed consent, the patient was placed in the left lateral decubitus position. A forward-viewing Olympus endoscope was lubricated and inserted through the mouth into the oropharynx. Under direct visualization, the upper esophagus was intubated. The scope was advanced to the level of the third portion of duodenum. Scope was slowly withdrawn with careful inspection of the mucosal surfaces. The scope was retroflexed for inspection of the gastric fundus and incisura. Findings and maneuvers are listed in impression below. The patient tolerated the procedure well. The scope was removed. There were no immediate complications. Findings:   Esophagus: abnormal: in the distal esophagus there was noted grade II varices. At least one area of red spot was seen overlying one column of varices. Starting at [facility], banding was pursued along 3 different columns. A total of 5 bands were successfully placed with near complete eradication of varices. Stomach:  abnormal: portal hypertensive gastropathy was noted throughout the proximal stomach. Duodenum: normal      IMPRESSION:  1. Esophageal varices s/p banding x 5   2. Portal hypertensive gastropathy. RECOMMENDATIONS:    1. Liquid diet x 3 days  2. Repeat EGD in 3 months for further banding as needed. 3.  Carafate slurry TID  4.   Continue tramadol as needed for pain control      The results were discussed with the patient and family. A copy of the images obtained were given to the patient.      Arleen Lambert MD  12/4/2020  2:23 PM

## 2020-12-04 NOTE — ANESTHESIA PRE PROCEDURE
Department of Anesthesiology  Preprocedure Note       Name:  Logan Fish   Age:  63 y.o.  :  1960                                          MRN:  [id number]         Date:  2020      Surgeon: Marge Olvera):  Sydney Escalante MD    Procedure: Procedure(s):  EGD ESOPHAGOGASTRODUODENOSCOPY    Medications prior to admission:   Prior to Admission medications    Medication Sig Start Date End Date Taking? Authorizing Provider   nadolol (CORGARD) 20 MG tablet TAKE ONE TABLET BY MOUTH EVERY DAY -- NEED APPOINTMENT 10/7/20   Theodor Quant, APRN - NP   omeprazole (PRILOSEC) 20 MG delayed release capsule TAKE ONE CAPSULE BY MOUTH EVERY DAY 10/7/20   Theodor Quant, APRN - NP   tiZANidine (ZANAFLEX) 4 MG tablet  20   Historical Provider, MD   gabapentin (NEURONTIN) 600 MG tablet Take 600 mg by mouth 3 times daily. Historical Provider, MD   atorvastatin (LIPITOR) 10 MG tablet Take 10 mg by mouth daily    Historical Provider, MD   metFORMIN, OSM, (FORTAMET) 1000 MG extended release tablet Take 1,000 mg by mouth daily (with breakfast)  19   Historical Provider, MD   Ertugliflozin L-PyroglutamicAc (STEGLATRO) 5 MG TABS Take 5 mg by mouth daily  19   Historical Provider, MD   amitriptyline (ELAVIL) 10 MG tablet TAKE ONE TABLET BY MOUTH EVERY NIGHT 19   DANIELLA Ambrocio   cyclobenzaprine (FLEXERIL) 10 MG tablet Take 10 mg by mouth 3 times daily as needed for Muscle spasms    Historical Provider, MD   traMADol (ULTRAM) 50 MG tablet Take 1 tablet by mouth every 12 hours as needed for Pain 17   DANIELLA Boss CNP   benazepril (LOTENSIN) 20 MG tablet Take 1 tablet by mouth daily 17   DANIELLA Chua CNP       Current medications:    No current facility-administered medications for this visit. No current outpatient medications on file.      Facility-Administered Medications Ordered in Other Visits   Medication Dose Route Frequency Provider Last Rate Last Dose    lactated ringers infusion   Intravenous Continuous Elvia Kauffman MD           Allergies:  No Known Allergies    Problem List:    Patient Active Problem List   Diagnosis Code    Nonintractable headache R51.9    Cirrhosis of liver without ascites (HCC) K74.60    Essential hypertension I10    Chronic GERD K21.9    History of hepatitis C Z86.19    Elevated AFP R77.2    Portal hypertension ([facility] [address] K76.6    Secondary esophageal varices without bleeding (HCC) I85.10    Gastritis without bleeding K29.70    Gallstones K80.20       Past Medical History:        Diagnosis Date    Arthritis     Asthma     Cirrhosis ([facility] [address]     COPD (chronic obstructive pulmonary disease) (HCC)     Esophageal varices (HCC)     GERD (gastroesophageal reflux disease)     Hepatitis C     Hyperlipidemia     Hypertension        Past Surgical History:        Procedure Laterality Date    COLONOSCOPY  03/02/2016    Dr Sunny Solorzano bleeding internal hemorrhoids o/w normal; fair prep (5 yr)    HI EGD TRANSORAL BIOPSY SINGLE/MULTIPLE N/A 2/7/2017    Dr Mel Lopez Grade I esophageal varices, gastritis/gastropathy    HI EGD TRANSORAL BIOPSY SINGLE/MULTIPLE N/A 3/23/2018    Dr Milagros Glover I esophageal varices, erosive/active gastritis-2 yr recall    UPPER GASTROINTESTINAL ENDOSCOPY  03/02/2016    Dr Dorantes II esoph varices; portal hyptensive gastropathy    UPPER GASTROINTESTINAL ENDOSCOPY N/A 3/3/2020    Dr Coby Blum/variceal banding x 4, 3 columns of Grade II varices, repeat in 6-8 wks    UPPER GASTROINTESTINAL ENDOSCOPY N/A 4/14/2020    Dr Coby Blum/variceal banding x 3-grade I-II varices, repeat in 8 wks    UPPER GASTROINTESTINAL ENDOSCOPY N/A 8/25/2020    Dr LUIS FELIPE Blum/variceal banding x 3, grade 1-2 varices, portal hypertensive gastropathy, repeat in 3 months    US GUIDED LIVER BIOPSY PERCUTANEOUS  05/07/2019    Dr. Haley Cormier; Grade 2, Stage 4, iron stain (-)       Social History:    Social History     Tobacco Use    Smoking status: Current Every Day Smoker     Packs/day: 0.25     Years: 45.00     Pack years: 11.25    Smokeless tobacco: Never Used   Substance Use Topics    Alcohol use: No     Comment: Pt states that she stopped drinking 2014? Ready to quit: Not Answered  Counseling given: Not Answered      Vital Signs (Current): There were no vitals filed for this visit. BP Readings from Last 3 Encounters:   09/22/20 115/70   08/25/20 (!) 146/76   08/25/20 108/82       NPO Status:                                                                                 BMI:   Wt Readings from Last 3 Encounters:   09/22/20 201 lb (91.2 kg)   08/25/20 195 lb (88.5 kg)   08/11/20 196 lb (88.9 kg)     There is no height or weight on file to calculate BMI.    CBC:   Lab Results   Component Value Date    WBC 4.6 08/04/2020    RBC 4.63 08/04/2020    HGB 13.6 08/04/2020    HCT 40.5 08/04/2020    MCV 87.5 08/04/2020    RDW 13.6 08/04/2020     08/04/2020       CMP:   Lab Results   Component Value Date     08/04/2020    K 4.3 08/04/2020     08/04/2020    CO2 26 08/04/2020    BUN 10 08/04/2020    CREATININE 0.4 08/04/2020    GFRAA >59 08/04/2020    LABGLOM >60 08/04/2020    GLUCOSE 172 08/04/2020    PROT 7.6 08/04/2020    CALCIUM 9.7 08/04/2020    BILITOT 0.7 08/04/2020    ALKPHOS 120 08/04/2020    AST 24 08/04/2020    ALT 20 08/04/2020       POC Tests: No results for input(s): POCGLU, POCNA, POCK, POCCL, POCBUN, POCHEMO, POCHCT in the last 72 hours.     Coags:   Lab Results   Component Value Date    PROTIME 13.7 08/04/2020    INR 1.06 08/04/2020    APTT 35.5 05/07/2019       HCG (If Applicable): No results found for: PREGTESTUR, PREGSERUM, HCG, HCGQUANT     ABGs: No results found for: PHART, PO2ART, JPC4PHZ, AQR4AOK, BEART, Y0IGRLAD     Type & Screen (If Applicable):  No results found for: LABABO, LABRH    Drug/Infectious Status (If Applicable):  No results found for: HIV, HEPCAB    COVID-19 Screening (If Applicable):   Lab Results   Component Value Date    COVID19 NOT DETECTED 11/30/2020         Anesthesia Evaluation  Patient summary reviewed no history of anesthetic complications:   Airway: Mallampati: II  TM distance: >3 FB   Neck ROM: full  Mouth opening: > = 3 FB Dental: normal exam         Pulmonary: breath sounds clear to auscultation  (+) COPD:  asthma: current smoker          Patient smoked on day of surgery. Cardiovascular:    (+) hypertension:, hyperlipidemia         Beta Blocker:  Dose within 24 Hrs         Neuro/Psych:   (+) headaches:,             GI/Hepatic/Renal:   (+) GERD:, hepatitis: C, liver disease (cirrhosis): portal hypertension, esophageal varices, morbid obesity          Endo/Other:    (+) Diabetes, : arthritis:., .                 Abdominal:   (+) obese,         Vascular: negative vascular ROS. Anesthesia Plan      general and TIVA     ASA 3       Induction: intravenous. Anesthetic plan and risks discussed with patient.         Attending anesthesiologist reviewed and agrees with [address] [place] [address], DANIELLA - CRNA   12/4/2020

## 2020-12-04 NOTE — H&P
Patient Name: Christin Patterson  : 1960  MRN: [id number]  DATE: 20    Allergies: No Known Allergies     ENDOSCOPY  History and Physical    Procedure:    [] Diagnostic Colonoscopy       [] Screening Colonoscopy  [x] EGD      [] ERCP      [] EUS       [] Other    [x] Previous office notes/History and Physical reviewed from the patients chart. Please see EMR for further details of HPI. I have examined the patient's status immediately prior to the procedure and:      Indications/HPI:    []Abdominal Pain   []Cancer- GI/Lung     []Fhx of colon CA/polyps  []History of Polyps  []Barretts            []Melena  []Abnormal Imaging              []Dysphagia              []Persistent Pneumonia   []Anemia                            []Food Impaction        []History of Polyps  [] GI Bleed             []Pulmonary nodule/Mass   []Change in bowel habits []Heartburn/Reflux  []Rectal Bleed (BRBPR)  []Chest Pain - Non Cardiac []Heme (+) Stool []Ulcers  []Constipation  []Hemoptysis  [x]Varices  []Diarrhea  []Hypoxemia    []Nausea/Vomiting   []Screening   []Crohns/Colitis  []Other:     Anesthesia:   [x] MAC [] Moderate Sedation   [] General   [] None     ROS: 12 pt Review of Symptoms was negative unless mentioned above    Medications:   Prior to Admission medications    Medication Sig Start Date End Date Taking? Authorizing Provider   nadolol (CORGARD) 20 MG tablet TAKE ONE TABLET BY MOUTH EVERY DAY -- NEED APPOINTMENT 10/7/20   DANIELLA Padgett - NP   omeprazole (PRILOSEC) 20 MG delayed release capsule TAKE ONE CAPSULE BY MOUTH EVERY DAY 10/7/20   DANIELLA Padgett - RADHA   tiZANidine (ZANAFLEX) 4 MG tablet  20   Historical Provider, MD   gabapentin (NEURONTIN) 600 MG tablet Take 600 mg by mouth 3 times daily.     Historical Provider, MD   atorvastatin (LIPITOR) 10 MG tablet Take 10 mg by mouth daily    Historical Provider, MD   metFORMIN, OSM, (FORTAMET) 1000 MG extended release tablet Take 1,000 mg by mouth LIVER BIOPSY PERCUTANEOUS  05/07/2019    Dr. Viry Rogers; Grade 2, Stage 4, iron stain (-)       Social History:  Social History     Tobacco Use    Smoking status: Current Every Day Smoker     Packs/day: 0.25     Years: 45.00     Pack years: 11.25    Smokeless tobacco: Never Used   Substance Use Topics    Alcohol use: No     Comment: Pt states that she stopped drinking 2014?  Drug use: No     Types: Cocaine     Comment: Pt stopped use on this 2014?- Pt did not have to do any rehab       Vital Signs: There were no vitals filed for this visit. Physical Exam:  Cardiac:  [x]WNL  []Comments:  Pulmonary:  [x]WNL   []Comments:  Neuro/Mental Status:  [x]WNL  []Comments:  Abdominal:  [x]WNL    []Comments:  Other:   []WNL  []Comments:    Informed Consent:  The risks and benefits of the procedure have been discussed with either the patient or if they cannot consent, their representative. Assessment:  Patient examined and appropriate for planned sedation and procedure. Plan:  Proceed with planned sedation and procedure as above.          Janelle Ballard MD

## 2020-12-07 ENCOUNTER — TELEPHONE (OUTPATIENT)
Dept: GASTROENTEROLOGY | Age: 60
End: 2020-12-07

## 2020-12-07 NOTE — TELEPHONE ENCOUNTER
Belia Parker,    Per Dr Ramy Kenney 12/4/20: IMPRESSION:  1. Esophageal varices s/p banding x 5   2. Portal hypertensive gastropathy. RECOMMENDATIONS:    1. Liquid diet x 3 days  2. Repeat EGD in 3 months for further banding as needed. 3.  Carafate slurry TID  4. Continue tramadol as needed for pain control        The results were discussed with the patient and family.   A copy of the images obtained were given to the patient.      Rudy Biswas MD  12/4/2020  2:23 PM

## 2020-12-11 ENCOUNTER — TELEPHONE (OUTPATIENT)
Dept: GASTROENTEROLOGY | Age: 60
End: 2020-12-11

## 2020-12-11 NOTE — TELEPHONE ENCOUNTER
Patient is scheduled for repeat EGD with Dr. Layla Parra at [facility] on March 9, 21. Patient is aware of appointment date and all instructions.

## 2020-12-11 NOTE — TELEPHONE ENCOUNTER
PATIENT IS SCHEDULED ON 02/10/2021 FOR HER 6 MO. SHE WILL NEED LABS AND ULTRA SOUND OF THE LIVER PRIOR TO THAT APPT.

## 2020-12-23 NOTE — PROGRESS NOTES
[facility] - PODIATRY    Today's Date: 12/28/20    Patient Name: Della Gonzalez  MRN: [id number]  CSN: [id number]  PCP: Donny Aguayo MD  Referring Provider: No ref. provider found    SUBJECTIVE     Chief Complaint   Patient presents with   • Follow-up     Pt is here today for 3 month f/u on diabetic foot/nail care - Pt denies pain at this moment - Pt presents with thickened and long toenails with discoloration - PCP 07/06/2020   • Diabetes     pt is unaware of the last blood sugar reading      HPI: Della Gonzalez, a 60 y.o.female, comes to clinic as a(n) established patient presenting for diabetic foot exam and complaining of thick fungal toenails. Patient has h/o cirrhosis, DM2, GERD, tobacco use. Patient is NIDDM and unsure of last BG level. Admits occasional numbness and tingling to feet but overall notes retained sensation.  Notes that her toenails are long, thick, discolored and crumbly. She is unable to care for them herself adequately. Continues tobacco use. Relates continued thickened, dry skin to heels despite use of moisturizer and pumice stone/pina board. Denies pain at the present time. Relates previous treatment(s) including care by podiatrist. Denies any constitutional symptoms. No other pedal complaints at this time.    Past Medical History:   Diagnosis Date   • Cirrhosis of liver (CMS/HCC)    • Diabetes mellitus (CMS/HCC)    • GERD (gastroesophageal reflux disease)    • Liver disease      Past Surgical History:   Procedure Laterality Date   • COLONOSCOPY       Family History   Problem Relation Age of Onset   • Diabetes Mother    • No Known Problems Father      Social History     Socioeconomic History   • Marital status: Single     Spouse name: Not on file   • Number of children: Not on file   • Years of education: Not on file   • Highest education level: Not on file   Tobacco Use   • Smoking status: Current Every Day Smoker     Packs/day: 0.50     Years: 40.00     Pack years:  20.00     Types: Cigarettes   • Smokeless tobacco: Never Used   Substance and Sexual Activity   • Alcohol use: No     Frequency: Never     Binge frequency: Never   • Drug use: No   • Sexual activity: Not Currently     Partners: Male     No Known Allergies  Current Outpatient Medications   Medication Sig Dispense Refill   • amitriptyline (ELAVIL) 100 MG tablet TAKE ONE TABLET BY MOUTH EVERY EVENING 30 tablet 5   • atorvastatin (LIPITOR) 10 MG tablet TAKE ONE TABLET BY MOUTH EVERY DAY 30 tablet 5   • benazepril (LOTENSIN) 10 MG tablet TAKE ONE TABLET BY MOUTH EVERY DAY 30 tablet 5   • gabapentin (NEURONTIN) 600 MG tablet Take 1 tablet by mouth 2 (Two) Times a Day. 60 tablet 0   • hydrocortisone-eucerin Apply  topically to the appropriate area as directed 2 (Two) Times a Day. 120 g 2   • ketoconazole (NIZORAL) 2 % cream Apply  topically to the appropriate area as directed Daily. 60 g 2   • linagliptin (TRADJENTA) 5 MG tablet tablet Take 1 tablet by mouth Daily. 30 tablet 2   • metFORMIN (Glucophage) 1000 MG tablet Take 1 tablet by mouth 2 (Two) Times a Day With Meals. 60 tablet 5   • nabumetone (RELAFEN) 500 MG tablet Take 1 tablet by mouth 2 (Two) Times a Day As Needed for Moderate Pain . 60 tablet 5   • nadolol (CORGARD) 20 MG tablet Take 1 tablet by mouth Daily. 30 tablet 2   • nystatin (MYCOSTATIN) 033233 UNIT/GM cream Apply  topically to the appropriate area as directed 2 (two) times a day. 30 g 0   • omeprazole (priLOSEC) 20 MG capsule TAKE ONE CAPSULE BY MOUTH EVERY DAY     • Steglatro 15 MG tablet TAKE ONE TABLET BY MOUTH EVERY MORNING 90 tablet 1   • tiZANidine (ZANAFLEX) 4 MG tablet      • traMADol (ULTRAM) 50 MG tablet Take 1 tablet by mouth 3 (Three) Times a Day As Needed (TID as needed). 90 tablet 0     No current facility-administered medications for this visit.      Review of Systems   Constitutional: Negative for chills and fever.   HENT: Negative for congestion.    Respiratory: Negative for shortness  of breath.    Cardiovascular: Positive for leg swelling. Negative for chest pain.   Gastrointestinal: Negative for constipation, diarrhea, nausea and vomiting.   Musculoskeletal: Positive for arthralgias. Negative for gait problem.        Foot pain   Skin: Negative for wound.        Dry skin to heels   Neurological: Positive for numbness.       OBJECTIVE     Vitals:    12/28/20 1113   BP: 128/82   Pulse: 78   SpO2: 97%       PHYSICAL EXAM  GEN:   Accompanied by none.     Foot/Ankle Exam:       General:   Diabetic Foot Exam Performed    Appearance: appears stated age and healthy and obesity    Orientation: AAOx3    Affect: appropriate    Gait: unimpaired    Assistance: independent    Shoe Gear:  Casual shoes    VASCULAR      Right Foot Vascularity   Dorsalis pedis:  2+  Posterior tibial:  2+  Skin Temperature: warm    Edema Grading:  None  CFT:  3  Pedal Hair Growth:  Present  Varicosities: mild varicosities       Left Foot Vascularity   Dorsalis pedis:  2+  Posterior tibial:  2+  Skin Temperature: warm    Edema Grading:  None  CFT:  3  Pedal Hair Growth:  Present  Varicosities: mild varicosities        NEUROLOGIC     Right Foot Neurologic   Light touch sensation:  Diminished  Vibratory sensation:  Diminished  Hot/Cold sensation: diminished    Protective Sensation using Walker-John Paul Monofilament:  9     Left Foot Neurologic   Light touch sensation:  Diminished  Vibratory sensation:  Diminished  Hot/cold sensation: diminished    Protective Sensation using Walker-John Paul Monofilament:  9     MUSCULOSKELETAL      Right Foot Musculoskeletal   Ecchymosis:  None  Tenderness: toenails    Arch:  Normal  Hallux valgus: No    Hallux limitus: No       Left Foot Musculoskeletal   Ecchymosis:  None  Tenderness: toenails    Arch:  Normal  Hallux valgus: No    Hallux limitus: No       MUSCLE STRENGTH     Right Foot Muscle Strength   Foot dorsiflexion:  5  Foot plantar flexion:  5  Foot inversion:  5  Foot eversion:  5      Left Foot Muscle Strength   Foot dorsiflexion:  5  Foot plantar flexion:  5  Foot inversion:  5  Foot eversion:  5     RANGE OF MOTION      Right Foot Range of Motion   Foot and ankle ROM within normal limits       Left Foot Range of Motion   Foot and ankle ROM within normal limits       DERMATOLOGIC     Right Foot Dermatologic   Skin: dry skin    Nails: onychomycosis, abnormally thick, subungual debris, dystrophic nails and ingrown toenail (hallux bilateral border)       Left Foot Dermatologic   Skin: dry skin    Nails: onychomycosis, abnormally thick, subungual debris, dystrophic nails and ingrown toenail (hallux medial border)        RADIOLOGY/NUCLEAR:  No results found.    LABORATORY/CULTURE RESULTS:      PATHOLOGY RESULTS:       ASSESSMENT/PLAN     Diagnoses and all orders for this visit:    1. Onychomycosis (Primary)    2. Ingrown toenail    3. Type 2 diabetes mellitus with diabetic neuropathy, with long-term current use of insulin (CMS/[facility])    4. Tinea pedis of both feet    5. Tobacco abuse    6. Obesity, morbid, BMI 40.0-49.9 (CMS/[facility])      Comprehensive lower extremity examination and evaluation was performed.  Discussed findings and treatment plan including risks, benefits, and treatment options with patient in detail. Patient agreed with treatment plan.  After verbal consent obtained, nail(s) x10 debrided of length and thickness with nail nipper without incidence  After verbal consent obtained, nail(s) x2 debrided of offending borders with nail nipper without incidence  Patient may maintain nails and calluses at home utilizing emery board or pumice stone between visits as needed  Reviewed at home diabetic foot care including daily foot checks   Tobacco use ongoing, not ready to quit.   Continue monitoring blood sugar and maintain diabetic control.   Encourage moisture under occlusion to heels with use of pumice stone/pina board.   Patient's Body mass index is 40 kg/m². BMI is above normal parameters.  Recommendations include: educational material.  An After Visit Summary was printed and given to the patient at discharge, including (if requested) any available informative/educational handouts regarding diagnosis, treatment, or medications. All questions were answered to patient/family satisfaction. Should symptoms fail to improve or worsen they agree to call or return to clinic or to go to the Emergency Department. Discussed the importance of following up with any needed screening tests/labs/specialist appointments and any requested follow-up recommended by me today. Importance of maintaining follow-up discussed and patient accepts that missed appointments can delay diagnosis and potentially lead to worsening of conditions.  Return in about 3 months (around 3/28/2021)., or sooner if acute issues arise.        This document has been electronically signed by MELECIO Hassan on December 28, 2020 12:04 CST

## 2020-12-28 ENCOUNTER — OFFICE VISIT (OUTPATIENT)
Dept: PODIATRY | Facility: CLINIC | Age: 60
End: 2020-12-28

## 2020-12-28 VITALS
OXYGEN SATURATION: 97 % | HEART RATE: 78 BPM | DIASTOLIC BLOOD PRESSURE: 82 MMHG | WEIGHT: 204.8 LBS | HEIGHT: 60 IN | BODY MASS INDEX: 40.21 KG/M2 | SYSTOLIC BLOOD PRESSURE: 128 MMHG

## 2020-12-28 DIAGNOSIS — B35.1 ONYCHOMYCOSIS: Primary | ICD-10-CM

## 2020-12-28 DIAGNOSIS — E11.40 TYPE 2 DIABETES MELLITUS WITH DIABETIC NEUROPATHY, WITH LONG-TERM CURRENT USE OF INSULIN (HCC): ICD-10-CM

## 2020-12-28 DIAGNOSIS — Z79.4 TYPE 2 DIABETES MELLITUS WITH DIABETIC NEUROPATHY, WITH LONG-TERM CURRENT USE OF INSULIN (HCC): ICD-10-CM

## 2020-12-28 DIAGNOSIS — B35.3 TINEA PEDIS OF BOTH FEET: ICD-10-CM

## 2020-12-28 DIAGNOSIS — Z72.0 TOBACCO ABUSE: ICD-10-CM

## 2020-12-28 DIAGNOSIS — L60.0 INGROWN TOENAIL: ICD-10-CM

## 2020-12-28 DIAGNOSIS — E66.01 OBESITY, MORBID, BMI 40.0-49.9 (HCC): ICD-10-CM

## 2020-12-28 PROCEDURE — 99213 OFFICE O/P EST LOW 20 MIN: CPT | Performed by: NURSE PRACTITIONER

## 2020-12-28 PROCEDURE — 11721 DEBRIDE NAIL 6 OR MORE: CPT | Performed by: NURSE PRACTITIONER

## 2020-12-28 NOTE — PATIENT INSTRUCTIONS
"BMI for Adults  What is BMI?  Body mass index (BMI) is a number that is calculated from a person's weight and height. BMI can help estimate how much of a person's weight is composed of fat. BMI does not measure body fat directly. Rather, it is an alternative to procedures that directly measure body fat, which can be difficult and expensive.  BMI can help identify people who may be at higher risk for certain medical problems.  What are BMI measurements used for?  BMI is used as a screening tool to identify possible weight problems. It helps determine whether a person is obese, overweight, a healthy weight, or underweight.  BMI is useful for:  · Identifying a weight problem that may be related to a medical condition or may increase the risk for medical problems.  · Promoting changes, such as changes in diet and exercise, to help reach a healthy weight. BMI screening can be repeated to see if these changes are working.  How is BMI calculated?  BMI involves measuring your weight in relation to your height. Both height and weight are measured, and the BMI is calculated from those numbers. This can be done either in English ([place] or metric measurements. Note that charts and online BMI calculators are available to help you find your BMI quickly and easily without having to do these calculations yourself.  To calculate your BMI in English ([place] measurements:    1. Measure your weight in pounds (lb).  2. Multiply the number of pounds by 703.  ? For example, for a person who weighs 180 lb, multiply that number by 703, which equals 126,540.  3. Measure your height in inches. Then multiply that number by itself to get a measurement called \"inches squared.\"  ? For example, for a person who is 70 inches tall, the \"inches squared\" measurement is 70 inches x 70 inches, which equals 4,900 inches squared.  4. Divide the total from step 2 (number of lb x 703) by the total from step 3 (inches squared): 126,540 ÷ 4,900 = 25.8. This is " "your BMI.  To calculate your BMI in metric measurements:  1. Measure your weight in kilograms (kg).  2. Measure your height in meters (m). Then multiply that number by itself to get a measurement called \"meters squared.\"  ? For example, for a person who is 1.75 m tall, the \"meters squared\" measurement is 1.75 m x 1.75 m, which is equal to 3.1 meters squared.  3. Divide the number of kilograms (your weight) by the meters squared number. In this example: 70 ÷ 3.1 = 22.6. This is your BMI.  What do the results mean?  BMI charts are used to identify whether you are underweight, normal weight, overweight, or obese. The following guidelines will be used:  · Underweight: BMI less than 18.5.  · Normal weight: BMI between 18.5 and 24.9.  · Overweight: BMI between 25 and 29.9.  · Obese: BMI of 30 or above.  Keep these notes in mind:  · Weight includes both fat and muscle, so someone with a muscular build, such as an athlete, may have a BMI that is higher than 24.9. In cases like these, BMI is not an accurate measure of body fat.  · To determine if excess body fat is the cause of a BMI of 25 or higher, further assessments may need to be done by a health care provider.  · BMI is usually interpreted in the same way for men and women.  Where to find more information  For more information about BMI, including tools to quickly calculate your BMI, go to these websites:  · Centers for Disease Control and Prevention: www.cdc.gov  · American Heart Association: www.heart.org  · National Heart, Lung, and Blood [place]: www.nhlbi.nih.gov  Summary  · Body mass index (BMI) is a number that is calculated from a person's weight and height.  · BMI may help estimate how much of a person's weight is composed of fat. BMI can help identify those who may be at higher risk for certain medical problems.  · BMI can be measured using English measurements or metric measurements.  · BMI charts are used to identify whether you are underweight, normal " weight, overweight, or obese.  This information is not intended to replace advice given to you by your health care provider. Make sure you discuss any questions you have with your health care provider.  Document Revised: 09/09/2020 Document Reviewed: 07/17/2020  Elsevier Patient Education © 2020 [employer]

## 2021-01-05 ENCOUNTER — OFFICE VISIT (OUTPATIENT)
Dept: FAMILY MEDICINE CLINIC | Facility: CLINIC | Age: 61
End: 2021-01-05

## 2021-01-05 VITALS
TEMPERATURE: 97 F | DIASTOLIC BLOOD PRESSURE: 69 MMHG | BODY MASS INDEX: 39.54 KG/M2 | HEART RATE: 80 BPM | OXYGEN SATURATION: 97 % | WEIGHT: 201.4 LBS | HEIGHT: 60 IN | SYSTOLIC BLOOD PRESSURE: 149 MMHG

## 2021-01-05 DIAGNOSIS — N76.4 ABSCESS OF LEFT GENITAL LABIA: ICD-10-CM

## 2021-01-05 DIAGNOSIS — E78.2 MIXED HYPERLIPIDEMIA: ICD-10-CM

## 2021-01-05 DIAGNOSIS — L03.317 CELLULITIS OF LEFT BUTTOCK: ICD-10-CM

## 2021-01-05 DIAGNOSIS — I10 ESSENTIAL HYPERTENSION: ICD-10-CM

## 2021-01-05 DIAGNOSIS — E11.42 TYPE 2 DIABETES MELLITUS WITH DIABETIC POLYNEUROPATHY, WITHOUT LONG-TERM CURRENT USE OF INSULIN (HCC): Primary | ICD-10-CM

## 2021-01-05 PROCEDURE — 96372 THER/PROPH/DIAG INJ SC/IM: CPT | Performed by: NURSE PRACTITIONER

## 2021-01-05 PROCEDURE — 56405 I&D VULVA/PERINEAL ABSCESS: CPT | Performed by: NURSE PRACTITIONER

## 2021-01-05 PROCEDURE — 99214 OFFICE O/P EST MOD 30 MIN: CPT | Performed by: NURSE PRACTITIONER

## 2021-01-05 RX ORDER — SULFAMETHOXAZOLE AND TRIMETHOPRIM 800; 160 MG/1; MG/1
1 TABLET ORAL 2 TIMES DAILY
Qty: 20 TABLET | Refills: 0 | Status: SHIPPED | OUTPATIENT
Start: 2021-01-05 | End: 2021-01-15

## 2021-01-05 RX ORDER — BENAZEPRIL HYDROCHLORIDE 10 MG/1
10 TABLET ORAL DAILY
Qty: 30 TABLET | Refills: 5 | Status: SHIPPED | OUTPATIENT
Start: 2021-01-05 | End: 2021-07-20 | Stop reason: SDUPTHER

## 2021-01-05 RX ORDER — CEFTRIAXONE 1 G/1
1 INJECTION, POWDER, FOR SOLUTION INTRAMUSCULAR; INTRAVENOUS ONCE
Status: COMPLETED | OUTPATIENT
Start: 2021-01-05 | End: 2021-01-05

## 2021-01-05 RX ORDER — ATORVASTATIN CALCIUM 10 MG/1
10 TABLET, FILM COATED ORAL DAILY
Qty: 30 TABLET | Refills: 5 | Status: SHIPPED | OUTPATIENT
Start: 2021-01-05 | End: 2021-07-20 | Stop reason: SDUPTHER

## 2021-01-05 RX ADMIN — CEFTRIAXONE 1 G: 1 INJECTION, POWDER, FOR SOLUTION INTRAMUSCULAR; INTRAVENOUS at 12:16

## 2021-01-05 NOTE — PROGRESS NOTES
"Chief Complaint  Abscess (Genital area)    Subjective          Della Gonzalez presents to [facility] for   History of Present Illness  Patient states \"I have another boil in my genital area.  It is very painful and is draining.\"  Patient states that it started about a week ago.  She has been using moist heat and sitz baths.  The pain is persistent and makes sitting difficult.  She does have a history of skin abscesses (boils) to various areas that typically resolve with moist heat and occasionally require the addition of oral antibiotics.    Objective   Vital Signs:   /69 (BP Location: Right arm, Patient Position: Sitting, Cuff Size: Adult)   Pulse 80   Temp 97 °F (36.1 °C) (Infrared)   Ht 152.4 cm (60\")   Wt 91.4 kg (201 lb 6.4 oz)   SpO2 97%   BMI 39.33 kg/m²     Physical Exam  Vitals signs and nursing note reviewed. Exam conducted with a chaperone present.   Constitutional:       General: She is not in acute distress.     Appearance: Normal appearance. She is well-developed. She is obese. She is not diaphoretic.   HENT:      Head: Normocephalic and atraumatic.   Neck:      Musculoskeletal: Normal range of motion and neck supple.   Cardiovascular:      Rate and Rhythm: Normal rate and regular rhythm.      Heart sounds: Normal heart sounds. No murmur.   Pulmonary:      Effort: Pulmonary effort is normal. No respiratory distress.      Breath sounds: Normal breath sounds.   Abdominal:      General: Bowel sounds are normal. There is no distension.      Palpations: Abdomen is soft.      Tenderness: There is no abdominal tenderness.   Genitourinary:      Musculoskeletal: Normal range of motion.   Skin:     General: Skin is warm and dry.      Capillary Refill: Capillary refill takes less than 2 seconds.      Findings: Erythema present.      Comments: SEE PROCEDURE NOTE   Neurological:      Mental Status: She is alert and oriented to person, place, and time. Mental status is at " baseline.   Psychiatric:         Mood and Affect: Mood normal.         Behavior: Behavior normal.         Thought Content: Thought content normal.         Judgment: Judgment normal.        Result Review :                 Assessment and Plan    Problem List Items Addressed This Visit        Cardiac and Vasculature    Mixed hyperlipidemia    Relevant Medications    atorvastatin (LIPITOR) 10 MG tablet    Essential hypertension    Relevant Medications    benazepril (LOTENSIN) 10 MG tablet    Other Relevant Orders    Comprehensive metabolic panel       Endocrine and Metabolic    Diabetes mellitus (CMS/HCC) - Primary    Relevant Medications    linagliptin (TRADJENTA) 5 MG tablet tablet    Other Relevant Orders    Comprehensive metabolic panel    Hemoglobin A1c      Other Visit Diagnoses     Abscess of left genital labia        Relevant Medications    cefTRIAXone (ROCEPHIN) injection 1 g (Completed)    sulfamethoxazole-trimethoprim (BACTRIM DS,SEPTRA DS) 800-160 MG per tablet    Other Relevant Orders    Culture, Routine - Swab, Labia    Cellulitis of left buttock        Relevant Medications    cefTRIAXone (ROCEPHIN) injection 1 g (Completed)    sulfamethoxazole-trimethoprim (BACTRIM DS,SEPTRA DS) 800-160 MG per tablet        Patient instructed to call should she have increased pain or develop fever >100.5.  She is further instructed to continue warm moist heat and sitz bath to area at least BID.  Follow Up   Return in about 2 days (around 1/7/2021) for Recheck.  Patient was given instructions and counseling regarding her condition or for health maintenance advice. Please see specific information pulled into the AVS if appropriate.

## 2021-01-05 NOTE — PROGRESS NOTES
Procedure   Incision & Drainage    Date/Time: 1/5/2021 11:34 AM  Performed by: Vijaya Henao APRN  Authorized by: Vijaya Henao APRN   Type: abscess  Body area: anogenital  Location details: vulva    Sedation:  Patient sedated: no    Scalpel size: 11  Incision type: single straight  Drainage: purulent  Drainage amount: copious  Wound treatment: wound left open  Packing material: none  Patient tolerance: patient tolerated the procedure well with no immediate complications  Comments: Informed consent obtained following discussion of risks and benefits of I&D procedure.  Abscess of the left labia measured 8 cm x 4 cm with 3 mm opening draining a purulent drainage.  There is also an area of 22 cm x 14 cm warmth and erythema to the left buttock posterior to the abscess.

## 2021-01-06 LAB
ALBUMIN SERPL-MCNC: 4.3 G/DL (ref 3.8–4.8)
ALBUMIN/GLOB SERPL: 1.5 {RATIO} (ref 1.2–2.2)
ALP SERPL-CCNC: 119 IU/L (ref 39–117)
ALT SERPL-CCNC: 18 IU/L (ref 0–32)
AST SERPL-CCNC: 34 IU/L (ref 0–40)
BILIRUB SERPL-MCNC: 1.2 MG/DL (ref 0–1.2)
BUN SERPL-MCNC: 14 MG/DL (ref 8–27)
BUN/CREAT SERPL: 31 (ref 12–28)
CALCIUM SERPL-MCNC: 9.3 MG/DL (ref 8.7–10.3)
CHLORIDE SERPL-SCNC: 98 MMOL/L (ref 96–106)
CO2 SERPL-SCNC: 21 MMOL/L (ref 20–29)
CREAT SERPL-MCNC: 0.45 MG/DL (ref 0.57–1)
GLOBULIN SER CALC-MCNC: 2.9 G/DL (ref 1.5–4.5)
GLUCOSE SERPL-MCNC: 120 MG/DL (ref 65–99)
HBA1C MFR BLD: 7.2 % (ref 4.8–5.6)
POTASSIUM SERPL-SCNC: 3.9 MMOL/L (ref 3.5–5.2)
PROT SERPL-MCNC: 7.2 G/DL (ref 6–8.5)
SODIUM SERPL-SCNC: 137 MMOL/L (ref 134–144)

## 2021-01-07 ENCOUNTER — OFFICE VISIT (OUTPATIENT)
Dept: FAMILY MEDICINE CLINIC | Facility: CLINIC | Age: 61
End: 2021-01-07

## 2021-01-07 VITALS
TEMPERATURE: 98.6 F | HEIGHT: 60 IN | HEART RATE: 64 BPM | OXYGEN SATURATION: 98 % | DIASTOLIC BLOOD PRESSURE: 85 MMHG | WEIGHT: 197.4 LBS | BODY MASS INDEX: 38.76 KG/M2 | SYSTOLIC BLOOD PRESSURE: 144 MMHG

## 2021-01-07 DIAGNOSIS — N76.4 ABSCESS OF LEFT GENITAL LABIA: Primary | ICD-10-CM

## 2021-01-07 DIAGNOSIS — L03.317 CELLULITIS OF LEFT BUTTOCK: ICD-10-CM

## 2021-01-07 PROCEDURE — 96372 THER/PROPH/DIAG INJ SC/IM: CPT | Performed by: NURSE PRACTITIONER

## 2021-01-07 PROCEDURE — 99213 OFFICE O/P EST LOW 20 MIN: CPT | Performed by: NURSE PRACTITIONER

## 2021-01-07 RX ORDER — CEFTRIAXONE 1 G/1
1 INJECTION, POWDER, FOR SOLUTION INTRAMUSCULAR; INTRAVENOUS ONCE
Status: COMPLETED | OUTPATIENT
Start: 2021-01-07 | End: 2021-01-07

## 2021-01-07 RX ADMIN — CEFTRIAXONE 1 G: 1 INJECTION, POWDER, FOR SOLUTION INTRAMUSCULAR; INTRAVENOUS at 08:37

## 2021-01-08 ENCOUNTER — OFFICE VISIT (OUTPATIENT)
Dept: FAMILY MEDICINE CLINIC | Facility: CLINIC | Age: 61
End: 2021-01-08

## 2021-01-08 VITALS
HEART RATE: 73 BPM | SYSTOLIC BLOOD PRESSURE: 113 MMHG | HEIGHT: 60 IN | DIASTOLIC BLOOD PRESSURE: 75 MMHG | WEIGHT: 199 LBS | BODY MASS INDEX: 39.07 KG/M2

## 2021-01-08 DIAGNOSIS — N76.4 ABSCESS OF LEFT GENITAL LABIA: Primary | ICD-10-CM

## 2021-01-08 LAB
BACTERIA SPEC AEROBE CULT: ABNORMAL
BACTERIA SPEC CULT: ABNORMAL
OTHER ANTIBIOTIC SUSC ISLT: ABNORMAL

## 2021-01-08 PROCEDURE — 96372 THER/PROPH/DIAG INJ SC/IM: CPT | Performed by: NURSE PRACTITIONER

## 2021-01-08 PROCEDURE — 99213 OFFICE O/P EST LOW 20 MIN: CPT | Performed by: NURSE PRACTITIONER

## 2021-01-08 RX ORDER — CEFTRIAXONE 1 G/1
1 INJECTION, POWDER, FOR SOLUTION INTRAMUSCULAR; INTRAVENOUS ONCE
Status: COMPLETED | OUTPATIENT
Start: 2021-01-08 | End: 2021-01-08

## 2021-01-08 RX ADMIN — CEFTRIAXONE 1 G: 1 INJECTION, POWDER, FOR SOLUTION INTRAMUSCULAR; INTRAVENOUS at 08:33

## 2021-01-08 NOTE — PROGRESS NOTES
"Chief Complaint  Abscess (rebel)    Subjective          Della Gonzalez presents to [facility] for   History of Present Illness  One day recheck of abscess.  Patient relates no fever, less pain, still with moderate drainage.  Overall, patient states she feels better.    Objective   Vital Signs:   /75 (BP Location: Right arm, Patient Position: Sitting, Cuff Size: Large Adult)   Pulse 73   Ht 152.4 cm (60\") Comment: pt reported  Wt 90.3 kg (199 lb)   BMI 38.86 kg/m²     Physical Exam  Vitals signs and nursing note reviewed.   Constitutional:       General: She is not in acute distress.     Appearance: Normal appearance. She is well-developed. She is obese. She is not diaphoretic.   HENT:      Head: Normocephalic and atraumatic.   Neck:      Musculoskeletal: Normal range of motion and neck supple.   Cardiovascular:      Rate and Rhythm: Normal rate and regular rhythm.      Heart sounds: Normal heart sounds. No murmur.   Pulmonary:      Effort: Pulmonary effort is normal. No respiratory distress.      Breath sounds: Normal breath sounds.   Abdominal:      General: Bowel sounds are normal. There is no distension.      Palpations: Abdomen is soft.      Tenderness: There is no abdominal tenderness.   Genitourinary:         Comments: Area of buttock cellulitis is resolved.  Musculoskeletal: Normal range of motion.   Skin:     General: Skin is warm and dry.      Capillary Refill: Capillary refill takes less than 2 seconds.      Findings: Erythema present.   Neurological:      Mental Status: She is alert and oriented to person, place, and time.   Psychiatric:         Behavior: Behavior normal.         Thought Content: Thought content normal.         Judgment: Judgment normal.        Result Review :                 Assessment and Plan    Problem List Items Addressed This Visit     None      Visit Diagnoses     Abscess of left genital labia    -  Primary    Relevant Medications    " cefTRIAXone (ROCEPHIN) injection 1 g (Start on 1/8/2021  9:12 AM)        Continue hot sitz baths and antibiotic as prescribed.    Follow Up   Return in about 1 week (around 1/15/2021) for Next scheduled follow up.  Patient was given instructions and counseling regarding her condition or for health maintenance advice. Please see specific information pulled into the AVS if appropriate.

## 2021-01-15 ENCOUNTER — OFFICE VISIT (OUTPATIENT)
Dept: FAMILY MEDICINE CLINIC | Facility: CLINIC | Age: 61
End: 2021-01-15

## 2021-01-15 VITALS
HEIGHT: 60 IN | HEART RATE: 76 BPM | WEIGHT: 199.2 LBS | OXYGEN SATURATION: 99 % | TEMPERATURE: 97.5 F | BODY MASS INDEX: 39.11 KG/M2 | DIASTOLIC BLOOD PRESSURE: 66 MMHG | SYSTOLIC BLOOD PRESSURE: 96 MMHG

## 2021-01-15 DIAGNOSIS — E11.628 TYPE 2 DIABETES MELLITUS WITH OTHER SKIN COMPLICATION, WITHOUT LONG-TERM CURRENT USE OF INSULIN (HCC): ICD-10-CM

## 2021-01-15 DIAGNOSIS — N76.4 ABSCESS OF LEFT GENITAL LABIA: Primary | ICD-10-CM

## 2021-01-15 DIAGNOSIS — F51.01 PRIMARY INSOMNIA: ICD-10-CM

## 2021-01-15 PROCEDURE — 99213 OFFICE O/P EST LOW 20 MIN: CPT | Performed by: NURSE PRACTITIONER

## 2021-01-15 RX ORDER — TRAZODONE HYDROCHLORIDE 100 MG/1
100 TABLET ORAL NIGHTLY
Qty: 90 TABLET | Refills: 1 | Status: SHIPPED | OUTPATIENT
Start: 2021-01-15 | End: 2021-07-27

## 2021-01-15 NOTE — PROGRESS NOTES
"Chief Complaint  Abscess (rebel) and Diabetes (refill on steglatro)    Subjective          Della Gonzalez presents to [facility] for   History of Present Illness  Patient presents this morning for a recheck of the labial abscess.  She reports that the area is no longer draining and that she has very little discomfort in the area.  She has been able to wear jeans for the past few days without pain or irritation.  She denies complications or other problems associated with the infection.  Objective   Vital Signs:   BP 96/66 (BP Location: Right arm, Patient Position: Sitting, Cuff Size: Large Adult)   Pulse 76   Temp 97.5 °F (36.4 °C)   Ht 152.4 cm (60\") Comment: pt reported  Wt 90.4 kg (199 lb 3.2 oz)   SpO2 99%   BMI 38.90 kg/m²     Physical Exam  Vitals signs and nursing note reviewed.   Constitutional:       General: She is not in acute distress.     Appearance: Normal appearance. She is obese. She is not ill-appearing.   HENT:      Head: Normocephalic and atraumatic.   Cardiovascular:      Rate and Rhythm: Normal rate and regular rhythm.      Heart sounds: Normal heart sounds. No murmur.   Pulmonary:      Effort: Pulmonary effort is normal.      Breath sounds: Normal breath sounds. No wheezing.   Genitourinary:     General: Normal vulva.      Labia:         Left: No tenderness or lesion.        Musculoskeletal: Normal range of motion.   Skin:     General: Skin is warm and dry.   Neurological:      General: No focal deficit present.      Mental Status: She is alert and oriented to person, place, and time.   Psychiatric:         Mood and Affect: Mood normal.         Behavior: Behavior normal.         Thought Content: Thought content normal.         Judgment: Judgment normal.        Result Review :     Contains abnormal data Culture, Routine - Swab, Labia  Order: [id number]  Status:  Final result   Visible to patient:  No (not released) Next appt:  01/19/2021 at 08:15 AM in Family " Medicine (NURSE/MA ANDREZ CARDONA) Dx:  Abscess of left genital labia  Specimen Information: Labia; Swab    SWAB        Component    Culture Final reportAbnormal     Result 1 CommentAbnormal      Comment: Methicillin - resistant Staphylococcus aureus   Heavy growth   Based on resistance to oxacillin this isolate would be resistant to   all currently available beta-lactam antimicrobial agents, with the   exception of the newer cephalosporins with anti-MRSA activity, such as   Ceftaroline    Susceptibility Testing Comment     Comment:       ** S = Susceptible; I = Intermediate; R = Resistant **                      P = Positive; N = Negative               MICS are expressed in micrograms per mL      Antibiotic                 RSLT#1    RSLT#2    RSLT#3    RSLT#4   Ciprofloxacin                  R   Clindamycin                    S   Erythromycin                   R   Gentamicin                     S   Levofloxacin                   I   Linezolid                      S   Oxacillin                      R   Penicillin                     R   Rifampin                       S   Tetracycline                   S   Trimethoprim/Sulfa             S   Vancomycin                     S                        Assessment and Plan    Problem List Items Addressed This Visit        Endocrine and Metabolic    Diabetes mellitus (CMS/HCC)    Relevant Medications    Ertugliflozin L-PyroglutamicAc (Steglatro) 15 MG tablet    Other Relevant Orders    Comprehensive metabolic panel    Hemoglobin A1c      Other Visit Diagnoses     Abscess of left genital labia    -  Primary    Primary insomnia        Relevant Medications    traZODone (DESYREL) 100 MG tablet          Follow Up   Return for keep scheduled appt.  Patient was given instructions and counseling regarding her condition or for health maintenance advice. Please see specific information pulled into the AVS if appropriate.

## 2021-01-16 LAB
ALBUMIN SERPL-MCNC: 4.2 G/DL (ref 3.8–4.8)
ALBUMIN/GLOB SERPL: 1.2 {RATIO} (ref 1.2–2.2)
ALP SERPL-CCNC: 107 IU/L (ref 39–117)
ALT SERPL-CCNC: 25 IU/L (ref 0–32)
AST SERPL-CCNC: 34 IU/L (ref 0–40)
BILIRUB SERPL-MCNC: 0.6 MG/DL (ref 0–1.2)
BUN SERPL-MCNC: 19 MG/DL (ref 8–27)
BUN/CREAT SERPL: 27 (ref 12–28)
CALCIUM SERPL-MCNC: 9.4 MG/DL (ref 8.7–10.3)
CHLORIDE SERPL-SCNC: 99 MMOL/L (ref 96–106)
CO2 SERPL-SCNC: 23 MMOL/L (ref 20–29)
CREAT SERPL-MCNC: 0.71 MG/DL (ref 0.57–1)
GLOBULIN SER CALC-MCNC: 3.5 G/DL (ref 1.5–4.5)
GLUCOSE SERPL-MCNC: 279 MG/DL (ref 65–99)
HBA1C MFR BLD: 8.1 % (ref 4.8–5.6)
POTASSIUM SERPL-SCNC: 4.6 MMOL/L (ref 3.5–5.2)
PROT SERPL-MCNC: 7.7 G/DL (ref 6–8.5)
SODIUM SERPL-SCNC: 137 MMOL/L (ref 134–144)

## 2021-01-22 ENCOUNTER — OFFICE VISIT (OUTPATIENT)
Dept: FAMILY MEDICINE CLINIC | Facility: CLINIC | Age: 61
End: 2021-01-22

## 2021-01-22 VITALS
TEMPERATURE: 97.2 F | DIASTOLIC BLOOD PRESSURE: 72 MMHG | SYSTOLIC BLOOD PRESSURE: 108 MMHG | HEART RATE: 71 BPM | BODY MASS INDEX: 37.95 KG/M2 | HEIGHT: 61 IN | WEIGHT: 201 LBS | OXYGEN SATURATION: 98 %

## 2021-01-22 DIAGNOSIS — I10 ESSENTIAL HYPERTENSION: ICD-10-CM

## 2021-01-22 DIAGNOSIS — R63.5 WEIGHT GAIN: ICD-10-CM

## 2021-01-22 DIAGNOSIS — E66.01 MORBID (SEVERE) OBESITY DUE TO EXCESS CALORIES (HCC): ICD-10-CM

## 2021-01-22 DIAGNOSIS — E11.42 TYPE 2 DIABETES MELLITUS WITH DIABETIC POLYNEUROPATHY, WITHOUT LONG-TERM CURRENT USE OF INSULIN (HCC): Primary | ICD-10-CM

## 2021-01-22 PROCEDURE — 99213 OFFICE O/P EST LOW 20 MIN: CPT | Performed by: NURSE PRACTITIONER

## 2021-01-22 NOTE — PROGRESS NOTES
"Chief Complaint  Diabetes and Pain    Subjective          Della Gonzalez presents to [facility] for   History of Present Illness  Patient admits to eating poorly over the past few months.  She is not eating \"sweets\" but is eating complex carbs daily in the form of potatoes, bread and rice.  She often eats meals that consist only of carbohydrates such as this morning:  A bowl of oatmeal and an english muffin.  Patient states \"I know better.  I jane fell off the wagon into eating poorly again.\"  Patient denies symptoms related to hypoglycemia or hyperglycemia.  She denies need for pain medication or gabapentin stating \"I don't really take either of them anymore and I have some if I need them.\"    Objective   Vital Signs:   /72 (BP Location: Right arm, Patient Position: Sitting, Cuff Size: Large Adult)   Pulse 71   Temp 97.2 °F (36.2 °C)   Ht 154.9 cm (61\") Comment: pt reported  Wt 91.2 kg (201 lb)   SpO2 98%   BMI 37.98 kg/m²     Physical Exam  Vitals signs and nursing note reviewed.   Constitutional:       General: She is not in acute distress.     Appearance: She is well-developed. She is not diaphoretic.   HENT:      Head: Normocephalic and atraumatic.   Eyes:      Conjunctiva/sclera: Conjunctivae normal.      Pupils: Pupils are equal, round, and reactive to light.   Neck:      Musculoskeletal: Normal range of motion and neck supple.   Cardiovascular:      Rate and Rhythm: Normal rate and regular rhythm.      Heart sounds: Normal heart sounds. No murmur.   Pulmonary:      Effort: Pulmonary effort is normal. No respiratory distress.      Breath sounds: Normal breath sounds.   Abdominal:      General: Bowel sounds are normal. There is no distension.      Palpations: Abdomen is soft.      Tenderness: There is no abdominal tenderness.   Musculoskeletal: Normal range of motion.   Skin:     General: Skin is warm and dry.      Capillary Refill: Capillary refill takes less than 2 " seconds.      Findings: No erythema.   Neurological:      Mental Status: She is alert and oriented to person, place, and time. Mental status is at baseline.   Psychiatric:         Mood and Affect: Mood normal.         Behavior: Behavior normal.         Thought Content: Thought content normal.         Judgment: Judgment normal.        Result Review :     Most Recent A1C    HGBA1C Most Recent 1/15/21   Hemoglobin A1C 8.1 (A)   (A) Abnormal value       Comments are available for some flowsheets but are not being displayed.                     Assessment and Plan    Problem List Items Addressed This Visit        Cardiac and Vasculature    Essential hypertension       Endocrine and Metabolic    Diabetes mellitus (CMS/HCC) - Primary    Relevant Medications    linagliptin (TRADJENTA) 5 MG tablet tablet      Other Visit Diagnoses     Weight gain        Morbid (severe) obesity due to excess calories (CMS/HCC)          Time spent discussing diet planning.  Obvious knowledge base without deficit.  Patient admits to poor dietary habits recently.    Follow Up   Return in about 3 months (around 4/22/2021) for Next scheduled follow up with labs prior.  Patient was given instructions and counseling regarding her condition or for health maintenance advice. Please see specific information pulled into the AVS if appropriate.

## 2021-01-25 ENCOUNTER — TELEPHONE (OUTPATIENT)
Dept: GASTROENTEROLOGY | Age: 61
End: 2021-01-25

## 2021-01-25 DIAGNOSIS — K74.60 CIRRHOSIS OF LIVER WITHOUT ASCITES, UNSPECIFIED HEPATIC CIRRHOSIS TYPE (HCC): Primary | ICD-10-CM

## 2021-01-25 NOTE — TELEPHONE ENCOUNTER
1-25-21      [facility] for call back to go over her US appt. Scheduled on 2-3-21 @ 8:15am, [facility] [address]. Will need lab work done after that in [place] [id number]. NPO after midnight. She is to bring her ins card and photo ID.

## 2021-01-25 NOTE — TELEPHONE ENCOUNTER
----- Message from Yrn Maurer MA sent at 12/14/2020 11:56 AM CST -----  PATIENT IS SCHEDULED ON 02/10/2021 FOR HER 6 MO. SHE WILL NEED LABS AND ULTRA SOUND OF THE LIVER PRIOR TO THAT APPT.  bg

## 2021-01-25 NOTE — TELEPHONE ENCOUNTER
----- Message from Tony Clifton MA sent at 12/14/2020 11:56 AM CST -----  PATIENT IS SCHEDULED ON 02/10/2021 FOR HER 6 MO. SHE WILL NEED LABS AND ULTRA SOUND OF THE LIVER PRIOR TO THAT APPT.  bg

## 2021-02-02 DIAGNOSIS — B35.4 TINEA CORPORIS: ICD-10-CM

## 2021-02-03 ENCOUNTER — HOSPITAL ENCOUNTER (OUTPATIENT)
Dept: ULTRASOUND IMAGING | Age: 61
Discharge: HOME | End: 2021-02-03
Payer: MEDICARE

## 2021-02-03 DIAGNOSIS — K74.60 CIRRHOSIS OF LIVER WITHOUT ASCITES, UNSPECIFIED HEPATIC CIRRHOSIS TYPE (HCC): ICD-10-CM

## 2021-02-03 LAB
ALBUMIN SERPL-MCNC: 4.4 G/DL (ref 3.5–5.2)
ALP BLD-CCNC: 102 U/L (ref 35–104)
ALPHA FETOPROTEIN: 2.1 NG/ML (ref 0–8.3)
ALT SERPL-CCNC: 22 U/L (ref 5–33)
ANION GAP SERPL CALCULATED.3IONS-SCNC: 14 MMOL/L (ref 7–19)
AST SERPL-CCNC: 25 U/L (ref 5–32)
BILIRUB SERPL-MCNC: 0.8 MG/DL (ref 0.2–1.2)
BUN BLDV-MCNC: 9 MG/DL (ref 8–23)
CALCIUM SERPL-MCNC: 9.3 MG/DL (ref 8.8–10.2)
CHLORIDE BLD-SCNC: 102 MMOL/L (ref 98–111)
CO2: 25 MMOL/L (ref 22–29)
CREAT SERPL-MCNC: 0.3 MG/DL (ref 0.5–0.9)
GFR AFRICAN AMERICAN: >59
GFR NON-AFRICAN AMERICAN: >60
GLUCOSE BLD-MCNC: 139 MG/DL (ref 74–109)
HCT VFR BLD CALC: 41.1 % (ref 37–47)
HEMOGLOBIN: 13.7 G/DL (ref 12–16)
INR BLD: 1.05 (ref 0.88–1.18)
MCH RBC QN AUTO: 29.3 PG (ref 27–31)
MCHC RBC AUTO-ENTMCNC: 33.3 G/DL (ref 33–37)
MCV RBC AUTO: 88 FL (ref 81–99)
PDW BLD-RTO: 13.7 % (ref 11.5–14.5)
PLATELET # BLD: 154 K/UL (ref 130–400)
PMV BLD AUTO: 10.8 FL (ref 9.4–12.3)
POTASSIUM SERPL-SCNC: 3.8 MMOL/L (ref 3.5–5)
PROTHROMBIN TIME: 13.7 SEC (ref 12–14.6)
RBC # BLD: 4.67 M/UL (ref 4.2–5.4)
SODIUM BLD-SCNC: 141 MMOL/L (ref 136–145)
TOTAL PROTEIN: 7.9 G/DL (ref 6.6–8.7)
WBC # BLD: 5.1 K/UL (ref 4.8–10.8)

## 2021-02-03 PROCEDURE — 76705 ECHO EXAM OF ABDOMEN: CPT

## 2021-02-03 RX ORDER — KETOCONAZOLE 20 MG/G
CREAM TOPICAL
Qty: 60 G | Refills: 2 | Status: SHIPPED | OUTPATIENT
Start: 2021-02-03 | End: 2022-11-01

## 2021-02-04 ENCOUNTER — OFFICE VISIT (OUTPATIENT)
Dept: FAMILY MEDICINE CLINIC | Facility: CLINIC | Age: 61
End: 2021-02-04

## 2021-02-04 VITALS
BODY MASS INDEX: 38.97 KG/M2 | WEIGHT: 206.4 LBS | HEIGHT: 61 IN | TEMPERATURE: 97.1 F | SYSTOLIC BLOOD PRESSURE: 106 MMHG | DIASTOLIC BLOOD PRESSURE: 73 MMHG | OXYGEN SATURATION: 96 % | HEART RATE: 60 BPM

## 2021-02-04 DIAGNOSIS — L98.9 PERINEAL IRRITATION IN FEMALE: ICD-10-CM

## 2021-02-04 DIAGNOSIS — E65: ICD-10-CM

## 2021-02-04 DIAGNOSIS — B37.2 CUTANEOUS CANDIDIASIS: Primary | ICD-10-CM

## 2021-02-04 DIAGNOSIS — K21.9 GASTROESOPHAGEAL REFLUX DISEASE, UNSPECIFIED WHETHER ESOPHAGITIS PRESENT: ICD-10-CM

## 2021-02-04 PROCEDURE — 99213 OFFICE O/P EST LOW 20 MIN: CPT | Performed by: NURSE PRACTITIONER

## 2021-02-04 RX ORDER — NYSTATIN 100000 U/G
CREAM TOPICAL 2 TIMES DAILY
Qty: 30 G | Refills: 2 | Status: SHIPPED | OUTPATIENT
Start: 2021-02-04 | End: 2021-10-26 | Stop reason: SDUPTHER

## 2021-02-04 RX ORDER — OMEPRAZOLE 20 MG/1
20 CAPSULE, DELAYED RELEASE ORAL DAILY
Qty: 90 CAPSULE | Refills: 1 | Status: SHIPPED | OUTPATIENT
Start: 2021-02-04 | End: 2021-07-27 | Stop reason: SDUPTHER

## 2021-02-04 NOTE — PROGRESS NOTES
"Chief Complaint  Rash    Subjective          Della Gonzalez presents to [facility] for   History of Present Illness  Patient presents with a groin rash present for the \"past few days.\"  She states it burns and slightly itches.  She has been using ketaconazole cream with no improvement.  Patient has a history of diabetes with hyperglycemia and has an apron of abdominal skin.  She states she keeps it as clean and dry as possible.    Objective   Vital Signs:   /73 (BP Location: Right arm, Patient Position: Sitting, Cuff Size: Large Adult)   Pulse 60   Temp 97.1 °F (36.2 °C)   Ht 154.9 cm (61\") Comment: pt reported  Wt 93.6 kg (206 lb 6.4 oz)   SpO2 96%   BMI 39.00 kg/m²     Physical Exam  Vitals signs and nursing note reviewed.   Constitutional:       General: She is not in acute distress.     Appearance: She is well-developed. She is not diaphoretic.   HENT:      Head: Normocephalic and atraumatic.   Eyes:      Conjunctiva/sclera: Conjunctivae normal.      Pupils: Pupils are equal, round, and reactive to light.   Neck:      Musculoskeletal: Normal range of motion and neck supple.   Cardiovascular:      Rate and Rhythm: Normal rate and regular rhythm.      Heart sounds: Normal heart sounds. No murmur.   Pulmonary:      Effort: Pulmonary effort is normal. No respiratory distress.      Breath sounds: Normal breath sounds.   Abdominal:      General: Bowel sounds are normal. There is no distension.      Palpations: Abdomen is soft.      Tenderness: There is no abdominal tenderness.   Musculoskeletal: Normal range of motion.   Skin:     General: Skin is warm and dry.      Capillary Refill: Capillary refill takes less than 2 seconds.      Findings: Erythema and rash present.          Neurological:      Mental Status: She is alert and oriented to person, place, and time. Mental status is at baseline.   Psychiatric:         Mood and Affect: Mood normal.         Behavior: Behavior " normal.         Thought Content: Thought content normal.         Judgment: Judgment normal.        Result Review :                 Assessment and Plan    Problem List Items Addressed This Visit     None      Visit Diagnoses     Cutaneous candidiasis    -  Primary    Relevant Medications    nystatin (MYCOSTATIN) 120457 UNIT/GM cream    Perineal irritation in female        Abdominal apron        Gastroesophageal reflux disease, unspecified whether esophagitis present        Relevant Medications    omeprazole (priLOSEC) 20 MG capsule          Follow Up   Return for keep scheduled appt.  Patient was given instructions and counseling regarding her condition or for health maintenance advice. Please see specific information pulled into the AVS if appropriate.

## 2021-02-23 ENCOUNTER — OFFICE VISIT (OUTPATIENT)
Dept: GASTROENTEROLOGY | Age: 61
End: 2021-02-23
Payer: MEDICARE

## 2021-02-23 VITALS
DIASTOLIC BLOOD PRESSURE: 60 MMHG | WEIGHT: 200 LBS | OXYGEN SATURATION: 99 % | HEIGHT: 60 IN | HEART RATE: 58 BPM | SYSTOLIC BLOOD PRESSURE: 115 MMHG | BODY MASS INDEX: 39.27 KG/M2

## 2021-02-23 DIAGNOSIS — Z86.19 HISTORY OF HEPATITIS C: ICD-10-CM

## 2021-02-23 DIAGNOSIS — K76.6 PORTAL HYPERTENSION (HCC): ICD-10-CM

## 2021-02-23 DIAGNOSIS — K21.9 CHRONIC GERD: ICD-10-CM

## 2021-02-23 DIAGNOSIS — I85.00 ESOPHAGEAL VARICES WITHOUT BLEEDING, UNSPECIFIED ESOPHAGEAL VARICES TYPE (HCC): ICD-10-CM

## 2021-02-23 DIAGNOSIS — K74.60 CIRRHOSIS OF LIVER WITHOUT ASCITES, UNSPECIFIED HEPATIC CIRRHOSIS TYPE (HCC): Primary | ICD-10-CM

## 2021-02-23 DIAGNOSIS — K29.70 GASTRITIS DETERMINED BY BIOPSY: ICD-10-CM

## 2021-02-23 PROCEDURE — 3017F COLORECTAL CA SCREEN DOC REV: CPT | Performed by: NURSE PRACTITIONER

## 2021-02-23 PROCEDURE — 99214 OFFICE O/P EST MOD 30 MIN: CPT | Performed by: NURSE PRACTITIONER

## 2021-02-23 PROCEDURE — G8484 FLU IMMUNIZE NO ADMIN: HCPCS | Performed by: NURSE PRACTITIONER

## 2021-02-23 PROCEDURE — 4004F PT TOBACCO SCREEN RCVD TLK: CPT | Performed by: NURSE PRACTITIONER

## 2021-02-23 PROCEDURE — G8427 DOCREV CUR MEDS BY ELIG CLIN: HCPCS | Performed by: NURSE PRACTITIONER

## 2021-02-23 PROCEDURE — G8417 CALC BMI ABV UP PARAM F/U: HCPCS | Performed by: NURSE PRACTITIONER

## 2021-02-23 ASSESSMENT — ENCOUNTER SYMPTOMS
VOMITING: 0
DIARRHEA: 0
CONSTIPATION: 0
ABDOMINAL PAIN: 0
BACK PAIN: 1
VOICE CHANGE: 0
BLOOD IN STOOL: 0
SORE THROAT: 0
SHORTNESS OF BREATH: 0
CHEST TIGHTNESS: 0
ABDOMINAL DISTENTION: 0
RECTAL PAIN: 0
COUGH: 0
NAUSEA: 0

## 2021-02-23 NOTE — PATIENT INSTRUCTIONS
Schedule colonoscopy. No aspirin, ibuprofen, naproxen, fish oil or vitamin E for 5 days before procedure. Do not eat or drink after midnight the day of the procedure. Allowed medications can be taken with a small sip of water. Please review your prep instructions for allowed medications. You will not be able to drive for 24 hours after the procedure due to sedation. Bring a  with you the day of the procedure. If you are on blood thinners, clearance from the prescribing physician will be obtained before your procedure is scheduled. If it is determined it is not safe to hold these medications for a short time an alternative procedure for evaluation may be recommended. Risks of colonoscopy include, but are not limited to, perforation, bleeding, and infection, Risk of perforation and bleeding are increased if there is a polyp removed. Anesthesia risks will be reviewed with you before the procedure by a member of the anesthesia department. Your physician may also schedule a follow up appointment with the nurse practitioner to discuss pathology, symptoms or to check if you have had any problems related to your procedure. If you prefer not to return to the office after your procedure please discuss this with your physician on the day of your colonoscopy. The physician will talk with you and/or your family after the procedure is completed. Final recommendations are based on the pathologist report if biopsies or specimens are taken. For Colonoscopy: You will be given specific directions regarding restrictions to diet and bowel prep instructions including laxatives. Please read these instructions one week prior to your scheduled procedure to ensure that you are prepared.  If you have any questions regarding these instructions please call our office Mon through Fri from 8:00 am to 4:00 pm. Follow prep instructions provided for bowel prep. Take all of the bowel prep as directed. If you are having problems with nausea, stop your prep for 30-45 min to allow the nausea to subside before resuming your prep. It is important to drink plenty of fluids throughout the day before taking your laxatives. This will help to protect your kidneys, prevent dehydration and maximize the effect of the bowel prep. If polyps are removed during the procedure they will be sent to a pathologist for analysis. Unless you have a follow up appointment scheduled, you will be notified by mail of the pathology results within 4 weeks. If you have not received results after 4 weeks you may call the office to obtain this information. Your diet before a colonoscopy bowel preparation is very important to ensure a successful colon exam. It is recommended to consider certain changes to your diet three to four days prior to the procedure. Remember that your bowels need to be empty for the exam.    What foods are good to eat? Cut down on heavy solid foods three to four days before the procedure and start introducing lighter meals to your diet. The following food suggestions are a good part of your diet before a colonoscopy bowel preparation. Light meat that is easily digestible such as chicken (without the skin)   Potatoes without skin   Cheese   Eggs   A light meal of steamed white fish   Light clear soups    Foods and drinks to avoid  Avoid foods that contain too much fiber. Stay clear of dark colored beverages. They can stick to the walls of the digestive tract and make it difficult to differentiate from blood.  Some of these foods are:  Red meat, rice, nuts and vegetables   Milk, other milk based fluids and cream   Most fruit and puddings   Whole grain pasta   Cereals, bran and seeds   Colored beverages, especially those that are red or purple in color   Red colored Jell-O On the day before the colonoscopy, continue to drink plenty of clear fluids. It is important   to keep yourself hydrated before the exam.     Please follow all instructions as provided for cleansing the bowel. Failure to have an adequately prepped colon may cause you to have incomplete exam with further testing required.      [email or web address]

## 2021-02-23 NOTE — PROGRESS NOTES
Subjective:      Patient ID: Marybeth Villela is a 63 y.o. female  Dr. Josiah Freire M.D., MD  No ref. provider found    HPI  Chief Complaint   Patient presents with    Cirrhosis       6 month cirrhosis f/u. Labs and u/s have been completed. MELD = 7  Impression 1. Cirrhotic changes of the liver with heterogeneous echotexture and diminished caliber. No evidence of discrete mass. There is hepatopedal flow within the portal vein. 2. Suspected small angiomyolipoma involving interpolar aspect of the right kidney. No evidence of hydronephrosis. 3. Cholelithiasis. There is no evidence of biliary dilatation. 4. Mild splenomegaly. Signed by Dr Haley Rodrigues on 2/3/2021 9:34 AM   Last egd 12/2020 with grade II varices. Due for repeat EGD with banding next month. She denies jaundice or icterus. Reports no problems currently with memory or confusion. She denies hematochezia, melena, hematemesis. No abdominal swelling or rapid weight gain     She is prescribed nadolol 20 mg daily for portal hypertension with gastropathy and esophageal varices  She is also prescribed omeprazole 20 mg daily for chronic GERD and ulcer prevention. She has no gi complaints. She denies dysphagia, n/v. Reflux reported as well controlled. She denies abdominal pain, change in bowel habit. She is due also for screening colonoscopy. Last c-scope 5 yr ago with poor prep noted. Medications reviewed. She has had some medication changes but cannot recall the names of her new medications. List is not accurate. Assessment:      1. Cirrhosis of liver without ascites, unspecified hepatic cirrhosis type ([address]    2. Portal hypertension (HCC)    3. Esophageal varices without bleeding, unspecified esophageal varices type ([address]    4. Gastritis determined by biopsy    5. Chronic GERD    6. History of hepatitis C            Plan:      Continue same medications. Dr Sidra Dick I esophageal varices, erosive/active gastritis-2 yr recall    UPPER GASTROINTESTINAL ENDOSCOPY  03/02/2016    Dr Perkins-gr II esoph varices; portal hyptensive gastropathy    UPPER GASTROINTESTINAL ENDOSCOPY N/A 3/3/2020    Dr Paloma Torres-w/variceal banding x 4, 3 columns of Grade II varices, repeat in 6-8 wks    UPPER GASTROINTESTINAL ENDOSCOPY N/A 4/14/2020    Dr Paloma Torres-w/variceal banding x 3-grade I-II varices, repeat in 8 wks    UPPER GASTROINTESTINAL ENDOSCOPY N/A 8/25/2020    Dr Paloma Torres-w/variceal banding x 3, grade 1-2 varices, portal hypertensive gastropathy, repeat in 3 months    UPPER GASTROINTESTINAL ENDOSCOPY N/A 12/4/2020    Dr Paloma Torres-w/variceal banding x 5-Grade II varices, portal hypertensive gastropathy, repeat in 3 months    US GUIDED LIVER BIOPSY PERCUTANEOUS  05/07/2019    Dr. Segundo Sweeney; Grade 2, Stage 4, iron stain (-)       Current Outpatient Medications   Medication Sig Dispense Refill    amoxicillin-clavulanate (AUGMENTIN) 875-125 MG per tablet Take 1 tablet by mouth 2 times daily      ondansetron (ZOFRAN) 4 MG tablet Take 1 tablet by mouth every 8 hours as needed for Nausea 45 tablet 2    nadolol (CORGARD) 20 MG tablet TAKE ONE TABLET BY MOUTH EVERY DAY -- NEED APPOINTMENT 90 tablet 1    omeprazole (PRILOSEC) 20 MG delayed release capsule TAKE ONE CAPSULE BY MOUTH EVERY DAY 90 capsule 1    tiZANidine (ZANAFLEX) 4 MG tablet Take 4 mg by mouth every 6 hours as needed       gabapentin (NEURONTIN) 600 MG tablet Take 600 mg by mouth 3 times daily.       atorvastatin (LIPITOR) 10 MG tablet Take 10 mg by mouth daily      metFORMIN, OSM, (FORTAMET) 1000 MG extended release tablet Take 1,000 mg by mouth daily (with breakfast)       Ertugliflozin L-PyroglutamicAc (STEGLATRO) 5 MG TABS Take 5 mg by mouth daily       cyclobenzaprine (FLEXERIL) 10 MG tablet Take 10 mg by mouth 3 times daily as needed for Muscle spasms  traMADol (ULTRAM) 50 MG tablet Take 1 tablet by mouth every 12 hours as needed for Pain 60 tablet 2    benazepril (LOTENSIN) 20 MG tablet Take 1 tablet by mouth daily 30 tablet 5     No current facility-administered medications for this visit. No Known Allergies     reports that she has been smoking. She has a 11.25 pack-year smoking history. She has never used smokeless tobacco. She reports that she does not drink alcohol or use drugs. Review of Systems   Constitutional: Negative for appetite change, fever and unexpected weight change. HENT: Negative for sore throat and voice change. Respiratory: Negative for cough, chest tightness and shortness of breath. Cardiovascular: Negative for chest pain, palpitations and leg swelling. Gastrointestinal: Negative for abdominal distention, abdominal pain, blood in stool, constipation, diarrhea, nausea, rectal pain and vomiting. Heartburn   Musculoskeletal: Positive for arthralgias and back pain. Negative for gait problem. Skin: Negative for pallor, rash and wound. Neurological: Negative for dizziness, weakness and light-headedness. Hematological: Negative for adenopathy. Does not bruise/bleed easily. All other systems reviewed and are negative. Objective:   Physical Exam  Constitutional:       General: She is not in acute distress. Appearance: Normal appearance. She is well-developed. Comments: /60   Pulse 58   Ht 5' (1.524 m)   Wt 200 lb (90.7 kg)   SpO2 99%   BMI 39.06 kg/m²    HENT:      Head: Normocephalic and atraumatic. Mouth/Throat:      Mouth: Mucous membranes are moist.      Pharynx: Oropharynx is clear. Eyes:      General: No scleral icterus. Pupils: Pupils are equal, round, and reactive to light. Cardiovascular:      Rate and Rhythm: Normal rate and regular rhythm. Heart sounds: No murmur. Pulmonary:      Effort: Pulmonary effort is normal. No respiratory distress. Breath sounds: Normal breath sounds. Abdominal:      General: Bowel sounds are normal. There is no distension. Palpations: Abdomen is soft. There is no mass. Tenderness: There is no abdominal tenderness. There is no guarding or rebound. Musculoskeletal: Normal range of motion. Skin:     General: Skin is warm and dry. Coloration: Skin is not pale. Neurological:      Mental Status: She is alert and oriented to person, place, and time.    Psychiatric:         Mood and Affect: Mood normal.         Behavior: Behavior normal.

## 2021-03-05 ENCOUNTER — OFFICE VISIT (OUTPATIENT)
Age: 61
End: 2021-03-05

## 2021-03-05 VITALS — HEART RATE: 58 BPM | TEMPERATURE: 97.1 F | OXYGEN SATURATION: 98 %

## 2021-03-05 DIAGNOSIS — Z11.59 SCREENING FOR VIRAL DISEASE: Primary | ICD-10-CM

## 2021-03-05 PROCEDURE — 99999 PR OFFICE/OUTPT VISIT,PROCEDURE ONLY: CPT | Performed by: NURSE PRACTITIONER

## 2021-03-06 LAB — SARS-COV-2, PCR: NOT DETECTED

## 2021-03-09 ENCOUNTER — HOSPITAL ENCOUNTER (OUTPATIENT)
Age: 61
Setting detail: OUTPATIENT SURGERY
Discharge: HOME | End: 2021-03-09
Attending: INTERNAL MEDICINE | Admitting: INTERNAL MEDICINE
Payer: MEDICARE

## 2021-03-09 ENCOUNTER — ANESTHESIA (OUTPATIENT)
Dept: ENDOSCOPY | Age: 61
End: 2021-03-09
Payer: MEDICARE

## 2021-03-09 ENCOUNTER — ANESTHESIA EVENT (OUTPATIENT)
Dept: ENDOSCOPY | Age: 61
End: 2021-03-09
Payer: MEDICARE

## 2021-03-09 VITALS — SYSTOLIC BLOOD PRESSURE: 148 MMHG | DIASTOLIC BLOOD PRESSURE: 62 MMHG | OXYGEN SATURATION: 82 %

## 2021-03-09 VITALS
OXYGEN SATURATION: 100 % | RESPIRATION RATE: 18 BRPM | DIASTOLIC BLOOD PRESSURE: 71 MMHG | SYSTOLIC BLOOD PRESSURE: 120 MMHG | TEMPERATURE: 98.2 F | BODY MASS INDEX: 39.66 KG/M2 | HEIGHT: 60 IN | HEART RATE: 72 BPM | WEIGHT: 202 LBS

## 2021-03-09 LAB
GLUCOSE BLD-MCNC: 163 MG/DL (ref 70–99)
PERFORMED ON: ABNORMAL

## 2021-03-09 PROCEDURE — 7100000011 HC PHASE II RECOVERY - ADDTL 15 MIN: Performed by: INTERNAL MEDICINE

## 2021-03-09 PROCEDURE — 3609010600 HC COLONOSCOPY POLYPECTOMY SNARE/COLD BIOPSY: Performed by: INTERNAL MEDICINE

## 2021-03-09 PROCEDURE — 3700000001 HC ADD 15 MINUTES (ANESTHESIA): Performed by: INTERNAL MEDICINE

## 2021-03-09 PROCEDURE — 3609017100 HC EGD: Performed by: INTERNAL MEDICINE

## 2021-03-09 PROCEDURE — 82947 ASSAY GLUCOSE BLOOD QUANT: CPT

## 2021-03-09 PROCEDURE — 2580000003 HC RX 258: Performed by: NURSE ANESTHETIST, CERTIFIED REGISTERED

## 2021-03-09 PROCEDURE — 6360000002 HC RX W HCPCS: Performed by: NURSE ANESTHETIST, CERTIFIED REGISTERED

## 2021-03-09 PROCEDURE — 7100000010 HC PHASE II RECOVERY - FIRST 15 MIN: Performed by: INTERNAL MEDICINE

## 2021-03-09 PROCEDURE — 88305 TISSUE EXAM BY PATHOLOGIST: CPT

## 2021-03-09 PROCEDURE — 45380 COLONOSCOPY AND BIOPSY: CPT | Performed by: INTERNAL MEDICINE

## 2021-03-09 PROCEDURE — 3700000000 HC ANESTHESIA ATTENDED CARE: Performed by: INTERNAL MEDICINE

## 2021-03-09 PROCEDURE — 2709999900 HC NON-CHARGEABLE SUPPLY: Performed by: INTERNAL MEDICINE

## 2021-03-09 PROCEDURE — 2580000003 HC RX 258: Performed by: INTERNAL MEDICINE

## 2021-03-09 PROCEDURE — 2500000003 HC RX 250 WO HCPCS: Performed by: NURSE ANESTHETIST, CERTIFIED REGISTERED

## 2021-03-09 PROCEDURE — 43235 EGD DIAGNOSTIC BRUSH WASH: CPT | Performed by: INTERNAL MEDICINE

## 2021-03-09 RX ORDER — LIDOCAINE HYDROCHLORIDE 20 MG/ML
INJECTION, SOLUTION INFILTRATION; PERINEURAL PRN
Status: DISCONTINUED | OUTPATIENT
Start: 2021-03-09 | End: 2021-03-09 | Stop reason: SDUPTHER

## 2021-03-09 RX ORDER — SODIUM CHLORIDE, SODIUM LACTATE, POTASSIUM CHLORIDE, CALCIUM CHLORIDE 600; 310; 30; 20 MG/100ML; MG/100ML; MG/100ML; MG/100ML
INJECTION, SOLUTION INTRAVENOUS CONTINUOUS PRN
Status: DISCONTINUED | OUTPATIENT
Start: 2021-03-09 | End: 2021-03-09 | Stop reason: SDUPTHER

## 2021-03-09 RX ORDER — PROPOFOL 10 MG/ML
INJECTION, EMULSION INTRAVENOUS PRN
Status: DISCONTINUED | OUTPATIENT
Start: 2021-03-09 | End: 2021-03-09 | Stop reason: SDUPTHER

## 2021-03-09 RX ORDER — SODIUM CHLORIDE, SODIUM LACTATE, POTASSIUM CHLORIDE, CALCIUM CHLORIDE 600; 310; 30; 20 MG/100ML; MG/100ML; MG/100ML; MG/100ML
INJECTION, SOLUTION INTRAVENOUS CONTINUOUS
Status: DISCONTINUED | OUTPATIENT
Start: 2021-03-09 | End: 2021-03-09 | Stop reason: HOSPADM

## 2021-03-09 RX ADMIN — SODIUM CHLORIDE, SODIUM LACTATE, POTASSIUM CHLORIDE, AND CALCIUM CHLORIDE: 600; 310; 30; 20 INJECTION, SOLUTION INTRAVENOUS at 11:03

## 2021-03-09 RX ADMIN — SODIUM CHLORIDE, POTASSIUM CHLORIDE, SODIUM LACTATE AND CALCIUM CHLORIDE: 600; 310; 30; 20 INJECTION, SOLUTION INTRAVENOUS at 10:47

## 2021-03-09 RX ADMIN — PROPOFOL 400 MG: 10 INJECTION, EMULSION INTRAVENOUS at 11:09

## 2021-03-09 RX ADMIN — LIDOCAINE HYDROCHLORIDE 40 MG: 20 INJECTION, SOLUTION INFILTRATION; PERINEURAL at 11:09

## 2021-03-09 ASSESSMENT — LIFESTYLE VARIABLES: SMOKING_STATUS: 1

## 2021-03-09 ASSESSMENT — PAIN SCALES - GENERAL: PAINLEVEL_OUTOF10: 0

## 2021-03-09 NOTE — OP NOTE
Patient: Hany Colmenares: 1960  Med Rec#: [id number] Acc#: [id number]   Primary Care Provider Dr. Myah Granger M.D., MD    Date of Procedure:  3/9/2021    Endoscopist: Viktoriya Mercado MD    Referring Provider: Dr. Myah Granger M.D., MD,     Operation Performed: Colonoscopy with biopsy    Indications: screening    Anesthesia:  Sedation was administered by anesthesia who monitored the patient during the procedure. I met with Dacia Koehler prior to procedure. We discussed the procedure itself, and I have discussed the risks of endoscopy (including-- but not limited to-- pain, discomfort, bleeding potentially requiring second endoscopic procedure and/or blood transfusion, organ perforation requiring operative repair, damage to organs near the colon, infection, aspiration, cardiopulmonary/allergic reaction), benefits, indications to endoscopy. Additionally, we discussed options other than colonoscopy. The patient expressed understanding. All questions answered. The patient decided to proceed with the procedure. Signed informed consent was placed on the chart. Blood Loss: minimal    Withdrawal time: > 6 minutes  Bowel Prep: adequate     Complications: no immediate complications    DESCRIPTION OF PROCEDURE:     A time out was performed. After written informed consent was obtained, the patient was placed in the left lateral position. The perianal area was inspected, and a digital rectal exam was performed. A rectal exam was performed: normal tone, no palpable lesions. At this point, a forward viewing Olympus colonoscope was inserted into the anus and carefully advanced to the cecum. The cecum was identified by the ileocecal valve and the appendiceal orifice. The colonoscope was then slowly withdrawn with careful inspection of the mucosa in a linear and circumferential fashion. The scope was retroflexed in the rectum.  Suction was utilized during the procedure to remove as much air as possible from the bowel. The colonoscope was removed from the patient, and the procedure was terminated. Findings are listed below. Findings: The mucosa appeared normal throughout the entire examined colon   In the left colon, there were two small sessile polyps noted - removed with cold forceps polypectomy  Retroflexion in the rectum was normal and revealed no further abnormalities         Recommendations:  1. Repeat colonoscopy: pending pathology - max of 5 yrs for screening  2. Await biopsy results-you will receive a letter with your results within 7-10 days    Findings and recommendations were discussed w/ the patient. A copy of the images was provided.     Joshua Logan MD  3/9/2021  11:34 AM

## 2021-03-09 NOTE — H&P
Patient Name: Don Solano  : 1960  MRN: [id number]  DATE: 21    Allergies: No Known Allergies     ENDOSCOPY  History and Physical    Procedure:    [] Diagnostic Colonoscopy       [] Screening Colonoscopy  [x] EGD      [] ERCP      [] EUS       [] Other    [x] Previous office notes/History and Physical reviewed from the patients chart. Please see EMR for further details of HPI. I have examined the patient's status immediately prior to the procedure and:      Indications/HPI:    []Abdominal Pain   []Cancer- GI/Lung     []Fhx of colon CA/polyps  []History of Polyps  []Barretts            []Melena  []Abnormal Imaging              []Dysphagia              []Persistent Pneumonia   []Anemia                            []Food Impaction        []History of Polyps  [] GI Bleed             []Pulmonary nodule/Mass   []Change in bowel habits []Heartburn/Reflux  []Rectal Bleed (BRBPR)  []Chest Pain - Non Cardiac []Heme (+) Stool []Ulcers  []Constipation  []Hemoptysis  [x]Varices  []Diarrhea  []Hypoxemia    []Nausea/Vomiting   []Screening   []Crohns/Colitis  []Other:     Anesthesia:   [x] MAC [] Moderate Sedation   [] General   [] None     ROS: 12 pt Review of Symptoms was negative unless mentioned above    Medications:   Prior to Admission medications    Medication Sig Start Date End Date Taking?  Authorizing Provider   amoxicillin-clavulanate (AUGMENTIN) 875-125 MG per tablet Take 1 tablet by mouth 2 times daily    Historical Provider, MD   ondansetron (ZOFRAN) 4 MG tablet Take 1 tablet by mouth every 8 hours as needed for Nausea 20   Krys Maldonado MD   nadolol (CORGARD) 20 MG tablet TAKE ONE TABLET BY MOUTH EVERY DAY -- NEED APPOINTMENT 10/7/20   DANIELLA Winters NP   omeprazole (PRILOSEC) 20 MG delayed release capsule TAKE ONE CAPSULE BY MOUTH EVERY DAY 10/7/20   DANIELLA Winters NP   tiZANidine (ZANAFLEX) 4 MG tablet Take 4 mg by mouth every 6 hours as needed  20   Historical Provider, MD   gabapentin (NEURONTIN) 600 MG tablet Take 600 mg by mouth 3 times daily.     Historical Provider, MD   atorvastatin (LIPITOR) 10 MG tablet Take 10 mg by mouth daily    Historical Provider, MD   metFORMIN, OSM, (FORTAMET) 1000 MG extended release tablet Take 1,000 mg by mouth daily (with breakfast)  6/21/19   Historical Provider, MD   Ertugliflozin L-PyroglutamicAc (STEGLATRO) 5 MG TABS Take 5 mg by mouth daily  4/24/19   Historical Provider, MD   cyclobenzaprine (FLEXERIL) 10 MG tablet Take 10 mg by mouth 3 times daily as needed for Muscle spasms    Historical Provider, MD   traMADol (ULTRAM) 50 MG tablet Take 1 tablet by mouth every 12 hours as needed for Pain 9/7/17   DANIELLA Ngo CNP   benazepril (LOTENSIN) 20 MG tablet Take 1 tablet by mouth daily 7/5/17   DANIELLA Rivera CNP       Past Medical History:  Past Medical History:   Diagnosis Date    Arthritis     Asthma     [facility] [facility]     COPD (chronic obstructive pulmonary disease) ([facility] [facility]     Esophageal varices ([facility] [address] [facility]     GERD (gastroesophageal reflux disease)     Hepatitis C     Hyperlipidemia     Hypertension        Past Surgical History:  Past Surgical History:   Procedure Laterality Date    COLONOSCOPY  03/02/2016    Dr Sandhu Esther bleeding internal hemorrhoids o/w normal; fair prep (5 yr)    OR EGD TRANSORAL BIOPSY SINGLE/MULTIPLE N/A 2/7/2017    Dr Anna Colin Grade I esophageal varices, gastritis/gastropathy    OR EGD TRANSORAL BIOPSY SINGLE/MULTIPLE N/A 3/23/2018    Dr Elenita Saucedo I esophageal varices, erosive/active gastritis-2 yr recall    UPPER GASTROINTESTINAL ENDOSCOPY  03/02/2016    Dr Perkins-shantal II esoph varices; portal hyptensive gastropathy    UPPER GASTROINTESTINAL ENDOSCOPY N/A 3/3/2020    Dr Ana Torres-marisa/variceal banding x 4, 3 columns of Grade II varices, repeat in 6-8 wks    UPPER GASTROINTESTINAL ENDOSCOPY N/A 4/14/2020    Dr LUIS FELIPE Blum/variceal banding x 3-grade I-II varices, repeat in 8 wks    UPPER GASTROINTESTINAL ENDOSCOPY N/A 8/25/2020    Dr Trudy Torres-w/variceal banding x 3, grade 1-2 varices, portal hypertensive gastropathy, repeat in 3 months    UPPER GASTROINTESTINAL ENDOSCOPY N/A 12/4/2020    Dr Trudy Torres-w/variceal banding x 5-Grade II varices, portal hypertensive gastropathy, repeat in 3 months    US GUIDED LIVER BIOPSY PERCUTANEOUS  05/07/2019    Dr. Tyler Blake; Grade 2, Stage 4, iron stain (-)       Social History:  Social History     Tobacco Use    Smoking status: Current Every Day Smoker     Packs/day: 0.25     Years: 45.00     Pack years: 11.25    Smokeless tobacco: Never Used   Substance Use Topics    Alcohol use: No     Comment: Pt states that she stopped drinking 2014?  Drug use: No     Types: Cocaine     Comment: Pt stopped use on this 2014?- Pt did not have to do any rehab       Vital Signs: There were no vitals filed for this visit. Physical Exam:  Cardiac:  [x]WNL  []Comments:  Pulmonary:  [x]WNL   []Comments:  Neuro/Mental Status:  [x]WNL  []Comments:  Abdominal:  [x]WNL    []Comments:  Other:   []WNL  []Comments:    Informed Consent:  The risks and benefits of the procedure have been discussed with either the patient or if they cannot consent, their representative. Assessment:  Patient examined and appropriate for planned sedation and procedure. Plan:  Proceed with planned sedation and procedure as above.          Nathaly Vu MD

## 2021-03-09 NOTE — ANESTHESIA PRE PROCEDURE
Department of Anesthesiology  Preprocedure Note       Name:  Romelia Garcia   Age:  63 y.o.  :  1960                                          MRN:  [id number]         Date:  3/9/2021      Surgeon: Arin Urbina):  Sally Narvaez MD    Procedure: Procedure(s):  EGD ESOPHAGOGASTRODUODENOSCOPY    Medications prior to admission:   Prior to Admission medications    Medication Sig Start Date End Date Taking? Authorizing Provider   amoxicillin-clavulanate (AUGMENTIN) 875-125 MG per tablet Take 1 tablet by mouth 2 times daily    Historical Provider, MD   ondansetron (ZOFRAN) 4 MG tablet Take 1 tablet by mouth every 8 hours as needed for Nausea 20   Sally Narvaez MD   nadolol (CORGARD) 20 MG tablet TAKE ONE TABLET BY MOUTH EVERY DAY -- NEED APPOINTMENT 10/7/20   DANIELLA Mora NP   omeprazole (PRILOSEC) 20 MG delayed release capsule TAKE ONE CAPSULE BY MOUTH EVERY DAY 10/7/20   DANIELLA Mora NP   tiZANidine (ZANAFLEX) 4 MG tablet Take 4 mg by mouth every 6 hours as needed  20   Historical Provider, MD   gabapentin (NEURONTIN) 600 MG tablet Take 600 mg by mouth 3 times daily. Historical Provider, MD   atorvastatin (LIPITOR) 10 MG tablet Take 10 mg by mouth daily    Historical Provider, MD   metFORMIN, OSM, (FORTAMET) 1000 MG extended release tablet Take 1,000 mg by mouth daily (with breakfast)  19   Historical Provider, MD   Ertugliflozin L-PyroglutamicAc (STEGLATRO) 5 MG TABS Take 5 mg by mouth daily  19   Historical Provider, MD   cyclobenzaprine (FLEXERIL) 10 MG tablet Take 10 mg by mouth 3 times daily as needed for Muscle spasms    Historical Provider, MD   traMADol (ULTRAM) 50 MG tablet Take 1 tablet by mouth every 12 hours as needed for Pain 17   KamalaDANIELLA Garza CNP   benazepril (LOTENSIN) 20 MG tablet Take 1 tablet by mouth daily 17   DANIELLA Luz CNP       Current medications:    No current outpatient medications on file.      No current facility-administered medications for this visit.         Allergies:  No Known Allergies    Problem List:    Patient Active Problem List   Diagnosis Code    Nonintractable headache R51.9    Cirrhosis of liver without ascites (HCC) K74.60    Essential hypertension I10    Chronic GERD K21.9    History of hepatitis C Z86.19    Elevated AFP R77.2    Portal hypertension ([facility] [address] K76.6    Secondary esophageal varices without bleeding (HCC) I85.10    Gastritis without bleeding K29.70    Gallstones K80.20       Past Medical History:        Diagnosis Date    Arthritis     Asthma     Cirrhosis ([facility] [address]     COPD (chronic obstructive pulmonary disease) (HCC)     Esophageal varices (HCC)     GERD (gastroesophageal reflux disease)     Hepatitis C     Hyperlipidemia     Hypertension        Past Surgical History:        Procedure Laterality Date    COLONOSCOPY  03/02/2016    Dr Levi Ahmadi bleeding internal hemorrhoids o/w normal; fair prep (5 yr)    MS EGD TRANSORAL BIOPSY SINGLE/MULTIPLE N/A 2/7/2017    Dr Bryanna Hyde Grade I esophageal varices, gastritis/gastropathy    MS EGD TRANSORAL BIOPSY SINGLE/MULTIPLE N/A 3/23/2018    Dr Valdez [place] I esophageal varices, erosive/active gastritis-2 yr recall    UPPER GASTROINTESTINAL ENDOSCOPY  03/02/2016    Dr Dorantes II esoph varices; portal hyptensive gastropathy    UPPER GASTROINTESTINAL ENDOSCOPY N/A 3/3/2020    Dr Audra Blum/variceal banding x 4, 3 columns of Grade II varices, repeat in 6-8 wks    UPPER GASTROINTESTINAL ENDOSCOPY N/A 4/14/2020    Dr Audra Blum/variceal banding x 3-grade I-II varices, repeat in 8 wks    UPPER GASTROINTESTINAL ENDOSCOPY N/A 8/25/2020    Dr Audra Blum/variceal banding x 3, grade 1-2 varices, portal hypertensive gastropathy, repeat in 3 months    UPPER GASTROINTESTINAL ENDOSCOPY N/A 12/4/2020    Dr Audra Blum/variceal banding x 5-Grade II varices, portal hypertensive gastropathy, repeat in 3 months    US GUIDED LIVER BIOPSY PERCUTANEOUS  05/07/2019    Dr. Uziel Cabrales; Grade 2, Stage 4, iron stain (-)       Social History:    Social History     Tobacco Use    Smoking status: Current Every Day Smoker     Packs/day: 0.25     Years: 45.00     Pack years: 11.25    Smokeless tobacco: Never Used   Substance Use Topics    Alcohol use: No     Comment: Pt states that she stopped drinking 2014? Ready to quit: Not Answered  Counseling given: Not Answered      Vital Signs (Current): There were no vitals filed for this visit. BP Readings from Last 3 Encounters:   02/23/21 115/60   12/04/20 (!) 195/94   12/04/20 (!) 174/86       NPO Status:                                                                                 BMI:   Wt Readings from Last 3 Encounters:   02/23/21 200 lb (90.7 kg)   12/04/20 206 lb (93.4 kg)   09/22/20 201 lb (91.2 kg)     There is no height or weight on file to calculate BMI.    CBC:   Lab Results   Component Value Date    WBC 5.1 02/03/2021    RBC 4.67 02/03/2021    HGB 13.7 02/03/2021    HCT 41.1 02/03/2021    MCV 88.0 02/03/2021    RDW 13.7 02/03/2021     02/03/2021       CMP:   Lab Results   Component Value Date     02/03/2021    K 3.8 02/03/2021     02/03/2021    CO2 25 02/03/2021    BUN 9 02/03/2021    CREATININE 0.3 02/03/2021    GFRAA >59 02/03/2021    LABGLOM >60 02/03/2021    GLUCOSE 139 02/03/2021    PROT 7.9 02/03/2021    CALCIUM 9.3 02/03/2021    BILITOT 0.8 02/03/2021    ALKPHOS 102 02/03/2021    AST 25 02/03/2021    ALT 22 02/03/2021       POC Tests: No results for input(s): POCGLU, POCNA, POCK, POCCL, POCBUN, POCHEMO, POCHCT in the last 72 hours.     Coags:   Lab Results   Component Value Date    PROTIME 13.7 02/03/2021    INR 1.05 02/03/2021    APTT 35.5 05/07/2019       HCG (If Applicable): No results found for: PREGTESTUR, PREGSERUM, HCG, HCGQUANT     ABGs: No results found for: PHART, PO2ART, MXF1LGK, SNS2DMS, BEART, R5TNPCKV     Type & Screen (If Applicable):  No results found for: LABABO, LABRH    Drug/Infectious Status (If Applicable):  No results found for: HIV, HEPCAB    COVID-19 Screening (If Applicable):   Lab Results   Component Value Date    COVID19 Not Detected 03/05/2021         Anesthesia Evaluation  Patient summary reviewed no history of anesthetic complications:   Airway: Mallampati: II  TM distance: >3 FB   Neck ROM: full  Mouth opening: > = 3 FB Dental: normal exam         Pulmonary: breath sounds clear to auscultation  (+) COPD:  asthma: current smoker          Patient smoked on day of surgery. Cardiovascular:    (+) hypertension:, hyperlipidemia         Beta Blocker:  Dose within 24 Hrs         Neuro/Psych:   (+) headaches:,             GI/Hepatic/Renal:   (+) GERD:, hepatitis: C, liver disease (cirrhosis): portal hypertension, esophageal varices, morbid obesity          Endo/Other:    (+) Diabetes, : arthritis:., .                 Abdominal:   (+) obese,         Vascular: negative vascular ROS. Anesthesia Plan      general and TIVA     ASA 3       Induction: intravenous. Anesthetic plan and risks discussed with patient.                       DANIELLA Cali - CRNA   3/9/2021

## 2021-03-11 ENCOUNTER — OFFICE VISIT (OUTPATIENT)
Dept: FAMILY MEDICINE CLINIC | Facility: CLINIC | Age: 61
End: 2021-03-11

## 2021-03-11 VITALS
TEMPERATURE: 97.7 F | SYSTOLIC BLOOD PRESSURE: 111 MMHG | HEART RATE: 64 BPM | HEIGHT: 61 IN | DIASTOLIC BLOOD PRESSURE: 67 MMHG | WEIGHT: 202.6 LBS | OXYGEN SATURATION: 96 % | BODY MASS INDEX: 38.25 KG/M2

## 2021-03-11 DIAGNOSIS — H00.011 HORDEOLUM EXTERNUM OF RIGHT UPPER EYELID: Primary | ICD-10-CM

## 2021-03-11 PROCEDURE — 99213 OFFICE O/P EST LOW 20 MIN: CPT | Performed by: NURSE PRACTITIONER

## 2021-03-11 RX ORDER — NADOLOL 20 MG/1
20 TABLET ORAL DAILY
Qty: 30 TABLET | Refills: 2 | Status: SHIPPED | OUTPATIENT
Start: 2021-03-11 | End: 2021-06-16 | Stop reason: SDUPTHER

## 2021-03-11 RX ORDER — CEPHALEXIN 500 MG/1
500 CAPSULE ORAL 3 TIMES DAILY
Qty: 21 CAPSULE | Refills: 0 | Status: SHIPPED | OUTPATIENT
Start: 2021-03-11 | End: 2021-03-29

## 2021-03-11 NOTE — PROGRESS NOTES
"Chief Complaint  Stye (Bump on eyelid/ states its itching)    Subjective          Della Gonzalez presents to [facility] for eye problem.   History of Present Illness  Eye problem.   New. Began about 2-3 days ago. Worsening. Stye to right upper eyelid. Tender, itching, red, swollen. Feels like previous stye. Has tried warm compresses without relief. No changes in vision, facial rash, fever.       Objective   Vital Signs:   /67 (BP Location: Right arm, Patient Position: Sitting, Cuff Size: Adult)   Pulse 64   Temp 97.7 °F (36.5 °C) (Infrared)   Ht 154.9 cm (60.98\")   Wt 91.9 kg (202 lb 9.6 oz)   SpO2 96%   BMI 38.30 kg/m²     Physical Exam  Vitals and nursing note reviewed.   Constitutional:       Appearance: She is well-developed.   HENT:      Head: Normocephalic and atraumatic.   Eyes:      General: Vision grossly intact.         Right eye: Hordeolum present.      Extraocular Movements: Extraocular movements intact.      Conjunctiva/sclera: Conjunctivae normal.     Cardiovascular:      Rate and Rhythm: Normal rate and regular rhythm.   Pulmonary:      Effort: Pulmonary effort is normal.   Musculoskeletal:      Cervical back: Neck supple.   Lymphadenopathy:      Cervical: No cervical adenopathy.   Skin:     General: Skin is warm and dry.   Neurological:      Mental Status: She is alert and oriented to person, place, and time.        Result Review :                 Assessment and Plan    Diagnoses and all orders for this visit:    1. Hordeolum externum of right upper eyelid (Primary)    Other orders  -     nadolol (CORGARD) 20 MG tablet; Take 1 tablet by mouth Daily.  Dispense: 30 tablet; Refill: 2  -     cephalexin (Keflex) 500 MG capsule; Take 1 capsule by mouth 3 (Three) Times a Day.  Dispense: 21 capsule; Refill: 0      Continue warm compresses      I spent 20 minutes caring for Della on this date of service. This time includes time spent by me in the following " activities:obtaining and/or reviewing a separately obtained history, performing a medically appropriate examination and/or evaluation , counseling and educating the patient/family/caregiver, ordering medications, tests, or procedures and documenting information in the medical record  Follow Up   Return in about 4 days (around 3/15/2021), or if symptoms worsen or fail to improve.  Patient was given instructions and counseling regarding her condition or for health maintenance advice. Please see specific information pulled into the AVS if appropriate.

## 2021-03-24 NOTE — PROGRESS NOTES
[facility] - P[facility]IATRY    Today's Date: 03/29/21    Patient Name: Della Gonzalez  MRN: [id number]  CSN: [id number]  PCP: Donny Aguayo MD  Referring Provider: No ref. provider found    SUBJECTIVE     Chief Complaint   Patient presents with   • Follow-up     3 mo f/u diabetic nail care- pt states has been keeping vaseline on feet for dryness- pt denies pain at this time- pt presents with thickened and discolored toe nails.- pcp 07/06/2020   • Diabetes     160mg/dl last BG this morning.     HPI: Della Gonzalez, a 60 y.o.female, comes to clinic as a(n) established patient presenting for diabetic foot exam and complaining of thick fungal toenails. Patient has h/o cirrhosis, DM2, GERD, tobacco use. Patient is NIDDM with last stated BG level of 160mg/dl.  Notes some mild numbness/tingling in feet.  Notes that her toenails are long, thick, discolored and crumbly. She is unable to care for them herself adequately due to nail shape and thickness. Continues tobacco use daily. Has continued to apply Vaseline to feet to help with dryness. Denies pain at the present time. Relates previous treatment(s) including care by podiatrist. Denies any constitutional symptoms. No other pedal complaints at this time.    Past Medical History:   Diagnosis Date   • Cirrhosis of liver (CMS/HCC)    • Diabetes mellitus (CMS/HCC)    • GERD (gastroesophageal reflux disease)    • Liver disease      Past Surgical History:   Procedure Laterality Date   • COLONOSCOPY       Family History   Problem Relation Age of Onset   • Diabetes Mother    • No Known Problems Father      Social History     Socioeconomic History   • Marital status: Single     Spouse name: Not on file   • Number of children: Not on file   • Years of education: Not on file   • Highest education level: Not on file   Tobacco Use   • Smoking status: Current Every Day Smoker     Packs/day: 0.50     Years: 40.00     Pack years: 20.00     Types: Cigarettes   • Smokeless  tobacco: Never Used   Vaping Use   • Vaping Use: Never used   Substance and Sexual Activity   • Alcohol use: No   • Drug use: No   • Sexual activity: Not Currently     Partners: Male     No Known Allergies  Current Outpatient Medications   Medication Sig Dispense Refill   • atorvastatin (LIPITOR) 10 MG tablet Take 1 tablet by mouth Daily. 30 tablet 5   • benazepril (LOTENSIN) 10 MG tablet Take 1 tablet by mouth Daily. 30 tablet 5   • Ertugliflozin L-PyroglutamicAc (Steglatro) 15 MG tablet Take 1 tablet by mouth Every Morning. 90 tablet 1   • hydrocortisone-eucerin Apply  topically to the appropriate area as directed 2 (Two) Times a Day. 120 g 2   • ketoconazole (NIZORAL) 2 % cream APPLY TOPICALLY TO APPROPRIATE AREA AS DIRECTED DAILY 60 g 2   • linagliptin (TRADJENTA) 5 MG tablet tablet Take 1 tablet by mouth Daily. 90 tablet 1   • metFORMIN (Glucophage) 1000 MG tablet Take 1 tablet by mouth 2 (Two) Times a Day With Meals. 60 tablet 5   • nabumetone (RELAFEN) 500 MG tablet Take 1 tablet by mouth 2 (Two) Times a Day As Needed for Moderate Pain . 60 tablet 5   • nadolol (CORGARD) 20 MG tablet Take 1 tablet by mouth Daily. 30 tablet 2   • omeprazole (priLOSEC) 20 MG capsule Take 1 capsule by mouth Daily. 90 capsule 1   • tiZANidine (ZANAFLEX) 4 MG tablet      • traMADol (ULTRAM) 50 MG tablet Take 1 tablet by mouth 3 (Three) Times a Day As Needed (TID as needed). 90 tablet 0   • traZODone (DESYREL) 100 MG tablet Take 1 tablet by mouth Every Night. 90 tablet 1   • gabapentin (NEURONTIN) 600 MG tablet Take 1 tablet by mouth 2 (Two) Times a Day. (Patient taking differently: Take 600 mg by mouth 2 (Two) Times a Day. PRN for pain) 60 tablet 0   • nystatin (MYCOSTATIN) 269921 UNIT/GM cream Apply  topically to the appropriate area as directed 2 (two) times a day. (Patient taking differently: Apply  topically to the appropriate area as directed As Needed.) 30 g 2     No current facility-administered medications for this visit.       Review of Systems   Constitutional: Negative for chills and fever.   HENT: Negative for congestion.    Respiratory: Negative for shortness of breath.    Cardiovascular: Positive for leg swelling. Negative for chest pain.   Gastrointestinal: Negative for constipation, diarrhea, nausea and vomiting.   Musculoskeletal: Positive for arthralgias. Negative for gait problem.        Foot pain   Skin: Negative for wound.   Neurological: Positive for numbness.       OBJECTIVE     Vitals:    03/29/21 1120   BP: 130/72   Pulse: 52   SpO2: 98%       PHYSICAL EXAM  GEN:   Accompanied by none.     Foot/Ankle Exam:       General:   Diabetic Foot Exam Performed    Appearance: appears stated age and healthy and obesity    Orientation: AAOx3    Affect: appropriate    Gait: unimpaired    Assistance: independent    Shoe Gear:  Casual shoes    VASCULAR      Right Foot Vascularity   Dorsalis pedis:  2+  Posterior tibial:  2+  Skin Temperature: warm    Edema Grading:  None  CFT:  3  Pedal Hair Growth:  Present  Varicosities: mild varicosities       Left Foot Vascularity   Dorsalis pedis:  2+  Posterior tibial:  2+  Skin Temperature: warm    Edema Grading:  None  CFT:  3  Pedal Hair Growth:  Present  Varicosities: mild varicosities        NEUROLOGIC     Right Foot Neurologic   Light touch sensation:  Diminished  Vibratory sensation:  Diminished  Hot/Cold sensation: diminished    Protective Sensation using [place] Monofilament:  8     Left Foot Neurologic   Light touch sensation:  Diminished  Vibratory sensation:  Diminished  Hot/cold sensation: diminished    Protective Sensation using [place] Monofilament:  8     MUSCULOSKELETAL      Right Foot Musculoskeletal   Ecchymosis:  None  Tenderness: toenails    Arch:  Normal  Hallux valgus: No    Hallux limitus: No       Left Foot Musculoskeletal   Ecchymosis:  None  Tenderness: toenails    Arch:  Normal  Hallux valgus: No    Hallux limitus: No       MUSCLE STRENGTH     Right  Foot Muscle Strength   Foot dorsiflexion:  5  Foot plantar flexion:  5  Foot inversion:  5  Foot eversion:  5     Left Foot Muscle Strength   Foot dorsiflexion:  5  Foot plantar flexion:  5  Foot inversion:  5  Foot eversion:  5     RANGE OF MOTION      Right Foot Range of Motion   Foot and ankle ROM within normal limits       Left Foot Range of Motion   Foot and ankle ROM within normal limits       DERMATOLOGIC     Right Foot Dermatologic   Skin: skin intact    Nails: onychomycosis, abnormally thick, subungual debris, dystrophic nails and ingrown toenail (hallux bilateral border)       Left Foot Dermatologic   Skin: skin intact    Nails: onychomycosis, abnormally thick, subungual debris, dystrophic nails and ingrown toenail (hallux medial border)        RADIOLOGY/NUCLEAR:  No results found.    LABORATORY/CULTURE RESULTS:      PATHOLOGY RESULTS:       ASSESSMENT/PLAN     Diagnoses and all orders for this visit:    1. Onychomycosis (Primary)    2. Ingrown toenail    3. Type 2 diabetes mellitus with diabetic neuropathy, with long-term current use of insulin (CMS/[facility])    4. Tobacco abuse    5. Obesity, morbid, BMI 40.0-49.9 (CMS/[facility])      Comprehensive lower extremity examination and evaluation was performed.  Discussed findings and treatment plan including risks, benefits, and treatment options with patient in detail. Patient agreed with treatment plan.  After verbal consent obtained, nail(s) x10 debrided of length and thickness with nail nipper without incidence  After verbal consent obtained, nail(s) x2 debrided of offending borders with nail nipper without incidence  Patient may maintain nails and calluses at home utilizing emery board or pumice stone between visits as needed  Reviewed at home diabetic foot care including daily foot checks   Tobacco cessation needed.   Continue monitoring blood sugar and maintain diabetic control under direction of PCP.   Continue Vaseline and moisture under occlusion for dry skin.    Patient's Body mass index is 38.13 kg/m². BMI is above normal parameters. Recommendations include: educational material.  An After Visit Summary was printed and given to the patient at discharge, including (if requested) any available informative/educational handouts regarding diagnosis, treatment, or medications. All questions were answered to patient/family satisfaction. Should symptoms fail to improve or worsen they agree to call or return to clinic or to go to the Emergency Department. Discussed the importance of following up with any needed screening tests/labs/specialist appointments and any requested follow-up recommended by me today. Importance of maintaining follow-up discussed and patient accepts that missed appointments can delay diagnosis and potentially lead to worsening of conditions.  Return in about 3 months (around 6/29/2021)., or sooner if acute issues arise.        This document has been electronically signed by MELECIO Hassan on March 29, 2021 12:52 CDT

## 2021-03-26 ENCOUNTER — TELEPHONE (OUTPATIENT)
Dept: PODIATRY | Facility: CLINIC | Age: 61
End: 2021-03-26

## 2021-03-29 ENCOUNTER — OFFICE VISIT (OUTPATIENT)
Dept: PODIATRY | Facility: CLINIC | Age: 61
End: 2021-03-29

## 2021-03-29 VITALS
HEART RATE: 52 BPM | HEIGHT: 61 IN | WEIGHT: 201.8 LBS | SYSTOLIC BLOOD PRESSURE: 130 MMHG | BODY MASS INDEX: 38.1 KG/M2 | DIASTOLIC BLOOD PRESSURE: 72 MMHG | OXYGEN SATURATION: 98 %

## 2021-03-29 DIAGNOSIS — E66.01 OBESITY, MORBID, BMI 40.0-49.9 (HCC): ICD-10-CM

## 2021-03-29 DIAGNOSIS — Z72.0 TOBACCO ABUSE: ICD-10-CM

## 2021-03-29 DIAGNOSIS — B35.1 ONYCHOMYCOSIS: Primary | ICD-10-CM

## 2021-03-29 DIAGNOSIS — L60.0 INGROWN TOENAIL: ICD-10-CM

## 2021-03-29 DIAGNOSIS — E11.40 TYPE 2 DIABETES MELLITUS WITH DIABETIC NEUROPATHY, WITH LONG-TERM CURRENT USE OF INSULIN (HCC): ICD-10-CM

## 2021-03-29 DIAGNOSIS — Z79.4 TYPE 2 DIABETES MELLITUS WITH DIABETIC NEUROPATHY, WITH LONG-TERM CURRENT USE OF INSULIN (HCC): ICD-10-CM

## 2021-03-29 PROCEDURE — 11721 DEBRIDE NAIL 6 OR MORE: CPT | Performed by: NURSE PRACTITIONER

## 2021-03-30 ENCOUNTER — OFFICE VISIT (OUTPATIENT)
Dept: FAMILY MEDICINE CLINIC | Facility: CLINIC | Age: 61
End: 2021-03-30

## 2021-03-30 VITALS
BODY MASS INDEX: 39.78 KG/M2 | WEIGHT: 202.6 LBS | OXYGEN SATURATION: 97 % | SYSTOLIC BLOOD PRESSURE: 120 MMHG | HEART RATE: 56 BPM | DIASTOLIC BLOOD PRESSURE: 82 MMHG | HEIGHT: 60 IN | TEMPERATURE: 97.1 F

## 2021-03-30 DIAGNOSIS — L02.01 CUTANEOUS ABSCESS OF FACE: Primary | ICD-10-CM

## 2021-03-30 DIAGNOSIS — E11.42 TYPE 2 DIABETES MELLITUS WITH DIABETIC POLYNEUROPATHY, WITHOUT LONG-TERM CURRENT USE OF INSULIN (HCC): ICD-10-CM

## 2021-03-30 DIAGNOSIS — Z86.19 HISTORY OF STAPHYLOCOCCAL INFECTION: ICD-10-CM

## 2021-03-30 PROCEDURE — 99213 OFFICE O/P EST LOW 20 MIN: CPT | Performed by: NURSE PRACTITIONER

## 2021-03-30 RX ORDER — AMOXICILLIN AND CLAVULANATE POTASSIUM 875; 125 MG/1; MG/1
1 TABLET, FILM COATED ORAL 2 TIMES DAILY
Qty: 20 TABLET | Refills: 0 | Status: SHIPPED | OUTPATIENT
Start: 2021-03-30 | End: 2021-04-12 | Stop reason: SDUPTHER

## 2021-03-30 NOTE — PROGRESS NOTES
"Chief Complaint  Skin Problem (face)    Subjective          Della Gonzalez presents to [facility]  History of Present Illness  Patient states she started developing \"new sores\" on her face yesterday and knew she needed to be seen quickly before \"they get bad again.\"  Patient has a history of recurrent cutaneous abscesses that have responded well to Augmentin in the past.  She denies fever or feeling bad, just \"really sore around the areas.\"    Objective   Vital Signs:   /82 (BP Location: Right arm, Patient Position: Sitting, Cuff Size: Large Adult)   Pulse 56   Temp 97.1 °F (36.2 °C)   Ht 152.4 cm (60\") Comment: pt reported  Wt 91.9 kg (202 lb 9.6 oz)   SpO2 97%   BMI 39.57 kg/m²       Physical Exam  Vitals and nursing note reviewed.   Constitutional:       General: She is not in acute distress.     Appearance: Normal appearance. She is well-developed. She is obese. She is not ill-appearing or diaphoretic.   HENT:      Head: Normocephalic and atraumatic.   Eyes:      Conjunctiva/sclera: Conjunctivae normal.      Pupils: Pupils are equal, round, and reactive to light.   Cardiovascular:      Rate and Rhythm: Normal rate and regular rhythm.      Heart sounds: Normal heart sounds. No murmur heard.     Pulmonary:      Effort: Pulmonary effort is normal. No respiratory distress.      Breath sounds: Normal breath sounds.   Musculoskeletal:         General: Normal range of motion.      Cervical back: Normal range of motion and neck supple.   Skin:     General: Skin is warm and dry.      Capillary Refill: Capillary refill takes less than 2 seconds.      Findings: Abscess and erythema present.          Neurological:      General: No focal deficit present.      Mental Status: She is alert and oriented to person, place, and time. Mental status is at baseline.   Psychiatric:         Mood and Affect: Mood normal.         Behavior: Behavior normal.         Thought Content: Thought content " normal.         Judgment: Judgment normal.        Result Review :                 Assessment and Plan    Diagnoses and all orders for this visit:    1. Cutaneous abscess of face (Primary)  -     amoxicillin-clavulanate (Augmentin) 875-125 MG per tablet; Take 1 tablet by mouth 2 (Two) Times a Day.  Dispense: 20 tablet; Refill: 0    2. History of staphylococcal infection    3. Type 2 diabetes mellitus with diabetic polyneuropathy, without long-term current use of insulin (CMS/[facility])        Follow Up   Return for keep scheduled appt.  Patient was given instructions and counseling regarding her condition or for health maintenance advice. Please see specific information pulled into the AVS if appropriate.

## 2021-04-12 DIAGNOSIS — L02.01 CUTANEOUS ABSCESS OF FACE: ICD-10-CM

## 2021-04-12 RX ORDER — AMOXICILLIN AND CLAVULANATE POTASSIUM 875; 125 MG/1; MG/1
1 TABLET, FILM COATED ORAL 2 TIMES DAILY
Qty: 20 TABLET | Refills: 0 | Status: SHIPPED | OUTPATIENT
Start: 2021-04-12 | End: 2021-04-27

## 2021-04-12 NOTE — TELEPHONE ENCOUNTER
Caller: Della Gonzalez    Relationship: Self    Best call back number: [phone]    Medication needed:   Requested Prescriptions     Pending Prescriptions Disp Refills   • amoxicillin-clavulanate (Augmentin) 875-125 MG per tablet 20 tablet 0     Sig: Take 1 tablet by mouth 2 (Two) Times a Day.     What additional details did the patient provide when requesting the medication: PT UNSURE IF EVELYN CA WANTED TO REFILL SAME ANTIBIOTIC OR PRESCRIBE SOMETHING STRONER    Does the patient have less than a 3 day supply:  [x] Yes  [] No    What is the patient's preferred pharmacy: [facility] [place], [place] - [address] [place] [address] [phone] [facility] [phone] FX

## 2021-04-20 ENCOUNTER — OFFICE VISIT (OUTPATIENT)
Dept: GASTROENTEROLOGY | Age: 61
End: 2021-04-20
Payer: MEDICARE

## 2021-04-20 VITALS
HEART RATE: 58 BPM | WEIGHT: 195 LBS | SYSTOLIC BLOOD PRESSURE: 138 MMHG | BODY MASS INDEX: 38.28 KG/M2 | HEIGHT: 60 IN | OXYGEN SATURATION: 99 % | DIASTOLIC BLOOD PRESSURE: 80 MMHG

## 2021-04-20 DIAGNOSIS — K74.60 CIRRHOSIS OF LIVER WITHOUT ASCITES, UNSPECIFIED HEPATIC CIRRHOSIS TYPE (HCC): Primary | ICD-10-CM

## 2021-04-20 DIAGNOSIS — K76.6 PORTAL HYPERTENSION (HCC): ICD-10-CM

## 2021-04-20 PROCEDURE — 99213 OFFICE O/P EST LOW 20 MIN: CPT | Performed by: NURSE PRACTITIONER

## 2021-04-20 PROCEDURE — G8427 DOCREV CUR MEDS BY ELIG CLIN: HCPCS | Performed by: NURSE PRACTITIONER

## 2021-04-20 PROCEDURE — 4004F PT TOBACCO SCREEN RCVD TLK: CPT | Performed by: NURSE PRACTITIONER

## 2021-04-20 PROCEDURE — 3017F COLORECTAL CA SCREEN DOC REV: CPT | Performed by: NURSE PRACTITIONER

## 2021-04-20 PROCEDURE — G8417 CALC BMI ABV UP PARAM F/U: HCPCS | Performed by: NURSE PRACTITIONER

## 2021-04-20 ASSESSMENT — ENCOUNTER SYMPTOMS
COUGH: 0
CONSTIPATION: 0
ABDOMINAL DISTENTION: 0
SHORTNESS OF BREATH: 0
RECTAL PAIN: 0
NAUSEA: 0
BLOOD IN STOOL: 0
VOMITING: 0
ANAL BLEEDING: 0
ABDOMINAL PAIN: 0
CHOKING: 0
TROUBLE SWALLOWING: 0
DIARRHEA: 0

## 2021-04-20 NOTE — PROGRESS NOTES
Subjective:     Patient ID: Torsten Wood is a 57 y.o. female  PCP: Dr. Shon Duran M.D., MD  Referring Provider: No ref. provider found    HPI  Patient presents to the office today with the following complaints: Follow-up      Torsten Wood is here for follow up after EGD & Colonoscopy on 3/9/2021. Previously followed by JESSICA Leon for Hepatic cirrhosis. She is on Nadolol 20 mg daily for variceal bleeding prophylaxis, tolerating this well. She denies any issues or complaints today. Colonoscopy Findings 3/9/2021:   The mucosa appeared normal throughout the entire examined colon   In the left colon, there were two small sessile polyps noted - removed with cold forceps polypectomy  Retroflexion in the rectum was normal and revealed no further abnormalities   Recommendations:  1. Repeat colonoscopy: pending pathology - max of 5 yrs for screening  2. Await biopsy results-you will receive a letter with your results within 7-10 days    EGD Findings 3/9/2021:   Esophagus: abnormal: there were mucosal changes of previous banding seen. No banding needed today. There is no hiatal hernia present. Stomach:  abnormal: mild mucosal changes suggestive of portal hypertensive gastropathy noted. Duodenum: normal  IMPRESSION:  1. Portal hypertensive gastropathy noted. 2. No varices for banded  RECOMMENDATIONS:    1. Continue current regimen  2. Schedule f/u OV with LIZBETH BREWER in 6 weeks  3. Repeat EGD in 6 months for banding as needed. FINAL DIAGNOSIS:   Colon, rectal polypectomy: Benign hyperplastic polyp. All scope and pathology reports were reviewed and discussed with patient. All questions answered, pt verbalized understanding. Assessment:     1. Cirrhosis of liver without ascites, unspecified hepatic cirrhosis type ([address]    2.  Portal hypertension (HCC)            Plan:   - Continue current medications  - US Liver every 6 months due in August 2021  - CBC, CMP, Pt/INR, AFP every 6 months due in August 2021  - Follow up after labs and ultrasound, every 6 months      Orders  No orders of the defined types were placed in this encounter. Medications  No orders of the defined types were placed in this encounter.         Patient History:     Past Medical History:   Diagnosis Date    Arthritis     Asthma     Cirrhosis ([address]     COPD (chronic obstructive pulmonary disease) (HCC)     Esophageal varices (HCC)     GERD (gastroesophageal reflux disease)     Hepatitis C     Hyperlipidemia     Hypertension        Past Surgical History:   Procedure Laterality Date    COLONOSCOPY  03/02/2016    Dr Bree Silva bleeding internal hemorrhoids o/w normal; fair prep (5 yr)    COLONOSCOPY N/A 03/09/2021    Dr Sol Mar, 5 yr recall    TN EGD TRANSORAL BIOPSY SINGLE/MULTIPLE N/A 02/07/2017    Dr Margy Vazquez Grade I esophageal varices, gastritis/gastropathy    TN EGD TRANSORAL BIOPSY SINGLE/MULTIPLE N/A 03/23/2018    Dr Ayaka Reagan I esophageal varices, erosive/active gastritis-2 yr recall    UPPER GASTROINTESTINAL ENDOSCOPY  03/02/2016    Dr Dorantes II esoph varices; portal hyptensive gastropathy    UPPER GASTROINTESTINAL ENDOSCOPY N/A 03/03/2020    Dr Ann Blum/variceal banding x 4, 3 columns of Grade II varices, repeat in 6-8 wks    UPPER GASTROINTESTINAL ENDOSCOPY N/A 04/14/2020    Dr Ann Blum/variceal banding x 3-grade I-II varices, repeat in 8 wks    UPPER GASTROINTESTINAL ENDOSCOPY N/A 08/25/2020    Dr LUIS FELIPE Blum/variceal banding x 3, grade 1-2 varices, portal hypertensive gastropathy, repeat in 3 months    UPPER GASTROINTESTINAL ENDOSCOPY N/A 12/04/2020    Dr Ann Blum/variceal banding x 5-Grade II varices, portal hypertensive gastropathy, repeat in 3 months    UPPER GASTROINTESTINAL ENDOSCOPY N/A 03/09/2021    Dr Chula Dias of previous banding seen, no varices, portal hypertensive gastropathy, repeat in 6 months    US GUIDED LIVER BIOPSY PERCUTANEOUS  05/07/2019      omeprazole (PRILOSEC) 20 MG delayed release capsule TAKE ONE CAPSULE BY MOUTH EVERY DAY 90 capsule 1    tiZANidine (ZANAFLEX) 4 MG tablet Take 4 mg by mouth every 6 hours as needed       gabapentin (NEURONTIN) 600 MG tablet Take 600 mg by mouth 3 times daily.  atorvastatin (LIPITOR) 10 MG tablet Take 10 mg by mouth daily      metFORMIN, OSM, (FORTAMET) 1000 MG extended release tablet Take 1,000 mg by mouth daily (with breakfast)       Ertugliflozin L-PyroglutamicAc (STEGLATRO) 5 MG TABS Take 5 mg by mouth daily       cyclobenzaprine (FLEXERIL) 10 MG tablet Take 10 mg by mouth 3 times daily as needed for Muscle spasms      traMADol (ULTRAM) 50 MG tablet Take 1 tablet by mouth every 12 hours as needed for Pain 60 tablet 2    benazepril (LOTENSIN) 20 MG tablet Take 1 tablet by mouth daily 30 tablet 5     No current facility-administered medications for this visit. No Known Allergies    Review of Systems   Constitutional: Negative for activity change, appetite change, fatigue, fever and unexpected weight change. HENT: Negative for trouble swallowing. Respiratory: Negative for cough, choking and shortness of breath. Cardiovascular: Negative for chest pain. Gastrointestinal: Negative for abdominal distention, abdominal pain, anal bleeding, blood in stool, constipation, diarrhea, nausea, rectal pain and vomiting. Allergic/Immunologic: Negative for food allergies. All other systems reviewed and are negative. Objective:     /80   Pulse 58   Ht 5' (1.524 m)   Wt 195 lb (88.5 kg)   SpO2 99%   BMI 38.08 kg/m²     Physical Exam  Vitals signs reviewed. Constitutional:       General: She is not in acute distress. Appearance: She is well-developed. HENT:      Head: Normocephalic and atraumatic.       Right Ear: External ear normal.      Left Ear: External ear normal.      Nose: Nose normal.      Comments: Mask on     Mouth/Throat:      Comments: Mask on  Eyes: General: No scleral icterus. Right eye: No discharge. Left eye: No discharge. Conjunctiva/sclera: Conjunctivae normal.      Pupils: Pupils are equal, round, and reactive to light. Neck:      Musculoskeletal: Normal range of motion and neck supple. Cardiovascular:      Rate and Rhythm: Normal rate and regular rhythm. Heart sounds: Normal heart sounds. No murmur. Pulmonary:      Effort: Pulmonary effort is normal. No respiratory distress. Breath sounds: Decreased breath sounds and wheezing present. No rales. Abdominal:      General: Bowel sounds are normal. There is no distension. Palpations: Abdomen is soft. There is no mass. Tenderness: There is no abdominal tenderness. There is no guarding or rebound. Musculoskeletal: Normal range of motion. Skin:     General: Skin is warm and dry. Coloration: Skin is not pale. Neurological:      Mental Status: She is alert and oriented to person, place, and time.    Psychiatric:         Behavior: Behavior normal.

## 2021-04-21 DIAGNOSIS — M79.2 NEUROPATHIC PAIN: ICD-10-CM

## 2021-04-21 NOTE — TELEPHONE ENCOUNTER
Caller: Della Gonzalez    Relationship: Self    Best call back number: [phone]     Medication needed:   Requested Prescriptions     Pending Prescriptions Disp Refills   • traMADol (ULTRAM) 50 MG tablet 90 tablet 0     Sig: Take 1 tablet by mouth 3 (Three) Times a Day As Needed (TID as needed).       When do you need the refill by: 04/21/2021    What additional details did the patient provide when requesting the medication: COMPLETELY OUT    Does the patient have less than a 3 day supply:  [x] Yes  [] No    What is the patient's preferred pharmacy: [facility] [place], [place] - [address] [place] [address] [phone] [facility] [phone] FX

## 2021-04-22 RX ORDER — TRAMADOL HYDROCHLORIDE 50 MG/1
50 TABLET ORAL 3 TIMES DAILY PRN
Qty: 90 TABLET | Refills: 0 | Status: SHIPPED | OUTPATIENT
Start: 2021-04-22 | End: 2021-04-27

## 2021-04-27 ENCOUNTER — OFFICE VISIT (OUTPATIENT)
Dept: FAMILY MEDICINE CLINIC | Facility: CLINIC | Age: 61
End: 2021-04-27

## 2021-04-27 VITALS
HEART RATE: 56 BPM | BODY MASS INDEX: 39.03 KG/M2 | DIASTOLIC BLOOD PRESSURE: 84 MMHG | SYSTOLIC BLOOD PRESSURE: 133 MMHG | OXYGEN SATURATION: 97 % | HEIGHT: 60 IN | WEIGHT: 198.8 LBS | TEMPERATURE: 97.6 F

## 2021-04-27 DIAGNOSIS — M79.2 NEUROPATHIC PAIN: ICD-10-CM

## 2021-04-27 DIAGNOSIS — G43.009 MIGRAINE WITHOUT AURA AND WITHOUT STATUS MIGRAINOSUS, NOT INTRACTABLE: ICD-10-CM

## 2021-04-27 DIAGNOSIS — E11.42 TYPE 2 DIABETES MELLITUS WITH DIABETIC POLYNEUROPATHY, WITHOUT LONG-TERM CURRENT USE OF INSULIN (HCC): Primary | ICD-10-CM

## 2021-04-27 LAB — HBA1C MFR BLD: 6.9 %

## 2021-04-27 PROCEDURE — 99213 OFFICE O/P EST LOW 20 MIN: CPT | Performed by: NURSE PRACTITIONER

## 2021-04-27 PROCEDURE — 83036 HEMOGLOBIN GLYCOSYLATED A1C: CPT | Performed by: NURSE PRACTITIONER

## 2021-04-27 RX ORDER — SUMATRIPTAN 100 MG/1
TABLET, FILM COATED ORAL
Qty: 9 TABLET | Refills: 2 | Status: SHIPPED | OUTPATIENT
Start: 2021-04-27 | End: 2023-02-06 | Stop reason: SDUPTHER

## 2021-04-27 RX ORDER — GABAPENTIN 800 MG/1
TABLET ORAL
COMMUNITY
Start: 2021-04-08 | End: 2021-10-26 | Stop reason: SDUPTHER

## 2021-04-27 NOTE — PROGRESS NOTES
"Chief Complaint  Diabetes    Subjective          Della Gonzalez presents to [facility]  History of Present Illness  DIABETES: Patient presents today for 3 month recheck. She is tolerating her current medication regimen. She is needing a refill of Tradjenta today.  She denies episodes of hypoglycemic or hyperglycemic symptoms.    HEADACHE:  Patient has intermittent migraine headaches that she currently has no treatment for.  She states she usually only has one a month, or so.  There is no aura.  The pain in typically on the left side of her head behind the eye.  She is light sensitive and sound sensitive.  She denies nausea and vomiting with the headache.    Objective   Vital Signs:   /84 (BP Location: Right arm, Patient Position: Sitting, Cuff Size: Large Adult)   Pulse 56   Temp 97.6 °F (36.4 °C)   Ht 152.4 cm (60\") Comment: pt reported  Wt 90.2 kg (198 lb 12.8 oz)   SpO2 97%   BMI 38.83 kg/m²       Physical Exam  Vitals and nursing note reviewed.   Constitutional:       General: She is not in acute distress.     Appearance: Normal appearance. She is well-developed. She is obese. She is not ill-appearing or diaphoretic.   HENT:      Head: Normocephalic and atraumatic.   Eyes:      Conjunctiva/sclera: Conjunctivae normal.      Pupils: Pupils are equal, round, and reactive to light.   Cardiovascular:      Rate and Rhythm: Normal rate and regular rhythm.      Heart sounds: Normal heart sounds. No murmur heard.     Pulmonary:      Effort: Pulmonary effort is normal. No respiratory distress.      Breath sounds: Normal breath sounds.   Abdominal:      General: Bowel sounds are normal. There is no distension.      Palpations: Abdomen is soft.      Tenderness: There is no abdominal tenderness.   Musculoskeletal:         General: Tenderness present. Normal range of motion.      Cervical back: Normal range of motion and neck supple.      Comments: Left cervical paraspinal muscle " tenderness to deep palpation.   Skin:     General: Skin is warm and dry.      Capillary Refill: Capillary refill takes less than 2 seconds.      Findings: No erythema.   Neurological:      Mental Status: She is alert and oriented to person, place, and time. Mental status is at baseline.   Psychiatric:         Mood and Affect: Mood normal.         Behavior: Behavior normal.         Thought Content: Thought content normal.         Judgment: Judgment normal.        Result Review :                 Assessment and Plan    Diagnoses and all orders for this visit:    1. Type 2 diabetes mellitus with diabetic polyneuropathy, without long-term current use of insulin (CMS/[facility]) (Primary)  -     POC Glycosylated Hemoglobin (Hb A1C)  -     linagliptin (TRADJENTA) 5 MG tablet tablet; Take 1 tablet by mouth Daily.  Dispense: 90 tablet; Refill: 1    2. Migraine without aura and without status migrainosus, not intractable  -     SUMAtriptan (Imitrex) 100 MG tablet; Take one tablet at onset of headache. May repeat dose one time in 2 hours if headache not relieved.  Dispense: 9 tablet; Refill: 2    3. Neuropathic pain    POCT HbA1c in office was 6.9.   Patient will try the nabumetone for the neuropathic pain to the left cervical area.  She agrees to call or discuss with pain management if the pain worsens.    Follow Up   Return in about 3 months (around 7/27/2021) for Next scheduled follow up with CMP/Hgb A1c.  Patient was given instructions and counseling regarding her condition or for health maintenance advice. Please see specific information pulled into the AVS if appropriate.

## 2021-05-11 ENCOUNTER — OFFICE VISIT (OUTPATIENT)
Dept: FAMILY MEDICINE CLINIC | Facility: CLINIC | Age: 61
End: 2021-05-11

## 2021-05-11 VITALS
HEIGHT: 60 IN | TEMPERATURE: 97.8 F | OXYGEN SATURATION: 96 % | DIASTOLIC BLOOD PRESSURE: 84 MMHG | SYSTOLIC BLOOD PRESSURE: 126 MMHG | HEART RATE: 63 BPM | WEIGHT: 198.2 LBS | BODY MASS INDEX: 38.91 KG/M2
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
## 2021-05-11 DIAGNOSIS — E11.65 TYPE 2 DIABETES MELLITUS WITH HYPERGLYCEMIA, WITHOUT LONG-TERM CURRENT USE OF INSULIN (HCC): ICD-10-CM

## 2021-05-11 PROCEDURE — 99213 OFFICE O/P EST LOW 20 MIN: CPT | Performed by: NURSE PRACTITIONER

## 2021-05-11 RX ORDER — SULFAMETHOXAZOLE AND TRIMETHOPRIM 800; 160 MG/1; MG/1
1 TABLET ORAL 2 TIMES DAILY
Qty: 14 TABLET | Refills: 0 | Status: SHIPPED | OUTPATIENT
Start: 2021-05-11 | End: 2021-06-29

## 2021-05-11 NOTE — PROGRESS NOTES
"Chief Complaint  Insect Bite (forehead, noticed a couple days ago, headache, light fever)    Subjective          Della Gonzalez presents to [facility]  History of Present Illness    Objective   Vital Signs:   /84 (BP Location: Right arm, Patient Position: Sitting, Cuff Size: Large Adult)   Pulse 63   Temp 97.8 °F (36.6 °C)   Ht 152.4 cm (60\") Comment: pt reported  Wt 89.9 kg (198 lb 3.2 oz)   SpO2 96%   BMI 38.71 kg/m²     Physical Exam  Vitals and nursing note reviewed.   Constitutional:       General: She is not in acute distress.     Appearance: She is well-developed. She is not diaphoretic.   HENT:      Head: Normocephalic and atraumatic.     Eyes:      Conjunctiva/sclera: Conjunctivae normal.      Pupils: Pupils are equal, round, and reactive to light.   Cardiovascular:      Rate and Rhythm: Normal rate and regular rhythm.      Heart sounds: Normal heart sounds. No murmur heard.     Pulmonary:      Effort: Pulmonary effort is normal. No respiratory distress.      Breath sounds: Normal breath sounds.   Musculoskeletal:         General: Normal range of motion.      Cervical back: Normal range of motion and neck supple.   Skin:     General: Skin is warm and dry.      Capillary Refill: Capillary refill takes less than 2 seconds.      Findings: Erythema and lesion present.   Neurological:      Mental Status: She is alert and oriented to person, place, and time. Mental status is at baseline.   Psychiatric:         Mood and Affect: Mood normal.         Behavior: Behavior normal.         Thought Content: Thought content normal.         Judgment: Judgment normal.          Result Review :                 Assessment and Plan    Diagnoses and all orders for this visit:    1. Bug bite with infection, initial encounter (Primary)  -     sulfamethoxazole-trimethoprim (BACTRIM DS,SEPTRA DS) 800-160 MG per tablet; Take 1 tablet by mouth 2 (Two) Times a Day.  Dispense: 14 tablet; Refill: " 0    2. Type 2 diabetes mellitus with hyperglycemia, without long-term current use of insulin (CMS/[facility])  -     metFORMIN (Glucophage) 1000 MG tablet; Take 1 tablet by mouth 2 (Two) Times a Day With Meals.  Dispense: 180 tablet; Refill: 1        Follow Up   Return for keep scheduled appt.  Patient was given instructions and counseling regarding her condition or for health maintenance advice. Please see specific information pulled into the AVS if appropriate.

## 2021-06-04 ENCOUNTER — TELEPHONE (OUTPATIENT)
Dept: PODIATRY | Facility: CLINIC | Age: 61
End: 2021-06-04

## 2021-06-16 RX ORDER — NADOLOL 20 MG/1
20 TABLET ORAL DAILY
Qty: 90 TABLET | Refills: 1 | Status: SHIPPED | OUTPATIENT
Start: 2021-06-16 | End: 2021-12-28

## 2021-06-16 NOTE — TELEPHONE ENCOUNTER
Caller: Della Gonzalez    Relationship: Self    Medication needed:   Requested Prescriptions     Pending Prescriptions Disp Refills   • nadolol (CORGARD) 20 MG tablet 30 tablet 2     Sig: Take 1 tablet by mouth Daily.       What is the patient's preferred pharmacy: [facility] [place], [place] - [address] [place] [address] [phone] [facility] [phone]

## 2021-06-29 ENCOUNTER — OFFICE VISIT (OUTPATIENT)
Dept: FAMILY MEDICINE CLINIC | Facility: CLINIC | Age: 61
End: 2021-06-29

## 2021-06-29 VITALS
SYSTOLIC BLOOD PRESSURE: 118 MMHG | OXYGEN SATURATION: 95 % | WEIGHT: 190.6 LBS | TEMPERATURE: 98 F | HEART RATE: 81 BPM | HEIGHT: 60 IN | DIASTOLIC BLOOD PRESSURE: 75 MMHG | BODY MASS INDEX: 37.42 KG/M2

## 2021-06-29 DIAGNOSIS — N76.4 ABSCESS OF RIGHT GENITAL LABIA: Primary | ICD-10-CM

## 2021-06-29 DIAGNOSIS — N90.7 LABIAL CYST: ICD-10-CM

## 2021-06-29 DIAGNOSIS — Z86.19 HISTORY OF STAPHYLOCOCCAL INFECTION: ICD-10-CM

## 2021-06-29 PROCEDURE — 99213 OFFICE O/P EST LOW 20 MIN: CPT | Performed by: NURSE PRACTITIONER

## 2021-06-29 RX ORDER — SULFAMETHOXAZOLE AND TRIMETHOPRIM 800; 160 MG/1; MG/1
1 TABLET ORAL 2 TIMES DAILY
Qty: 20 TABLET | Refills: 0 | Status: SHIPPED | OUTPATIENT
Start: 2021-06-29 | End: 2021-07-27

## 2021-06-29 NOTE — PROGRESS NOTES
"Chief Complaint  Abscess and Headache    Subjective          Della Gonzalez presents to [facility]  History of Present Illness     HEADACHE: Patient states that she has had a severe headache since yesterday. She tried taking an Imitrex but states that it didn't help at all. She states that she took a Zanaflex and that seems to helped better. She states that she is having headaches almost daily at this point.     ABSCESS: She has had another abscess in her groin that has been present for 2 days. 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 She is having trouble sitting at this point due to the pain. She has been using cool compresses at home and soaked it with warm water this morning.      Objective   Vital Signs:   /75 (BP Location: Right arm, Patient Position: Sitting, Cuff Size: Large Adult)   Pulse 81   Temp 98 °F (36.7 °C)   Ht 152.4 cm (60\") Comment: PT REPORTED  Wt 86.5 kg (190 lb 9.6 oz)   SpO2 95%   BMI 37.22 kg/m²     Physical Exam  Constitutional:       Appearance: Normal appearance.   Cardiovascular:      Pulses: Normal pulses.      Heart sounds: Normal heart sounds.   Pulmonary:      Effort: Pulmonary effort is normal.      Breath sounds: Normal breath sounds.   Musculoskeletal:         General: Normal range of motion.   Skin:     General: Skin is warm and dry.      Capillary Refill: Capillary refill takes less than 2 seconds.      Comments: 3 cm abscess noted to right labia.    Neurological:      Mental Status: She is alert and oriented to person, place, and time.   Psychiatric:         Mood and Affect: Mood normal.         Behavior: Behavior normal.         Thought Content: Thought content normal.         Judgment: Judgment normal.        Result Review :          Incision & Drainage    Date/Time: 6/29/2021 9:10 AM  Performed by: Vijaya Henao APRN  Authorized by: Vijaya Henao APRN   Type: abscess  Body area: anogenital  Anesthesia: local " infiltration    Anesthesia:  Local Anesthetic: lidocaine 1% without epinephrine  Anesthetic total: 1 mL    Sedation:  Patient sedated: no    Scalpel size: 11  Incision type: single straight  Drainage: purulent and  bloody  Drainage amount: scant  Wound treatment: wound left open  Patient tolerance: patient tolerated the procedure well with no immediate complications  Comments: Informed consent gained.            Assessment and Plan    Diagnoses and all orders for this visit:    1. Abscess of right genital labia (Primary)  -     sulfamethoxazole-trimethoprim (Bactrim DS) 800-160 MG per tablet; Take 1 tablet by mouth 2 (Two) Times a Day.  Dispense: 20 tablet; Refill: 0  -     Incision & Drainage    2. History of staphylococcal infection  -     sulfamethoxazole-trimethoprim (Bactrim DS) 800-160 MG per tablet; Take 1 tablet by mouth 2 (Two) Times a Day.  Dispense: 20 tablet; Refill: 0    3. Labial cyst  -     sulfamethoxazole-trimethoprim (Bactrim DS) 800-160 MG per tablet; Take 1 tablet by mouth 2 (Two) Times a Day.  Dispense: 20 tablet; Refill: 0        Follow Up   Return for keep scheduled appt.  Patient was given instructions and counseling regarding her condition or for health maintenance advice. Please see specific information pulled into the AVS if appropriate.

## 2021-07-09 NOTE — PROGRESS NOTES
[facility] - PODIATRY    Today's Date: 07/16/21    Patient Name: Della Gonzalez  MRN: [id number]  CSN: [id number]  PCP: Donny Aguayo MD  Referring Provider: No ref. provider found    SUBJECTIVE     Chief Complaint   Patient presents with   • Follow-up     pcp07/06/2020 3 month follow up diabetic nail care- pt states feet are doing well- pt denies pain-- pt presents with long thick yellowed nails   • Diabetes     hasnt takent htem letely     HPI: Della Gonzalez, a 61 y.o.female, comes to clinic as a(n) established patient presenting for diabetic foot exam and complaining of thick fungal toenails. Patient has h/o cirrhosis, DM2, GERD, tobacco use. Patient is NIDDM and unsure of last BG level.  Relates  mild numbness/tingling in feet.  Notes that her toenails are long, thick, discolored and crumbly.  Patient has attempted to care for nails at home, however, states that she is unable to care for some of the nails due to thickness.  Continues tobacco use daily.  Feels like she has cracking of skin in between the toes. Denies pain today. Relates previous treatment(s) including care by podiatrist. Denies any constitutional symptoms. No other pedal complaints at this time.    Past Medical History:   Diagnosis Date   • Cirrhosis of liver (CMS/HCC)    • Diabetes mellitus (CMS/HCC)    • GERD (gastroesophageal reflux disease)    • Liver disease      Past Surgical History:   Procedure Laterality Date   • COLONOSCOPY       Family History   Problem Relation Age of Onset   • Diabetes Mother    • No Known Problems Father      Social History     Socioeconomic History   • Marital status: Single     Spouse name: Not on file   • Number of children: Not on file   • Years of education: Not on file   • Highest education level: Not on file   Tobacco Use   • Smoking status: Current Every Day Smoker     Packs/day: 0.50     Years: 40.00     Pack years: 20.00     Types: Cigarettes   • Smokeless tobacco: Never Used   Vaping  Use   • Vaping Use: Never used   Substance and Sexual Activity   • Alcohol use: No   • Drug use: No   • Sexual activity: Not Currently     Partners: Male     No Known Allergies  Current Outpatient Medications   Medication Sig Dispense Refill   • atorvastatin (LIPITOR) 10 MG tablet Take 1 tablet by mouth Daily. 30 tablet 5   • benazepril (LOTENSIN) 10 MG tablet Take 1 tablet by mouth Daily. 30 tablet 5   • Ertugliflozin L-PyroglutamicAc (Steglatro) 15 MG tablet Take 1 tablet by mouth Every Morning. 90 tablet 1   • gabapentin (NEURONTIN) 800 MG tablet      • hydrocortisone-eucerin Apply  topically to the appropriate area as directed 2 (Two) Times a Day. 120 g 2   • ketoconazole (NIZORAL) 2 % cream APPLY TOPICALLY TO APPROPRIATE AREA AS DIRECTED DAILY 60 g 2   • linagliptin (TRADJENTA) 5 MG tablet tablet Take 1 tablet by mouth Daily. 90 tablet 1   • metFORMIN (Glucophage) 1000 MG tablet Take 1 tablet by mouth 2 (Two) Times a Day With Meals. 180 tablet 1   • nabumetone (RELAFEN) 500 MG tablet Take 1 tablet by mouth 2 (Two) Times a Day As Needed for Moderate Pain . 60 tablet 5   • nadolol (CORGARD) 20 MG tablet Take 1 tablet by mouth Daily. 90 tablet 1   • nystatin (MYCOSTATIN) 598623 UNIT/GM cream Apply  topically to the appropriate area as directed 2 (two) times a day. (Patient taking differently: Apply  topically to the appropriate area as directed As Needed.) 30 g 2   • omeprazole (priLOSEC) 20 MG capsule Take 1 capsule by mouth Daily. 90 capsule 1   • sulfamethoxazole-trimethoprim (Bactrim DS) 800-160 MG per tablet Take 1 tablet by mouth 2 (Two) Times a Day. 20 tablet 0   • SUMAtriptan (Imitrex) 100 MG tablet Take one tablet at onset of headache. May repeat dose one time in 2 hours if headache not relieved. 9 tablet 2   • tiZANidine (ZANAFLEX) 4 MG tablet      • traZODone (DESYREL) 100 MG tablet Take 1 tablet by mouth Every Night. 90 tablet 1     No current facility-administered medications for this visit.      Review of Systems   Constitutional: Negative for chills and fever.   HENT: Negative for congestion.    Respiratory: Negative for shortness of breath.    Cardiovascular: Positive for leg swelling. Negative for chest pain.   Gastrointestinal: Negative for constipation, diarrhea, nausea and vomiting.   Musculoskeletal: Positive for arthralgias. Negative for gait problem.        Foot pain   Skin: Negative for wound.   Neurological: Positive for numbness.       OBJECTIVE     Vitals:    07/16/21 1519   BP: 134/72   Pulse: 60   SpO2: 98%       PHYSICAL EXAM  GEN:   Accompanied by none.     Foot/Ankle Exam:       General:   Diabetic Foot Exam Performed    Appearance: appears stated age and healthy and obesity    Orientation: AAOx3    Affect: appropriate    Gait: unimpaired    Assistance: independent    Shoe Gear:  Casual shoes    VASCULAR      Right Foot Vascularity   Dorsalis pedis:  2+  Posterior tibial:  2+  Skin Temperature: warm    Edema Grading:  None  CFT:  3  Pedal Hair Growth:  Present  Varicosities: mild varicosities       Left Foot Vascularity   Dorsalis pedis:  2+  Posterior tibial:  2+  Skin Temperature: warm    Edema Grading:  None  CFT:  3  Pedal Hair Growth:  Present  Varicosities: mild varicosities        NEUROLOGIC     Right Foot Neurologic   Light touch sensation:  Diminished  Vibratory sensation:  Diminished  Hot/Cold sensation: diminished    Protective Sensation using [place] Monofilament:  8     Left Foot Neurologic   Light touch sensation:  Diminished  Vibratory sensation:  Diminished  Hot/cold sensation: diminished    Protective Sensation using [place] Monofilament:  8     MUSCULOSKELETAL      Right Foot Musculoskeletal   Ecchymosis:  None  Tenderness: toenails and toe 1    Arch:  Normal  Hallux valgus: No    Hallux limitus: No       Left Foot Musculoskeletal   Ecchymosis:  None  Tenderness: toenails and toe 1    Arch:  Normal  Hallux valgus: No    Hallux limitus: No        MUSCLE STRENGTH     Right Foot Muscle Strength   Foot dorsiflexion:  5  Foot plantar flexion:  5  Foot inversion:  5  Foot eversion:  5     Left Foot Muscle Strength   Foot dorsiflexion:  5  Foot plantar flexion:  5  Foot inversion:  5  Foot eversion:  5     RANGE OF MOTION      Right Foot Range of Motion   Foot and ankle ROM within normal limits       Left Foot Range of Motion   Foot and ankle ROM within normal limits       DERMATOLOGIC     Right Foot Dermatologic   Skin: skin intact    Nails: onychomycosis, abnormally thick, subungual debris, dystrophic nails and ingrown toenail (hallux bilateral border)       Left Foot Dermatologic   Skin: skin intact    Nails: onychomycosis, abnormally thick, subungual debris, dystrophic nails and ingrown toenail (hallux medial border)        RADIOLOGY/NUCLEAR:  No results found.    LABORATORY/CULTURE RESULTS:      PATHOLOGY RESULTS:       ASSESSMENT/PLAN     Diagnoses and all orders for this visit:    1. Onychomycosis (Primary)    2. Ingrown toenail    3. Type 2 diabetes mellitus with diabetic neuropathy, with long-term current use of insulin (CMS/[facility])    4. Tobacco abuse    5. Obesity, morbid, BMI 40.0-49.9 (CMS/[facility])      Comprehensive lower extremity examination and evaluation was performed.  Discussed findings and treatment plan including risks, benefits, and treatment options with patient in detail. Patient agreed with treatment plan.  After verbal consent obtained, nail(s) x6 debrided of length and thickness with nail nipper without incidence  After verbal consent obtained, nail(s) x2 debrided of offending borders with nail nipper without incidence  Patient may maintain nails and calluses at home utilizing emery board or pumice stone between visits as needed  Reviewed at home diabetic foot care including daily foot checks   Tobacco cessation needed.   Advised patient that it might be in her best interest to have hallux nails removed permanently due to their continued  dystrophic growth and tenderness.   An After Visit Summary was printed and given to the patient at discharge, including (if requested) any available informative/educational handouts regarding diagnosis, treatment, or medications. All questions were answered to patient/family satisfaction. Should symptoms fail to improve or worsen they agree to call or return to clinic or to go to the Emergency Department. Discussed the importance of following up with any needed screening tests/labs/specialist appointments and any requested follow-up recommended by me today. Importance of maintaining follow-up discussed and patient accepts that missed appointments can delay diagnosis and potentially lead to worsening of conditions.  Return in about 3 months (around 10/16/2021)., or sooner if acute issues arise.        This document has been electronically signed by MELECIO Hassan on July 16, 2021 16:21 CDT

## 2021-07-15 ENCOUNTER — TELEPHONE (OUTPATIENT)
Dept: PODIATRY | Facility: CLINIC | Age: 61
End: 2021-07-15

## 2021-07-15 NOTE — TELEPHONE ENCOUNTER
Called patient regarding appt on 07/16/2021. Left message for patient to return call if any questions or concerns arise.

## 2021-07-16 ENCOUNTER — OFFICE VISIT (OUTPATIENT)
Dept: PODIATRY | Facility: CLINIC | Age: 61
End: 2021-07-16

## 2021-07-16 VITALS
HEART RATE: 60 BPM | DIASTOLIC BLOOD PRESSURE: 72 MMHG | HEIGHT: 60 IN | SYSTOLIC BLOOD PRESSURE: 134 MMHG | BODY MASS INDEX: 38.72 KG/M2 | WEIGHT: 197.2 LBS | OXYGEN SATURATION: 98 %

## 2021-07-16 DIAGNOSIS — Z72.0 TOBACCO ABUSE: ICD-10-CM

## 2021-07-16 DIAGNOSIS — E11.40 TYPE 2 DIABETES MELLITUS WITH DIABETIC NEUROPATHY, WITH LONG-TERM CURRENT USE OF INSULIN (HCC): ICD-10-CM

## 2021-07-16 DIAGNOSIS — Z79.4 TYPE 2 DIABETES MELLITUS WITH DIABETIC NEUROPATHY, WITH LONG-TERM CURRENT USE OF INSULIN (HCC): ICD-10-CM

## 2021-07-16 DIAGNOSIS — B35.1 ONYCHOMYCOSIS: Primary | ICD-10-CM

## 2021-07-16 DIAGNOSIS — E66.01 OBESITY, MORBID, BMI 40.0-49.9 (HCC): ICD-10-CM

## 2021-07-16 DIAGNOSIS — L60.0 INGROWN TOENAIL: ICD-10-CM

## 2021-07-16 PROCEDURE — 11721 DEBRIDE NAIL 6 OR MORE: CPT | Performed by: NURSE PRACTITIONER

## 2021-07-20 ENCOUNTER — CLINICAL SUPPORT (OUTPATIENT)
Dept: FAMILY MEDICINE CLINIC | Facility: CLINIC | Age: 61
End: 2021-07-20

## 2021-07-20 DIAGNOSIS — E78.2 MIXED HYPERLIPIDEMIA: ICD-10-CM

## 2021-07-20 DIAGNOSIS — R53.83 FATIGUE, UNSPECIFIED TYPE: ICD-10-CM

## 2021-07-20 DIAGNOSIS — I10 ESSENTIAL HYPERTENSION: ICD-10-CM

## 2021-07-20 DIAGNOSIS — E11.65 TYPE 2 DIABETES MELLITUS WITH HYPERGLYCEMIA, WITHOUT LONG-TERM CURRENT USE OF INSULIN (HCC): Primary | ICD-10-CM

## 2021-07-20 DIAGNOSIS — Z00.00 MEDICARE ANNUAL WELLNESS VISIT, SUBSEQUENT: ICD-10-CM

## 2021-07-20 DIAGNOSIS — E11.628 TYPE 2 DIABETES MELLITUS WITH OTHER SKIN COMPLICATION, WITHOUT LONG-TERM CURRENT USE OF INSULIN (HCC): ICD-10-CM

## 2021-07-20 RX ORDER — ATORVASTATIN CALCIUM 10 MG/1
10 TABLET, FILM COATED ORAL DAILY
Qty: 90 TABLET | Refills: 1 | Status: SHIPPED | OUTPATIENT
Start: 2021-07-20 | End: 2022-01-27

## 2021-07-20 RX ORDER — BENAZEPRIL HYDROCHLORIDE 10 MG/1
10 TABLET ORAL DAILY
Qty: 90 TABLET | Refills: 1 | Status: SHIPPED | OUTPATIENT
Start: 2021-07-20 | End: 2022-01-21

## 2021-07-21 LAB
ALBUMIN SERPL-MCNC: 4.4 G/DL (ref 3.8–4.8)
ALBUMIN/GLOB SERPL: 1.4 {RATIO} (ref 1.2–2.2)
ALP SERPL-CCNC: 119 IU/L (ref 48–121)
ALT SERPL-CCNC: 20 IU/L (ref 0–32)
AST SERPL-CCNC: 24 IU/L (ref 0–40)
BASOPHILS # BLD AUTO: 0.1 X10E3/UL (ref 0–0.2)
BASOPHILS NFR BLD AUTO: 2 %
BILIRUB SERPL-MCNC: 1 MG/DL (ref 0–1.2)
BUN SERPL-MCNC: 17 MG/DL (ref 8–27)
BUN/CREAT SERPL: 33 (ref 12–28)
CALCIUM SERPL-MCNC: 9.3 MG/DL (ref 8.7–10.3)
CHLORIDE SERPL-SCNC: 102 MMOL/L (ref 96–106)
CHOLEST SERPL-MCNC: 185 MG/DL (ref 100–199)
CO2 SERPL-SCNC: 22 MMOL/L (ref 20–29)
CREAT SERPL-MCNC: 0.52 MG/DL (ref 0.57–1)
EOSINOPHIL # BLD AUTO: 0.1 X10E3/UL (ref 0–0.4)
EOSINOPHIL NFR BLD AUTO: 3 %
ERYTHROCYTE [DISTWIDTH] IN BLOOD BY AUTOMATED COUNT: 14.5 % (ref 11.7–15.4)
GLOBULIN SER CALC-MCNC: 3.2 G/DL (ref 1.5–4.5)
GLUCOSE SERPL-MCNC: 150 MG/DL (ref 65–99)
HBA1C MFR BLD: 7.8 % (ref 4.8–5.6)
HCT VFR BLD AUTO: 44 % (ref 34–46.6)
HDLC SERPL-MCNC: 40 MG/DL
HGB BLD-MCNC: 14.2 G/DL (ref 11.1–15.9)
IMM GRANULOCYTES # BLD AUTO: 0 X10E3/UL (ref 0–0.1)
IMM GRANULOCYTES NFR BLD AUTO: 0 %
LDLC SERPL CALC-MCNC: 112 MG/DL (ref 0–99)
LDLC/HDLC SERPL: 2.8 RATIO (ref 0–3.2)
LYMPHOCYTES # BLD AUTO: 0.8 X10E3/UL (ref 0.7–3.1)
LYMPHOCYTES NFR BLD AUTO: 18 %
MCH RBC QN AUTO: 27.3 PG (ref 26.6–33)
MCHC RBC AUTO-ENTMCNC: 32.3 G/DL (ref 31.5–35.7)
MCV RBC AUTO: 85 FL (ref 79–97)
MONOCYTES # BLD AUTO: 0.4 X10E3/UL (ref 0.1–0.9)
MONOCYTES NFR BLD AUTO: 8 %
NEUTROPHILS # BLD AUTO: 3.2 X10E3/UL (ref 1.4–7)
NEUTROPHILS NFR BLD AUTO: 69 %
PLATELET # BLD AUTO: 146 X10E3/UL (ref 150–450)
POTASSIUM SERPL-SCNC: 4.3 MMOL/L (ref 3.5–5.2)
PROT SERPL-MCNC: 7.6 G/DL (ref 6–8.5)
RBC # BLD AUTO: 5.2 X10E6/UL (ref 3.77–5.28)
SODIUM SERPL-SCNC: 143 MMOL/L (ref 134–144)
T4 SERPL-MCNC: 7.6 UG/DL (ref 4.5–12)
TRIGL SERPL-MCNC: 190 MG/DL (ref 0–149)
TSH SERPL DL<=0.005 MIU/L-ACNC: 4.77 UIU/ML (ref 0.45–4.5)
VLDLC SERPL CALC-MCNC: 33 MG/DL (ref 5–40)
WBC # BLD AUTO: 4.6 X10E3/UL (ref 3.4–10.8)

## 2021-07-27 ENCOUNTER — OFFICE VISIT (OUTPATIENT)
Dept: FAMILY MEDICINE CLINIC | Facility: CLINIC | Age: 61
End: 2021-07-27

## 2021-07-27 VITALS
HEIGHT: 60 IN | WEIGHT: 191 LBS | TEMPERATURE: 98 F | OXYGEN SATURATION: 96 % | BODY MASS INDEX: 37.5 KG/M2 | SYSTOLIC BLOOD PRESSURE: 149 MMHG | HEART RATE: 60 BPM | DIASTOLIC BLOOD PRESSURE: 79 MMHG

## 2021-07-27 DIAGNOSIS — Z00.00 MEDICARE ANNUAL WELLNESS VISIT, SUBSEQUENT: Primary | ICD-10-CM

## 2021-07-27 DIAGNOSIS — E11.65 UNCONTROLLED TYPE 2 DIABETES MELLITUS WITH HYPERGLYCEMIA (HCC): ICD-10-CM

## 2021-07-27 DIAGNOSIS — Z12.2 ENCOUNTER FOR SCREENING FOR LUNG CANCER: ICD-10-CM

## 2021-07-27 DIAGNOSIS — K21.9 GASTROESOPHAGEAL REFLUX DISEASE, UNSPECIFIED WHETHER ESOPHAGITIS PRESENT: ICD-10-CM

## 2021-07-27 DIAGNOSIS — Z87.891 PERSONAL HISTORY OF NICOTINE DEPENDENCE: ICD-10-CM

## 2021-07-27 DIAGNOSIS — E03.9 HYPOTHYROIDISM, UNSPECIFIED TYPE: ICD-10-CM

## 2021-07-27 DIAGNOSIS — L02.01 CUTANEOUS ABSCESS OF FACE: ICD-10-CM

## 2021-07-27 DIAGNOSIS — E66.01 CLASS 2 SEVERE OBESITY DUE TO EXCESS CALORIES WITH SERIOUS COMORBIDITY AND BODY MASS INDEX (BMI) OF 37.0 TO 37.9 IN ADULT (HCC): ICD-10-CM

## 2021-07-27 DIAGNOSIS — Z86.19 HISTORY OF STAPHYLOCOCCAL INFECTION: ICD-10-CM

## 2021-07-27 DIAGNOSIS — F51.01 PRIMARY INSOMNIA: ICD-10-CM

## 2021-07-27 DIAGNOSIS — E11.65 TYPE 2 DIABETES MELLITUS WITH HYPERGLYCEMIA, WITHOUT LONG-TERM CURRENT USE OF INSULIN (HCC): ICD-10-CM

## 2021-07-27 DIAGNOSIS — Z72.0 TOBACCO ABUSE: ICD-10-CM

## 2021-07-27 DIAGNOSIS — E78.2 MIXED HYPERLIPIDEMIA: ICD-10-CM

## 2021-07-27 DIAGNOSIS — Z23 NEED FOR SHINGLES VACCINE: ICD-10-CM

## 2021-07-27 DIAGNOSIS — I10 ESSENTIAL HYPERTENSION: ICD-10-CM

## 2021-07-27 PROCEDURE — G0439 PPPS, SUBSEQ VISIT: HCPCS | Performed by: NURSE PRACTITIONER

## 2021-07-27 RX ORDER — OMEPRAZOLE 20 MG/1
20 CAPSULE, DELAYED RELEASE ORAL DAILY
Qty: 90 CAPSULE | Refills: 1 | Status: SHIPPED | OUTPATIENT
Start: 2021-07-27 | End: 2022-02-25 | Stop reason: SDUPTHER

## 2021-07-27 RX ORDER — TRAZODONE HYDROCHLORIDE 150 MG/1
150 TABLET ORAL NIGHTLY
Qty: 90 TABLET | Refills: 1 | Status: SHIPPED | OUTPATIENT
Start: 2021-07-27 | End: 2022-01-28 | Stop reason: SDUPTHER

## 2021-07-27 RX ORDER — LEVOTHYROXINE SODIUM 0.05 MG/1
50 TABLET ORAL DAILY
Qty: 30 TABLET | Refills: 2 | Status: SHIPPED | OUTPATIENT
Start: 2021-07-27 | End: 2021-10-26 | Stop reason: SDUPTHER

## 2021-07-27 RX ORDER — SULFAMETHOXAZOLE AND TRIMETHOPRIM 800; 160 MG/1; MG/1
1 TABLET ORAL 2 TIMES DAILY
Qty: 20 TABLET | Refills: 0 | Status: SHIPPED | OUTPATIENT
Start: 2021-07-27 | End: 2021-08-25 | Stop reason: SDUPTHER

## 2021-07-27 NOTE — PROGRESS NOTES
The ABCs of the Annual Wellness Visit  Subsequent Medicare Wellness Visit    Chief Complaint   Patient presents with   • Medicare Wellness-subsequent       Subjective   History of Present Illness:  Della Gonzalez is a 61 y.o. female who presents for a Subsequent Medicare Wellness Visit.    INSOMNIA: Patient states that she isn't sleeping well and is wanting something to help her with this. She states that she is having trouble falling asleep as well as staying asleep.     Patient has a history of MRSA and has had some lesions appear on her face. She states that she has noticed these places for about a week. She states that she has been putting SHAILA on them but it isn't helping - she states that they are getting worse.     HEALTH RISK ASSESSMENT    Recent Hospitalizations:  No hospitalization(s) within the last year.    Current Medical Providers:  Patient Care Team:  Donny Aguayo MD as PCP - General (Family Medicine)    Smoking Status:  Social History     Tobacco Use   Smoking Status Current Every Day Smoker   • Packs/day: 0.50   • Years: 40.00   • Pack years: 20.00   • Types: Cigarettes   Smokeless Tobacco Never Used       Alcohol Consumption:  Social History     Substance and Sexual Activity   Alcohol Use No       Depression Screen:   PHQ-2/PHQ-9 Depression Screening 7/27/2021   Little interest or pleasure in doing things 0   Feeling down, depressed, or hopeless 0   Trouble falling or staying asleep, or sleeping too much -   Feeling tired or having little energy -   Poor appetite or overeating -   Feeling bad about yourself - or that you are a failure or have let yourself or your family down -   Trouble concentrating on things, such as reading the newspaper or watching television -   Moving or speaking so slowly that other people could have noticed. Or the opposite - being so fidgety or restless that you have been moving around a lot more than usual -   Thoughts that you would be better off dead, or of hurting  yourself in some way -   Total Score 0       Fall Risk Screen:  KISHAN Fall Risk Assessment has not been completed.    Health Habits and Functional and Cognitive Screening:  Functional & Cognitive Status 7/27/2021   Do you have difficulty preparing food and eating? No   Do you have difficulty bathing yourself, getting dressed or grooming yourself? No   Do you have difficulty using the toilet? No   Do you have difficulty moving around from place to place? No   Do you have trouble with steps or getting out of a bed or a chair? No   Current Diet Unhealthy Diet   Dental Exam Not up to date   Eye Exam Not up to date   Exercise (times per week) 7 times per week   Current Exercises Include Walking   Current Exercise Activities Include -   Do you need help using the phone?  No   Are you deaf or do you have serious difficulty hearing?  No   Do you need help with transportation? Yes   Do you need help shopping? No   Do you need help preparing meals?  No   Do you need help with housework?  No   Do you need help with laundry? No   Do you need help taking your medications? No   Do you need help managing money? No   Do you ever drive or ride in a car without wearing a seat belt? No   Have you felt unusual stress, anger or loneliness in the last month? No   Who do you live with? Alone   If you need help, do you have trouble finding someone available to you? No   Have you been bothered in the last four weeks by sexual problems? No   Do you have difficulty concentrating, remembering or making decisions? No         Does the patient have evidence of cognitive impairment? No    Asprin use counseling:Does not need ASA (and currently is not on it)    Age-appropriate Screening Schedule:  Refer to the list below for future screening recommendations based on patient's age, sex and/or medical conditions. Orders for these recommended tests are listed in the plan section. The patient has been provided with a written plan.    Health Maintenance    Topic Date Due   • TDAP/TD VACCINES (1 - Tdap) 07/30/2021 (Originally 4/17/1979)   • PAP SMEAR  08/04/2021 (Originally 11/19/2018)   • URINE MICROALBUMIN  08/14/2021 (Originally 6/13/2020)   • ZOSTER VACCINE (1 of 2) 10/27/2021 (Originally 4/17/2009)   • INFLUENZA VACCINE  10/01/2021   • DIABETIC EYE EXAM  10/29/2021   • HEMOGLOBIN A1C  01/20/2022   • DIABETIC FOOT EXAM  07/16/2022   • LIPID PANEL  07/20/2022   • MAMMOGRAM  08/05/2022          The following portions of the patient's history were reviewed and updated as appropriate: allergies, current medications, past family history, past medical history, past social history, past surgical history and problem list.    Outpatient Medications Prior to Visit   Medication Sig Dispense Refill   • atorvastatin (LIPITOR) 10 MG tablet Take 1 tablet by mouth Daily. 90 tablet 1   • benazepril (LOTENSIN) 10 MG tablet Take 1 tablet by mouth Daily. 90 tablet 1   • Ertugliflozin L-PyroglutamicAc (Steglatro) 15 MG tablet Take 1 tablet by mouth Every Morning. 90 tablet 1   • gabapentin (NEURONTIN) 800 MG tablet      • ketoconazole (NIZORAL) 2 % cream APPLY TOPICALLY TO APPROPRIATE AREA AS DIRECTED DAILY 60 g 2   • linagliptin (TRADJENTA) 5 MG tablet tablet Take 1 tablet by mouth Daily. 90 tablet 1   • metFORMIN (Glucophage) 1000 MG tablet Take 1 tablet by mouth 2 (Two) Times a Day With Meals. 180 tablet 1   • nabumetone (RELAFEN) 500 MG tablet Take 1 tablet by mouth 2 (Two) Times a Day As Needed for Moderate Pain . 60 tablet 5   • nadolol (CORGARD) 20 MG tablet Take 1 tablet by mouth Daily. 90 tablet 1   • nystatin (MYCOSTATIN) 334149 UNIT/GM cream Apply  topically to the appropriate area as directed 2 (two) times a day. (Patient taking differently: Apply  topically to the appropriate area as directed As Needed.) 30 g 2   • SUMAtriptan (Imitrex) 100 MG tablet Take one tablet at onset of headache. May repeat dose one time in 2 hours if headache not relieved. 9 tablet 2   •  tiZANidine (ZANAFLEX) 4 MG tablet      • omeprazole (priLOSEC) 20 MG capsule Take 1 capsule by mouth Daily. 90 capsule 1   • traZODone (DESYREL) 100 MG tablet Take 1 tablet by mouth Every Night. 90 tablet 1   • hydrocortisone-eucerin Apply  topically to the appropriate area as directed 2 (Two) Times a Day. 120 g 2   • sulfamethoxazole-trimethoprim (Bactrim DS) 800-160 MG per tablet Take 1 tablet by mouth 2 (Two) Times a Day. 20 tablet 0     No facility-administered medications prior to visit.       Patient Active Problem List   Diagnosis   • Diabetes mellitus (CMS/HCC)   • Morbidly obese (CMS/HCC)   • Mixed hyperlipidemia   • Cirrhosis of liver without ascites (CMS/HCC)   • Elevated AFP   • Esophageal varices determined by endoscopy (CMS/HCC)   • Essential hypertension   • Gastritis without bleeding   • History of hepatitis C   • Nonintractable headache   • Portal hypertension (CMS/HCC)   • Gallstones       Advanced Care Planning:  ACP discussion was held with the patient during this visit. Patient does not have an advance directive, declines further assistance.    Review of Systems   Constitutional: Negative.  Negative for fatigue, fever and unexpected weight change.   HENT: Negative.  Negative for facial swelling, sore throat and trouble swallowing.    Eyes: Negative.  Negative for photophobia, discharge and visual disturbance.   Respiratory: Negative.  Negative for cough, chest tightness and shortness of breath.    Cardiovascular: Negative.  Negative for chest pain and palpitations.   Gastrointestinal: Negative.  Negative for abdominal pain, diarrhea, nausea and vomiting.   Endocrine: Negative.  Negative for polydipsia, polyphagia and polyuria.   Genitourinary: Negative.  Negative for dysuria, flank pain and frequency.   Musculoskeletal: Negative.  Negative for back pain, gait problem and neck pain.   Skin: Positive for wound. Negative for rash.        2 open abscesses on face   Allergic/Immunologic: Negative.   "  Neurological: Negative.  Negative for dizziness, light-headedness and headaches.   Hematological: Negative.    Psychiatric/Behavioral: Negative.  Negative for self-injury and suicidal ideas.       Compared to one year ago, the patient feels her physical health is worse.  Compared to one year ago, the patient feels her mental health is the same.    Reviewed chart for potential of high risk medication in the elderly: yes  Reviewed chart for potential of harmful drug interactions in the elderly:yes    Objective         Vitals:    07/27/21 0850   BP: 149/79   BP Location: Right arm   Patient Position: Sitting   Cuff Size: Large Adult   Pulse: 60   Temp: 98 °F (36.7 °C)   SpO2: 96%   Weight: 86.6 kg (191 lb)   Height: 152.4 cm (60\")  Comment: pt reported   PainSc: 0-No pain       Body mass index is 37.3 kg/m².  Discussed the patient's BMI with her. The BMI is above average; BMI management plan is completed.    Physical Exam  Constitutional:       Appearance: Normal appearance. She is obese.   HENT:      Head: Normocephalic and atraumatic.   Cardiovascular:      Rate and Rhythm: Normal rate and regular rhythm.      Pulses: Normal pulses.           Dorsalis pedis pulses are 2+ on the right side and 2+ on the left side.        Posterior tibial pulses are 2+ on the right side and 2+ on the left side.      Heart sounds: Normal heart sounds.   Pulmonary:      Effort: Pulmonary effort is normal.      Breath sounds: Normal breath sounds. No wheezing.   Genitourinary:     Comments: Pap deferred to next visit.  Musculoskeletal:         General: Normal range of motion.      Cervical back: Normal range of motion and neck supple.      Right foot: Normal range of motion. No deformity or Charcot foot.      Left foot: Normal range of motion. No deformity or Charcot foot.   Feet:      Right foot:      Protective Sensation: 5 sites tested. 4 sites sensed.      Skin integrity: Callus and dry skin present.      Toenail Condition: Right " toenails are abnormally thick.      Left foot:      Protective Sensation: 5 sites tested. 5 sites sensed.      Skin integrity: Callus and dry skin present.      Toenail Condition: Left toenails are abnormally thick.   Skin:     General: Skin is warm and dry.      Capillary Refill: Capillary refill takes less than 2 seconds.      Findings: Lesion present.      Comments: Erythematous lesion noted to chin and left side of face.    Neurological:      Mental Status: She is alert and oriented to person, place, and time.   Psychiatric:         Mood and Affect: Mood normal.         Behavior: Behavior normal.         Thought Content: Thought content normal.         Judgment: Judgment normal.         Lab Results   Component Value Date     (H) 07/20/2021    CHLPL 185 07/20/2021    TRIG 190 (H) 07/20/2021    HDL 40 07/20/2021     (H) 07/20/2021    VLDL 33 07/20/2021    HGBA1C 7.8 (H) 07/20/2021        Assessment/Plan   Medicare Risks and Personalized Health Plan  CMS Preventative Services Quick Reference  Advance Directive Discussion  Alcohol Misuse  Breast Cancer/Mammogram Screening  Cardiovascular risk  Chronic Pain   Colon Cancer Screening  Dementia/Memory   Depression/Dysphoria  Diabetic Lab Screening   Fall Risk  Immunizations Discussed/Encouraged (specific immunizations; Shingrix and COVID19 )  Inactivity/Sedentary  Lung Cancer Risk  Obesity/Overweight   Osteoporosis Risk    The above risks/problems have been discussed with the patient.  Pertinent information has been shared with the patient in the After Visit Summary.  Follow up plans and orders are seen below in the Assessment/Plan Section.    Diagnoses and all orders for this visit:    1. Medicare annual wellness visit, subsequent (Primary)    2. Hypothyroidism, unspecified type  -     levothyroxine (Synthroid) 50 MCG tablet; Take 1 tablet by mouth Daily.  Dispense: 30 tablet; Refill: 2    3. Need for shingles vaccine  -     Zoster Vac Recomb Adjuvanted  50 MCG/0.5ML reconstituted suspension; Inject 0.5 mL into the appropriate muscle as directed by prescriber 1 (One) Time for 1 dose.  Dispense: 1 each; Refill: 1    4. History of staphylococcal infection  -     sulfamethoxazole-trimethoprim (Bactrim DS) 800-160 MG per tablet; Take 1 tablet by mouth 2 (Two) Times a Day.  Dispense: 20 tablet; Refill: 0    5. Gastroesophageal reflux disease, unspecified whether esophagitis present  -     omeprazole (priLOSEC) 20 MG capsule; Take 1 capsule by mouth Daily.  Dispense: 90 capsule; Refill: 1    6. Mixed hyperlipidemia    7. Essential hypertension    8. Type 2 diabetes mellitus with hyperglycemia, without long-term current use of insulin (CMS/[facility])    9. Uncontrolled type 2 diabetes mellitus with hyperglycemia (CMS/[facility])    10. Tobacco abuse  -     CT Chest Low Dose Wo; Future    11. Encounter for screening for lung cancer  -     CT Chest Low Dose Wo; Future    12. Cutaneous abscess of face  -     sulfamethoxazole-trimethoprim (Bactrim DS) 800-160 MG per tablet; Take 1 tablet by mouth 2 (Two) Times a Day.  Dispense: 20 tablet; Refill: 0    13. Personal history of nicotine dependence   -     CT Chest Low Dose Wo; Future    14. Primary insomnia  -     traZODone (DESYREL) 150 MG tablet; Take 1 tablet by mouth Every Night.  Dispense: 90 tablet; Refill: 1    15. Class 2 severe obesity due to excess calories with serious comorbidity and body mass index (BMI) of 37.0 to 37.9 in adult (CMS/[facility])    Patient encouraged to follow prescribed low carbohydrate diet and calorie count 1,800/day for diabetes control and weight loss.  Patient encouraged to resume her daily walking with goal of 30 min sustained activity.  Patient encouraged to complete vaccination and testing as recommended and prescribed.    Follow Up:  Return in about 3 months (around 10/27/2021) for Next scheduled follow up; Pap.     An After Visit Summary and PPPS were given to the patient.

## 2021-07-30 ENCOUNTER — TELEPHONE (OUTPATIENT)
Dept: GASTROENTEROLOGY | Age: 61
End: 2021-07-30

## 2021-07-30 DIAGNOSIS — K74.60 CIRRHOSIS OF LIVER WITHOUT ASCITES, UNSPECIFIED HEPATIC CIRRHOSIS TYPE (HCC): Primary | ICD-10-CM

## 2021-07-30 NOTE — TELEPHONE ENCOUNTER
----- Message from [facility] sent at 4/20/2021  1:14 PM CDT -----  Regarding: checkout note 4/20/21  Liver ultrasound due in August  CBC, CMP, AFP, Pt/INR due in August with follow up after

## 2021-08-12 ENCOUNTER — TELEPHONE (OUTPATIENT)
Dept: GASTROENTEROLOGY | Age: 61
End: 2021-08-12

## 2021-08-12 ENCOUNTER — TELEPHONE (OUTPATIENT)
Dept: FAMILY MEDICINE CLINIC | Facility: CLINIC | Age: 61
End: 2021-08-12

## 2021-08-12 NOTE — TELEPHONE ENCOUNTER
PATIENT CALLED AND SAID [employer] NEEDS A COPY OF HER LIVER US ORDER SENT TO THEM TODAY. THE NUMBER IS [phone] AS SHE DOES NOT HAVE THEIR FAX NUMBER. YOU CAN REACH THE PATIENT [phone].

## 2021-08-12 NOTE — TELEPHONE ENCOUNTER
08-12-21 [facility]   Spoke with Lupe Ryan    [phone]    Confirmed appointment information. They sent the Referral request to her PCP Dr Dornida Steen. They will contact his office for the information.  Raúl

## 2021-08-12 NOTE — TELEPHONE ENCOUNTER
08-12-21 Called spoke with Norma like Alanis Morales is going to fax over the paperwork. Cv       08-12-21 Called patient   LVm for the patient to get her labs done while here on 08-16-21 to get her labs done.  Raúl

## 2021-08-12 NOTE — TELEPHONE ENCOUNTER
08-12-21 Called [facility] [phone]  Pre recorded line for a medical device. Called patient LVM for there call the office back with a different number.  Cv

## 2021-08-12 NOTE — TELEPHONE ENCOUNTER
"Pt called, she is requesting medication to be sent to [email or web address] for \"a terrible headache.\"  "

## 2021-08-13 DIAGNOSIS — G44.201 ACUTE INTRACTABLE TENSION-TYPE HEADACHE: Primary | ICD-10-CM

## 2021-08-13 RX ORDER — BUTALBITAL, ACETAMINOPHEN AND CAFFEINE 50; 325; 40 MG/1; MG/1; MG/1
1 TABLET ORAL EVERY 4 HOURS PRN
Qty: 12 TABLET | Refills: 0 | Status: SHIPPED | OUTPATIENT
Start: 2021-08-13 | End: 2022-11-01

## 2021-08-16 ENCOUNTER — HOSPITAL ENCOUNTER (OUTPATIENT)
Dept: ULTRASOUND IMAGING | Age: 61
Discharge: HOME | End: 2021-08-16
Payer: MEDICARE

## 2021-08-16 DIAGNOSIS — K74.60 CIRRHOSIS OF LIVER WITHOUT ASCITES, UNSPECIFIED HEPATIC CIRRHOSIS TYPE (HCC): ICD-10-CM

## 2021-08-16 PROCEDURE — 76705 ECHO EXAM OF ABDOMEN: CPT

## 2021-08-23 ENCOUNTER — OFFICE VISIT (OUTPATIENT)
Dept: GASTROENTEROLOGY | Age: 61
End: 2021-08-23
Payer: MEDICARE

## 2021-08-23 VITALS
WEIGHT: 196 LBS | BODY MASS INDEX: 38.48 KG/M2 | DIASTOLIC BLOOD PRESSURE: 80 MMHG | SYSTOLIC BLOOD PRESSURE: 120 MMHG | HEIGHT: 60 IN | HEART RATE: 62 BPM | OXYGEN SATURATION: 98 %

## 2021-08-23 DIAGNOSIS — K74.60 CIRRHOSIS OF LIVER WITHOUT ASCITES, UNSPECIFIED HEPATIC CIRRHOSIS TYPE (HCC): ICD-10-CM

## 2021-08-23 DIAGNOSIS — I85.00 ESOPHAGEAL VARICES WITHOUT BLEEDING, UNSPECIFIED ESOPHAGEAL VARICES TYPE (HCC): ICD-10-CM

## 2021-08-23 DIAGNOSIS — K76.6 PORTAL HYPERTENSION (HCC): ICD-10-CM

## 2021-08-23 DIAGNOSIS — R10.32 LLQ PAIN: Primary | ICD-10-CM

## 2021-08-23 PROCEDURE — 4004F PT TOBACCO SCREEN RCVD TLK: CPT | Performed by: NURSE PRACTITIONER

## 2021-08-23 PROCEDURE — 3017F COLORECTAL CA SCREEN DOC REV: CPT | Performed by: NURSE PRACTITIONER

## 2021-08-23 PROCEDURE — G8427 DOCREV CUR MEDS BY ELIG CLIN: HCPCS | Performed by: NURSE PRACTITIONER

## 2021-08-23 PROCEDURE — 99214 OFFICE O/P EST MOD 30 MIN: CPT | Performed by: NURSE PRACTITIONER

## 2021-08-23 PROCEDURE — G8417 CALC BMI ABV UP PARAM F/U: HCPCS | Performed by: NURSE PRACTITIONER

## 2021-08-23 RX ORDER — TRAZODONE HYDROCHLORIDE 150 MG/1
150 TABLET ORAL NIGHTLY
COMMUNITY

## 2021-08-23 RX ORDER — LEVOTHYROXINE SODIUM 0.05 MG/1
50 TABLET ORAL DAILY
COMMUNITY

## 2021-08-23 ASSESSMENT — ENCOUNTER SYMPTOMS
VOMITING: 0
CONSTIPATION: 0
ANAL BLEEDING: 0
TROUBLE SWALLOWING: 0
SHORTNESS OF BREATH: 0
NAUSEA: 0
BLOOD IN STOOL: 0
RECTAL PAIN: 0
ABDOMINAL PAIN: 1
ABDOMINAL DISTENTION: 0
DIARRHEA: 0
COUGH: 0
CHOKING: 0

## 2021-08-23 NOTE — PROGRESS NOTES
Subjective:     Patient ID: Pricila Dexter is a 57 y.o. female  PCP: Dr. Yrn Bell M.D., MD  Referring Provider: No ref. provider found    HPI  Patient presents to the office today with the following complaints: Follow-up      Pt seen today for 6 month Cirrhosis follow up. Nadolol 20 mg po daily for variceal bleeding prophylaxis. She denies any hematemesis. She reports black stools occurred last week. This has since resolved. MELD-Na - 7 points 8/16/2021    Today, pt reports LLQ pain that is worse with coughing and movement. This began 2-3 days ago. She states stools are mushy. Denies any nausea or vomiting. Pain is described as sharp. Denies fever. Denies any bright red blood in stool.       Last EGD 3/2021 - portal hypertensive gastropathy, previous banding, 6 month recall for banding   Last Colonoscopy 3/2021 - HP, 5 year recall       WBC (K/uL)   Date Value   08/16/2021 4.9     Hemoglobin (g/dL)   Date Value   08/16/2021 13.4     Hematocrit (%)   Date Value   08/16/2021 42.9     Platelets (K/uL)   Date Value   08/16/2021 130     Lab Results   Component Value Date     08/16/2021    K 4.0 08/16/2021     08/16/2021    CO2 29 08/16/2021    BUN 14 08/16/2021    CREATININE 0.4 (L) 08/16/2021    GLUCOSE 159 (H) 08/16/2021    CALCIUM 9.8 08/16/2021    PROT 8.0 08/16/2021    LABALBU 4.5 08/16/2021    BILITOT 0.8 08/16/2021    ALKPHOS 112 (H) 08/16/2021    AST 35 (H) 08/16/2021    ALT 28 08/16/2021    LABGLOM >60 08/16/2021    GFRAA >59 08/16/2021    GLOB 3.5 01/25/2017       Lab Results   Component Value Date    INR 1.05 08/16/2021    INR 1.05 02/03/2021    INR 1.06 08/04/2020    PROTIME 13.9 08/16/2021    PROTIME 13.7 02/03/2021    PROTIME 13.7 08/04/2020     Component Value Ref Range & Units Status Collected Lab   Alpha Fetoprotein 1.6  0.0 - 8.3 ng/mL Final 08/16/2021 12:15 PM [facility] [address] [place] [address] Lab     Narrative   EXAMINATION: US LIVER 8/16/2021 2:42 PM   HISTORY: Cirrhosis COMPARISON: Hepatic ultrasound 2/3/2021   FINDINGS:   Transabdominal sonographic evaluation of the liver was performed in   the transverse and longitudinal projections. The pancreas is partially obscured due to shadowing from overlying   bowel gas. The visualized portion appears grossly normal.   The liver demonstrates somewhat of a coarsened echotexture but is   otherwise normal in appearance without intrahepatic biliary ductal   dilatation. No focal hepatic lesions are demonstrated. Limited visualization of the right kidney demonstrates it to be normal   in size and echogenicity. Flow in the portal vein appears hepatopedal.   A large shadowing stone is seen in the gallbladder. The gallbladder is   otherwise normal in appearance without wall thickening or   pericholecystic fluid. The common bile duct is normal in caliber at 3 mm. The spleen measures 12.5 x 5.9 x 12.6 cm in size.       Impression   1. Coarsened hepatic echotexture suggestive of chronic hepatic   disease. 2. Cholelithiasis without evidence of acute cholecystitis. Signed by Dr Mihir Patton:     1. LLQ pain    2. Cirrhosis of liver without ascites, unspecified hepatic cirrhosis type ([address]    3. Portal hypertension (HCC)    4. Esophageal varices without bleeding, unspecified esophageal varices type (HCC)            Plan:   - Continue Nadolol  - Follow up every 6 months with labs and ultrasound for cirrhosis management.  - CT scan abd/pelvis r/o diverticulitis STAT  - Liquid diet until CT results, if symptoms worsen or do not improve, report to ER  - Schedule EGD  Nothing to eat or drink after midnight. No driving for 24 hours after procedure. Bring a  to procedure. No aspirin, NSAIDs, fish oil 5 days before procedure.   I have discussed the benefits, alternatives, and risks (including bleeding, perforation and death)  for pursuing Endoscopy (EGD/Colonscopy/EUS/ERCP) with the patient and they are willing to continue. We also discussed the need for anesthesia, IV access, proper dietary changes, medication changes if necessary, and need for bowel prep (if ordered) prior to their Endoscopic procedure. They are aware they must have someone accompany them to their scheduled procedure to drive them home - they agree to the above and are willing to continue. Orders  Orders Placed This Encounter   Procedures    CT ABDOMEN PELVIS W IV CONTRAST Additional Contrast? Oral     Standing Status:   Future     Standing Expiration Date:   8/23/2022     Scheduling Instructions:      Requests [facility]     Order Specific Question:   Additional Contrast?     Answer:   Oral     Order Specific Question:   Reason for exam:     Answer:   r/o diverticulitis     Medications  No orders of the defined types were placed in this encounter.         Patient History:     Past Medical History:   Diagnosis Date    Arthritis     Asthma     Cirrhosis ([address]     COPD (chronic obstructive pulmonary disease) (HCC)     Esophageal varices (HCC)     GERD (gastroesophageal reflux disease)     Hepatitis C     Hyperlipidemia     Hypertension        Past Surgical History:   Procedure Laterality Date    COLONOSCOPY  03/02/2016    Dr Marianne Sosaumbgildardo bleeding internal hemorrhoids o/w normal; fair prep (5 yr)    COLONOSCOPY N/A 03/09/2021    Dr Mauricio Lancaster, 5 yr recall    [place] EGD TRANSORAL BIOPSY SINGLE/MULTIPLE N/A 02/07/2017    Dr Shereen Garrido Grade I esophageal varices, gastritis/gastropathy    [place] EGD TRANSORAL BIOPSY SINGLE/MULTIPLE N/A 03/23/2018    Dr Rodriguez Moore I esophageal varices, erosive/active gastritis-2 yr recall    UPPER GASTROINTESTINAL ENDOSCOPY  03/02/2016    Dr Perkins-shantal II esoph varices; portal hyptensive gastropathy    UPPER GASTROINTESTINAL ENDOSCOPY N/A 03/03/2020    Dr Lei Torres-w/variceal banding x 4, 3 columns of Grade II varices, repeat in 6-8 wks    UPPER GASTROINTESTINAL ENDOSCOPY N/A 04/14/2020    Dr Lei Khanna (Non-Medical):    Physical Activity:     Days of Exercise per Week:     Minutes of Exercise per Session:    Stress:     Feeling of Stress :    Social Connections:     Frequency of Communication with Friends and Family:     Frequency of Social Gatherings with Friends and Family:     Attends Confucianism Services:     Active Member of Clubs or Organizations:     Attends Club or Organization Meetings:     Marital Status:    Intimate Partner Violence:     Fear of Current or Ex-Partner:     Emotionally Abused:     Physically Abused:     Sexually Abused:        Current Outpatient Medications   Medication Sig Dispense Refill    traZODone (DESYREL) 150 MG tablet Take 150 mg by mouth nightly      levothyroxine (SYNTHROID) 50 MCG tablet Take 50 mcg by mouth Daily      linagliptin (TRADJENTA) 5 MG tablet Take 5 mg by mouth daily      ondansetron (ZOFRAN) 4 MG tablet Take 1 tablet by mouth every 8 hours as needed for Nausea 45 tablet 2    nadolol (CORGARD) 20 MG tablet TAKE ONE TABLET BY MOUTH EVERY DAY -- NEED APPOINTMENT 90 tablet 1    omeprazole (PRILOSEC) 20 MG delayed release capsule TAKE ONE CAPSULE BY MOUTH EVERY DAY 90 capsule 1    gabapentin (NEURONTIN) 600 MG tablet Take 600 mg by mouth 3 times daily.  atorvastatin (LIPITOR) 10 MG tablet Take 10 mg by mouth daily      metFORMIN, OSM, (FORTAMET) 1000 MG extended release tablet Take 1,000 mg by mouth daily (with breakfast)       Ertugliflozin L-PyroglutamicAc (STEGLATRO) 5 MG TABS Take 5 mg by mouth daily       cyclobenzaprine (FLEXERIL) 10 MG tablet Take 10 mg by mouth 3 times daily as needed for Muscle spasms      traMADol (ULTRAM) 50 MG tablet Take 1 tablet by mouth every 12 hours as needed for Pain 60 tablet 2    benazepril (LOTENSIN) 20 MG tablet Take 1 tablet by mouth daily 30 tablet 5     No current facility-administered medications for this visit. No Known Allergies    Review of Systems   Constitutional: Positive for fatigue. Negative for activity change, appetite change, fever and unexpected weight change. HENT: Negative for trouble swallowing. Respiratory: Negative for cough, choking and shortness of breath. Cardiovascular: Negative for chest pain. Gastrointestinal: Positive for abdominal pain. Negative for abdominal distention, anal bleeding, blood in stool, constipation, diarrhea, nausea, rectal pain and vomiting. Allergic/Immunologic: Negative for food allergies. Neurological: Positive for dizziness. All other systems reviewed and are negative. Objective:     /80   Pulse 62   Ht 5' (1.524 m)   Wt 196 lb (88.9 kg)   SpO2 98%   BMI 38.28 kg/m²     Physical Exam  Vitals reviewed. Constitutional:       General: She is not in acute distress. Appearance: She is well-developed. Eyes:      General:         Right eye: No discharge. Left eye: No discharge. Cardiovascular:      Rate and Rhythm: Normal rate and regular rhythm. Heart sounds: No murmur heard. Pulmonary:      Effort: Pulmonary effort is normal. No respiratory distress. Breath sounds: Normal breath sounds. Abdominal:      General: Bowel sounds are normal. There is no distension. Palpations: Abdomen is soft. There is no mass. Tenderness: There is abdominal tenderness in the left lower quadrant. There is guarding. There is no rebound. Musculoskeletal:         General: Normal range of motion. Cervical back: Normal range of motion. Skin:     General: Skin is warm and dry. Neurological:      Mental Status: She is alert and oriented to person, place, and time.    Psychiatric:         Behavior: Behavior normal.

## 2021-08-24 ENCOUNTER — HOSPITAL ENCOUNTER (OUTPATIENT)
Dept: CT IMAGING | Facility: HOSPITAL | Age: 61
Discharge: HOME | End: 2021-08-24
Admitting: NURSE PRACTITIONER

## 2021-08-24 DIAGNOSIS — Z87.891 PERSONAL HISTORY OF NICOTINE DEPENDENCE: ICD-10-CM

## 2021-08-24 DIAGNOSIS — Z72.0 TOBACCO ABUSE: ICD-10-CM

## 2021-08-24 DIAGNOSIS — Z12.2 ENCOUNTER FOR SCREENING FOR LUNG CANCER: ICD-10-CM

## 2021-08-24 PROCEDURE — 71271 CT THORAX LUNG CANCER SCR C-: CPT

## 2021-08-25 ENCOUNTER — TELEPHONE (OUTPATIENT)
Dept: FAMILY MEDICINE CLINIC | Facility: CLINIC | Age: 61
End: 2021-08-25

## 2021-08-25 DIAGNOSIS — L02.01 CUTANEOUS ABSCESS OF FACE: ICD-10-CM

## 2021-08-25 DIAGNOSIS — Z86.19 HISTORY OF STAPHYLOCOCCAL INFECTION: ICD-10-CM

## 2021-08-25 RX ORDER — SULFAMETHOXAZOLE AND TRIMETHOPRIM 800; 160 MG/1; MG/1
1 TABLET ORAL 2 TIMES DAILY
Qty: 20 TABLET | Refills: 0 | Status: SHIPPED | OUTPATIENT
Start: 2021-08-25 | End: 2021-09-16

## 2021-08-25 NOTE — TELEPHONE ENCOUNTER
Caller: Della Gonzalez    Relationship: Self    Best call back number: [phone]    Who are you requesting to speak with     CLINICAL STAFF      Do you know the name of the person who called:    DELLA    What was the call regarding:    STILL HAVING BUMPS ON RIGHT SIDE OF CHIN, BURNING AND ASKING FOR MORE ANTIBIOTIC.     Do you require a callback:    PLEASE ADVISE      [email or web address] - [place], [place] - [address] [place] [address] [phone] [facility] [phone] FX

## 2021-09-16 ENCOUNTER — OFFICE VISIT (OUTPATIENT)
Dept: FAMILY MEDICINE CLINIC | Facility: CLINIC | Age: 61
End: 2021-09-16

## 2021-09-16 VITALS
HEIGHT: 60 IN | DIASTOLIC BLOOD PRESSURE: 79 MMHG | HEART RATE: 58 BPM | SYSTOLIC BLOOD PRESSURE: 121 MMHG | BODY MASS INDEX: 38.95 KG/M2 | OXYGEN SATURATION: 96 % | WEIGHT: 198.4 LBS | TEMPERATURE: 98 F
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
## 2021-09-16 DIAGNOSIS — J30.9 ALLERGIC SINUSITIS: ICD-10-CM

## 2021-09-16 DIAGNOSIS — R51.9 SINUS HEADACHE: ICD-10-CM

## 2021-09-16 DIAGNOSIS — J45.901 BRONCHITIS, ALLERGIC, UNSPECIFIED ASTHMA SEVERITY, WITH ACUTE EXACERBATION: ICD-10-CM

## 2021-09-16 PROCEDURE — 99213 OFFICE O/P EST LOW 20 MIN: CPT | Performed by: NURSE PRACTITIONER

## 2021-09-16 PROCEDURE — 96372 THER/PROPH/DIAG INJ SC/IM: CPT | Performed by: NURSE PRACTITIONER

## 2021-09-16 RX ORDER — TRIAMCINOLONE ACETONIDE 1 MG/G
1 CREAM TOPICAL 2 TIMES DAILY
Qty: 15 G | Refills: 2 | Status: SHIPPED | OUTPATIENT
Start: 2021-09-16 | End: 2022-07-29 | Stop reason: SDUPTHER

## 2021-09-16 RX ORDER — BENZONATATE 100 MG/1
100 CAPSULE ORAL 3 TIMES DAILY PRN
Qty: 15 CAPSULE | Refills: 0 | Status: SHIPPED | OUTPATIENT
Start: 2021-09-16 | End: 2021-12-09

## 2021-09-16 RX ORDER — METHYLPREDNISOLONE ACETATE 40 MG/ML
40 INJECTION, SUSPENSION INTRA-ARTICULAR; INTRALESIONAL; INTRAMUSCULAR; SOFT TISSUE ONCE
Status: COMPLETED | OUTPATIENT
Start: 2021-09-16 | End: 2021-09-16

## 2021-09-16 RX ORDER — CETIRIZINE HYDROCHLORIDE 10 MG/1
10 TABLET ORAL DAILY
Qty: 30 TABLET | Refills: 2 | Status: SHIPPED | OUTPATIENT
Start: 2021-09-16 | End: 2021-12-21

## 2021-09-16 RX ADMIN — METHYLPREDNISOLONE ACETATE 40 MG: 40 INJECTION, SUSPENSION INTRA-ARTICULAR; INTRALESIONAL; INTRAMUSCULAR; SOFT TISSUE at 14:10

## 2021-09-16 NOTE — PROGRESS NOTES
"Chief Complaint  Insect Bite and Sinus Problem    Subjective          Della Gonzalez presents to [facility]  History of Present Illness  SINUS:  Patient complains of strong allergy symptoms.  She has itchy throat and eyes, eyes watering, runny/stuffy nose.  She complains of sinus headache.  No cough.  No loss of taste or smell.  No fever.  She is Covid vaccinated.  INSECT BITE:  She is unsure what bit her right ankle.  She has a red area, circular, with a blistered center.  She admits to sitting on the porch frequently and unsure when or what bit her.  She has not used anything on it.  It is primarily itchy.  Objective   Vital Signs:   /79 (BP Location: Right arm, Patient Position: Sitting, Cuff Size: Large Adult)   Pulse 58   Temp 98 °F (36.7 °C)   Ht 152.4 cm (60\")   Wt 90 kg (198 lb 6.4 oz)   SpO2 96%   BMI 38.75 kg/m²     Physical Exam  Vitals and nursing note reviewed.   Constitutional:       General: She is not in acute distress.     Appearance: Normal appearance. She is obese. She is not ill-appearing.   HENT:      Head: Normocephalic and atraumatic.      Comments: Maxillary and frontal tenderness to percussion.     Right Ear: Tympanic membrane, ear canal and external ear normal.      Left Ear: Tympanic membrane, ear canal and external ear normal.      Ears:      Comments: Moderate serous effusion bilaterally.     Nose: Rhinorrhea present.      Comments: Gray boggy turbinates.     Mouth/Throat:      Mouth: Mucous membranes are moist.      Pharynx: Oropharynx is clear. No oropharyngeal exudate or posterior oropharyngeal erythema.   Eyes:      Conjunctiva/sclera: Conjunctivae normal.      Pupils: Pupils are equal, round, and reactive to light.      Comments: Eyes watery.   Cardiovascular:      Rate and Rhythm: Normal rate and regular rhythm.      Heart sounds: Normal heart sounds.   Pulmonary:      Effort: Pulmonary effort is normal.      Breath sounds: Normal breath " sounds.   Musculoskeletal:      Cervical back: Normal range of motion and neck supple.        Feet:    Lymphadenopathy:      Cervical: No cervical adenopathy.   Skin:     General: Skin is warm and dry.   Neurological:      Mental Status: She is alert and oriented to person, place, and time.   Psychiatric:         Mood and Affect: Mood normal.        Result Review :                 Assessment and Plan    Diagnoses and all orders for this visit:    1. Bug bite, initial encounter (Primary)  -     methylPREDNISolone acetate (DEPO-medrol) injection 40 mg  -     triamcinolone (KENALOG) 0.1 % cream; Apply 1 application topically to the appropriate area as directed 2 (Two) Times a Day.  Dispense: 15 g; Refill: 2    2. Bronchitis, allergic, unspecified asthma severity, with acute exacerbation  -     methylPREDNISolone acetate (DEPO-medrol) injection 40 mg  -     benzonatate (Tessalon Perles) 100 MG capsule; Take 1 capsule by mouth 3 (Three) Times a Day As Needed for Cough.  Dispense: 15 capsule; Refill: 0  -     cetirizine (zyrTEC) 10 MG tablet; Take 1 tablet by mouth Daily.  Dispense: 30 tablet; Refill: 2    3. Allergic sinusitis  -     methylPREDNISolone acetate (DEPO-medrol) injection 40 mg  -     benzonatate (Tessalon Perles) 100 MG capsule; Take 1 capsule by mouth 3 (Three) Times a Day As Needed for Cough.  Dispense: 15 capsule; Refill: 0  -     cetirizine (zyrTEC) 10 MG tablet; Take 1 tablet by mouth Daily.  Dispense: 30 tablet; Refill: 2    4. Sinus headache  -     methylPREDNISolone acetate (DEPO-medrol) injection 40 mg  -     benzonatate (Tessalon Perles) 100 MG capsule; Take 1 capsule by mouth 3 (Three) Times a Day As Needed for Cough.  Dispense: 15 capsule; Refill: 0  -     cetirizine (zyrTEC) 10 MG tablet; Take 1 tablet by mouth Daily.  Dispense: 30 tablet; Refill: 2        Follow Up   Return for keep scheduled appt.  Patient was given instructions and counseling regarding her condition or for health maintenance  advice. Please see specific information pulled into the AVS if appropriate.

## 2021-10-08 RX ORDER — SULFAMETHOXAZOLE AND TRIMETHOPRIM 800; 160 MG/1; MG/1
TABLET ORAL
Qty: 20 TABLET | Refills: 0 | OUTPATIENT
Start: 2021-10-08

## 2021-10-08 NOTE — PROGRESS NOTES
[facility] - PODIATRY    Today's Date: 10/18/21    Patient Name: Della Gonzalez  MRN: [id number]  CSN: [id number]  PCP: Vijaya Henao APRN  Referring Provider: No ref. provider found    SUBJECTIVE     Chief Complaint   Patient presents with   • Follow-up     pcp09/16/2021 3 MONTH F/U DIABETIC FOOT/NAIL CARE- pt states doing good- pt denies pain- pt presents with long thick nails   • Diabetes     hasnt checked     HPI: Della Gonzalez, a 61 y.o.female, comes to clinic as a(n) established patient presenting for diabetic foot exam and complaining of thick fungal toenails and dry, itchy skin of both feet. Patient has h/o cirrhosis, DM2, GERD, tobacco use. Patient is NIDDM and unsure of last BG level.  Patient states that she has not checked blood glucose recently.  Relates  mild numbness/tingling in feet.  Notes that her toenails are long, thick, discolored and crumbly.  Patient states she is unable to take care of her nails at home due to shape and thickness of nails.  Continues tobacco use daily.  Notes that feet have been dry, flaky, and very itchy recently.  She has continued to apply moisturizing cream without any improvement. Denies pain today. Relates previous treatment(s) including care by podiatrist. Denies any constitutional symptoms. No other pedal complaints at this time.    Past Medical History:   Diagnosis Date   • Cirrhosis of liver (HCC)    • Diabetes mellitus (HCC)    • GERD (gastroesophageal reflux disease)    • Liver disease      Past Surgical History:   Procedure Laterality Date   • COLONOSCOPY       Family History   Problem Relation Age of Onset   • Diabetes Mother    • No Known Problems Father      Social History     Socioeconomic History   • Marital status: Single   Tobacco Use   • Smoking status: Current Every Day Smoker     Packs/day: 0.50     Years: 40.00     Pack years: 20.00     Types: Cigarettes   • Smokeless tobacco: Never Used   Vaping Use   • Vaping Use: Never used    Substance and Sexual Activity   • Alcohol use: No   • Drug use: No   • Sexual activity: Not Currently     Partners: Male     No Known Allergies  Current Outpatient Medications   Medication Sig Dispense Refill   • atorvastatin (LIPITOR) 10 MG tablet Take 1 tablet by mouth Daily. 90 tablet 1   • benazepril (LOTENSIN) 10 MG tablet Take 1 tablet by mouth Daily. 90 tablet 1   • benzonatate (Tessalon Perles) 100 MG capsule Take 1 capsule by mouth 3 (Three) Times a Day As Needed for Cough. 15 capsule 0   • butalbital-acetaminophen-caffeine (Esgic) -40 MG per tablet Take 1 tablet by mouth Every 4 (Four) Hours As Needed for Headache. 12 tablet 0   • cetirizine (zyrTEC) 10 MG tablet Take 1 tablet by mouth Daily. 30 tablet 2   • Ertugliflozin L-PyroglutamicAc (Steglatro) 15 MG tablet Take 1 tablet by mouth Every Morning. 90 tablet 1   • gabapentin (NEURONTIN) 800 MG tablet      • ketoconazole (NIZORAL) 2 % cream APPLY TOPICALLY TO APPROPRIATE AREA AS DIRECTED DAILY 60 g 2   • levothyroxine (Synthroid) 50 MCG tablet Take 1 tablet by mouth Daily. 30 tablet 2   • linagliptin (TRADJENTA) 5 MG tablet tablet Take 1 tablet by mouth Daily. 90 tablet 1   • metFORMIN (Glucophage) 1000 MG tablet Take 1 tablet by mouth 2 (Two) Times a Day With Meals. 180 tablet 1   • nabumetone (RELAFEN) 500 MG tablet Take 1 tablet by mouth 2 (Two) Times a Day As Needed for Moderate Pain . 60 tablet 5   • nadolol (CORGARD) 20 MG tablet Take 1 tablet by mouth Daily. 90 tablet 1   • nystatin (MYCOSTATIN) 591073 UNIT/GM cream Apply  topically to the appropriate area as directed 2 (two) times a day. (Patient taking differently: Apply  topically to the appropriate area as directed As Needed.) 30 g 2   • omeprazole (priLOSEC) 20 MG capsule Take 1 capsule by mouth Daily. 90 capsule 1   • SUMAtriptan (Imitrex) 100 MG tablet Take one tablet at onset of headache. May repeat dose one time in 2 hours if headache not relieved. 9 tablet 2   • tiZANidine  (ZANAFLEX) 4 MG tablet      • traZODone (DESYREL) 150 MG tablet Take 1 tablet by mouth Every Night. 90 tablet 1   • triamcinolone (KENALOG) 0.1 % cream Apply 1 application topically to the appropriate area as directed 2 (Two) Times a Day. 15 g 2   • terbinafine (lamISIL) 1 % cream Apply 1 application topically to the appropriate area as directed 2 (Two) Times a Day. 36 g 3     No current facility-administered medications for this visit.     Review of Systems   Constitutional: Negative for chills and fever.   HENT: Negative for congestion.    Respiratory: Negative for shortness of breath.    Cardiovascular: Positive for leg swelling. Negative for chest pain.   Gastrointestinal: Negative for constipation, diarrhea, nausea and vomiting.   Musculoskeletal: Positive for arthralgias. Negative for gait problem.        Foot pain   Skin: Negative for wound.        Pruritus   Neurological: Positive for numbness.       OBJECTIVE     Vitals:    10/18/21 1034   BP: 134/62   Pulse: 59   SpO2: 98%       PHYSICAL EXAM  GEN:   Accompanied by none.     Foot/Ankle Exam:       General:   Diabetic Foot Exam Performed    Appearance: appears stated age and healthy and obesity    Orientation: AAOx3    Affect: appropriate    Gait: unimpaired    Assistance: independent    Shoe Gear:  Casual shoes    VASCULAR      Right Foot Vascularity   Dorsalis pedis:  2+  Posterior tibial:  2+  Skin Temperature: warm    Edema Grading:  None  CFT:  3  Pedal Hair Growth:  Present  Varicosities: mild varicosities       Left Foot Vascularity   Dorsalis pedis:  2+  Posterior tibial:  2+  Skin Temperature: warm    Edema Grading:  None  CFT:  3  Pedal Hair Growth:  Present  Varicosities: mild varicosities        NEUROLOGIC     Right Foot Neurologic   Light touch sensation:  Diminished  Vibratory sensation:  Diminished  Hot/Cold sensation: diminished    Protective Sensation using Lagrange-John Paul Monofilament:  8     Left Foot Neurologic   Light touch sensation:   Diminished  Vibratory sensation:  Diminished  Hot/cold sensation: diminished    Protective Sensation using Peculiar-John Paul Monofilament:  8     MUSCULOSKELETAL      Right Foot Musculoskeletal   Ecchymosis:  None  Tenderness: toenails and toe 1    Arch:  Normal  Hallux valgus: No    Hallux limitus: No       Left Foot Musculoskeletal   Ecchymosis:  None  Tenderness: toenails and toe 1    Arch:  Normal  Hallux valgus: No    Hallux limitus: No       MUSCLE STRENGTH     Right Foot Muscle Strength   Foot dorsiflexion:  5  Foot plantar flexion:  5  Foot inversion:  5  Foot eversion:  5     Left Foot Muscle Strength   Foot dorsiflexion:  5  Foot plantar flexion:  5  Foot inversion:  5  Foot eversion:  5     RANGE OF MOTION      Right Foot Range of Motion   Foot and ankle ROM within normal limits       Left Foot Range of Motion   Foot and ankle ROM within normal limits       DERMATOLOGIC     Right Foot Dermatologic   Skin: tinea (Interdigital and moccasin distribution)    Nails: onychomycosis, abnormally thick, subungual debris and dystrophic nails    Nails: no ingrown toenail       Left Foot Dermatologic   Skin: tinea (Interdigital and moccasin distribution)    Nails: onychomycosis, abnormally thick, subungual debris and dystrophic nails    Nails: no ingrown toenail        RADIOLOGY/NUCLEAR:  No results found.    LABORATORY/CULTURE RESULTS:      PATHOLOGY RESULTS:       ASSESSMENT/PLAN     Diagnoses and all orders for this visit:    1. Onychomycosis (Primary)    2. Ingrown toenail    3. Type 2 diabetes mellitus with diabetic neuropathy, with long-term current use of insulin (HCC)    4. Tobacco abuse    5. Tinea pedis of both feet  -     terbinafine (lamISIL) 1 % cream; Apply 1 application topically to the appropriate area as directed 2 (Two) Times a Day.  Dispense: 36 g; Refill: 3      Comprehensive lower extremity examination and evaluation was performed.  Discussed findings and treatment plan including risks, benefits,  and treatment options with patient in detail. Patient agreed with treatment plan.  After verbal consent obtained, nail(s) x8 debrided of length and thickness with nail nipper without incidence  Patient may maintain nails and calluses at home utilizing emery board or pumice stone between visits as needed  Reviewed at home diabetic foot care including daily foot checks   Tobacco cessation needed.   Overall nails seem to be doing better with routine podiatric care.  Rx Lamisil 1% cream 1 application to both feet twice daily x3 weeks.  Advised to clean shoes once to twice weekly with Lysol spray.  Avoid moisturizing creams until tinea has been treated.  An After Visit Summary was printed and given to the patient at discharge, including (if requested) any available informative/educational handouts regarding diagnosis, treatment, or medications. All questions were answered to patient/family satisfaction. Should symptoms fail to improve or worsen they agree to call or return to clinic or to go to the Emergency Department. Discussed the importance of following up with any needed screening tests/labs/specialist appointments and any requested follow-up recommended by me today. Importance of maintaining follow-up discussed and patient accepts that missed appointments can delay diagnosis and potentially lead to worsening of conditions.  Return in about 3 months (around 1/18/2022)., or sooner if acute issues arise.        This document has been electronically signed by MELECIO Hassan on October 18, 2021 13:04 CDT

## 2021-10-18 ENCOUNTER — OFFICE VISIT (OUTPATIENT)
Dept: PODIATRY | Facility: CLINIC | Age: 61
End: 2021-10-18

## 2021-10-18 ENCOUNTER — TELEPHONE (OUTPATIENT)
Dept: FAMILY MEDICINE CLINIC | Facility: CLINIC | Age: 61
End: 2021-10-18

## 2021-10-18 VITALS
OXYGEN SATURATION: 98 % | WEIGHT: 195 LBS | SYSTOLIC BLOOD PRESSURE: 134 MMHG | BODY MASS INDEX: 38.28 KG/M2 | HEART RATE: 59 BPM | HEIGHT: 60 IN | DIASTOLIC BLOOD PRESSURE: 62 MMHG

## 2021-10-18 DIAGNOSIS — B35.3 TINEA PEDIS OF BOTH FEET: ICD-10-CM

## 2021-10-18 DIAGNOSIS — E11.40 TYPE 2 DIABETES MELLITUS WITH DIABETIC NEUROPATHY, WITH LONG-TERM CURRENT USE OF INSULIN (HCC): ICD-10-CM

## 2021-10-18 DIAGNOSIS — L60.0 INGROWN TOENAIL: ICD-10-CM

## 2021-10-18 DIAGNOSIS — B35.1 ONYCHOMYCOSIS: Primary | ICD-10-CM

## 2021-10-18 DIAGNOSIS — Z72.0 TOBACCO ABUSE: ICD-10-CM

## 2021-10-18 DIAGNOSIS — Z79.4 TYPE 2 DIABETES MELLITUS WITH DIABETIC NEUROPATHY, WITH LONG-TERM CURRENT USE OF INSULIN (HCC): ICD-10-CM

## 2021-10-18 PROCEDURE — 11721 DEBRIDE NAIL 6 OR MORE: CPT | Performed by: NURSE PRACTITIONER

## 2021-10-18 PROCEDURE — 99213 OFFICE O/P EST LOW 20 MIN: CPT | Performed by: NURSE PRACTITIONER

## 2021-10-18 RX ORDER — PRENATAL VIT 91/IRON/FOLIC/DHA 28-975-200
1 COMBINATION PACKAGE (EA) ORAL 2 TIMES DAILY
Qty: 36 G | Refills: 3 | Status: SHIPPED | OUTPATIENT
Start: 2021-10-18 | End: 2022-07-29

## 2021-10-18 NOTE — TELEPHONE ENCOUNTER
Caller: Della Gonzalez    Relationship to patient: Self    Best call back number: [phone] (H)    Chief complaint: LEFT EAR ACHE   Type of visit: SAME DAY STATES THERE IS ALSO A BUG BITE INSIDE THE EAR     Requested date: TODAY 10/18/21     If rescheduling, when is the original appointment: NONE     Additional notes:I WAS UNABLE TO SCHEDULE WITH ANY PROVIDER IN THE OFFICE OR WARM TRANSFER

## 2021-10-20 NOTE — TELEPHONE ENCOUNTER
Third attempt to reach Pt to offer appt, no answer, LVM requesting call back. Pt has appt with Mrs. Vijaya Henao 10/26/21.

## 2021-10-26 ENCOUNTER — OFFICE VISIT (OUTPATIENT)
Dept: FAMILY MEDICINE CLINIC | Facility: CLINIC | Age: 61
End: 2021-10-26

## 2021-10-26 VITALS
SYSTOLIC BLOOD PRESSURE: 132 MMHG | OXYGEN SATURATION: 96 % | BODY MASS INDEX: 38.95 KG/M2 | HEART RATE: 58 BPM | HEIGHT: 60 IN | DIASTOLIC BLOOD PRESSURE: 72 MMHG | WEIGHT: 198.4 LBS | TEMPERATURE: 97.2 F

## 2021-10-26 DIAGNOSIS — Z79.899 LONG-TERM USE OF HIGH-RISK MEDICATION: ICD-10-CM

## 2021-10-26 DIAGNOSIS — N81.6 CYSTOCELE WITH RECTOCELE: ICD-10-CM

## 2021-10-26 DIAGNOSIS — M79.2 NEUROPATHIC PAIN: ICD-10-CM

## 2021-10-26 DIAGNOSIS — L02.01 CUTANEOUS ABSCESS OF FACE: ICD-10-CM

## 2021-10-26 DIAGNOSIS — Z23 FLU VACCINE NEED: ICD-10-CM

## 2021-10-26 DIAGNOSIS — B37.2 CUTANEOUS CANDIDIASIS: ICD-10-CM

## 2021-10-26 DIAGNOSIS — E11.42 TYPE 2 DIABETES MELLITUS WITH DIABETIC POLYNEUROPATHY, WITHOUT LONG-TERM CURRENT USE OF INSULIN (HCC): ICD-10-CM

## 2021-10-26 DIAGNOSIS — Z12.4 ENCOUNTER FOR PAPANICOLAOU SMEAR FOR CERVICAL CANCER SCREENING: ICD-10-CM

## 2021-10-26 DIAGNOSIS — E03.9 HYPOTHYROIDISM, UNSPECIFIED TYPE: ICD-10-CM

## 2021-10-26 DIAGNOSIS — N81.10 CYSTOCELE WITH RECTOCELE: ICD-10-CM

## 2021-10-26 DIAGNOSIS — E11.65 TYPE 2 DIABETES MELLITUS WITH HYPERGLYCEMIA, WITHOUT LONG-TERM CURRENT USE OF INSULIN (HCC): Primary | ICD-10-CM

## 2021-10-26 LAB
EXPIRATION DATE: ABNORMAL
HBA1C MFR BLD: 8 %
Lab: ABNORMAL

## 2021-10-26 PROCEDURE — 99214 OFFICE O/P EST MOD 30 MIN: CPT | Performed by: NURSE PRACTITIONER

## 2021-10-26 PROCEDURE — 90686 IIV4 VACC NO PRSV 0.5 ML IM: CPT | Performed by: NURSE PRACTITIONER

## 2021-10-26 PROCEDURE — 83036 HEMOGLOBIN GLYCOSYLATED A1C: CPT | Performed by: NURSE PRACTITIONER

## 2021-10-26 PROCEDURE — 3051F HG A1C>EQUAL 7.0%<8.0%: CPT | Performed by: NURSE PRACTITIONER

## 2021-10-26 PROCEDURE — G0008 ADMIN INFLUENZA VIRUS VAC: HCPCS | Performed by: NURSE PRACTITIONER

## 2021-10-26 RX ORDER — AMOXICILLIN AND CLAVULANATE POTASSIUM 875; 125 MG/1; MG/1
1 TABLET, FILM COATED ORAL 2 TIMES DAILY
Qty: 14 TABLET | Refills: 0 | Status: SHIPPED | OUTPATIENT
Start: 2021-10-26 | End: 2021-12-09

## 2021-10-26 RX ORDER — FLUCONAZOLE 150 MG/1
TABLET ORAL
Qty: 2 TABLET | Refills: 0 | Status: SHIPPED | OUTPATIENT
Start: 2021-10-26 | End: 2021-12-09

## 2021-10-26 RX ORDER — GABAPENTIN 800 MG/1
800 TABLET ORAL 2 TIMES DAILY
Qty: 180 TABLET | Refills: 0 | Status: SHIPPED | OUTPATIENT
Start: 2021-10-26 | End: 2022-11-01

## 2021-10-26 RX ORDER — LEVOTHYROXINE SODIUM 0.05 MG/1
50 TABLET ORAL DAILY
Qty: 90 TABLET | Refills: 1 | Status: SHIPPED | OUTPATIENT
Start: 2021-10-26 | End: 2022-05-06

## 2021-10-26 RX ORDER — NYSTATIN 100000 U/G
CREAM TOPICAL 2 TIMES DAILY
Qty: 30 G | Refills: 2 | Status: SHIPPED | OUTPATIENT
Start: 2021-10-26 | End: 2022-07-29

## 2021-10-26 NOTE — PROGRESS NOTES
"Chief Complaint  Abscess (chin), Gynecologic Exam, and Diabetes (patient wants to discuss GABAPENTIN script)    Subjective          Della Gonzalez presents to [facility]  History of Present Illness  PAP:  Patient will have routine pap completed today.  No current problems voiced.  ABSCESS:  Patient has a history of cutaneous abscesses.  She has a recurrent one on her chin.  No fever.  No other areas.  It is swollen and tender.  DM:  3 month follow up.  A1c performed today.  Denies symptoms of hypoglycemia or hyperglycemia.  She states her BG usually runs less than 200 unless she \"eats like I know I shouldn't.\"  She previously has gone to [facility] for pain medication that she has weaned herself off.  She also got gabapentin from them for neuropathic pain related to her lumbar DDD as well as DM.  She admittedly did not take it twice EVERY day but usually once daily.  She had an abundance because it was prescribed TID.  She has now run out and wishes for this to be prescribed from this office.  She will provide UDS and enter a controlled substance agreement at this visit.  Objective   Vital Signs:   /72 (BP Location: Right arm, Patient Position: Sitting, Cuff Size: Large Adult)   Pulse 58   Temp 97.2 °F (36.2 °C)   Ht 152.4 cm (60\") Comment: pt reported  Wt 90 kg (198 lb 6.4 oz)   SpO2 96%   BMI 38.75 kg/m²     Physical Exam  Vitals and nursing note reviewed.   Constitutional:       General: She is not in acute distress.     Appearance: Normal appearance. She is well-developed. She is obese. She is not ill-appearing or diaphoretic.   HENT:      Head: Normocephalic and atraumatic.   Eyes:      Conjunctiva/sclera: Conjunctivae normal.      Pupils: Pupils are equal, round, and reactive to light.   Cardiovascular:      Rate and Rhythm: Normal rate and regular rhythm.      Heart sounds: Normal heart sounds. No murmur heard.      Pulmonary:      Effort: Pulmonary effort is normal. No " respiratory distress.      Breath sounds: Normal breath sounds.   Abdominal:      General: Bowel sounds are normal. There is no distension.      Palpations: Abdomen is soft.      Tenderness: There is no abdominal tenderness.   Genitourinary:     Comments: Pap obtained.  Vulva is inflamed consistent with candida.  There is 2nd degree cystocele and rectocele present.  Musculoskeletal:         General: Normal range of motion.      Cervical back: Normal range of motion and neck supple.   Skin:     General: Skin is warm and dry.      Capillary Refill: Capillary refill takes less than 2 seconds.      Findings: Erythema and lesion present.      Comments: 1.7 cm fluctuant, tender abscess at the right border of the chin.  There is surrounding erythema.  No drainage.   Neurological:      Mental Status: She is alert and oriented to person, place, and time.   Psychiatric:         Behavior: Behavior normal.         Thought Content: Thought content normal.         Judgment: Judgment normal.        Result Review :                 Assessment and Plan    Diagnoses and all orders for this visit:    1. Type 2 diabetes mellitus with hyperglycemia, without long-term current use of insulin ([facility]) (Primary)  -     POCT glycated hemoglobin, total    2. Type 2 diabetes mellitus with diabetic polyneuropathy, without long-term current use of insulin (HCC)  -     linagliptin (TRADJENTA) 5 MG tablet tablet; Take 1 tablet by mouth Daily.  Dispense: 90 tablet; Refill: 1    3. Hypothyroidism, unspecified type  -     levothyroxine (Synthroid) 50 MCG tablet; Take 1 tablet by mouth Daily.  Dispense: 90 tablet; Refill: 1    4. Neuropathic pain  -     gabapentin (NEURONTIN) 800 MG tablet; Take 1 tablet by mouth 2 (Two) Times a Day.  Dispense: 180 tablet; Refill: 0    5. Long-term use of high-risk medication  -     Compliance Drug Analysis, Ur - Urine, Clean Catch    6. Cutaneous abscess of face  -     amoxicillin-clavulanate (Augmentin) 875-125 MG per  tablet; Take 1 tablet by mouth 2 (Two) Times a Day.  Dispense: 14 tablet; Refill: 0    7. Flu vaccine need  -     FluLaval/Fluarix/Fluzone >6 Months ([id number])    8. Encounter for Papanicolaou smear for cervical cancer screening  -     IGP,Aptima HPV,Age Gdln    9. Cutaneous candidiasis  -     fluconazole (Diflucan) 150 MG tablet; Take 1 tab today and 1 tab at completion of antibiotic course  Dispense: 2 tablet; Refill: 0  -     nystatin (MYCOSTATIN) 028096 UNIT/GM cream; Apply  topically to the appropriate area as directed 2 (Two) Times a Day.  Dispense: 30 g; Refill: 2    10. Cystocele with rectocele    Patient is asymptomatic from the cystocele and rectocele; therefore, no referral at present.  Discussion ensued regarding symptoms such as frequent UTI, urinary incontinence and constipation.    Follow Up   Return in about 3 months (around 1/26/2022) for Next scheduled follow up.  Patient was given instructions and counseling regarding her condition or for health maintenance advice. Please see specific information pulled into the AVS if appropriate.

## 2021-10-29 LAB
AGE GDLN ACOG TESTING: NORMAL
CYTOLOGIST CVX/VAG CYTO: NORMAL
CYTOLOGY CVX/VAG DOC CYTO: NORMAL
CYTOLOGY CVX/VAG DOC THIN PREP: NORMAL
DX ICD CODE: NORMAL
HIV 1 & 2 AB SER-IMP: NORMAL
HPV I/H RISK 4 DNA CVX QL PROBE+SIG AMP: NEGATIVE
OTHER STN SPEC: NORMAL
STAT OF ADQ CVX/VAG CYTO-IMP: NORMAL

## 2021-10-31 LAB — DRUGS UR: NORMAL

## 2021-12-08 ENCOUNTER — TELEPHONE (OUTPATIENT)
Dept: FAMILY MEDICINE CLINIC | Facility: CLINIC | Age: 61
End: 2021-12-08

## 2021-12-08 NOTE — TELEPHONE ENCOUNTER
"  Caller: Della Gonzalez    Relationship: Self    Best call back number:  [phone] (H)     What medication are you requesting: Unsure    What are your current symptoms: Pt has developed a stye in both of her eyes - left side is in the corner and it is closing  right side stye is on the top of her lashes and is starting to swell and develop another on the bottom she said that its a \"big red bump\"     She has been doing hot and cold compresses - they are itching and burning very badly. They are also beginning to cause headaches       If a prescription is needed, what is your preferred pharmacy and phone number: [facility] - [place], [place] - [address] [phone]  - [phone] FX     Additional notes: Please call pt to advise.         "

## 2021-12-09 ENCOUNTER — OFFICE VISIT (OUTPATIENT)
Dept: FAMILY MEDICINE CLINIC | Facility: CLINIC | Age: 61
End: 2021-12-09

## 2021-12-09 VITALS
BODY MASS INDEX: 39.78 KG/M2 | WEIGHT: 202.6 LBS | TEMPERATURE: 97.8 F | DIASTOLIC BLOOD PRESSURE: 70 MMHG | SYSTOLIC BLOOD PRESSURE: 137 MMHG | HEART RATE: 59 BPM | HEIGHT: 60 IN | OXYGEN SATURATION: 97 %

## 2021-12-09 DIAGNOSIS — R11.0 NAUSEA: ICD-10-CM

## 2021-12-09 DIAGNOSIS — H01.005 BLEPHARITIS OF LOWER EYELIDS OF BOTH EYES, UNSPECIFIED TYPE: ICD-10-CM

## 2021-12-09 DIAGNOSIS — Z86.19 HISTORY OF STAPHYLOCOCCAL INFECTION: Primary | ICD-10-CM

## 2021-12-09 DIAGNOSIS — H01.002 BLEPHARITIS OF LOWER EYELIDS OF BOTH EYES, UNSPECIFIED TYPE: ICD-10-CM

## 2021-12-09 DIAGNOSIS — L02.01 ABSCESS OF FOREHEAD: ICD-10-CM

## 2021-12-09 DIAGNOSIS — H01.004 BLEPHARITIS OF LEFT UPPER EYELID, UNSPECIFIED TYPE: ICD-10-CM

## 2021-12-09 PROCEDURE — 96372 THER/PROPH/DIAG INJ SC/IM: CPT | Performed by: NURSE PRACTITIONER

## 2021-12-09 PROCEDURE — 99214 OFFICE O/P EST MOD 30 MIN: CPT | Performed by: NURSE PRACTITIONER

## 2021-12-09 RX ORDER — CEFTRIAXONE 1 G/1
1 INJECTION, POWDER, FOR SOLUTION INTRAMUSCULAR; INTRAVENOUS ONCE
Status: COMPLETED | OUTPATIENT
Start: 2021-12-09 | End: 2021-12-09

## 2021-12-09 RX ORDER — SULFAMETHOXAZOLE AND TRIMETHOPRIM 800; 160 MG/1; MG/1
1 TABLET ORAL 2 TIMES DAILY
Qty: 14 TABLET | Refills: 0 | Status: SHIPPED | OUTPATIENT
Start: 2021-12-09 | End: 2022-01-28

## 2021-12-09 RX ORDER — ONDANSETRON 4 MG/1
4 TABLET, ORALLY DISINTEGRATING ORAL EVERY 8 HOURS PRN
Qty: 15 TABLET | Refills: 0 | Status: SHIPPED | OUTPATIENT
Start: 2021-12-09 | End: 2022-07-29

## 2021-12-09 RX ADMIN — CEFTRIAXONE 1 G: 1 INJECTION, POWDER, FOR SOLUTION INTRAMUSCULAR; INTRAVENOUS at 15:38

## 2021-12-10 NOTE — PROGRESS NOTES
"Chief Complaint  Stye (bilateral eyes), Insect Bite, and Nausea    Subjective          Della Gonzalez presents to [facility]  History of Present Illness  STYE:  Patient has acquired 3 styes this week.  One is present on the right lower lid; one on the lateral corner of the left eye and one on the left upper lid.  They are painful and swollen.  She has applied warm compresses with mild relief of symptoms.  ABSCESS:  She has a new abscess to the center of her forehead that she awoke with.  Patient thinks \"a bug bit me.\"  No treatment has been provided.  She does have a history of multiple spontaneous abscesses and is MRSA positive.  NAUSEA:  She has nausea today and historically has it with abscess formation.  She is out of any nausea medication for PRN use.  Objective   Vital Signs:   /70 (BP Location: Right arm, Patient Position: Sitting, Cuff Size: Large Adult)   Pulse 59   Temp 97.8 °F (36.6 °C)   Ht 152.4 cm (60\") Comment: pt reported  Wt 91.9 kg (202 lb 9.6 oz)   SpO2 97%   BMI 39.57 kg/m²     Physical Exam  Vitals and nursing note reviewed.   Constitutional:       General: She is not in acute distress.     Appearance: Normal appearance. She is well-developed. She is obese. She is not ill-appearing.   HENT:      Head: Normocephalic and atraumatic.   Eyes:      General:         Right eye: Hordeolum present.         Left eye: Hordeolum present.     Conjunctiva/sclera: Conjunctivae normal.      Pupils: Pupils are equal, round, and reactive to light.     Cardiovascular:      Rate and Rhythm: Normal rate and regular rhythm.      Heart sounds: Normal heart sounds. No murmur heard.      Pulmonary:      Effort: Pulmonary effort is normal. No respiratory distress.      Breath sounds: Normal breath sounds.   Musculoskeletal:         General: Normal range of motion.      Cervical back: Normal range of motion and neck supple.   Skin:     General: Skin is warm and dry.      " Capillary Refill: Capillary refill takes less than 2 seconds.      Findings: Erythema present.          Neurological:      Mental Status: She is alert and oriented to person, place, and time.   Psychiatric:         Behavior: Behavior normal.         Thought Content: Thought content normal.         Judgment: Judgment normal.        Result Review :                 Assessment and Plan    Diagnoses and all orders for this visit:    1. History of staphylococcal infection (Primary)  -     sulfamethoxazole-trimethoprim (Bactrim DS) 800-160 MG per tablet; Take 1 tablet by mouth 2 (Two) Times a Day.  Dispense: 14 tablet; Refill: 0    2. Blepharitis of lower eyelids of both eyes, unspecified type  -     Neomycin-Polymyxin-Dexameth 0.1 % ointment; Apply 1 application to eye(s) as directed by provider 2 (Two) Times a Day.  Dispense: 3.5 g; Refill: 1    3. Blepharitis of left upper eyelid, unspecified type  -     Neomycin-Polymyxin-Dexameth 0.1 % ointment; Apply 1 application to eye(s) as directed by provider 2 (Two) Times a Day.  Dispense: 3.5 g; Refill: 1    4. Abscess of forehead  -     sulfamethoxazole-trimethoprim (Bactrim DS) 800-160 MG per tablet; Take 1 tablet by mouth 2 (Two) Times a Day.  Dispense: 14 tablet; Refill: 0  -     cefTRIAXone (ROCEPHIN) injection 1 g    5. Nausea  -     ondansetron ODT (Zofran ODT) 4 MG disintegrating tablet; Place 1 tablet on the tongue Every 8 (Eight) Hours As Needed for Nausea or Vomiting.  Dispense: 15 tablet; Refill: 0        Follow Up   Return for keep [facility] appt.  Patient was given instructions and counseling regarding her condition or for health maintenance advice. Please see specific information pulled into the AVS if appropriate.

## 2021-12-21 DIAGNOSIS — J30.9 ALLERGIC SINUSITIS: ICD-10-CM

## 2021-12-21 DIAGNOSIS — R51.9 SINUS HEADACHE: ICD-10-CM

## 2021-12-21 DIAGNOSIS — J45.901 BRONCHITIS, ALLERGIC, UNSPECIFIED ASTHMA SEVERITY, WITH ACUTE EXACERBATION: ICD-10-CM

## 2021-12-21 RX ORDER — CETIRIZINE HYDROCHLORIDE 10 MG/1
TABLET ORAL
Qty: 30 TABLET | Refills: 2 | Status: SHIPPED | OUTPATIENT
Start: 2021-12-21 | End: 2021-12-28

## 2021-12-21 NOTE — TELEPHONE ENCOUNTER
Rx Refill Note  Requested Prescriptions     Pending Prescriptions Disp Refills   • cetirizine (zyrTEC) 10 MG tablet [Pharmacy Med Name: CETIRIZINE HCL 10MG TABS] 30 tablet 2     Sig: TAKE ONE TABLET BY MOUTH EVERY DAY      Last office visit with prescribing clinician: 12/9/2021      Next office visit with prescribing clinician: 1/25/2022    Lrx:9/16/2021-3 months       Coreen Michele MA  12/21/21, 09:48 CST

## 2021-12-28 DIAGNOSIS — R51.9 SINUS HEADACHE: ICD-10-CM

## 2021-12-28 DIAGNOSIS — J45.901 BRONCHITIS, ALLERGIC, UNSPECIFIED ASTHMA SEVERITY, WITH ACUTE EXACERBATION: ICD-10-CM

## 2021-12-28 DIAGNOSIS — J30.9 ALLERGIC SINUSITIS: ICD-10-CM

## 2021-12-28 RX ORDER — CETIRIZINE HYDROCHLORIDE 10 MG/1
TABLET ORAL
Qty: 30 TABLET | Refills: 2 | Status: SHIPPED | OUTPATIENT
Start: 2021-12-28 | End: 2022-02-25 | Stop reason: SDUPTHER

## 2021-12-28 RX ORDER — NADOLOL 20 MG/1
TABLET ORAL
Qty: 90 TABLET | Refills: 1 | Status: SHIPPED | OUTPATIENT
Start: 2021-12-28 | End: 2022-06-27

## 2022-01-04 NOTE — PROGRESS NOTES
[facility] - PODIATRY    Today's Date: 01/17/22    Patient Name: Della Gonzalez  MRN: [id number]  CSN: [id number]  PCP: Vijaya Henao APRN  Referring Provider: No ref. provider found    SUBJECTIVE     Chief Complaint   Patient presents with   • Follow-up     pcp12/09/2021 3 [facility] FU DIABETIC foot/nail care- pt states feet doing ok- pt denies pain- pt presents with long thick nails   • Diabetes     hasnt checked     HPI: Della Gonzalez, a 61 y.o.female, comes to clinic as a(n) established patient presenting for diabetic foot exam and complaining of thickened, irregular toenails. Patient has h/o cirrhosis, DM2, GERD, tobacco use. Patient is NIDDM and unsure of last BG level.  Does not routinely check blood glucose.  Relates  mild numbness/tingling in feet.  Notes that her toenails are long, thick, discolored and crumbly.  Patient states she is unable to take care of her nails at home due to shape and thickness of nails.  Continues tobacco use daily.  Denies pain today. Relates previous treatment(s) including care by podiatrist. Denies any constitutional symptoms. No other pedal complaints at this time.    Past Medical History:   Diagnosis Date   • Cirrhosis of liver (HCC)    • Diabetes mellitus (HCC)    • GERD (gastroesophageal reflux disease)    • Liver disease      Past Surgical History:   Procedure Laterality Date   • COLONOSCOPY       Family History   Problem Relation Age of Onset   • Diabetes Mother    • No Known Problems Father      Social History     Socioeconomic History   • Marital status: Single   Tobacco Use   • Smoking status: Current Every Day Smoker     Packs/day: 0.50     Years: 40.00     Pack years: 20.00     Types: Cigarettes   • Smokeless tobacco: Never Used   Vaping Use   • Vaping Use: Never used   Substance and Sexual Activity   • Alcohol use: No   • Drug use: No   • Sexual activity: Not Currently     Partners: Male     No Known Allergies  Current Outpatient Medications   Medication  Sig Dispense Refill   • atorvastatin (LIPITOR) 10 MG tablet Take 1 tablet by mouth Daily. 90 tablet 1   • benazepril (LOTENSIN) 10 MG tablet Take 1 tablet by mouth Daily. 90 tablet 1   • butalbital-acetaminophen-caffeine (Esgic) -40 MG per tablet Take 1 tablet by mouth Every 4 (Four) Hours As Needed for Headache. 12 tablet 0   • cetirizine (zyrTEC) 10 MG tablet TAKE ONE TABLET BY MOUTH EVERY DAY 30 tablet 2   • Ertugliflozin L-PyroglutamicAc (Steglatro) 15 MG tablet Take 1 tablet by mouth Every Morning. 90 tablet 1   • gabapentin (NEURONTIN) 800 MG tablet Take 1 tablet by mouth 2 (Two) Times a Day. 180 tablet 0   • ketoconazole (NIZORAL) 2 % cream APPLY TOPICALLY TO APPROPRIATE AREA AS DIRECTED DAILY 60 g 2   • levothyroxine (Synthroid) 50 MCG tablet Take 1 tablet by mouth Daily. 90 tablet 1   • linagliptin (TRADJENTA) 5 MG tablet tablet Take 1 tablet by mouth Daily. 90 tablet 1   • metFORMIN (Glucophage) 1000 MG tablet Take 1 tablet by mouth 2 (Two) Times a Day With Meals. 180 tablet 1   • nabumetone (RELAFEN) 500 MG tablet Take 1 tablet by mouth 2 (Two) Times a Day As Needed for Moderate Pain . 60 tablet 5   • nadolol (CORGARD) 20 MG tablet TAKE ONE TABLET BY MOUTH EVERY DAY 90 tablet 1   • Neomycin-Polymyxin-Dexameth 0.1 % ointment Apply 1 application to eye(s) as directed by provider 2 (Two) Times a Day. 3.5 g 1   • nystatin (MYCOSTATIN) 640903 UNIT/GM cream Apply  topically to the appropriate area as directed 2 (Two) Times a Day. 30 g 2   • omeprazole (priLOSEC) 20 MG capsule Take 1 capsule by mouth Daily. 90 capsule 1   • ondansetron ODT (Zofran ODT) 4 MG disintegrating tablet Place 1 tablet on the tongue Every 8 (Eight) Hours As Needed for Nausea or Vomiting. 15 tablet 0   • sulfamethoxazole-trimethoprim (Bactrim DS) 800-160 MG per tablet Take 1 tablet by mouth 2 (Two) Times a Day. 14 tablet 0   • SUMAtriptan (Imitrex) 100 MG tablet Take one tablet at onset of headache. May repeat dose one time in 2  hours if headache not relieved. 9 tablet 2   • terbinafine (lamISIL) 1 % cream Apply 1 application topically to the appropriate area as directed 2 (Two) Times a Day. 36 g 3   • tiZANidine (ZANAFLEX) 4 MG tablet      • traZODone (DESYREL) 150 MG tablet Take 1 tablet by mouth Every Night. 90 tablet 1   • triamcinolone (KENALOG) 0.1 % cream Apply 1 application topically to the appropriate area as directed 2 (Two) Times a Day. 15 g 2     No current facility-administered medications for this visit.     Review of Systems   Constitutional: Negative for chills and fever.   HENT: Negative for congestion.    Respiratory: Negative for shortness of breath.    Cardiovascular: Positive for leg swelling. Negative for chest pain.   Gastrointestinal: Negative for constipation, diarrhea, nausea and vomiting.   Musculoskeletal: Positive for arthralgias. Negative for gait problem.        Foot pain   Skin: Negative for wound.   Neurological: Positive for numbness.       OBJECTIVE     Vitals:    01/17/22 1129   Pulse: 60   SpO2: 98%       PHYSICAL EXAM  GEN:   Accompanied by none.     Foot/Ankle Exam:       General:   Appearance: appears stated age and healthy and obesity    Orientation: AAOx3    Affect: appropriate    Gait: unimpaired    Assistance: independent    Shoe Gear:  Casual shoes    VASCULAR      Right Foot Vascularity   Dorsalis pedis:  2+  Posterior tibial:  2+  Skin Temperature: warm    Edema Grading:  None  CFT:  3  Pedal Hair Growth:  Present  Varicosities: mild varicosities       Left Foot Vascularity   Dorsalis pedis:  2+  Posterior tibial:  2+  Skin Temperature: warm    Edema Grading:  None  CFT:  3  Pedal Hair Growth:  Present  Varicosities: mild varicosities        NEUROLOGIC     Right Foot Neurologic   Light touch sensation:  Diminished  Vibratory sensation:  Diminished  Hot/Cold sensation: diminished    Protective Sensation using [place] Monofilament:  8     Left Foot Neurologic   Light touch sensation:   Diminished  Vibratory sensation:  Diminished  Hot/cold sensation: diminished    Protective Sensation using [place] Monofilament:  8     MUSCULOSKELETAL      Right Foot Musculoskeletal   Ecchymosis:  None  Tenderness: toenails and toe 1    Arch:  Normal  Hallux valgus: No    Hallux limitus: No       Left Foot Musculoskeletal   Ecchymosis:  None  Tenderness: toenails and toe 1    Arch:  Normal  Hallux valgus: No    Hallux limitus: No       MUSCLE STRENGTH     Right Foot Muscle Strength   Foot dorsiflexion:  5  Foot plantar flexion:  5  Foot inversion:  5  Foot eversion:  5     Left Foot Muscle Strength   Foot dorsiflexion:  5  Foot plantar flexion:  5  Foot inversion:  5  Foot eversion:  5     RANGE OF MOTION      Right Foot Range of Motion   Foot and ankle ROM within normal limits       Left Foot Range of Motion   Foot and ankle ROM within normal limits       DERMATOLOGIC     Right Foot Dermatologic   Skin: skin intact    Skin: no right foot tinea    Nails: onychomycosis, abnormally thick, subungual debris and dystrophic nails    Nails: no ingrown toenail       Left Foot Dermatologic   Skin: skin intact    Skin: no left foot tinea    Nails: onychomycosis, abnormally thick, subungual debris and dystrophic nails    Nails: no ingrown toenail        RADIOLOGY/NUCLEAR:  No results found.    LABORATORY/CULTURE RESULTS:      PATHOLOGY RESULTS:       ASSESSMENT/PLAN     Diagnoses and all orders for this visit:    1. Onychogryphosis (Primary)    2. Onychomycosis    3. Type 2 diabetes mellitus with diabetic neuropathy, with long-term current use of insulin (HCC)    4. Tobacco abuse      Comprehensive lower extremity examination and evaluation was performed.  Discussed findings and treatment plan including risks, benefits, and treatment options with patient in detail. Patient agreed with treatment plan.  After verbal consent obtained, nail(s) x10 debrided of length and thickness with nail nipper without  incidence  Patient may maintain nails and calluses at home utilizing emery board or pumice stone between visits as needed  Reviewed at home diabetic foot care including daily foot checks   Tobacco cessation needed.   An After Visit Summary was printed and given to the patient at discharge, including (if requested) any available informative/educational handouts regarding diagnosis, treatment, or medications. All questions were answered to patient/family satisfaction. Should symptoms fail to improve or worsen they agree to call or return to clinic or to go to the Emergency Department. Discussed the importance of following up with any needed screening tests/labs/specialist appointments and any requested follow-up recommended by me today. Importance of maintaining follow-up discussed and patient accepts that missed appointments can delay diagnosis and potentially lead to worsening of conditions.  Return in about 3 months (around 4/17/2022)., or sooner if acute issues arise.        This document has been electronically signed by MELECIO Hassan on January 17, 2022 12:51 CST

## 2022-01-14 ENCOUNTER — TELEPHONE (OUTPATIENT)
Dept: PODIATRY | Facility: CLINIC | Age: 62
End: 2022-01-14

## 2022-01-14 NOTE — TELEPHONE ENCOUNTER
Called patient regarding appt on 01/17/2022. Left message for patient to return call if any questions or concerns arise.

## 2022-01-17 ENCOUNTER — OFFICE VISIT (OUTPATIENT)
Dept: PODIATRY | Facility: CLINIC | Age: 62
End: 2022-01-17

## 2022-01-17 VITALS — WEIGHT: 203 LBS | OXYGEN SATURATION: 98 % | HEART RATE: 60 BPM | BODY MASS INDEX: 39.85 KG/M2 | HEIGHT: 60 IN

## 2022-01-17 DIAGNOSIS — E11.40 TYPE 2 DIABETES MELLITUS WITH DIABETIC NEUROPATHY, WITH LONG-TERM CURRENT USE OF INSULIN: ICD-10-CM

## 2022-01-17 DIAGNOSIS — Z72.0 TOBACCO ABUSE: ICD-10-CM

## 2022-01-17 DIAGNOSIS — B35.1 ONYCHOMYCOSIS: ICD-10-CM

## 2022-01-17 DIAGNOSIS — Z79.4 TYPE 2 DIABETES MELLITUS WITH DIABETIC NEUROPATHY, WITH LONG-TERM CURRENT USE OF INSULIN: ICD-10-CM

## 2022-01-17 DIAGNOSIS — L60.2 ONYCHOGRYPHOSIS: Primary | ICD-10-CM

## 2022-01-17 PROCEDURE — 11721 DEBRIDE NAIL 6 OR MORE: CPT | Performed by: NURSE PRACTITIONER

## 2022-01-19 DIAGNOSIS — E11.628 TYPE 2 DIABETES MELLITUS WITH OTHER SKIN COMPLICATION, WITHOUT LONG-TERM CURRENT USE OF INSULIN: ICD-10-CM

## 2022-01-19 RX ORDER — ERTUGLIFLOZIN 15 MG/1
TABLET, FILM COATED ORAL
Qty: 90 TABLET | Refills: 1 | Status: SHIPPED | OUTPATIENT
Start: 2022-01-19 | End: 2022-07-25 | Stop reason: SDUPTHER

## 2022-01-19 NOTE — TELEPHONE ENCOUNTER
Rx Refill Note  Requested Prescriptions     Pending Prescriptions Disp Refills   • Steglatro 15 MG tablet [Pharmacy Med Name: STEGLATRO 15MG TABS] 90 tablet 1     Sig: TAKE ONE TABLET BY MOUTH EVERY DAY IN THE MORNING      Last office visit with prescribing clinician: 12/9/2021      Next office visit with prescribing clinician: 1/25/2022   Adamaris Palmer MA  01/19/22, 13:33 CST

## 2022-01-21 DIAGNOSIS — I10 ESSENTIAL HYPERTENSION: ICD-10-CM

## 2022-01-21 RX ORDER — BENAZEPRIL HYDROCHLORIDE 10 MG/1
TABLET ORAL
Qty: 90 TABLET | Refills: 1 | Status: SHIPPED | OUTPATIENT
Start: 2022-01-21 | End: 2022-07-29 | Stop reason: SDUPTHER

## 2022-01-21 NOTE — TELEPHONE ENCOUNTER
Rx Refill Note  Requested Prescriptions     Pending Prescriptions Disp Refills   • benazepril (LOTENSIN) 10 MG tablet [Pharmacy Med Name: BENAZEPRIL HCL 10MG TABS] 90 tablet 1     Sig: TAKE ONE TABLET BY MOUTH EVERY DAY      Last office visit with prescribing clinician: 12/9/2021      Next office visit with prescribing clinician: 1/28/2022     Cyn Still MA  01/21/22, 13:42 CST

## 2022-01-27 DIAGNOSIS — E78.2 MIXED HYPERLIPIDEMIA: ICD-10-CM

## 2022-01-27 RX ORDER — ATORVASTATIN CALCIUM 10 MG/1
TABLET, FILM COATED ORAL
Qty: 90 TABLET | Refills: 1 | Status: SHIPPED | OUTPATIENT
Start: 2022-01-27 | End: 2022-07-25 | Stop reason: SDUPTHER

## 2022-01-27 NOTE — TELEPHONE ENCOUNTER
Rx Refill Note  Requested Prescriptions     Pending Prescriptions Disp Refills   • atorvastatin (LIPITOR) 10 MG tablet [Pharmacy Med Name: ATORVASTATIN CALCIUM 10MG TABS] 90 tablet 1     Sig: TAKE ONE TABLET BY MOUTH EVERY DAY      Last office visit with prescribing clinician: 12/9/2021      Next office visit with prescribing clinician: 1/28/2022     Cyn Still MA  01/27/22, 09:38 CST

## 2022-01-28 ENCOUNTER — OFFICE VISIT (OUTPATIENT)
Dept: FAMILY MEDICINE CLINIC | Facility: CLINIC | Age: 62
End: 2022-01-28

## 2022-01-28 VITALS
DIASTOLIC BLOOD PRESSURE: 71 MMHG | HEIGHT: 60 IN | HEART RATE: 66 BPM | SYSTOLIC BLOOD PRESSURE: 111 MMHG | OXYGEN SATURATION: 98 % | WEIGHT: 208 LBS | BODY MASS INDEX: 40.84 KG/M2 | TEMPERATURE: 97.6 F

## 2022-01-28 DIAGNOSIS — E11.628 TYPE 2 DIABETES MELLITUS WITH OTHER SKIN COMPLICATION, WITHOUT LONG-TERM CURRENT USE OF INSULIN: Primary | ICD-10-CM

## 2022-01-28 DIAGNOSIS — B02.9 HERPES ZOSTER WITHOUT COMPLICATION: ICD-10-CM

## 2022-01-28 DIAGNOSIS — F51.01 PRIMARY INSOMNIA: ICD-10-CM

## 2022-01-28 LAB
EXPIRATION DATE: ABNORMAL
HBA1C MFR BLD: 9 %
Lab: ABNORMAL

## 2022-01-28 PROCEDURE — 83036 HEMOGLOBIN GLYCOSYLATED A1C: CPT | Performed by: NURSE PRACTITIONER

## 2022-01-28 PROCEDURE — 99214 OFFICE O/P EST MOD 30 MIN: CPT | Performed by: NURSE PRACTITIONER

## 2022-01-28 PROCEDURE — 3052F HG A1C>EQUAL 8.0%<EQUAL 9.0%: CPT | Performed by: NURSE PRACTITIONER

## 2022-01-28 RX ORDER — TRAZODONE HYDROCHLORIDE 150 MG/1
150 TABLET ORAL NIGHTLY
Qty: 90 TABLET | Refills: 1 | Status: SHIPPED | OUTPATIENT
Start: 2022-01-28 | End: 2022-05-09

## 2022-01-28 RX ORDER — VALACYCLOVIR HYDROCHLORIDE 1 G/1
1000 TABLET, FILM COATED ORAL 3 TIMES DAILY
Qty: 21 TABLET | Refills: 0 | Status: SHIPPED | OUTPATIENT
Start: 2022-01-28 | End: 2022-07-29

## 2022-01-28 NOTE — PROGRESS NOTES
"Chief Complaint  Diabetes, Hypertension, and Insomnia    Subjective          Della Gonzalez presents to [facility]  History of Present Illness  Diabetes: Patient feels asymptomatic, denies polydipsia, polyuria, polyphagia. But she states she has no energy, and headaches. She was expecting her a1c to be high today. Her diet has been off for about a week but has been eating good other than that. Has been having very watery diarrhea for the last couple of days. Has been feeling lightheaded when standing up last two days. Consistently taking medicaton; no issues.     HTN: Stable, with no issue while on medication     Insomnia: Needs a refill on Trazadone; states sometimes it works but sometimes it doesn't. Getting an average of 6 hours of sleep a night.     Bump on head: Noticed a bump on the back left side of her head. It itches and burns. No alleviating factors.    Rash under left breast: Rash that has been there two days. Its very painful, it burns and its red. Patient has to tuck a washcloth to prevent skin friction. She has been using cornstarch and nystatin without relief. Scale from 1-10, patient states the pain is a 9.       Objective   Vital Signs:   /71 (BP Location: Right arm, Patient Position: Sitting, Cuff Size: Large Adult)   Pulse 66   Temp 97.6 °F (36.4 °C)   Ht 152.4 cm (60\") Comment: pt reported  Wt 94.3 kg (208 lb)   SpO2 98%   BMI 40.62 kg/m²     Physical Exam  Vitals and nursing note reviewed.   Constitutional:       General: She is not in acute distress.     Appearance: Normal appearance. She is obese. She is not ill-appearing.   HENT:      Head: Normocephalic and atraumatic.   Cardiovascular:      Rate and Rhythm: Normal rate and regular rhythm.      Heart sounds: Normal heart sounds. No murmur heard.      Pulmonary:      Effort: Pulmonary effort is normal.      Breath sounds: Normal breath sounds.   Skin:     General: Skin is warm and dry.      Findings: " Erythema and rash present.      Comments: Area under left breast has 2 separate areas of erythema with multiple vesicles within the area.  Skin surrounding the erythema is tender to light touch as well.  On the scalp, left side near the hairline on the neck is a 1 cm area with vesicles present as well.  Less erythematous.  Tender to light touch also.   Neurological:      Mental Status: She is alert and oriented to person, place, and time.   Psychiatric:         Thought Content: Thought content normal.        Result Review :                 Assessment and Plan    Diagnoses and all orders for this visit:    1. Type 2 diabetes mellitus with other skin complication, without long-term current use of insulin (HCC) (Primary)  -     POCT glycated hemoglobin, total    2. Herpes zoster without complication  -     valACYclovir (Valtrex) 1000 MG tablet; Take 1 tablet by mouth 3 (Three) Times a Day.  Dispense: 21 tablet; Refill: 0    3. Primary insomnia  -     traZODone (DESYREL) 150 MG tablet; Take 1 tablet by mouth Every Night.  Dispense: 90 tablet; Refill: 1        Follow Up {Instructions Charge Capture  Follow-up Communications :23}  Return in about 3 months (around 4/28/2022) for Next scheduled follow up with labs prior.  Patient was given instructions and counseling regarding her condition or for health maintenance advice. Please see specific information pulled into the AVS if appropriate.

## 2022-02-02 ENCOUNTER — OFFICE VISIT (OUTPATIENT)
Dept: FAMILY MEDICINE CLINIC | Facility: CLINIC | Age: 62
End: 2022-02-02

## 2022-02-02 VITALS
WEIGHT: 205.6 LBS | HEART RATE: 62 BPM | DIASTOLIC BLOOD PRESSURE: 69 MMHG | HEIGHT: 60 IN | OXYGEN SATURATION: 98 % | BODY MASS INDEX: 40.37 KG/M2 | SYSTOLIC BLOOD PRESSURE: 103 MMHG | TEMPERATURE: 97.9 F

## 2022-02-02 DIAGNOSIS — E11.628 TYPE 2 DIABETES MELLITUS WITH OTHER SKIN COMPLICATION, WITHOUT LONG-TERM CURRENT USE OF INSULIN: ICD-10-CM

## 2022-02-02 DIAGNOSIS — L02.93 CARBUNCLE: Primary | ICD-10-CM

## 2022-02-02 DIAGNOSIS — I10 ESSENTIAL HYPERTENSION: ICD-10-CM

## 2022-02-02 PROCEDURE — 96372 THER/PROPH/DIAG INJ SC/IM: CPT | Performed by: FAMILY MEDICINE

## 2022-02-02 PROCEDURE — 99214 OFFICE O/P EST MOD 30 MIN: CPT | Performed by: FAMILY MEDICINE

## 2022-02-02 RX ORDER — AMOXICILLIN AND CLAVULANATE POTASSIUM 875; 125 MG/1; MG/1
1 TABLET, FILM COATED ORAL 2 TIMES DAILY
Qty: 20 TABLET | Refills: 0 | Status: SHIPPED | OUTPATIENT
Start: 2022-02-02 | End: 2022-02-25

## 2022-02-02 RX ORDER — CEFTRIAXONE 500 MG/1
500 INJECTION, POWDER, FOR SOLUTION INTRAMUSCULAR; INTRAVENOUS EVERY 24 HOURS
Status: COMPLETED | OUTPATIENT
Start: 2022-02-02 | End: 2022-02-02

## 2022-02-02 RX ADMIN — CEFTRIAXONE 500 MG: 500 INJECTION, POWDER, FOR SOLUTION INTRAMUSCULAR; INTRAVENOUS at 13:28

## 2022-02-02 NOTE — PROGRESS NOTES
OFFICE VISIT NOTE:    Della Gonzalez is a 61 y.o. female who presents today for Abscess (chin, hot/cold compress, did drain some, red, tender).     A1c recently was 9%.   FSBS are running high, but the blood pressures have been fine.     Abscess  This is a new problem. The current episode started in the past 7 days. The problem occurs intermittently. The problem has been gradually worsening. Pertinent negatives include no abdominal pain, chest pain, fatigue, fever or rash. She has tried acetaminophen and rest for the symptoms. The treatment provided mild relief.        Past medical/surgical history, Family history, Social history, Allergies and Medications have been reviewed with the patient today and are updated in [facility] EMR. See below.  Past Medical History:   Diagnosis Date   • Cirrhosis of liver (HCC)    • Diabetes mellitus (HCC)    • GERD (gastroesophageal reflux disease)    • Liver disease      Past Surgical History:   Procedure Laterality Date   • COLONOSCOPY       Family History   Problem Relation Age of Onset   • Diabetes Mother    • No Known Problems Father      Social History     Tobacco Use   • Smoking status: Current Every Day Smoker     Packs/day: 0.50     Years: 40.00     Pack years: 20.00     Types: Cigarettes   • Smokeless tobacco: Never Used   Vaping Use   • Vaping Use: Never used   Substance Use Topics   • Alcohol use: No   • Drug use: No       ALLERGIES:  Patient has no known allergies.    CURRENT MEDS:    Current Outpatient Medications:   •  atorvastatin (LIPITOR) 10 MG tablet, TAKE ONE TABLET BY MOUTH EVERY DAY, Disp: 90 tablet, Rfl: 1  •  benazepril (LOTENSIN) 10 MG tablet, TAKE ONE TABLET BY MOUTH EVERY DAY, Disp: 90 tablet, Rfl: 1  •  butalbital-acetaminophen-caffeine (Esgic) -40 MG per tablet, Take 1 tablet by mouth Every 4 (Four) Hours As Needed for Headache., Disp: 12 tablet, Rfl: 0  •  cetirizine (zyrTEC) 10 MG tablet, TAKE ONE TABLET BY MOUTH EVERY DAY, Disp: 30 tablet, Rfl: 2  •   gabapentin (NEURONTIN) 800 MG tablet, Take 1 tablet by mouth 2 (Two) Times a Day., Disp: 180 tablet, Rfl: 0  •  ketoconazole (NIZORAL) 2 % cream, APPLY TOPICALLY TO APPROPRIATE AREA AS DIRECTED DAILY, Disp: 60 g, Rfl: 2  •  levothyroxine (Synthroid) 50 MCG tablet, Take 1 tablet by mouth Daily., Disp: 90 tablet, Rfl: 1  •  linagliptin (TRADJENTA) 5 MG tablet tablet, Take 1 tablet by mouth Daily., Disp: 90 tablet, Rfl: 1  •  metFORMIN (Glucophage) 1000 MG tablet, Take 1 tablet by mouth 2 (Two) Times a Day With Meals., Disp: 180 tablet, Rfl: 1  •  nabumetone (RELAFEN) 500 MG tablet, Take 1 tablet by mouth 2 (Two) Times a Day As Needed for Moderate Pain ., Disp: 60 tablet, Rfl: 5  •  nadolol (CORGARD) 20 MG tablet, TAKE ONE TABLET BY MOUTH EVERY DAY, Disp: 90 tablet, Rfl: 1  •  Neomycin-Polymyxin-Dexameth 0.1 % ointment, Apply 1 application to eye(s) as directed by provider 2 (Two) Times a Day., Disp: 3.5 g, Rfl: 1  •  nystatin (MYCOSTATIN) 113784 UNIT/GM cream, Apply  topically to the appropriate area as directed 2 (Two) Times a Day., Disp: 30 g, Rfl: 2  •  omeprazole (priLOSEC) 20 MG capsule, Take 1 capsule by mouth Daily., Disp: 90 capsule, Rfl: 1  •  ondansetron ODT (Zofran ODT) 4 MG disintegrating tablet, Place 1 tablet on the tongue Every 8 (Eight) Hours As Needed for Nausea or Vomiting., Disp: 15 tablet, Rfl: 0  •  Steglatro 15 MG tablet, TAKE ONE TABLET BY MOUTH EVERY DAY IN THE MORNING, Disp: 90 tablet, Rfl: 1  •  SUMAtriptan (Imitrex) 100 MG tablet, Take one tablet at onset of headache. May repeat dose one time in 2 hours if headache not relieved., Disp: 9 tablet, Rfl: 2  •  terbinafine (lamISIL) 1 % cream, Apply 1 application topically to the appropriate area as directed 2 (Two) Times a Day., Disp: 36 g, Rfl: 3  •  tiZANidine (ZANAFLEX) 4 MG tablet, , Disp: , Rfl:   •  traZODone (DESYREL) 150 MG tablet, Take 1 tablet by mouth Every Night., Disp: 90 tablet, Rfl: 1  •  triamcinolone (KENALOG) 0.1 % cream, Apply  "1 application topically to the appropriate area as directed 2 (Two) Times a Day., Disp: 15 g, Rfl: 2  •  valACYclovir (Valtrex) 1000 MG tablet, Take 1 tablet by mouth 3 (Three) Times a Day., Disp: 21 tablet, Rfl: 0  •  amoxicillin-clavulanate (Augmentin) 875-125 MG per tablet, Take 1 tablet by mouth 2 (Two) Times a Day., Disp: 20 tablet, Rfl: 0    REVIEW OF SYSTEMS:  Review of Systems   Constitutional: Negative for activity change, fatigue, fever, unexpected weight gain and unexpected weight loss.   Respiratory: Negative for shortness of breath.    Cardiovascular: Negative for chest pain.   Gastrointestinal: Negative for abdominal pain.   Genitourinary: Negative for difficulty urinating.   Skin: Negative for rash.   Neurological: Negative for syncope and headache.       PHYSICAL EXAMINATION:  Vital Signs:  /69 (BP Location: Left arm, Patient Position: Sitting, Cuff Size: Large Adult)   Pulse 62   Temp 97.9 °F (36.6 °C)   Ht 152.4 cm (60\")   Wt 93.3 kg (205 lb 9.6 oz)   LMP  (LMP Unknown)   SpO2 98%   BMI 40.15 kg/m²   Vitals:    02/02/22 1239   Patient Position: Sitting       Physical Exam  Vitals and nursing note reviewed.   Constitutional:       General: She is not in acute distress.     Appearance: She is well-developed.   HENT:      Head: Normocephalic and atraumatic.      Mouth/Throat:      Mouth: Mucous membranes are moist.      Pharynx: Oropharynx is clear.   Neck:      Vascular: No JVD.   Pulmonary:      Effort: Pulmonary effort is normal.      Breath sounds: Normal breath sounds.   Musculoskeletal:         General: Normal range of motion.      Cervical back: Normal range of motion and neck supple.   Skin:     General: Skin is warm and dry.      Capillary Refill: Capillary refill takes less than 2 seconds.      Findings: No rash.      Comments: Carbuncle on chin   Neurological:      General: No focal deficit present.      Mental Status: She is alert and oriented to person, place, and time.      " Cranial Nerves: No cranial nerve deficit.   Psychiatric:         Mood and Affect: Mood normal.         Behavior: Behavior normal.         Procedures    ASSESSMENT/ PLAN:  Problem List Items Addressed This Visit        Cardiac and Vasculature    Essential hypertension       Endocrine and Metabolic    Diabetes mellitus (HCC)      Other Visit Diagnoses     Carbuncle    -  Primary    Relevant Medications    amoxicillin-clavulanate (Augmentin) 875-125 MG per tablet    cefTRIAXone (ROCEPHIN) injection 500 mg (Completed)            Specific Patient Instructions:  MEDICATION Instructions: Encouraged patient to continue routine medicines as prescribed and maintain compliance. Patient instructed to report any adverse side effects or reactions to medicines promptly to the office. Patient instructed to make us aware of any OTC or herbal meds or supplement use.    DIET Recommendations: Patient instructed and provided information on the following nutrition and diet recommendations: Calorie restriction for weight reduction and maintenance. Necessity for adequate daily intake of fluids/water. Also, diet information provided in AVS for specifics.    EXERCISE Instructions: Discussed with patient the need for routine aerobic activity for cardiovascular fitness, 3 times a week for about 30 minutes. Daily exercise for increased fitness and weight reduction goals.    SMOKING Recommendations: Counseled patient and encouraged them on smoking and tobacco cessation if applicable. Discussed the benefits to all body systems with smoking/tobacco cessation, including decreased cardiac/lung/stroke/cancer risk. Encouraged no vaping use.    HEALTH MAINTENANCE:  Counseling provided to patient/family about routine health maintenance and ANNUAL physicals/labs. Counseling on recommended Vaccinations appropriate for age needed.        Patient's Body mass index is 40.15 kg/m². indicating that she is morbidly obese (BMI > 40 or > 35 with obesity - related  health condition). Obesity-related health conditions include the following: hypertension, diabetes mellitus and osteoarthritis. Obesity is unchanged. BMI is is above average; BMI management plan is completed. We discussed portion control and increasing exercise..      Medications or Orders placed this visit:  No orders of the defined types were placed in this encounter.      Medications DISCONTINUED this visit:  There are no discontinued medications.    FOLLOW-UP:  Return in about 3 months (around 5/2/2022), or if symptoms worsen or fail to improve, for Recheck, Next scheduled follow up.    I discussed the patients findings and my recommendations with patient.  An After Visit Summary (AVS) was printed and given to the patient at discharge.        Donny Aguayo MD, FAAFP  2/2/2022

## 2022-02-02 NOTE — PATIENT INSTRUCTIONS
Diabetes: Insulin non dependent. Nephropathy, Retinopathy, Neuropahty status discussed.  Discussed goals of Diabetes today.  Goal Hgb A1C <7.0 for most patient.  Good BP control is encouraged with Goal BP based on JNC 8 guidelines 2014 <140/90.  Discussed role of Ace-I and ARB with DM.  DM imparts risk equivalence for CAD based on ATP III.  Current guidelines support moderate intensity statin with goal of 30-50% reduction in LDL unless 10 yr risk ASCVD >7.5 then high intensity should be used. Close monitoring of Lipid levels encouraged. Recommend once yearly eye evaluation by optometry or ophthalmology.  Good foot health discussed and foot exam completed.  Recommended toe health, wear good shoes, cut nails straight across and tend calluses if present. Take medications as encouraged.  Monitor blood sugars as encouraged and bring log to future meetings. Weight needs to be monitored. Monitor portions and caloric intake.  Pneumovax frequency discussed.   a. Labs: CMP, Microalbumin, A1C  b. Encouraged pt to bring glucose logs to each appointment  c. Encouraged self foot exams, and yearly eye exams.  d. Encouraged to lose weight/please see information provided  e. Recommend regular exercise   f. Medications as listed in today's visit      Skin Abscess    A skin abscess is an infected area on or under your skin that contains a collection of pus and other material. An abscess may also be called a furuncle, carbuncle, or boil. An abscess can occur in or on almost any part of your body.  Some abscesses break open (rupture) on their own. Most continue to get worse unless they are treated. The infection can spread deeper into the body and eventually into your blood, which can make you feel ill. Treatment usually involves draining the abscess.  What are the causes?  An abscess occurs when germs, like bacteria, pass through your skin and cause an infection. This may be caused by:  · A scrape or cut on your skin.  · A puncture  wound through your skin, including a needle injection or insect bite.  · Blocked oil or sweat glands.  · Blocked and infected hair follicles.  · A cyst that forms beneath your skin (sebaceous cyst) and becomes infected.  What increases the risk?  This condition is more likely to develop in people who:  · Have a weak body defense system (immune system).  · Have diabetes.  · Have dry and irritated skin.  · Get frequent injections or use illegal IV drugs.  · Have a foreign body in a wound, such as a splinter.  · Have problems with their lymph system or veins.  What are the signs or symptoms?  Symptoms of this condition include:  · A painful, firm bump under the skin.  · A bump with pus at the top. This may break through the skin and drain.  Other symptoms include:  · Redness surrounding the abscess site.  · Warmth.  · Swelling of the lymph nodes (glands) near the abscess.  · Tenderness.  · A sore on the skin.  How is this diagnosed?  This condition may be diagnosed based on:  · A physical exam.  · Your medical history.  · A sample of pus. This may be used to find out what is causing the infection.  · Blood tests.  · Imaging tests, such as an ultrasound, CT scan, or MRI.  How is this treated?  A small abscess that drains on its own may not need treatment. Treatment for larger abscesses may include:  · Moist heat or heat pack applied to the area several times a day.  · A procedure to drain the abscess (incision and drainage).  · Antibiotic medicines. For a severe abscess, you may first get antibiotics through an IV and then change to antibiotics by mouth.  Follow these instructions at home:  Medicines    · Take over-the-counter and prescription medicines only as told by your health care provider.  · If you were prescribed an antibiotic medicine, take it as told by your health care provider. Do not stop taking the antibiotic even if you start to feel better.    Abscess care    · If you have an abscess that has not  drained, apply heat to the affected area. Use the heat source that your health care provider recommends, such as a moist heat pack or a heating pad.  ? Place a towel between your skin and the heat source.  ? Leave the heat on for 20-30 minutes.  ? Remove the heat if your skin turns bright red. This is especially important if you are unable to feel pain, heat, or cold. You may have a greater risk of getting burned.  · Follow instructions from your health care provider about how to take care of your abscess. Make sure you:  ? Cover the abscess with a bandage (dressing).  ? Change your dressing or gauze as told by your health care provider.  ? Wash your hands with soap and water before you change the dressing or gauze. If soap and water are not available, use hand .  · Check your abscess every day for signs of a worsening infection. Check for:  ? More redness, swelling, or pain.  ? More fluid or blood.  ? Warmth.  ? More pus or a bad smell.    General instructions  · To avoid spreading the infection:  ? Do not share personal care items, towels, or hot tubs with others.  ? Avoid making skin contact with other people.  · Keep all follow-up visits as told by your health care provider. This is important.  Contact a health care provider if you have:  · More redness, swelling, or pain around your abscess.  · More fluid or blood coming from your abscess.  · Warm skin around your abscess.  · More pus or a bad smell coming from your abscess.  · A fever.  · Muscle aches.  · Chills or a general ill feeling.  Get help right away if you:  · Have severe pain.  · See red streaks on your skin spreading away from the abscess.  Summary  · A skin abscess is an infected area on or under your skin that contains a collection of pus and other material.  · A small abscess that drains on its own may not need treatment.  · Treatment for larger abscesses may include having a procedure to drain the abscess and taking an antibiotic.  This  information is not intended to replace advice given to you by your health care provider. Make sure you discuss any questions you have with your health care provider.  Document Revised: 04/09/2020 Document Reviewed: 01/31/2019  Elsevier Patient Education © 2021 [employer]

## 2022-02-10 ENCOUNTER — TELEPHONE (OUTPATIENT)
Dept: GASTROENTEROLOGY | Age: 62
End: 2022-02-10

## 2022-02-10 DIAGNOSIS — K74.60 CIRRHOSIS OF LIVER WITHOUT ASCITES, UNSPECIFIED HEPATIC CIRRHOSIS TYPE (HCC): Primary | ICD-10-CM

## 2022-02-10 NOTE — TELEPHONE ENCOUNTER
----- Message from Anna Otero sent at 8/19/2021  8:57 AM CDT -----  Regarding: Liver Ultrasound  Patient had Liver US in Aug     Due again in Feb 2022

## 2022-02-11 NOTE — TELEPHONE ENCOUNTER
02-11-22 Patient returned called notified that it time for her liver us and Labs       Transferred to scheduling

## 2022-02-17 ENCOUNTER — TELEPHONE (OUTPATIENT)
Dept: FAMILY MEDICINE CLINIC | Facility: CLINIC | Age: 62
End: 2022-02-17

## 2022-02-17 DIAGNOSIS — L02.93 CARBUNCLE: Primary | ICD-10-CM

## 2022-02-17 RX ORDER — SULFAMETHOXAZOLE AND TRIMETHOPRIM 800; 160 MG/1; MG/1
1 TABLET ORAL 2 TIMES DAILY
Qty: 14 TABLET | Refills: 0 | Status: SHIPPED | OUTPATIENT
Start: 2022-02-17 | End: 2022-04-13 | Stop reason: SDUPTHER

## 2022-02-17 NOTE — TELEPHONE ENCOUNTER
Caller: Della Gonzalez    Relationship: Self    Best call back number: [phone]    What medication are you requesting: ANTIBIOTIC    What are your current symptoms: BUMPS ON CHIN-ON THE RIGHT SIDE    How long have you been experiencing symptoms: ONGOING    Have you had these symptoms before:    [x] Yes  [] No    Have you been treated for these symptoms before:   [x] Yes  [] No    If a prescription is needed, what is your preferred pharmacy and phone number:  [facility] [place], [place] - [address] [place] [address] [phone] [facility] [phone]   [phone]    Additional notes:    PATIENT STATES SHE IS GETTING BUMPS ON THE RIGHT SIDE OF HER CHIN AGAIN AND WOULD LIKE SOME CREAM CALLED IN. PLEASE ADVISE.    PATIENT WOULD ALSO SPEAK WITH SOMEONE TO FOLLOW UP ABOUT A REFERRAL FOR AN ULTRA SOUND. PLEASE ADVISE

## 2022-02-18 NOTE — TELEPHONE ENCOUNTER
I sent in an antibiotic.  [facility] recurrent carb[facility].

## 2022-02-22 ENCOUNTER — TELEPHONE (OUTPATIENT)
Dept: FAMILY MEDICINE CLINIC | Facility: CLINIC | Age: 62
End: 2022-02-22

## 2022-02-22 NOTE — TELEPHONE ENCOUNTER
Pt is requesting letter for a support animal, she feels a pet would help with her anxiety. Pt stated that she needs this letter to waive her deposit for her landlord. Can we provide this for Pt?

## 2022-02-22 NOTE — TELEPHONE ENCOUNTER
I can provide this; however, she will have to have a dedicated appt to discuss this since it has never been completed before.

## 2022-02-23 ENCOUNTER — HOSPITAL ENCOUNTER (OUTPATIENT)
Dept: ULTRASOUND IMAGING | Age: 62
Discharge: HOME | End: 2022-02-23
Payer: MEDICARE

## 2022-02-23 DIAGNOSIS — K74.60 CIRRHOSIS OF LIVER WITHOUT ASCITES, UNSPECIFIED HEPATIC CIRRHOSIS TYPE (HCC): ICD-10-CM

## 2022-02-23 LAB
ALBUMIN SERPL-MCNC: 4.5 G/DL (ref 3.5–5.2)
ALP BLD-CCNC: 96 U/L (ref 35–104)
ALPHA FETOPROTEIN: 2.1 NG/ML (ref 0–8.3)
ALT SERPL-CCNC: 15 U/L (ref 5–33)
ANION GAP SERPL CALCULATED.3IONS-SCNC: 13 MMOL/L (ref 7–19)
AST SERPL-CCNC: 21 U/L (ref 5–32)
BILIRUB SERPL-MCNC: 0.7 MG/DL (ref 0.2–1.2)
BUN BLDV-MCNC: 10 MG/DL (ref 8–23)
CALCIUM SERPL-MCNC: 9.5 MG/DL (ref 8.8–10.2)
CHLORIDE BLD-SCNC: 101 MMOL/L (ref 98–111)
CO2: 25 MMOL/L (ref 22–29)
CREAT SERPL-MCNC: 0.5 MG/DL (ref 0.5–0.9)
GFR AFRICAN AMERICAN: >59
GFR NON-AFRICAN AMERICAN: >60
GLUCOSE BLD-MCNC: 166 MG/DL (ref 74–109)
HCT VFR BLD CALC: 45 % (ref 37–47)
HEMOGLOBIN: 14.6 G/DL (ref 12–16)
INR BLD: 1.03 (ref 0.88–1.18)
MCH RBC QN AUTO: 28.7 PG (ref 27–31)
MCHC RBC AUTO-ENTMCNC: 32.4 G/DL (ref 33–37)
MCV RBC AUTO: 88.4 FL (ref 81–99)
PDW BLD-RTO: 13.4 % (ref 11.5–14.5)
PLATELET # BLD: 156 K/UL (ref 130–400)
PMV BLD AUTO: 10.6 FL (ref 9.4–12.3)
POTASSIUM SERPL-SCNC: 4.3 MMOL/L (ref 3.5–5)
PROTHROMBIN TIME: 13.7 SEC (ref 12–14.6)
RBC # BLD: 5.09 M/UL (ref 4.2–5.4)
SODIUM BLD-SCNC: 139 MMOL/L (ref 136–145)
TOTAL PROTEIN: 8 G/DL (ref 6.6–8.7)
WBC # BLD: 5.4 K/UL (ref 4.8–10.8)

## 2022-02-23 PROCEDURE — 76705 ECHO EXAM OF ABDOMEN: CPT

## 2022-02-25 ENCOUNTER — OFFICE VISIT (OUTPATIENT)
Dept: FAMILY MEDICINE CLINIC | Facility: CLINIC | Age: 62
End: 2022-02-25

## 2022-02-25 VITALS
OXYGEN SATURATION: 99 % | HEART RATE: 61 BPM | BODY MASS INDEX: 40.21 KG/M2 | TEMPERATURE: 97.6 F | WEIGHT: 204.8 LBS | DIASTOLIC BLOOD PRESSURE: 75 MMHG | HEIGHT: 60 IN | SYSTOLIC BLOOD PRESSURE: 108 MMHG

## 2022-02-25 DIAGNOSIS — K21.9 GASTROESOPHAGEAL REFLUX DISEASE, UNSPECIFIED WHETHER ESOPHAGITIS PRESENT: ICD-10-CM

## 2022-02-25 DIAGNOSIS — R51.9 SINUS HEADACHE: ICD-10-CM

## 2022-02-25 DIAGNOSIS — F41.9 ANXIETY DISORDER, UNSPECIFIED TYPE: ICD-10-CM

## 2022-02-25 DIAGNOSIS — J30.9 ALLERGIC SINUSITIS: ICD-10-CM

## 2022-02-25 DIAGNOSIS — J45.901 BRONCHITIS, ALLERGIC, UNSPECIFIED ASTHMA SEVERITY, WITH ACUTE EXACERBATION: Primary | ICD-10-CM

## 2022-02-25 PROCEDURE — 99214 OFFICE O/P EST MOD 30 MIN: CPT | Performed by: NURSE PRACTITIONER

## 2022-02-25 RX ORDER — OMEPRAZOLE 20 MG/1
20 CAPSULE, DELAYED RELEASE ORAL DAILY
Qty: 90 CAPSULE | Refills: 1 | Status: SHIPPED | OUTPATIENT
Start: 2022-02-25 | End: 2022-08-31 | Stop reason: SDUPTHER

## 2022-02-25 RX ORDER — CETIRIZINE HYDROCHLORIDE 10 MG/1
10 TABLET ORAL DAILY
Qty: 90 TABLET | Refills: 1 | Status: SHIPPED | OUTPATIENT
Start: 2022-02-25 | End: 2022-07-29 | Stop reason: SDUPTHER

## 2022-02-25 NOTE — PROGRESS NOTES
"Chief Complaint  Abscess (groin) and Depression (pt wants letter for service animal)    Subjective          Della Gonzalez presents to [facility]  History of Present Illness  GROIN:  Patient states she has another \"boil\" but is currently on antibiotic treatment for an abscess on her chin.  She has history of MRSA infection.  She is also using hot soaks to the groin area.  ANXIETY:  Patient states she is interested in a comfort pet/ to help with her long standing anxiety and depression.  Her housing requires a letter stating that it is medically necessary.  Patient states she has a history of having a pet and believes her life will be enriched and she will need less medication if she has a  pet.  MED REFILL:  Patient needs some routine med refills. No change in treatment requested.  Objective   Vital Signs:   /75 (BP Location: Right arm, Patient Position: Sitting, Cuff Size: Large Adult)   Pulse 61   Temp 97.6 °F (36.4 °C)   Ht 152.4 cm (60\") Comment: pt reported  Wt 92.9 kg (204 lb 12.8 oz)   SpO2 99%   BMI 40.00 kg/m²     Physical Exam  Vitals reviewed.   Constitutional:       General: She is not in acute distress.     Appearance: Normal appearance. She is obese. She is not ill-appearing.   Cardiovascular:      Rate and Rhythm: Normal rate and regular rhythm.      Heart sounds: Normal heart sounds. No murmur heard.      Skin:     General: Skin is warm and dry.      Findings: Erythema present.      Comments: 1 cm raised tender area on left labia with minimal erythema, no pointed fluctuance and no drainage.   Neurological:      Mental Status: She is alert and oriented to person, place, and time.   Psychiatric:         Thought Content: Thought content normal.        Result Review :                 Assessment and Plan    Diagnoses and all orders for this visit:    1. Bronchitis, allergic, unspecified asthma severity, with acute exacerbation (Primary)  -     " cetirizine (zyrTEC) 10 MG tablet; Take 1 tablet by mouth Daily.  Dispense: 90 tablet; Refill: 1    2. Gastroesophageal reflux disease, unspecified whether esophagitis present  -     omeprazole (priLOSEC) 20 MG capsule; Take 1 capsule by mouth Daily.  Dispense: 90 capsule; Refill: 1    3. Allergic sinusitis  -     cetirizine (zyrTEC) 10 MG tablet; Take 1 tablet by mouth Daily.  Dispense: 90 tablet; Refill: 1    4. Sinus headache  -     cetirizine (zyrTEC) 10 MG tablet; Take 1 tablet by mouth Daily.  Dispense: 90 tablet; Refill: 1    5. Anxiety disorder, unspecified type    Patient instructed to complete her antibiotic course as well as continuing the hot moist compresses.  Comfort pet letter provided.      Follow Up   Return for keep scheduled appt.  Patient was given instructions and counseling regarding her condition or for health maintenance advice. Please see specific information pulled into the AVS if appropriate.

## 2022-03-28 ENCOUNTER — OFFICE VISIT (OUTPATIENT)
Dept: GASTROENTEROLOGY | Age: 62
End: 2022-03-28
Payer: MEDICARE

## 2022-03-28 VITALS
HEART RATE: 55 BPM | OXYGEN SATURATION: 96 % | BODY MASS INDEX: 40.44 KG/M2 | WEIGHT: 206 LBS | HEIGHT: 60 IN | DIASTOLIC BLOOD PRESSURE: 68 MMHG | SYSTOLIC BLOOD PRESSURE: 132 MMHG

## 2022-03-28 DIAGNOSIS — K74.60 CIRRHOSIS OF LIVER WITHOUT ASCITES, UNSPECIFIED HEPATIC CIRRHOSIS TYPE (HCC): Primary | ICD-10-CM

## 2022-03-28 DIAGNOSIS — I85.10 SECONDARY ESOPHAGEAL VARICES WITHOUT BLEEDING (HCC): ICD-10-CM

## 2022-03-28 DIAGNOSIS — Z11.52 ENCOUNTER FOR SCREENING FOR COVID-19: Primary | ICD-10-CM

## 2022-03-28 PROCEDURE — 4004F PT TOBACCO SCREEN RCVD TLK: CPT | Performed by: NURSE PRACTITIONER

## 2022-03-28 PROCEDURE — G8484 FLU IMMUNIZE NO ADMIN: HCPCS | Performed by: NURSE PRACTITIONER

## 2022-03-28 PROCEDURE — G8417 CALC BMI ABV UP PARAM F/U: HCPCS | Performed by: NURSE PRACTITIONER

## 2022-03-28 PROCEDURE — 99213 OFFICE O/P EST LOW 20 MIN: CPT | Performed by: NURSE PRACTITIONER

## 2022-03-28 PROCEDURE — G8427 DOCREV CUR MEDS BY ELIG CLIN: HCPCS | Performed by: NURSE PRACTITIONER

## 2022-03-28 PROCEDURE — 3017F COLORECTAL CA SCREEN DOC REV: CPT | Performed by: NURSE PRACTITIONER

## 2022-03-28 ASSESSMENT — ENCOUNTER SYMPTOMS
DIARRHEA: 0
TROUBLE SWALLOWING: 0
RECTAL PAIN: 0
VOMITING: 0
CONSTIPATION: 0
ABDOMINAL PAIN: 0
ANAL BLEEDING: 0
COUGH: 0
CHOKING: 0
BLOOD IN STOOL: 0
SHORTNESS OF BREATH: 0
ABDOMINAL DISTENTION: 0
NAUSEA: 0

## 2022-03-28 NOTE — PROGRESS NOTES
Subjective:     Patient ID: Lajuan Kussmaul is a 57 y.o. female  PCP: Dr. Kel López M.D., MD  Referring Provider: No ref. provider found    HPI  Patient presents to the office today with the following complaints: Follow-up      Pt seen today for 6 month Cirrhosis follow up. Hx esophageal varices, portal HTN gastropathy. She is currently on Nadolol 20 mg po daily for variceal bleeding prophylaxis. AFP normal.  Current MELD score 8 based on labs 2/23/2022. She is due for EGD for variceal banding. She denies any hematemesis, melena. She denies any further issues or complaints. She reports loose stools at times, depending on certain foods she eats. Last EGD 3/2021 - portal hypertensive gastropathy, previous banding, 6 month recall for banding   Last Colonoscopy 3/2021 - HP, 5 year recall     WBC (K/uL)   Date Value   02/23/2022 5.4     Hemoglobin (g/dL)   Date Value   02/23/2022 14.6     Hematocrit (%)   Date Value   02/23/2022 45.0     Platelets (K/uL)   Date Value   02/23/2022 156     Lab Results   Component Value Date     02/23/2022    K 4.3 02/23/2022     02/23/2022    CO2 25 02/23/2022    BUN 10 02/23/2022    CREATININE 0.5 02/23/2022    GLUCOSE 166 (H) 02/23/2022    CALCIUM 9.5 02/23/2022    PROT 8.0 02/23/2022    LABALBU 4.5 02/23/2022    BILITOT 0.7 02/23/2022    ALKPHOS 96 02/23/2022    AST 21 02/23/2022    ALT 15 02/23/2022    LABGLOM >60 02/23/2022    GFRAA >59 02/23/2022    GLOB 3.5 01/25/2017       Lab Results   Component Value Date    INR 1.03 02/23/2022    INR 1.05 08/16/2021    INR 1.05 02/03/2021    PROTIME 13.7 02/23/2022    PROTIME 13.9 08/16/2021    PROTIME 13.7 02/03/2021     AFP Tumor Marker  Order: [id number]   Status: Final result     Visible to patient: Yes (not seen)     Next appt: None     Dx: Cirrhosis of liver without ascites, u. ..     4 Result Notes    Component Ref Range & Units 2/23/22 1247 8/16/21 1215 2/3/21 0908 8/4/20 1117 1/20/20 1028 7/1/19 1040 2/21/19 1146   Alpha Fetoprotein 0.0 - 8.3 ng/mL 2.1  1.6  2.1  3.1  3.0  3.0  3.7    Resulting [facility]  [id number]          Addendum  Signed by Selina Desai MD on 03/07/22 0840  Addendum:   US LIVER    3/7/2022 9:37 AM   Dictation clarification:   There is no focal liver abnormality or mass. Cirrhotic appearance   noted. Signed by Dr Amol Lopez     Narrative   ** ORIGINAL REPORT **   US LIVER    2/23/2022 2:04 PM   History: Cirrhosis. Fatty liver. Coarse hepatic echogenicity compatible with fatty infiltration or   cirrhosis. Slightly nodular liver contour. Large shadowing gallstone noted. No sign of cholecystitis or biliary dilation. CBD = 3 mm. No ascites. No focal spleen abnormality. Spleen = 130 x 57 mm. Normal right kidney measuring 123 x 49 x 43 mm.       Impression   1. Stable cirrhotic appearance of the liver. 2. Unchanged gallstone. 3. No ascites is seen. Signed by Dr Amol Lopez** ADDENDUM #1 **       Assessment:     1. Cirrhosis of liver without ascites, unspecified hepatic cirrhosis type ([facility] [address]    2. Secondary esophageal varices without bleeding (HCC)            Plan:   - Continue Nadolol 20 mg po daily  - Labs and liver ultrasound every 6 months with follow up  - Avoid NSAIDs  - Schedule EGD  Nothing to eat or drink after midnight. No driving for 24 hours after procedure. Bring a  to procedure. No aspirin, NSAIDs, fish oil 5 days before procedure. I have discussed the benefits, alternatives, and risks (including bleeding, perforation and death)  for pursuing Endoscopy (EGD/Colonscopy/EUS/ERCP) with the patient and they are willing to continue.  We also discussed the need for anesthesia, IV access, proper dietary changes, medication changes if necessary, and need for bowel prep (if ordered) prior to their Endoscopic procedure. They are aware they must have someone accompany them to their scheduled procedure to drive them home - they agree to the above and are willing to continue. Orders  No orders of the defined types were placed in this encounter. Medications  No orders of the defined types were placed in this encounter.         Patient History:     Past Medical History:   Diagnosis Date    Arthritis     Asthma     Cirrhosis ([address]     COPD (chronic obstructive pulmonary disease) (HCC)     Esophageal varices (HCC)     GERD (gastroesophageal reflux disease)     Hepatitis C     Hyperlipidemia     Hypertension        Past Surgical History:   Procedure Laterality Date    COLONOSCOPY  03/02/2016    Dr Andres Sheridan bleeding internal hemorrhoids o/w normal; fair prep (5 yr)    COLONOSCOPY N/A 03/09/2021    Dr Viraj Lopez, 5 yr recall    MA EGD TRANSORAL BIOPSY SINGLE/MULTIPLE N/A 02/07/2017    Dr Sanam White Grade I esophageal varices, gastritis/gastropathy    MA EGD TRANSORAL BIOPSY SINGLE/MULTIPLE N/A 03/23/2018    Dr George Villa I esophageal varices, erosive/active gastritis-2 yr recall    UPPER GASTROINTESTINAL ENDOSCOPY  03/02/2016    Dr Perkins-gr II esoph varices; portal hyptensive gastropathy    UPPER GASTROINTESTINAL ENDOSCOPY N/A 03/03/2020    Dr Coby Torres-w/variceal banding x 4, 3 columns of Grade II varices, repeat in 6-8 wks    UPPER GASTROINTESTINAL ENDOSCOPY N/A 04/14/2020    Dr Coby Torres-w/variceal banding x 3-grade I-II varices, repeat in 8 wks    UPPER GASTROINTESTINAL ENDOSCOPY N/A 08/25/2020    Dr LUIS FELIPE Torres-w/variceal banding x 3, grade 1-2 varices, portal hypertensive gastropathy, repeat in 3 months    UPPER GASTROINTESTINAL ENDOSCOPY N/A 12/04/2020    Dr Coby Torres-w/variceal banding x 5-Grade II varices, portal hypertensive gastropathy, repeat in 3 months    UPPER GASTROINTESTINAL ENDOSCOPY N/A 03/09/2021    Dr Ced Velarde of previous banding Organization Meetings: Not on file    Marital Status: Not on file   Intimate Partner Violence:     Fear of Current or Ex-Partner: Not on file    Emotionally Abused: Not on file    Physically Abused: Not on file    Sexually Abused: Not on file   Housing Stability:     Unable to Pay for Housing in the Last Year: Not on file    Number of [place] in the Last Year: Not on file    Unstable Housing in the Last Year: Not on file       Current Outpatient Medications   Medication Sig Dispense Refill    traZODone (DESYREL) 150 MG tablet Take 150 mg by mouth nightly      levothyroxine (SYNTHROID) 50 MCG tablet Take 50 mcg by mouth Daily      linagliptin (TRADJENTA) 5 MG tablet Take 5 mg by mouth daily      ondansetron (ZOFRAN) 4 MG tablet Take 1 tablet by mouth every 8 hours as needed for Nausea 45 tablet 2    nadolol (CORGARD) 20 MG tablet TAKE ONE TABLET BY MOUTH EVERY DAY -- NEED APPOINTMENT 90 tablet 1    omeprazole (PRILOSEC) 20 MG delayed release capsule TAKE ONE CAPSULE BY MOUTH EVERY DAY 90 capsule 1    atorvastatin (LIPITOR) 10 MG tablet Take 10 mg by mouth daily      Ertugliflozin L-PyroglutamicAc (STEGLATRO) 5 MG TABS Take 5 mg by mouth daily       cyclobenzaprine (FLEXERIL) 10 MG tablet Take 10 mg by mouth 3 times daily as needed for Muscle spasms      traMADol (ULTRAM) 50 MG tablet Take 1 tablet by mouth every 12 hours as needed for Pain 60 tablet 2    benazepril (LOTENSIN) 20 MG tablet Take 1 tablet by mouth daily 30 tablet 5     No current facility-administered medications for this visit. No Known Allergies    Review of Systems   Constitutional: Negative for activity change, appetite change, fatigue, fever and unexpected weight change. HENT: Negative for trouble swallowing. Respiratory: Negative for cough, choking and shortness of breath. Cardiovascular: Negative for chest pain.    Gastrointestinal: Negative for abdominal distention, abdominal pain, anal bleeding, blood in stool, constipation, diarrhea, nausea, rectal pain and vomiting. Allergic/Immunologic: Negative for food allergies. All other systems reviewed and are negative. Objective:     /68   Pulse 55   Ht 5' (1.524 m)   Wt 206 lb (93.4 kg)   SpO2 96%   BMI 40.23 kg/m²     Physical Exam  Vitals reviewed. Constitutional:       General: She is not in acute distress. Appearance: She is well-developed. HENT:      Head: Normocephalic and atraumatic. Right Ear: External ear normal.      Left Ear: External ear normal.      Nose: Nose normal.      Comments: Mask on     Mouth/Throat:      Comments: Mask on  Eyes:      General: No scleral icterus. Right eye: No discharge. Left eye: No discharge. Conjunctiva/sclera: Conjunctivae normal.      Pupils: Pupils are equal, round, and reactive to light. Cardiovascular:      Rate and Rhythm: Normal rate and regular rhythm. Heart sounds: Normal heart sounds. No murmur heard. Pulmonary:      Effort: Pulmonary effort is normal. No respiratory distress. Breath sounds: Normal breath sounds. No wheezing or rales. Abdominal:      General: Bowel sounds are normal. There is no distension. Palpations: Abdomen is soft. There is no mass. Tenderness: There is no abdominal tenderness. There is no guarding or rebound. Musculoskeletal:         General: Normal range of motion. Cervical back: Normal range of motion and neck supple. Skin:     General: Skin is warm and dry. Coloration: Skin is not pale. Neurological:      Mental Status: She is alert and oriented to person, place, and time.    Psychiatric:         Behavior: Behavior normal.

## 2022-04-13 ENCOUNTER — OFFICE VISIT (OUTPATIENT)
Dept: FAMILY MEDICINE CLINIC | Facility: CLINIC | Age: 62
End: 2022-04-13

## 2022-04-13 VITALS
SYSTOLIC BLOOD PRESSURE: 122 MMHG | HEIGHT: 60 IN | TEMPERATURE: 97.8 F | HEART RATE: 60 BPM | DIASTOLIC BLOOD PRESSURE: 84 MMHG | BODY MASS INDEX: 40.21 KG/M2 | WEIGHT: 204.8 LBS | OXYGEN SATURATION: 98 %

## 2022-04-13 DIAGNOSIS — L02.03 CARBUNCLE OF FACE: ICD-10-CM

## 2022-04-13 DIAGNOSIS — H60.02: Primary | ICD-10-CM

## 2022-04-13 DIAGNOSIS — Z86.14 PERSONAL HISTORY OF MRSA (METHICILLIN RESISTANT STAPHYLOCOCCUS AUREUS): ICD-10-CM

## 2022-04-13 PROCEDURE — 99213 OFFICE O/P EST LOW 20 MIN: CPT | Performed by: NURSE PRACTITIONER

## 2022-04-13 RX ORDER — SULFAMETHOXAZOLE AND TRIMETHOPRIM 800; 160 MG/1; MG/1
1 TABLET ORAL 2 TIMES DAILY
Qty: 14 TABLET | Refills: 0 | Status: SHIPPED | OUTPATIENT
Start: 2022-04-13 | End: 2022-06-09

## 2022-04-13 NOTE — PROGRESS NOTES
"Chief Complaint  Abscess    Subjective          Della Gonzalez presents to [facility]  History of Present Illness  ABSCESS:  Patient notes an new area of abscess on the left ear and the chin that have arisen over the past week.  She tried to squeeze them and they have worsened.  Tender and hard to touch.  Painful to chew as well.  She has a history of recurrent cutaneous abscess and history of MRSA.    Objective   Vital Signs:   /84 (BP Location: Right arm, Patient Position: Sitting, Cuff Size: Large Adult)   Pulse 60   Temp 97.8 °F (36.6 °C)   Ht 152.4 cm (60\") Comment: pt reported  Wt 92.9 kg (204 lb 12.8 oz)   SpO2 98%   BMI 40.00 kg/m²     BMI is above normal parameters. Recommendations: exercise counseling/recommendations and nutrition counseling/recommendations       Physical Exam  Vitals and nursing note reviewed.   Constitutional:       General: She is not in acute distress.     Appearance: Normal appearance. She is obese. She is not ill-appearing.   HENT:      Head: Normocephalic and atraumatic.   Cardiovascular:      Rate and Rhythm: Normal rate and regular rhythm.      Heart sounds: Normal heart sounds. No murmur heard.  Pulmonary:      Effort: Pulmonary effort is normal.      Breath sounds: Normal breath sounds.   Skin:     General: Skin is warm and dry.      Findings: Erythema present.      Comments: 2 separate 1 cm abscess areas to the lower mid chin.  0.5 cm area to the left tragus, erythematous with pinpoint fluctuant top and indurated area.   Neurological:      Mental Status: She is alert and oriented to person, place, and time.   Psychiatric:         Thought Content: Thought content normal.        Result Review :                 Assessment and Plan    Diagnoses and all orders for this visit:    1. Carbuncle of ear, left (Primary)  -     sulfamethoxazole-trimethoprim (Bactrim DS) 800-160 MG per tablet; Take 1 tablet by mouth 2 (Two) Times a Day.  Dispense: " 14 tablet; Refill: 0    2. Carbuncle of face  -     sulfamethoxazole-trimethoprim (Bactrim DS) 800-160 MG per tablet; Take 1 tablet by mouth 2 (Two) Times a Day.  Dispense: 14 tablet; Refill: 0    3. Personal history of MRSA (methicillin resistant Staphylococcus aureus)  -     sulfamethoxazole-trimethoprim (Bactrim DS) 800-160 MG per tablet; Take 1 tablet by mouth 2 (Two) Times a Day.  Dispense: 14 tablet; Refill: 0    Patient encouraged to keep hands off areas of infection.  Clean areas with antibacterial liquid soap and warm water.      Follow Up   Return for keep scheduled appt.  Patient was given instructions and counseling regarding her condition or for health maintenance advice. Please see specific information pulled into the AVS if appropriate.

## 2022-04-15 ENCOUNTER — TELEPHONE (OUTPATIENT)
Dept: PODIATRY | Facility: CLINIC | Age: 62
End: 2022-04-15

## 2022-04-15 NOTE — TELEPHONE ENCOUNTER
Called patient regarding appt on 04/18/2022. Left message for patient to return call if any questions or concerns arise.

## 2022-04-18 ENCOUNTER — OFFICE VISIT (OUTPATIENT)
Dept: PODIATRY | Facility: CLINIC | Age: 62
End: 2022-04-18

## 2022-04-18 VITALS
SYSTOLIC BLOOD PRESSURE: 117 MMHG | DIASTOLIC BLOOD PRESSURE: 80 MMHG | OXYGEN SATURATION: 97 % | HEIGHT: 60 IN | WEIGHT: 203 LBS | HEART RATE: 67 BPM | BODY MASS INDEX: 39.85 KG/M2

## 2022-04-18 DIAGNOSIS — E11.9 ENCOUNTER FOR DIABETIC FOOT EXAM: ICD-10-CM

## 2022-04-18 DIAGNOSIS — B35.1 ONYCHOMYCOSIS: ICD-10-CM

## 2022-04-18 DIAGNOSIS — E11.40 TYPE 2 DIABETES MELLITUS WITH DIABETIC NEUROPATHY, WITH LONG-TERM CURRENT USE OF INSULIN: ICD-10-CM

## 2022-04-18 DIAGNOSIS — E66.9 CLASS 2 OBESITY WITH BODY MASS INDEX (BMI) OF 39.0 TO 39.9 IN ADULT, UNSPECIFIED OBESITY TYPE, UNSPECIFIED WHETHER SERIOUS COMORBIDITY PRESENT: ICD-10-CM

## 2022-04-18 DIAGNOSIS — Z79.4 TYPE 2 DIABETES MELLITUS WITH DIABETIC NEUROPATHY, WITH LONG-TERM CURRENT USE OF INSULIN: ICD-10-CM

## 2022-04-18 DIAGNOSIS — L60.2 ONYCHOGRYPHOSIS: Primary | ICD-10-CM

## 2022-04-18 DIAGNOSIS — L60.0 INGROWN TOENAIL: ICD-10-CM

## 2022-04-18 DIAGNOSIS — Z72.0 TOBACCO ABUSE: ICD-10-CM

## 2022-04-18 PROCEDURE — 99213 OFFICE O/P EST LOW 20 MIN: CPT | Performed by: NURSE PRACTITIONER

## 2022-04-18 PROCEDURE — 11721 DEBRIDE NAIL 6 OR MORE: CPT | Performed by: NURSE PRACTITIONER

## 2022-04-18 NOTE — PROGRESS NOTES
[facility] - PODIATRY    Today's Date: 04/18/22    Patient Name: Della Gonzalez  MRN: [id number]  CSN: [id number]  PCP: Vijaya Henao APRN  Referring Provider: No ref. provider found    SUBJECTIVE     Chief Complaint   Patient presents with   • Follow-up     Vijaya Henao, 12/09/2021 3 MO FU DIABETIC NAIL CARE - PT STATES doing ok, no complaints - pt denies pain - pt presents diabetic nail and foot care, long thick toenails    • Diabetes     Hasnt checked     HPI: Della Gonzalez, a 62 y.o.female, comes to clinic as a(n) established patient presenting for diabetic foot exam and complaining of thickened, irregular toenails. Patient has h/o cirrhosis, DM2, GERD, tobacco use. Patient is NIDDM and unsure of last BG level.  Has not checked her blood glucose recently.  Last in office A1c was 9%.  Relates  mild numbness/tingling in feet.  Notes that her toenails are long, thick, discolored and crumbly.  Patient states she is unable to take care of her nails at home due to shape and thickness of nails.  Continues tobacco use daily.  Denies pain unless direct pressure is applied to nails. Relates previous treatment(s) including care by podiatrist. Denies any constitutional symptoms. No other pedal complaints at this time.    Past Medical History:   Diagnosis Date   • Cirrhosis of liver (HCC)    • Diabetes mellitus (HCC)    • GERD (gastroesophageal reflux disease)    • Liver disease      Past Surgical History:   Procedure Laterality Date   • COLONOSCOPY       Family History   Problem Relation Age of Onset   • Diabetes Mother    • No Known Problems Father      Social History     Socioeconomic History   • Marital status: Single   Tobacco Use   • Smoking status: Current Every Day Smoker     Packs/day: 0.50     Years: 40.00     Pack years: 20.00     Types: Cigarettes   • Smokeless tobacco: Never Used   Vaping Use   • Vaping Use: Never used   Substance and Sexual Activity   • Alcohol use: No   • Drug use: No    • Sexual activity: Not Currently     Partners: Male     No Known Allergies  Current Outpatient Medications   Medication Sig Dispense Refill   • atorvastatin (LIPITOR) 10 MG tablet TAKE ONE TABLET BY MOUTH EVERY DAY 90 tablet 1   • benazepril (LOTENSIN) 10 MG tablet TAKE ONE TABLET BY MOUTH EVERY DAY 90 tablet 1   • butalbital-acetaminophen-caffeine (Esgic) -40 MG per tablet Take 1 tablet by mouth Every 4 (Four) Hours As Needed for Headache. 12 tablet 0   • cetirizine (zyrTEC) 10 MG tablet Take 1 tablet by mouth Daily. 90 tablet 1   • gabapentin (NEURONTIN) 800 MG tablet Take 1 tablet by mouth 2 (Two) Times a Day. 180 tablet 0   • ketoconazole (NIZORAL) 2 % cream APPLY TOPICALLY TO APPROPRIATE AREA AS DIRECTED DAILY 60 g 2   • levothyroxine (Synthroid) 50 MCG tablet Take 1 tablet by mouth Daily. 90 tablet 1   • linagliptin (TRADJENTA) 5 MG tablet tablet Take 1 tablet by mouth Daily. 90 tablet 1   • metFORMIN (Glucophage) 1000 MG tablet Take 1 tablet by mouth 2 (Two) Times a Day With Meals. 180 tablet 1   • nabumetone (RELAFEN) 500 MG tablet Take 1 tablet by mouth 2 (Two) Times a Day As Needed for Moderate Pain . 60 tablet 5   • nadolol (CORGARD) 20 MG tablet TAKE ONE TABLET BY MOUTH EVERY DAY 90 tablet 1   • Neomycin-Polymyxin-Dexameth 0.1 % ointment Apply 1 application to eye(s) as directed by provider 2 (Two) Times a Day. 3.5 g 1   • nystatin (MYCOSTATIN) 867917 UNIT/GM cream Apply  topically to the appropriate area as directed 2 (Two) Times a Day. 30 g 2   • omeprazole (priLOSEC) 20 MG capsule Take 1 capsule by mouth Daily. 90 capsule 1   • ondansetron ODT (Zofran ODT) 4 MG disintegrating tablet Place 1 tablet on the tongue Every 8 (Eight) Hours As Needed for Nausea or Vomiting. 15 tablet 0   • Steglatro 15 MG tablet TAKE ONE TABLET BY MOUTH EVERY DAY IN THE MORNING 90 tablet 1   • sulfamethoxazole-trimethoprim (Bactrim DS) 800-160 MG per tablet Take 1 tablet by mouth 2 (Two) Times a Day. 14 tablet 0   •  SUMAtriptan (Imitrex) 100 MG tablet Take one tablet at onset of headache. May repeat dose one time in 2 hours if headache not relieved. 9 tablet 2   • terbinafine (lamISIL) 1 % cream Apply 1 application topically to the appropriate area as directed 2 (Two) Times a Day. 36 g 3   • tiZANidine (ZANAFLEX) 4 MG tablet      • traZODone (DESYREL) 150 MG tablet Take 1 tablet by mouth Every Night. 90 tablet 1   • triamcinolone (KENALOG) 0.1 % cream Apply 1 application topically to the appropriate area as directed 2 (Two) Times a Day. 15 g 2   • valACYclovir (Valtrex) 1000 MG tablet Take 1 tablet by mouth 3 (Three) Times a Day. 21 tablet 0     No current facility-administered medications for this visit.     Review of Systems   Constitutional: Negative for chills and fever.   HENT: Negative for congestion.    Respiratory: Negative for shortness of breath.    Cardiovascular: Positive for leg swelling. Negative for chest pain.   Gastrointestinal: Negative for constipation, diarrhea, nausea and vomiting.   Musculoskeletal: Positive for arthralgias. Negative for gait problem.        Foot pain   Skin: Negative for color change and wound.        Tender toenails   Neurological: Positive for numbness.       OBJECTIVE     Vitals:    04/18/22 1131   BP: 117/80   Pulse: 67   SpO2: 97%       PHYSICAL EXAM  GEN:   Accompanied by none.     Foot/Ankle Exam:       General:   Diabetic Foot Exam Performed    Appearance: appears stated age and healthy and obesity    Orientation: AAOx3    Affect: appropriate    Gait: unimpaired    Assistance: independent    Shoe Gear:  Casual shoes    VASCULAR      Right Foot Vascularity   Dorsalis pedis:  2+  Posterior tibial:  2+  Skin Temperature: warm    Edema Grading:  None  CFT:  3  Pedal Hair Growth:  Present  Varicosities: mild varicosities       Left Foot Vascularity   Dorsalis pedis:  2+  Posterior tibial:  2+  Skin Temperature: warm    Edema Grading:  None  CFT:  3  Pedal Hair Growth:   Present  Varicosities: mild varicosities        NEUROLOGIC     Right Foot Neurologic   Light touch sensation:  Diminished  Vibratory sensation:  Diminished  Hot/Cold sensation: diminished    Protective Sensation using [place] Monofilament:  8     Left Foot Neurologic   Light touch sensation:  Diminished  Vibratory sensation:  Diminished  Hot/cold sensation: diminished    Protective Sensation using [place] Monofilament:  8     MUSCULOSKELETAL      Right Foot Musculoskeletal   Ecchymosis:  None  Tenderness: toenails and toe 1    Arch:  Normal  Hallux valgus: No    Hallux limitus: No       Left Foot Musculoskeletal   Ecchymosis:  None  Tenderness: toenails and toe 1    Arch:  Normal  Hallux valgus: No    Hallux limitus: No       MUSCLE STRENGTH     Right Foot Muscle Strength   Foot dorsiflexion:  5  Foot plantar flexion:  5  Foot inversion:  5  Foot eversion:  5     Left Foot Muscle Strength   Foot dorsiflexion:  5  Foot plantar flexion:  5  Foot inversion:  5  Foot eversion:  5     RANGE OF MOTION      Right Foot Range of Motion   Foot and ankle ROM within normal limits       Left Foot Range of Motion   Foot and ankle ROM within normal limits       DERMATOLOGIC     Right Foot Dermatologic   Skin: skin intact    Skin: no right foot tinea    Nails: onychomycosis, abnormally thick, subungual debris, dystrophic nails and ingrown toenail (Hallux)       Left Foot Dermatologic   Skin: skin intact    Skin: no left foot tinea    Nails: onychomycosis, abnormally thick, subungual debris, dystrophic nails and ingrown toenail (Hallux)        RADIOLOGY/NUCLEAR:  No results found.    LABORATORY/CULTURE RESULTS:      PATHOLOGY RESULTS:       ASSESSMENT/PLAN     Diagnoses and all orders for this visit:    1. Onychogryphosis (Primary)    2. Onychomycosis    3. Type 2 diabetes mellitus with diabetic neuropathy, with long-term current use of insulin (HCC)    4. Tobacco abuse    5. Ingrown toenail    6. Encounter for  diabetic foot exam (HCC)    7. Class 2 obesity with body mass index (BMI) of 39.0 to 39.9 in adult, unspecified obesity type, unspecified whether serious comorbidity present      Comprehensive lower extremity examination and evaluation was performed.  Discussed findings and treatment plan including risks, benefits, and treatment options with patient in detail. Patient agreed with treatment plan  Diabetic foot exam performed.  After verbal consent obtained, nail(s) x10 debrided of length and thickness with nail nipper without incidence  After verbal consent obtained, nail(s) x2 debrided of offending borders with nail nipper without incidence  Patient may maintain nails and calluses at home utilizing emery board or pumice stone between visits as needed  Reviewed at home diabetic foot care including daily foot checks   Tobacco cessation needed.   Patient's Body mass index is 39.65 kg/m². indicating that she is obese (BMI >30). Obesity-related health conditions include the following: diabetes mellitus and GERD. Obesity is unchanged. BMI is is above average; BMI management plan is completed. We discussed portion control and increasing exercise..  An After Visit Summary was printed and given to the patient at discharge, including (if requested) any available informative/educational handouts regarding diagnosis, treatment, or medications. All questions were answered to patient/family satisfaction. Should symptoms fail to improve or worsen they agree to call or return to clinic or to go to the Emergency Department. Discussed the importance of following up with any needed screening tests/labs/specialist appointments and any requested follow-up recommended by me today. Importance of maintaining follow-up discussed and patient accepts that missed appointments can delay diagnosis and potentially lead to worsening of conditions.  Return in about 3 months (around 7/18/2022)., or sooner if acute issues arise.        This document  has been electronically signed by MELECIO Hassan on April 18, 2022 12:38 CDT

## 2022-04-29 ENCOUNTER — ANESTHESIA EVENT (OUTPATIENT)
Dept: ENDOSCOPY | Age: 62
End: 2022-04-29
Payer: MEDICARE

## 2022-04-29 ENCOUNTER — HOSPITAL ENCOUNTER (OUTPATIENT)
Age: 62
Setting detail: OUTPATIENT SURGERY
Discharge: HOME | End: 2022-04-29
Attending: INTERNAL MEDICINE | Admitting: INTERNAL MEDICINE
Payer: MEDICARE

## 2022-04-29 ENCOUNTER — ANESTHESIA (OUTPATIENT)
Dept: ENDOSCOPY | Age: 62
End: 2022-04-29
Payer: MEDICARE

## 2022-04-29 VITALS
HEIGHT: 60 IN | HEART RATE: 58 BPM | RESPIRATION RATE: 18 BRPM | TEMPERATURE: 97.2 F | SYSTOLIC BLOOD PRESSURE: 100 MMHG | OXYGEN SATURATION: 97 % | BODY MASS INDEX: 39.66 KG/M2 | WEIGHT: 202 LBS | DIASTOLIC BLOOD PRESSURE: 77 MMHG

## 2022-04-29 VITALS
SYSTOLIC BLOOD PRESSURE: 195 MMHG | RESPIRATION RATE: 7 BRPM | OXYGEN SATURATION: 91 % | DIASTOLIC BLOOD PRESSURE: 86 MMHG

## 2022-04-29 LAB
GLUCOSE BLD-MCNC: 151 MG/DL (ref 70–99)
PERFORMED ON: ABNORMAL

## 2022-04-29 PROCEDURE — 2500000003 HC RX 250 WO HCPCS: Performed by: NURSE ANESTHETIST, CERTIFIED REGISTERED

## 2022-04-29 PROCEDURE — 7100000010 HC PHASE II RECOVERY - FIRST 15 MIN: Performed by: INTERNAL MEDICINE

## 2022-04-29 PROCEDURE — 2709999900 HC NON-CHARGEABLE SUPPLY: Performed by: INTERNAL MEDICINE

## 2022-04-29 PROCEDURE — 2580000003 HC RX 258: Performed by: INTERNAL MEDICINE

## 2022-04-29 PROCEDURE — 3700000000 HC ANESTHESIA ATTENDED CARE: Performed by: INTERNAL MEDICINE

## 2022-04-29 PROCEDURE — 2580000003 HC RX 258: Performed by: NURSE ANESTHETIST, CERTIFIED REGISTERED

## 2022-04-29 PROCEDURE — 43235 EGD DIAGNOSTIC BRUSH WASH: CPT | Performed by: INTERNAL MEDICINE

## 2022-04-29 PROCEDURE — 7100000011 HC PHASE II RECOVERY - ADDTL 15 MIN: Performed by: INTERNAL MEDICINE

## 2022-04-29 PROCEDURE — 82947 ASSAY GLUCOSE BLOOD QUANT: CPT

## 2022-04-29 PROCEDURE — 6360000002 HC RX W HCPCS: Performed by: NURSE ANESTHETIST, CERTIFIED REGISTERED

## 2022-04-29 PROCEDURE — 3609012400 HC EGD TRANSORAL BIOPSY SINGLE/MULTIPLE: Performed by: INTERNAL MEDICINE

## 2022-04-29 RX ORDER — SODIUM CHLORIDE 9 MG/ML
INJECTION, SOLUTION INTRAVENOUS PRN
Status: DISCONTINUED | OUTPATIENT
Start: 2022-04-29 | End: 2022-04-29 | Stop reason: HOSPADM

## 2022-04-29 RX ORDER — SODIUM CHLORIDE 0.9 % (FLUSH) 0.9 %
5-40 SYRINGE (ML) INJECTION PRN
Status: DISCONTINUED | OUTPATIENT
Start: 2022-04-29 | End: 2022-04-29 | Stop reason: HOSPADM

## 2022-04-29 RX ORDER — PROPOFOL 10 MG/ML
INJECTION, EMULSION INTRAVENOUS PRN
Status: DISCONTINUED | OUTPATIENT
Start: 2022-04-29 | End: 2022-04-29 | Stop reason: SDUPTHER

## 2022-04-29 RX ORDER — LIDOCAINE HYDROCHLORIDE 10 MG/ML
INJECTION, SOLUTION EPIDURAL; INFILTRATION; INTRACAUDAL; PERINEURAL PRN
Status: DISCONTINUED | OUTPATIENT
Start: 2022-04-29 | End: 2022-04-29 | Stop reason: SDUPTHER

## 2022-04-29 RX ORDER — SODIUM CHLORIDE, SODIUM LACTATE, POTASSIUM CHLORIDE, CALCIUM CHLORIDE 600; 310; 30; 20 MG/100ML; MG/100ML; MG/100ML; MG/100ML
INJECTION, SOLUTION INTRAVENOUS CONTINUOUS PRN
Status: DISCONTINUED | OUTPATIENT
Start: 2022-04-29 | End: 2022-04-29 | Stop reason: SDUPTHER

## 2022-04-29 RX ORDER — SODIUM CHLORIDE 0.9 % (FLUSH) 0.9 %
5-40 SYRINGE (ML) INJECTION EVERY 12 HOURS SCHEDULED
Status: DISCONTINUED | OUTPATIENT
Start: 2022-04-29 | End: 2022-04-29 | Stop reason: HOSPADM

## 2022-04-29 RX ORDER — ONDANSETRON 2 MG/ML
4 INJECTION INTRAMUSCULAR; INTRAVENOUS
Status: DISCONTINUED | OUTPATIENT
Start: 2022-04-29 | End: 2022-04-29 | Stop reason: HOSPADM

## 2022-04-29 RX ORDER — DIPHENHYDRAMINE HYDROCHLORIDE 50 MG/ML
12.5 INJECTION INTRAMUSCULAR; INTRAVENOUS
Status: DISCONTINUED | OUTPATIENT
Start: 2022-04-29 | End: 2022-04-29 | Stop reason: HOSPADM

## 2022-04-29 RX ORDER — SODIUM CHLORIDE, SODIUM LACTATE, POTASSIUM CHLORIDE, CALCIUM CHLORIDE 600; 310; 30; 20 MG/100ML; MG/100ML; MG/100ML; MG/100ML
INJECTION, SOLUTION INTRAVENOUS CONTINUOUS
Status: DISCONTINUED | OUTPATIENT
Start: 2022-04-29 | End: 2022-04-29 | Stop reason: HOSPADM

## 2022-04-29 RX ADMIN — LIDOCAINE HYDROCHLORIDE 50 MG: 10 INJECTION, SOLUTION EPIDURAL; INFILTRATION; INTRACAUDAL; PERINEURAL at 14:28

## 2022-04-29 RX ADMIN — SODIUM CHLORIDE, POTASSIUM CHLORIDE, SODIUM LACTATE AND CALCIUM CHLORIDE: 600; 310; 30; 20 INJECTION, SOLUTION INTRAVENOUS at 11:54

## 2022-04-29 RX ADMIN — SODIUM CHLORIDE, SODIUM LACTATE, POTASSIUM CHLORIDE, AND CALCIUM CHLORIDE: 600; 310; 30; 20 INJECTION, SOLUTION INTRAVENOUS at 14:24

## 2022-04-29 RX ADMIN — PROPOFOL 200 MG: 10 INJECTION, EMULSION INTRAVENOUS at 14:28

## 2022-04-29 ASSESSMENT — LIFESTYLE VARIABLES: SMOKING_STATUS: 1

## 2022-04-29 ASSESSMENT — PAIN - FUNCTIONAL ASSESSMENT: PAIN_FUNCTIONAL_ASSESSMENT: 0-10

## 2022-04-29 ASSESSMENT — PAIN DESCRIPTION - DESCRIPTORS: DESCRIPTORS: CRUSHING;ACHING

## 2022-04-29 NOTE — DISCHARGE INSTR - ACTIVITY
Cole Sanchez was with her aunt today for a procedure. She was the . Please excuse her from work/school.      Thank you,  Ovidio Schwartz RN  4/29/22

## 2022-04-29 NOTE — OP NOTE
Endoscopic Procedure Note    Patient: Alejandra Sauer: 1960  Med Rec#: [id number] Acc#: [id number]     Primary Care Provider Dr. Lili Hoyt M.D., MD  Referring Provider: Ghulam BREWER    Endoscopist: Abhishek Lacey MD    Date of Procedure:  4/29/2022    Procedure:   1. EGD with variceal banding    Indications:   1. H/o cirrhosis with portal htn   2. Previous banding     Anesthesia:  Sedation was administered by anesthesia who monitored the patient during the procedure. Estimated Blood Loss: minimal    Procedure:   After reviewing the patient's chart and obtaining informed consent, the patient was placed in the left lateral decubitus position. A forward-viewing Olympus endoscope was lubricated and inserted through the mouth into the oropharynx. Under direct visualization, the upper esophagus was intubated. The scope was advanced to the level of the third portion of duodenum. Scope was slowly withdrawn with careful inspection of the mucosal surfaces. The scope was retroflexed for inspection of the gastric fundus and incisura. Findings and maneuvers are listed in impression below. The patient tolerated the procedure well. The scope was removed. There were no immediate complications. Findings:   Esophagus: abnormal: there is evidence of previous banding noted throughout the distal esophagus. No large significant varices noted. There is a minimal sized hiatal hernia present. Stomach:  abnormal:  mucosal changes suggestive of portal hypertensive gastropathy noted. No gastric varices seen. Duodenum: normal      IMPRESSION:  1. Small esophageal varices. RECOMMENDATIONS:    1. Repeat EGD in 6 months for variceal screening   2. Continue current meds    3. F/u as planned with Ghulam BREWER    The results were discussed with the patient and family. A copy of the images obtained were given to the patient.      Flex Levi MD  4/29/2022  2:34 PM

## 2022-04-29 NOTE — ANESTHESIA PRE PROCEDURE
Department of Anesthesiology  Preprocedure Note       Name:  Anabelle Quiles   Age:  60 y.o.  :  1960                                          MRN:  [id number]         Date:  2022      Surgeon: Lizzette Beard):  Karma العراقي MD    Procedure: Procedure(s):  EGD ESOPHAGOGASTRODUODENOSCOPY    Medications prior to admission:   Prior to Admission medications    Medication Sig Start Date End Date Taking? Authorizing Provider   traZODone (DESYREL) 150 MG tablet Take 150 mg by mouth nightly    Historical Provider, MD   levothyroxine (SYNTHROID) 50 MCG tablet Take 50 mcg by mouth Daily    Historical Provider, MD   linagliptin (TRADJENTA) 5 MG tablet Take 5 mg by mouth daily    Historical Provider, MD   ondansetron (ZOFRAN) 4 MG tablet Take 1 tablet by mouth every 8 hours as needed for Nausea 20   Karma العراقي MD   nadolol (CORGARD) 20 MG tablet TAKE ONE TABLET BY MOUTH EVERY DAY -- NEED APPOINTMENT 10/7/20   DANIELLA Moreno NP   omeprazole (PRILOSEC) 20 MG delayed release capsule TAKE ONE CAPSULE BY MOUTH EVERY DAY 10/7/20   DANIELLA Moreno NP   atorvastatin (LIPITOR) 10 MG tablet Take 10 mg by mouth daily    Historical Provider, MD   Ertugliflozin L-PyroglutamicAc (STEGLATRO) 5 MG TABS Take 5 mg by mouth daily  19   Historical Provider, MD   cyclobenzaprine (FLEXERIL) 10 MG tablet Take 10 mg by mouth 3 times daily as needed for Muscle spasms    Historical Provider, MD   traMADol (ULTRAM) 50 MG tablet Take 1 tablet by mouth every 12 hours as needed for Pain 17   DANIELLA Wolff CNP   benazepril (LOTENSIN) 20 MG tablet Take 1 tablet by mouth daily 17   DANIELLA Flores CNP       Current medications:    No current facility-administered medications for this visit. No current outpatient medications on file.      Facility-Administered Medications Ordered in Other Visits   Medication Dose Route Frequency Provider Last Rate Last Admin    lactated ringers infusion IntraVENous Continuous Mckenna Maldonado  mL/hr at 04/29/22 1154 New Bag at 04/29/22 1154       Allergies:  No Known Allergies    Problem List:    Patient Active Problem List   Diagnosis Code    Nonintractable headache R51.9    Cirrhosis of liver without ascites (HCC) K74.60    Essential hypertension I10    Chronic GERD K21.9    History of hepatitis C Z86.19    Elevated AFP R77.2    Portal hypertension ([facility] [facility] K76.6    Secondary esophageal varices without bleeding (HCC) I85.10    Gastritis without bleeding K29.70    Gallstones K80.20       Past Medical History:        Diagnosis Date    Arthritis     Asthma     Cirrhosis ([facility] [address]     COPD (chronic obstructive pulmonary disease) (HCC)     Esophageal varices (HCC)     GERD (gastroesophageal reflux disease)     Hepatitis C     Hyperlipidemia     Hypertension        Past Surgical History:        Procedure Laterality Date    COLONOSCOPY  03/02/2016    Dr Isaak Hamilton bleeding internal hemorrhoids o/w normal; fair prep (5 yr)    COLONOSCOPY N/A 03/09/2021    Dr Ashish Kulkarni, 5 yr recall    CA EGD TRANSORAL BIOPSY SINGLE/MULTIPLE N/A 02/07/2017    Dr Alireza Davis Grade I esophageal varices, gastritis/gastropathy    CA EGD TRANSORAL BIOPSY SINGLE/MULTIPLE N/A 03/23/2018    Dr Lefty Darling I esophageal varices, erosive/active gastritis-2 yr recall    UPPER GASTROINTESTINAL ENDOSCOPY  03/02/2016    Dr Perkins-shantal II esoph varices; portal hyptensive gastropathy    UPPER GASTROINTESTINAL ENDOSCOPY N/A 03/03/2020    Dr Los Blum/variceal banding x 4, 3 columns of Grade II varices, repeat in 6-8 wks    UPPER GASTROINTESTINAL ENDOSCOPY N/A 04/14/2020    Dr Los Blum/variceal banding x 3-grade I-II varices, repeat in 8 wks    UPPER GASTROINTESTINAL ENDOSCOPY N/A 08/25/2020    Dr LUIS FELIPE Blum/variceal banding x 3, grade 1-2 varices, portal hypertensive gastropathy, repeat in 3 months    UPPER GASTROINTESTINAL ENDOSCOPY N/A 12/04/2020    Dr Los Robbw/variceal banding x 5-Grade II varices, portal hypertensive gastropathy, repeat in 3 months    UPPER GASTROINTESTINAL ENDOSCOPY N/A 03/09/2021    Dr Nahun Dye of previous banding seen, no varices, portal hypertensive gastropathy, repeat in 6 months    US GUIDED LIVER BIOPSY PERCUTANEOUS  05/07/2019    Dr. Adonna Bamberger; Grade 2, Stage 4, iron stain (-)       Social History:    Social History     Tobacco Use    Smoking status: Current Every Day Smoker     Packs/day: 0.50     Years: 45.00     Pack years: 22.50    Smokeless tobacco: Never Used   Substance Use Topics    Alcohol use: No     Comment:  stopped drinking 2014? Ready to quit: Not Answered  Counseling given: Not Answered      Vital Signs (Current): There were no vitals filed for this visit.                                            BP Readings from Last 3 Encounters:   04/29/22 (!) 163/86   03/28/22 132/68   08/23/21 120/80       NPO Status:                                                                                 BMI:   Wt Readings from Last 3 Encounters:   04/29/22 202 lb (91.6 kg)   03/28/22 206 lb (93.4 kg)   08/23/21 196 lb (88.9 kg)     There is no height or weight on file to calculate BMI.    CBC:   Lab Results   Component Value Date    WBC 5.4 02/23/2022    RBC 5.09 02/23/2022    HGB 14.6 02/23/2022    HCT 45.0 02/23/2022    MCV 88.4 02/23/2022    RDW 13.4 02/23/2022     02/23/2022       CMP:   Lab Results   Component Value Date     02/23/2022    K 4.3 02/23/2022     02/23/2022    CO2 25 02/23/2022    BUN 10 02/23/2022    CREATININE 0.5 02/23/2022    GFRAA >59 02/23/2022    LABGLOM >60 02/23/2022    GLUCOSE 166 02/23/2022    PROT 8.0 02/23/2022    CALCIUM 9.5 02/23/2022    BILITOT 0.7 02/23/2022    ALKPHOS 96 02/23/2022    AST 21 02/23/2022    ALT 15 02/23/2022       POC Tests:   Recent Labs     04/29/22  1155   POCGLU 151*       Coags:   Lab Results   Component Value Date    PROTIME 13.7 02/23/2022 INR 1.03 02/23/2022    APTT 35.5 05/07/2019       HCG (If Applicable): No results found for: PREGTESTUR, PREGSERUM, HCG, HCGQUANT     ABGs: No results found for: PHART, PO2ART, ASV5HPG, VQT7HKO, BEART, J7PKQSMX     Type & Screen (If Applicable):  No results found for: LABABO, LABRH    Drug/Infectious Status (If Applicable):  No results found for: HIV, HEPCAB    COVID-19 Screening (If Applicable):   Lab Results   Component Value Date    COVID19 Not Detected 03/05/2021         Anesthesia Evaluation  Patient summary reviewed no history of anesthetic complications:   Airway: Mallampati: II  TM distance: >3 FB   Neck ROM: full  Mouth opening: > = 3 FB Dental: normal exam         Pulmonary: breath sounds clear to auscultation  (+) COPD:  asthma: current smoker          Patient smoked on day of surgery. Cardiovascular:    (+) hypertension:, hyperlipidemia         Beta Blocker:  Dose within 24 Hrs         Neuro/Psych:   (+) headaches:,             GI/Hepatic/Renal:   (+) GERD:, hepatitis: C, liver disease (cirrhosis): portal hypertension, esophageal varices, morbid obesity          Endo/Other:    (+) Diabetes, : arthritis:., .                 Abdominal:   (+) obese,           Vascular: negative vascular ROS. Other Findings:               Anesthesia Plan      general and TIVA     ASA 3       Induction: intravenous. Anesthetic plan and risks discussed with patient.                       DANIELLA Sun - CRNA   4/29/2022

## 2022-04-29 NOTE — H&P
Patient Name: Shantanu Howard  : 1960  MRN: [id number]  DATE: 22    Allergies: No Known Allergies     ENDOSCOPY  History and Physical    Procedure:    [] Diagnostic Colonoscopy       [] Screening Colonoscopy  [x] EGD      [] ERCP      [] EUS       [] Other    [x] Previous office notes/History and Physical reviewed from the patients chart. Please see EMR for further details of HPI. I have examined the patient's status immediately prior to the procedure and:      Indications/HPI:    []Abdominal Pain   []Barretts  []Screening/Surveillance   []History of Polyps  []Dysphagia            [] +Cologard/DNA testing  []Abnormal Imaging              []EOE Hx              [] Family Hx of CRC/Polyps  []Anemia                            []Food Impaction       []Recent Poor Prep  []GI Bleed             []Lymphadenopathy  []History of Polyps  []Change in bowel habits []Heartburn/Reflux  []Cancer- GI/Lung  []Chest Pain - Non Cardiac []Heme (+) Stool []Ulcers  []Constipation  []Hemoptysis  []Incontinence    []Diarrhea  []Hypoxemia  []Rectal Bleed (BRBPR)  []Nausea/Vomiting   [x] Varices  []Crohns/Colitis  []Pancreatic Cyst   [x] Cirrhosis   []Pancreatitis    []Abnormal MRCP  []Elevated LFT [] Stent Removal, Previous ERCP  []Other:     Anesthesia:   [x] MAC [] Moderate Sedation   [] General   [] None     ROS: 12 pt Review of Symptoms was negative unless mentioned above    Medications:   Prior to Admission medications    Medication Sig Start Date End Date Taking?  Authorizing Provider   traZODone (DESYREL) 150 MG tablet Take 150 mg by mouth nightly    Historical Provider, MD   levothyroxine (SYNTHROID) 50 MCG tablet Take 50 mcg by mouth Daily    Historical Provider, MD   linagliptin (TRADJENTA) 5 MG tablet Take 5 mg by mouth daily    Historical Provider, MD   ondansetron (ZOFRAN) 4 MG tablet Take 1 tablet by mouth every 8 hours as needed for Nausea 20   Analisa Mclaughlin MD   nadolol (CORGARD) 20 MG tablet TAKE ONE TABLET BY MOUTH EVERY DAY -- NEED APPOINTMENT 10/7/20   DANIELLA Chapin NP   omeprazole (PRILOSEC) 20 MG delayed release capsule TAKE ONE CAPSULE BY MOUTH EVERY DAY 10/7/20   DANIELLA Chapin NP   atorvastatin (LIPITOR) 10 MG tablet Take 10 mg by mouth daily    Historical Provider, MD   Ertugliflozin L-PyroglutamicAc (STEGLATRO) 5 MG TABS Take 5 mg by mouth daily  4/24/19   Historical Provider, MD   cyclobenzaprine (FLEXERIL) 10 MG tablet Take 10 mg by mouth 3 times daily as needed for Muscle spasms    Historical Provider, MD   traMADol (ULTRAM) 50 MG tablet Take 1 tablet by mouth every 12 hours as needed for Pain 9/7/17   DANIELLA Ward CNP   benazepril (LOTENSIN) 20 MG tablet Take 1 tablet by mouth daily 7/5/17   DANIELLA Calloway CNP       Past Medical History:  Past Medical History:   Diagnosis Date    Arthritis     Asthma     [facility] [address]     COPD (chronic obstructive [facility] disease) [facility] [address] [facility]     Esophageal varices [facility] [address]     GERD (gastroesophageal reflux disease)     Hepatitis C     Hyperlipidemia     Hypertension        Past Surgical History:  Past Surgical History:   Procedure Laterality Date    COLONOSCOPY  03/02/2016    Dr Jorge Level bleeding internal hemorrhoids o/w normal; fair prep (5 yr)    COLONOSCOPY N/A 03/09/2021    Dr Radha Aguirre, 5 yr recall    CT EGD TRANSORAL BIOPSY SINGLE/MULTIPLE N/A 02/07/2017    Dr Karyn García Grade I esophageal varices, gastritis/gastropathy    CT EGD TRANSORAL BIOPSY SINGLE/MULTIPLE N/A 03/23/2018    Dr Cross  I esophageal varices, erosive/active gastritis-2 yr recall    UPPER GASTROINTESTINAL ENDOSCOPY  03/02/2016    Dr Perkins-gr II esoph varices; portal hyptensive gastropathy    UPPER GASTROINTESTINAL ENDOSCOPY N/A 03/03/2020    Dr Stu Torres-w/variceal banding x 4, 3 columns of Grade II varices, repeat in 6-8 wks    UPPER GASTROINTESTINAL ENDOSCOPY N/A 04/14/2020    Dr LUIS EFLIPE Torres-marisa/variceal banding x 3-grade I-II varices, repeat in 8 wks    UPPER GASTROINTESTINAL ENDOSCOPY N/A 08/25/2020    Dr Gino Torres-w/variceal banding x 3, grade 1-2 varices, portal hypertensive gastropathy, repeat in 3 months    UPPER GASTROINTESTINAL ENDOSCOPY N/A 12/04/2020    Dr Gino Torres-w/variceal banding x 5-Grade II varices, portal hypertensive gastropathy, repeat in 3 months    UPPER GASTROINTESTINAL ENDOSCOPY N/A 03/09/2021    Dr David Mercado of previous banding seen, no varices, portal hypertensive gastropathy, repeat in 6 months    US GUIDED LIVER BIOPSY PERCUTANEOUS  05/07/2019    Dr. Krystina Kaur; Grade 2, Stage 4, iron stain (-)       Social History:  Social History     Tobacco Use    Smoking status: Current Every Day Smoker     Packs/day: 0.50     Years: 45.00     Pack years: 22.50    Smokeless tobacco: Never Used   Vaping Use    Vaping Use: Never used   Substance Use Topics    Alcohol use: No     Comment:  stopped drinking 2014?  Drug use: No     Types: Cocaine     Comment: Stopped in  2014?- Pt did not have to do any rehab       Vital Signs:   Vitals:    04/29/22 1147   BP: (!) 163/86   Pulse: 57   Resp: 18   Temp: 97.6 °F (36.4 °C)   SpO2: 99%        Physical Exam:  Cardiac:  [x]WNL  []Comments:  Pulmonary:  [x]WNL   []Comments:  Neuro/Mental Status:  [x]WNL  []Comments:  Abdominal:  [x]WNL    []Comments:  Other:   []WNL  []Comments:    Informed Consent:  The risks and benefits of the procedure have been discussed with either the patient or if they cannot consent, their representative. Assessment:  Patient examined and appropriate for planned sedation and procedure. Plan:  Proceed with planned sedation and procedure as above.          Dulce Rawls MD

## 2022-05-05 DIAGNOSIS — E03.9 HYPOTHYROIDISM, UNSPECIFIED TYPE: ICD-10-CM

## 2022-05-05 DIAGNOSIS — E11.42 TYPE 2 DIABETES MELLITUS WITH DIABETIC POLYNEUROPATHY, WITHOUT LONG-TERM CURRENT USE OF INSULIN: ICD-10-CM

## 2022-05-05 NOTE — TELEPHONE ENCOUNTER
Rx Refill Note  Requested Prescriptions     Pending Prescriptions Disp Refills   • Tradjenta 5 MG tablet tablet [Pharmacy Med Name: TRADJENTA 5MG TABS] 90 tablet 1     Sig: TAKE ONE TABLET BY MOUTH EVERY DAY   • levothyroxine (SYNTHROID, LEVOTHROID) 50 MCG tablet [Pharmacy Med Name: LEVOTHYROXINE SODIUM 50MCG TABS] 90 tablet 1     Sig: TAKE ONE TABLET BY MOUTH EVERY DAY      Last office visit with prescribing clinician: 4/13/2022      Next office visit with prescribing clinician: 7/29/2022     Component   Ref Range & Units 9 mo ago 1 yr ago   T4, Total   4.5 - 12.0 ug/dL 7.6  7.1        Component   Ref Range & Units 9 mo ago 1 yr ago   TSH   0.450 - 4.500 uIU/mL 4.770 High   4.200             Patti Schwab MA  05/05/22, 10:27 CDT

## 2022-05-06 RX ORDER — LINAGLIPTIN 5 MG/1
TABLET, FILM COATED ORAL
Qty: 90 TABLET | Refills: 1 | Status: SHIPPED | OUTPATIENT
Start: 2022-05-06 | End: 2022-11-07 | Stop reason: SDUPTHER

## 2022-05-06 RX ORDER — LEVOTHYROXINE SODIUM 0.05 MG/1
TABLET ORAL
Qty: 90 TABLET | Refills: 1 | Status: SHIPPED | OUTPATIENT
Start: 2022-05-06 | End: 2022-11-07 | Stop reason: SDUPTHER

## 2022-05-09 DIAGNOSIS — F51.01 PRIMARY INSOMNIA: ICD-10-CM

## 2022-05-09 RX ORDER — TRAZODONE HYDROCHLORIDE 150 MG/1
TABLET ORAL
Qty: 90 TABLET | Refills: 1 | Status: SHIPPED | OUTPATIENT
Start: 2022-05-09 | End: 2022-10-20 | Stop reason: SDUPTHER

## 2022-05-09 NOTE — TELEPHONE ENCOUNTER
Rx Refill Note  Requested Prescriptions     Pending Prescriptions Disp Refills   • traZODone (DESYREL) 150 MG tablet [Pharmacy Med Name: TRAZODONE HCL 150MG TABS] 90 tablet 1     Sig: TAKE ONE TABLET BY MOUTH NIGHTLY      Last office visit with prescribing clinician: 4/13/2022      Next office visit with prescribing clinician: 7/29/2022     [facility] COMP DRUG ANALYSIS,UR   ====================================================================   Test                             Result       Flag       Units   Drug Present not Declared for Prescription Verification     Alcohol, Ethyl                 0.061        UNEXPECTED g/dL      Sources of ethyl alcohol include alcoholic beverages or as a      fermentation product of glucose; glucose is present in this      specimen.  Interpret result with caution, as the presence of      ethyl alcohol is likely due, at least in part, to fermentation of      glucose.     Trazodone                      PRESENT      UNEXPECTED     1,3 chlorophenyl piperazine    PRESENT      UNEXPECTED      1,3-chlorophenyl piperazine is an expected metabolite of      trazodone.   Drug Absent but Declared for Prescription Verification     Gabapentin                     Not Detected UNEXPECTED   ====================================================================   Test                      Result    Flag   Units      Ref Range     Creatinine              26               mg/dL      >=20   ====================================================================      Patti Schwab MA  05/09/22, 08:31 CDT

## 2022-05-11 DIAGNOSIS — S83.411A SPRAIN OF MEDIAL COLLATERAL LIGAMENT OF RIGHT KNEE, INITIAL ENCOUNTER: ICD-10-CM

## 2022-05-11 DIAGNOSIS — M25.571 ACUTE RIGHT ANKLE PAIN: ICD-10-CM

## 2022-05-11 RX ORDER — NABUMETONE 500 MG/1
500 TABLET, FILM COATED ORAL 2 TIMES DAILY PRN
Qty: 60 TABLET | Refills: 5 | Status: SHIPPED | OUTPATIENT
Start: 2022-05-11 | End: 2022-07-29

## 2022-05-11 NOTE — TELEPHONE ENCOUNTER
Rx Refill Note  Requested Prescriptions     Pending Prescriptions Disp Refills   • nabumetone (RELAFEN) 500 MG tablet 60 tablet 5     Sig: Take 1 tablet by mouth 2 (Two) Times a Day As Needed for Moderate Pain .      Last office visit with prescribing clinician: 4/13/2022      Next office visit with prescribing clinician: 7/29/2022   Adamaris Palmer MA  05/11/22, 07:45 CDT

## 2022-05-16 ENCOUNTER — TELEPHONE (OUTPATIENT)
Dept: FAMILY MEDICINE CLINIC | Facility: CLINIC | Age: 62
End: 2022-05-16

## 2022-05-16 DIAGNOSIS — M62.838 MUSCLE SPASMS OF BOTH LOWER EXTREMITIES: Primary | ICD-10-CM

## 2022-05-16 RX ORDER — TIZANIDINE 4 MG/1
4 TABLET ORAL EVERY 8 HOURS PRN
Qty: 60 TABLET | Refills: 1 | Status: SHIPPED | OUTPATIENT
Start: 2022-05-16

## 2022-05-16 NOTE — TELEPHONE ENCOUNTER
Caller: Della Gonzalez    Relationship: Self    Best call back number: [phone]    What medication are you requesting:   MEDICATION FOR LEG CRAMPS    tiZANidine (ZANAFLEX) 4 MG tablet      What are your current symptoms:   LEGS ARE CRAMPING AND IN PAIN    How long have you been experiencing symptoms:   ABOUT  A WEEK    Have you had these symptoms before:    [x] Yes  [] No    Have you been treated for these symptoms before:   [] Yes  [x] No    If a prescription is needed, what is your preferred pharmacy and phone number: [facility] [place], [place] - [address] [facility] [phone] [facility] [phone]      Additional notes:  N/A

## 2022-06-09 ENCOUNTER — OFFICE VISIT (OUTPATIENT)
Dept: FAMILY MEDICINE CLINIC | Facility: CLINIC | Age: 62
End: 2022-06-09

## 2022-06-09 VITALS
WEIGHT: 199.6 LBS | SYSTOLIC BLOOD PRESSURE: 148 MMHG | BODY MASS INDEX: 39.19 KG/M2 | OXYGEN SATURATION: 97 % | HEIGHT: 60 IN | DIASTOLIC BLOOD PRESSURE: 81 MMHG | HEART RATE: 60 BPM | TEMPERATURE: 97.6 F

## 2022-06-09 DIAGNOSIS — K13.0 ABSCESS OF LIP: Primary | ICD-10-CM

## 2022-06-09 PROCEDURE — 99213 OFFICE O/P EST LOW 20 MIN: CPT | Performed by: NURSE PRACTITIONER

## 2022-06-09 PROCEDURE — 96372 THER/PROPH/DIAG INJ SC/IM: CPT | Performed by: NURSE PRACTITIONER

## 2022-06-09 RX ORDER — SULFAMETHOXAZOLE AND TRIMETHOPRIM 800; 160 MG/1; MG/1
1 TABLET ORAL 2 TIMES DAILY
Qty: 20 TABLET | Refills: 0 | Status: SHIPPED | OUTPATIENT
Start: 2022-06-09 | End: 2022-07-11 | Stop reason: SDUPTHER

## 2022-06-09 RX ORDER — CEFTRIAXONE 1 G/1
1 INJECTION, POWDER, FOR SOLUTION INTRAMUSCULAR; INTRAVENOUS EVERY 24 HOURS
Status: COMPLETED | OUTPATIENT
Start: 2022-06-09 | End: 2022-06-09

## 2022-06-09 RX ADMIN — CEFTRIAXONE 1 G: 1 INJECTION, POWDER, FOR SOLUTION INTRAMUSCULAR; INTRAVENOUS at 10:46

## 2022-06-09 NOTE — PROGRESS NOTES
"Chief Complaint   Patient presents with   • Abscess     Patient has abscess on upper portion of lip        Subjective   Della Gonzalez is a 62 y.o. female who presents today for abscess on upper lip.     HPI   Has had abscess on left upper lip for a couple of days. Has been using hot compresses and triple antibiotic ointment on it with no relief. States she is in a lot of pain on her lip and a headache. Pain all the way from her lip to her left eye.     No Known Allergies      OBJECTIVE:  Vitals:    06/09/22 1026   BP: 148/81   BP Location: Right arm   Patient Position: Sitting   Cuff Size: Large Adult   Pulse: 60   Temp: 97.6 °F (36.4 °C)   TempSrc: Infrared   SpO2: 97%   Weight: 90.5 kg (199 lb 9.6 oz)   Height: 152.4 cm (60\")     Physical Exam  Vitals and nursing note reviewed.   Constitutional:       Appearance: Normal appearance.   HENT:      Mouth/Throat:      Lips: Lesions present.        Comments: Erythematous, edematous lesion on left upper lip. Consistent with abscess.   Cardiovascular:      Rate and Rhythm: Normal rate and regular rhythm.      Pulses: Normal pulses.      Heart sounds: Normal heart sounds.   Pulmonary:      Effort: Pulmonary effort is normal.      Breath sounds: Normal breath sounds.   Skin:     General: Skin is warm and dry.   Neurological:      Mental Status: She is alert.   Psychiatric:         Mood and Affect: Mood normal.         Behavior: Behavior normal.         Class 2 Severe Obesity (BMI >=35 and <=39.9). Obesity-related health conditions include the following: hypertension and dyslipidemias. Obesity is unchanged. BMI is is above average; BMI management plan is completed. We discussed low calorie, low carb based diet program, portion control and increasing exercise.        ASSESSMENT/ PLAN:    Diagnoses and all orders for this visit:    1. Abscess of lip (Primary)  -     sulfamethoxazole-trimethoprim (BACTRIM DS,SEPTRA DS) 800-160 MG per tablet; Take 1 tablet by mouth 2 (Two) Times a " Day.  Dispense: 20 tablet; Refill: 0  -     cefTRIAXone (ROCEPHIN) injection 1 g          Management Plan:     An After Visit Summary was printed and given to the patient at discharge.    Follow-up: Return if symptoms worsen or fail to improve.    I spent 20 minutes caring for Della on this date of service. This time includes time spent by me in the following activities: preparing for the visit, obtaining and/or reviewing a separately obtained history, performing a medically appropriate examination and/or evaluation, ordering medications, tests, or procedures and documenting information in the medical record     MELECIO Bhakta 6/9/2022 10:41 CDT  This note was electronically signed.

## 2022-06-24 ENCOUNTER — TELEPHONE (OUTPATIENT)
Dept: FAMILY MEDICINE CLINIC | Facility: CLINIC | Age: 62
End: 2022-06-24

## 2022-06-24 NOTE — TELEPHONE ENCOUNTER
Caller: Della Gonzalez    Relationship: Self    Best call back number: [phone]    What form or medical record are you requesting: LETTER ABOUT HAVING A PET AS A , STATES SHE NEEDS A COPY OF HE LETTER    Who is requesting this form or medical record from you: SELF     How would you like to receive the form or medical records (pick-up, mail, fax):      Timeframe paperwork needed: ASAP

## 2022-06-27 RX ORDER — NADOLOL 20 MG/1
TABLET ORAL
Qty: 90 TABLET | Refills: 1 | Status: SHIPPED | OUTPATIENT
Start: 2022-06-27 | End: 2022-12-29

## 2022-06-27 NOTE — TELEPHONE ENCOUNTER
Rx Refill Note  Requested Prescriptions     Pending Prescriptions Disp Refills   • nadolol (CORGARD) 20 MG tablet [Pharmacy Med Name: NADOLOL 20MG TABS] 90 tablet 1     Sig: TAKE ONE TABLET BY MOUTH EVERY DAY      Last office visit with prescribing clinician: 4/13/2022      Next office visit with prescribing clinician: 7/29/2022           Patti Schwab MA  06/27/22, 13:05 CDT

## 2022-07-11 DIAGNOSIS — K13.0 ABSCESS OF LIP: ICD-10-CM

## 2022-07-11 DIAGNOSIS — E11.628 TYPE 2 DIABETES MELLITUS WITH OTHER SKIN COMPLICATION, WITHOUT LONG-TERM CURRENT USE OF INSULIN: ICD-10-CM

## 2022-07-11 NOTE — TELEPHONE ENCOUNTER
Caller: GonzalezDella    Relationship: Self    Best call back number: [phone]    Requested Prescriptions:   Requested Prescriptions     Pending Prescriptions Disp Refills   • sulfamethoxazole-trimethoprim (BACTRIM DS,SEPTRA DS) 800-160 MG per tablet 20 tablet 0     Sig: Take 1 tablet by mouth 2 (Two) Times a Day.        Pharmacy where request should be sent: [facility] [address] [phone] [facility] [phone] FX     ADDITIONAL NOTES: PATIENT STATES SHE NEEDS A REFILL ON THE MEDICATION FOR THE BUMPS ON HER FACE  THEY VERY IRRITATED AND BURNING     Does the patient have less than a 3 day supply:  [x] Yes  [] No    Keiry Steve Rep   07/11/22 15:51 CDT

## 2022-07-12 RX ORDER — SULFAMETHOXAZOLE AND TRIMETHOPRIM 800; 160 MG/1; MG/1
1 TABLET ORAL 2 TIMES DAILY
Qty: 20 TABLET | Refills: 0 | Status: SHIPPED | OUTPATIENT
Start: 2022-07-12 | End: 2022-07-29

## 2022-07-20 NOTE — PROGRESS NOTES
[facility] - PODIATRY    Today's Date: 07/25/22    Patient Name: Della Gonzalez  MRN: [id number]  CSN: [id number]  PCP: Vijaya Henao APRN  Referring Provider: No ref. provider found    SUBJECTIVE     Chief Complaint   Patient presents with   • Follow-up     HenaoVijaya, 12/09/2021 3 month fu diabetic foot care- pt states feet doing ok, stumped her 4th toe right foot recently and its bruised and hurts a  little, left heel outside edge is cracking and sore- pt pain 6/10 at worst, mostly the left heel cracking- pt presents with long thick nails, dry skin, bruised 4th toe right foot, cracking heel left foot   • Diabetes     Hasnt checked     HPI: Della Gonzalez, a 62 y.o.female, comes to clinic as a(n) established patient presenting for diabetic foot exam and complaining of thickened, irregular toenails. Patient has h/o cirrhosis, DM2, GERD, tobacco use. Patient is NIDDM and unsure of last BG level.  Has not checked her blood glucose recently. Reports mild numbness/tingling in feet.  Notes that her toenails are long, thick, discolored and crumbly.  Patient states she is unable to take care of her nails at home due to shape and thickness of nails.  Continues tobacco use daily. States that she has bruising on the right 4th toe but is unsure what she did; states that it is somewhat tender. Also notes that heels are cracking and are painful. Admits pain at 6/10 level and described as sharp. Relates previous treatment(s) including care by podiatrist. Denies any constitutional symptoms. No other pedal complaints at this time.    Past Medical History:   Diagnosis Date   • Cirrhosis of liver (HCC)    • Diabetes mellitus (HCC)    • GERD (gastroesophageal reflux disease)    • Liver disease      Past Surgical History:   Procedure Laterality Date   • COLONOSCOPY       Family History   Problem Relation Age of Onset   • Diabetes Mother    • No Known Problems Father      Social History     Socioeconomic  History   • Marital status: Single   Tobacco Use   • Smoking status: Current Every Day Smoker     Packs/day: 0.50     Years: 40.00     Pack years: 20.00     Types: Cigarettes   • Smokeless tobacco: Never Used   Vaping Use   • Vaping Use: Never used   Substance and Sexual Activity   • Alcohol use: No   • Drug use: No   • Sexual activity: Not Currently     Partners: Male     No Known Allergies  Current Outpatient Medications   Medication Sig Dispense Refill   • atorvastatin (LIPITOR) 10 MG tablet TAKE ONE TABLET BY MOUTH EVERY DAY 90 tablet 1   • benazepril (LOTENSIN) 10 MG tablet TAKE ONE TABLET BY MOUTH EVERY DAY 90 tablet 1   • butalbital-acetaminophen-caffeine (Esgic) -40 MG per tablet Take 1 tablet by mouth Every 4 (Four) Hours As Needed for Headache. 12 tablet 0   • cetirizine (zyrTEC) 10 MG tablet Take 1 tablet by mouth Daily. 90 tablet 1   • gabapentin (NEURONTIN) 800 MG tablet Take 1 tablet by mouth 2 (Two) Times a Day. 180 tablet 0   • ketoconazole (NIZORAL) 2 % cream APPLY TOPICALLY TO APPROPRIATE AREA AS DIRECTED DAILY 60 g 2   • levothyroxine (SYNTHROID, LEVOTHROID) 50 MCG tablet TAKE ONE TABLET BY MOUTH EVERY DAY 90 tablet 1   • metFORMIN (Glucophage) 1000 MG tablet Take 1 tablet by mouth 2 (Two) Times a Day With Meals. 180 tablet 1   • nabumetone (RELAFEN) 500 MG tablet Take 1 tablet by mouth 2 (Two) Times a Day As Needed for Moderate Pain . 60 tablet 5   • nadolol (CORGARD) 20 MG tablet TAKE ONE TABLET BY MOUTH EVERY DAY 90 tablet 1   • Neomycin-Polymyxin-Dexameth 0.1 % ointment Apply 1 application to eye(s) as directed by provider 2 (Two) Times a Day. 3.5 g 1   • nystatin (MYCOSTATIN) 775141 UNIT/GM cream Apply  topically to the appropriate area as directed 2 (Two) Times a Day. 30 g 2   • omeprazole (priLOSEC) 20 MG capsule Take 1 capsule by mouth Daily. 90 capsule 1   • ondansetron ODT (Zofran ODT) 4 MG disintegrating tablet Place 1 tablet on the tongue Every 8 (Eight) Hours As Needed for  Nausea or Vomiting. 15 tablet 0   • Steglatro 15 MG tablet TAKE ONE TABLET BY MOUTH EVERY DAY IN THE MORNING 90 tablet 1   • sulfamethoxazole-trimethoprim (BACTRIM DS,SEPTRA DS) 800-160 MG per tablet Take 1 tablet by mouth 2 (Two) Times a Day. 20 tablet 0   • SUMAtriptan (Imitrex) 100 MG tablet Take one tablet at onset of headache. May repeat dose one time in 2 hours if headache not relieved. 9 tablet 2   • terbinafine (lamISIL) 1 % cream Apply 1 application topically to the appropriate area as directed 2 (Two) Times a Day. 36 g 3   • tiZANidine (ZANAFLEX) 4 MG tablet Take 1 tablet by mouth Every 8 (Eight) Hours As Needed for Muscle Spasms. 60 tablet 1   • Tradjenta 5 MG tablet tablet TAKE ONE TABLET BY MOUTH EVERY DAY 90 tablet 1   • traZODone (DESYREL) 150 MG tablet TAKE ONE TABLET BY MOUTH NIGHTLY 90 tablet 1   • triamcinolone (KENALOG) 0.1 % cream Apply 1 application topically to the appropriate area as directed 2 (Two) Times a Day. 15 g 2   • valACYclovir (Valtrex) 1000 MG tablet Take 1 tablet by mouth 3 (Three) Times a Day. 21 tablet 0     No current facility-administered medications for this visit.     Review of Systems   Constitutional: Negative for chills and fever.   HENT: Negative for congestion.    Respiratory: Negative for shortness of breath.    Cardiovascular: Positive for leg swelling. Negative for chest pain.   Gastrointestinal: Negative for constipation, diarrhea, nausea and vomiting.   Musculoskeletal: Positive for arthralgias. Negative for gait problem.        Foot pain   Skin: Positive for color change. Negative for wound.        Tender toenails   Neurological: Positive for numbness.       OBJECTIVE     Vitals:    07/22/22 1115   BP: 148/68   Pulse: 63   SpO2: 96%       PHYSICAL EXAM  GEN:   Accompanied by none.     Foot/Ankle Exam:       General:   Appearance: appears stated age and healthy and obesity    Orientation: AAOx3    Affect: appropriate    Gait: unimpaired    Assistance: independent     Shoe Gear:  Casual shoes    VASCULAR      Right Foot Vascularity   Dorsalis pedis:  2+  Posterior tibial:  2+  Skin Temperature: warm    Edema Grading:  None  CFT:  3  Pedal Hair Growth:  Present  Varicosities: mild varicosities       Left Foot Vascularity   Dorsalis pedis:  2+  Posterior tibial:  2+  Skin Temperature: warm    Edema Grading:  None  CFT:  3  Pedal Hair Growth:  Present  Varicosities: mild varicosities        NEUROLOGIC     Right Foot Neurologic   Light touch sensation:  Diminished  Vibratory sensation:  Diminished  Hot/Cold sensation: diminished    Protective Sensation using [place] Monofilament:  8     Left Foot Neurologic   Light touch sensation:  Diminished  Vibratory sensation:  Diminished  Hot/cold sensation: diminished    Protective Sensation using [place] Monofilament:  8     MUSCULOSKELETAL      Right Foot Musculoskeletal   Ecchymosis:  Toe 4  Tenderness: toenails, toe 1 and toe 4    Arch:  Normal  Hallux valgus: No    Hallux limitus: No       Left Foot Musculoskeletal   Ecchymosis:  None  Tenderness: toenails and toe 1    Arch:  Normal  Hallux valgus: No    Hallux limitus: No       MUSCLE STRENGTH     Right Foot Muscle Strength   Foot dorsiflexion:  5  Foot plantar flexion:  5  Foot inversion:  5  Foot eversion:  5     Left Foot Muscle Strength   Foot dorsiflexion:  5  Foot plantar flexion:  5  Foot inversion:  5  Foot eversion:  5     RANGE OF MOTION      Right Foot Range of Motion   Foot and ankle ROM within normal limits       Left Foot Range of Motion   Foot and ankle ROM within normal limits       DERMATOLOGIC     Right Foot Dermatologic   Skin: skin intact    Skin: no right foot tinea    Nails: onychomycosis, abnormally thick, subungual debris, dystrophic nails and ingrown toenail (Hallux)       Left Foot Dermatologic   Skin: skin intact    Skin: no left foot tinea    Nails: onychomycosis, abnormally thick, subungual debris, dystrophic nails and ingrown toenail  (Hallux)        RADIOLOGY/NUCLEAR:  No results found.    LABORATORY/CULTURE RESULTS:      PATHOLOGY RESULTS:       ASSESSMENT/PLAN     Diagnoses and all orders for this visit:    1. Onychogryphosis (Primary)    2. Onychomycosis    3. Ingrown toenail    4. Type 2 diabetes mellitus with diabetic neuropathy, with long-term current use of insulin (HCC)    5. Class 2 obesity with body mass index (BMI) of 39.0 to 39.9 in adult, unspecified obesity type, unspecified whether serious comorbidity present    6. Tinea pedis of both feet    7. Tobacco abuse      Comprehensive lower extremity examination and evaluation was performed.  Discussed findings and treatment plan including risks, benefits, and treatment options with patient in detail. Patient agreed with treatment plan  After verbal consent obtained, nail(s) x10 debrided of length and thickness with nail nipper without incidence  After verbal consent obtained, nail(s) x2 debrided of offending borders with nail nipper without incidence  Patient may maintain nails and calluses at home utilizing emery board or pumice stone between visits as needed  Reviewed at home diabetic foot care including daily foot checks   Tobacco cessation needed.   Continue to follow with PCP for diabetic management.  Patient's Body mass index is 38.47 kg/m². indicating that she is obese (BMI >30). Obesity-related health conditions include the following: diabetes mellitus and GERD. Obesity is unchanged. BMI is is above average; BMI management plan is completed. We discussed portion control and increasing exercise..  An After Visit Summary was printed and given to the patient at discharge, including (if requested) any available informative/educational handouts regarding diagnosis, treatment, or medications. All questions were answered to patient/family satisfaction. Should symptoms fail to improve or worsen they agree to call or return to clinic or to go to the Emergency Department. Discussed the  importance of following up with any needed screening tests/labs/specialist appointments and any requested follow-up recommended by me today. Importance of maintaining follow-up discussed and patient accepts that missed appointments can delay diagnosis and potentially lead to worsening of conditions.  Return in about 3 months (around 10/22/2022)., or sooner if acute issues arise.        This document has been electronically signed by MELECIO Hassan on July 25, 2022 07:44 CDT

## 2022-07-22 ENCOUNTER — OFFICE VISIT (OUTPATIENT)
Dept: PODIATRY | Facility: CLINIC | Age: 62
End: 2022-07-22

## 2022-07-22 VITALS
OXYGEN SATURATION: 96 % | SYSTOLIC BLOOD PRESSURE: 148 MMHG | HEART RATE: 63 BPM | DIASTOLIC BLOOD PRESSURE: 68 MMHG | WEIGHT: 197 LBS | HEIGHT: 60 IN | BODY MASS INDEX: 38.68 KG/M2

## 2022-07-22 DIAGNOSIS — B35.3 TINEA PEDIS OF BOTH FEET: ICD-10-CM

## 2022-07-22 DIAGNOSIS — L60.0 INGROWN TOENAIL: ICD-10-CM

## 2022-07-22 DIAGNOSIS — E66.9 CLASS 2 OBESITY WITH BODY MASS INDEX (BMI) OF 39.0 TO 39.9 IN ADULT, UNSPECIFIED OBESITY TYPE, UNSPECIFIED WHETHER SERIOUS COMORBIDITY PRESENT: ICD-10-CM

## 2022-07-22 DIAGNOSIS — Z79.4 TYPE 2 DIABETES MELLITUS WITH DIABETIC NEUROPATHY, WITH LONG-TERM CURRENT USE OF INSULIN: ICD-10-CM

## 2022-07-22 DIAGNOSIS — E11.40 TYPE 2 DIABETES MELLITUS WITH DIABETIC NEUROPATHY, WITH LONG-TERM CURRENT USE OF INSULIN: ICD-10-CM

## 2022-07-22 DIAGNOSIS — B35.1 ONYCHOMYCOSIS: ICD-10-CM

## 2022-07-22 DIAGNOSIS — L60.2 ONYCHOGRYPHOSIS: Primary | ICD-10-CM

## 2022-07-22 DIAGNOSIS — Z72.0 TOBACCO ABUSE: ICD-10-CM

## 2022-07-22 PROCEDURE — 11721 DEBRIDE NAIL 6 OR MORE: CPT | Performed by: NURSE PRACTITIONER

## 2022-07-25 DIAGNOSIS — E11.628 TYPE 2 DIABETES MELLITUS WITH OTHER SKIN COMPLICATION, WITHOUT LONG-TERM CURRENT USE OF INSULIN: ICD-10-CM

## 2022-07-25 DIAGNOSIS — E78.2 MIXED HYPERLIPIDEMIA: ICD-10-CM

## 2022-07-25 RX ORDER — ATORVASTATIN CALCIUM 10 MG/1
10 TABLET, FILM COATED ORAL DAILY
Qty: 90 TABLET | Refills: 1 | Status: SHIPPED | OUTPATIENT
Start: 2022-07-25 | End: 2023-02-02

## 2022-07-25 NOTE — TELEPHONE ENCOUNTER
Rx Refill Note  Requested Prescriptions     Pending Prescriptions Disp Refills   • Ertugliflozin L-PyroglutamicAc (Steglatro) 15 MG tablet 90 tablet 1     Sig: Take 1 tablet by mouth Every Morning.   • atorvastatin (LIPITOR) 10 MG tablet 90 tablet 1     Sig: Take 1 tablet by mouth Daily.      Last office visit with prescribing clinician: 4/13/2022      Next office visit with prescribing clinician: 7/29/2022     Cyn Still MA  07/25/22, 09:30 CDT

## 2022-07-29 ENCOUNTER — OFFICE VISIT (OUTPATIENT)
Dept: FAMILY MEDICINE CLINIC | Facility: CLINIC | Age: 62
End: 2022-07-29

## 2022-07-29 VITALS
DIASTOLIC BLOOD PRESSURE: 76 MMHG | OXYGEN SATURATION: 93 % | SYSTOLIC BLOOD PRESSURE: 129 MMHG | TEMPERATURE: 97.9 F | HEART RATE: 53 BPM | BODY MASS INDEX: 39.11 KG/M2 | WEIGHT: 199.2 LBS | HEIGHT: 60 IN

## 2022-07-29 VITALS
SYSTOLIC BLOOD PRESSURE: 129 MMHG | HEART RATE: 53 BPM | WEIGHT: 199.2 LBS | OXYGEN SATURATION: 93 % | TEMPERATURE: 97.9 F | BODY MASS INDEX: 39.11 KG/M2 | DIASTOLIC BLOOD PRESSURE: 76 MMHG | HEIGHT: 60 IN
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
## 2022-07-29 DIAGNOSIS — Z72.0 TOBACCO ABUSE: ICD-10-CM

## 2022-07-29 DIAGNOSIS — R51.9 SINUS HEADACHE: ICD-10-CM

## 2022-07-29 DIAGNOSIS — Z78.0 POST-MENOPAUSAL: ICD-10-CM

## 2022-07-29 DIAGNOSIS — J30.9 ALLERGIC SINUSITIS: ICD-10-CM

## 2022-07-29 DIAGNOSIS — Z12.31 BREAST CANCER SCREENING BY MAMMOGRAM: ICD-10-CM

## 2022-07-29 DIAGNOSIS — G62.9 POLYNEUROPATHY: ICD-10-CM

## 2022-07-29 DIAGNOSIS — E78.2 MIXED HYPERLIPIDEMIA: ICD-10-CM

## 2022-07-29 DIAGNOSIS — E66.01 CLASS 2 SEVERE OBESITY DUE TO EXCESS CALORIES WITH SERIOUS COMORBIDITY AND BODY MASS INDEX (BMI) OF 38.0 TO 38.9 IN ADULT: ICD-10-CM

## 2022-07-29 DIAGNOSIS — I10 ESSENTIAL HYPERTENSION: ICD-10-CM

## 2022-07-29 DIAGNOSIS — Z00.00 MEDICARE ANNUAL WELLNESS VISIT, SUBSEQUENT: ICD-10-CM

## 2022-07-29 DIAGNOSIS — E03.9 HYPOTHYROIDISM, UNSPECIFIED TYPE: ICD-10-CM

## 2022-07-29 DIAGNOSIS — E11.628 TYPE 2 DIABETES MELLITUS WITH OTHER SKIN COMPLICATION, WITHOUT LONG-TERM CURRENT USE OF INSULIN: Primary | ICD-10-CM

## 2022-07-29 DIAGNOSIS — Z79.899 LONG-TERM USE OF HIGH-RISK MEDICATION: Primary | ICD-10-CM

## 2022-07-29 DIAGNOSIS — J45.901 BRONCHITIS, ALLERGIC, UNSPECIFIED ASTHMA SEVERITY, WITH ACUTE EXACERBATION: ICD-10-CM

## 2022-07-29 DIAGNOSIS — Z23 NEED FOR VACCINATION AGAINST STREPTOCOCCUS PNEUMONIAE USING PNEUMOCOCCAL CONJUGATE VACCINE 13: ICD-10-CM

## 2022-07-29 PROCEDURE — G0439 PPPS, SUBSEQ VISIT: HCPCS | Performed by: NURSE PRACTITIONER

## 2022-07-29 PROCEDURE — 90677 PCV20 VACCINE IM: CPT | Performed by: NURSE PRACTITIONER

## 2022-07-29 PROCEDURE — 1170F FXNL STATUS ASSESSED: CPT | Performed by: NURSE PRACTITIONER

## 2022-07-29 PROCEDURE — 1159F MED LIST DOCD IN RCRD: CPT | Performed by: NURSE PRACTITIONER

## 2022-07-29 PROCEDURE — 99213 OFFICE O/P EST LOW 20 MIN: CPT | Performed by: NURSE PRACTITIONER

## 2022-07-29 PROCEDURE — G0009 ADMIN PNEUMOCOCCAL VACCINE: HCPCS | Performed by: NURSE PRACTITIONER

## 2022-07-29 RX ORDER — CETIRIZINE HYDROCHLORIDE 10 MG/1
10 TABLET ORAL DAILY
Qty: 90 TABLET | Refills: 1 | Status: SHIPPED | OUTPATIENT
Start: 2022-07-29 | End: 2023-01-26

## 2022-07-29 RX ORDER — TRIAMCINOLONE ACETONIDE 1 MG/G
1 CREAM TOPICAL 2 TIMES DAILY
Qty: 15 G | Refills: 2 | Status: SHIPPED | OUTPATIENT
Start: 2022-07-29

## 2022-07-29 RX ORDER — BENAZEPRIL HYDROCHLORIDE 10 MG/1
10 TABLET ORAL DAILY
Qty: 90 TABLET | Refills: 3 | Status: SHIPPED | OUTPATIENT
Start: 2022-07-29

## 2022-07-29 NOTE — PROGRESS NOTES
Subsequent Medicare Wellness Visit    Chief Complaint   Patient presents with   • Medicare Wellness-subsequent        Subjective    History of Present Illness:  Della Gonzalez is a 62 y.o. female who presents for a Subsequent Medicare Wellness Visit.    Patient has complaints of allergies, sinus pressure and cough from drainage.  No discolored mucus. No fever or other URI symptoms present.    The following portions of the patient's history were reviewed and   updated as appropriate: allergies, current medications, past family history, past medical history, past social history, past surgical history and problem list.    Compared to one year ago, the patient feels her physical   health is the same.    Compared to one year ago, the patient feels her mental   health is the same.    Recent Hospitalizations:  She was not admitted to the hospital during the last year.       Current Medical Providers:  Patient Care Team:  Vijaya Henao APRN as PCP - General (Family Medicine)    Outpatient Medications Prior to Visit   Medication Sig Dispense Refill   • atorvastatin (LIPITOR) 10 MG tablet Take 1 tablet by mouth Daily. 90 tablet 1   • Ertugliflozin L-PyroglutamicAc (Steglatro) 15 MG tablet Take 1 tablet by mouth Every Morning. 90 tablet 1   • gabapentin (NEURONTIN) 800 MG tablet Take 1 tablet by mouth 2 (Two) Times a Day. 180 tablet 0   • ketoconazole (NIZORAL) 2 % cream APPLY TOPICALLY TO APPROPRIATE AREA AS DIRECTED DAILY 60 g 2   • levothyroxine (SYNTHROID, LEVOTHROID) 50 MCG tablet TAKE ONE TABLET BY MOUTH EVERY DAY 90 tablet 1   • nadolol (CORGARD) 20 MG tablet TAKE ONE TABLET BY MOUTH EVERY DAY 90 tablet 1   • Neomycin-Polymyxin-Dexameth 0.1 % ointment Apply 1 application to eye(s) as directed by provider 2 (Two) Times a Day. 3.5 g 1   • omeprazole (priLOSEC) 20 MG capsule Take 1 capsule by mouth Daily. 90 capsule 1   • Tradjenta 5 MG tablet tablet TAKE ONE TABLET BY MOUTH EVERY DAY 90 tablet 1   • traZODone  (DESYREL) 150 MG tablet TAKE ONE TABLET BY MOUTH NIGHTLY 90 tablet 1   • benazepril (LOTENSIN) 10 MG tablet TAKE ONE TABLET BY MOUTH EVERY DAY 90 tablet 1   • cetirizine (zyrTEC) 10 MG tablet Take 1 tablet by mouth Daily. 90 tablet 1   • butalbital-acetaminophen-caffeine (Esgic) -40 MG per tablet Take 1 tablet by mouth Every 4 (Four) Hours As Needed for Headache. 12 tablet 0   • SUMAtriptan (Imitrex) 100 MG tablet Take one tablet at onset of headache. May repeat dose one time in 2 hours if headache not relieved. 9 tablet 2   • tiZANidine (ZANAFLEX) 4 MG tablet Take 1 tablet by mouth Every 8 (Eight) Hours As Needed for Muscle Spasms. 60 tablet 1   • metFORMIN (Glucophage) 1000 MG tablet Take 1 tablet by mouth 2 (Two) Times a Day With Meals. 180 tablet 1   • nabumetone (RELAFEN) 500 MG tablet Take 1 tablet by mouth 2 (Two) Times a Day As Needed for Moderate Pain . 60 tablet 5   • nystatin (MYCOSTATIN) 778699 UNIT/GM cream Apply  topically to the appropriate area as directed 2 (Two) Times a Day. 30 g 2   • ondansetron ODT (Zofran ODT) 4 MG disintegrating tablet Place 1 tablet on the tongue Every 8 (Eight) Hours As Needed for Nausea or Vomiting. 15 tablet 0   • sulfamethoxazole-trimethoprim (BACTRIM DS,SEPTRA DS) 800-160 MG per tablet Take 1 tablet by mouth 2 (Two) Times a Day. 20 tablet 0   • terbinafine (lamISIL) 1 % cream Apply 1 application topically to the appropriate area as directed 2 (Two) Times a Day. 36 g 3   • triamcinolone (KENALOG) 0.1 % cream Apply 1 application topically to the appropriate area as directed 2 (Two) Times a Day. 15 g 2   • valACYclovir (Valtrex) 1000 MG tablet Take 1 tablet by mouth 3 (Three) Times a Day. 21 tablet 0     No facility-administered medications prior to visit.       No opioid medication identified on active medication list. I have reviewed chart for other potential  high risk medication/s and harmful drug interactions in the elderly.            Aspirin is not on active  "medication list.  Aspirin use is indicated based on review of current medical condition/s. Pros and cons of this therapy have been discussed with this patient. Benefits of this medication outweigh potential harm.  Patient has been instructed to start taking this medication..    Patient Active Problem List   Diagnosis   • Diabetes mellitus (HCC)   • Morbidly obese (HCC)   • Mixed hyperlipidemia   • Cirrhosis of liver without ascites (HCC)   • Elevated AFP   • Esophageal varices determined by endoscopy (HCC)   • Essential hypertension   • Gastritis without bleeding   • History of hepatitis C   • Nonintractable headache   • Portal hypertension (HCC)   • Gallstones     Advance Care Planning  Advance Directive is not on file.  ACP discussion was declined by the patient. Patient does not have an advance directive, declines further assistance.    Review of Systems   Constitutional: Negative.    Respiratory: Positive for cough. Negative for shortness of breath.         Chronic, secondary to allergies and smoking.  Stable.   Cardiovascular: Negative.  Negative for chest pain and leg swelling.   Gastrointestinal: Positive for diarrhea.        Resolved after stopping Metformin.   Genitourinary: Negative.    Musculoskeletal: Negative.    Skin:        Frequent skin infections.   Neurological: Positive for numbness.        Occasional numbness of feet and legs, uses gabapentin PRN when bothering.   Psychiatric/Behavioral: Negative.         Objective    Vitals:    07/29/22 0912   BP: 129/76   BP Location: Left arm   Patient Position: Sitting   Cuff Size: Adult   Pulse: 53   Temp: 97.9 °F (36.6 °C)   SpO2: 93%   Weight: 90.4 kg (199 lb 3.2 oz)   Height: 152.4 cm (60\")  Comment: pt reported     Estimated body mass index is 38.9 kg/m² as calculated from the following:    Height as of this encounter: 152.4 cm (60\").    Weight as of this encounter: 90.4 kg (199 lb 3.2 oz).          Does the patient have evidence of cognitive impairment? " No    Physical Exam  Vitals and nursing note reviewed.   Constitutional:       General: She is not in acute distress.     Appearance: Normal appearance. She is obese. She is not ill-appearing.   HENT:      Head: Normocephalic and atraumatic.      Comments: Maxillary and frontal sinus pain to percussion.     Right Ear: Tympanic membrane and ear canal normal.      Left Ear: Tympanic membrane and ear canal normal.      Mouth/Throat:      Mouth: Mucous membranes are moist.      Pharynx: Oropharynx is clear.   Neck:      Vascular: No carotid bruit.   Cardiovascular:      Rate and Rhythm: Regular rhythm. Bradycardia present.      Pulses: Normal pulses.           Dorsalis pedis pulses are 2+ on the right side and 2+ on the left side.        Posterior tibial pulses are 2+ on the right side and 2+ on the left side.      Heart sounds: Normal heart sounds. No murmur heard.  Pulmonary:      Effort: Pulmonary effort is normal.      Breath sounds: Wheezing present.      Comments: Few expiratory wheezes present in bases.  Abdominal:      General: Bowel sounds are normal.      Palpations: Abdomen is soft.      Tenderness: There is no abdominal tenderness.   Genitourinary:     Comments: Patient declines pelvic/pap.  Musculoskeletal:         General: Normal range of motion.      Cervical back: Normal range of motion and neck supple.      Right foot: Normal range of motion. No deformity.      Left foot: Normal range of motion. No deformity.   Feet:      Right foot:      Protective Sensation: 6 sites tested. 6 sites sensed.      Skin integrity: Callus present.      Toenail Condition: Right toenails are abnormally thick.      Left foot:      Protective Sensation: 6 sites tested. 6 sites sensed.      Skin integrity: Callus present.      Toenail Condition: Left toenails are abnormally thick.   Lymphadenopathy:      Cervical: No cervical adenopathy.   Skin:     General: Skin is warm and dry.      Capillary Refill: Capillary refill takes  less than 2 seconds.   Neurological:      Mental Status: She is alert and oriented to person, place, and time. Mental status is at baseline.   Psychiatric:         Thought Content: Thought content normal.       Lab Results   Component Value Date    CHLPL 184 07/25/2022    TRIG 163 (H) 07/25/2022    HDL 40 07/25/2022     (H) 07/25/2022    VLDL 29 07/25/2022    HGBA1C 7.9 (H) 07/25/2022            HEALTH RISK ASSESSMENT    Smoking Status:  Social History     Tobacco Use   Smoking Status Current Every Day Smoker   • Packs/day: 0.50   • Years: 40.00   • Pack years: 20.00   • Types: Cigarettes   Smokeless Tobacco Never Used     Alcohol Consumption:  Social History     Substance and Sexual Activity   Alcohol Use No     Fall Risk Screen:    KISHAN Fall Risk Assessment was completed, and patient is at LOW risk for falls.Assessment completed on:7/29/2022    Depression Screening:  PHQ-2/PHQ-9 Depression Screening 7/29/2022   Retired PHQ-9 Total Score -   Retired Total Score -   Little Interest or Pleasure in Doing Things 0-->not at all   Feeling Down, Depressed or Hopeless 0-->not at all   PHQ-9: Brief Depression Severity Measure Score 0       Health Habits and Functional and Cognitive Screening:  Functional & Cognitive Status 7/29/2022   Do you have difficulty preparing food and eating? No   Do you have difficulty bathing yourself, getting dressed or grooming yourself? No   Do you have difficulty using the toilet? No   Do you have difficulty moving around from place to place? No   Do you have trouble with steps or getting out of a bed or a chair? No   Current Diet Unhealthy Diet   Dental Exam Not up to date   Eye Exam Not up to date   Exercise (times per week) 0 times per week   Current Exercises Include No Regular Exercise   Current Exercise Activities Include -   Do you need help using the phone?  No   Are you deaf or do you have serious difficulty hearing?  No   Do you need help with transportation? Yes   Do you  need help shopping? No   Do you need help preparing meals?  No   Do you need help with housework?  No   Do you need help with laundry? No   Do you need help taking your medications? No   Do you need help managing money? No   Do you ever drive or ride in a car without wearing a seat belt? No   Have you felt unusual stress, anger or loneliness in the last month? No   Who do you live with? Alone   If you need help, do you have trouble finding someone available to you? No   Have you been bothered in the last four weeks by sexual problems? No   Do you have difficulty concentrating, remembering or making decisions? No       Age-appropriate Screening Schedule:  Refer to the list below for future screening recommendations based on patient's age, sex and/or medical conditions. Orders for these recommended tests are listed in the plan section. The patient has been provided with a written plan.    Health Maintenance   Topic Date Due   • DIABETIC EYE EXAM  09/16/2022 (Originally 10/29/2021)   • TDAP/TD VACCINES (1 - Tdap) 07/28/2023 (Originally 4/17/1979)   • MAMMOGRAM  08/05/2022   • INFLUENZA VACCINE  10/01/2022   • HEMOGLOBIN A1C  01/25/2023   • DIABETIC FOOT EXAM  07/22/2023   • LIPID PANEL  07/25/2023   • URINE MICROALBUMIN  07/25/2023   • PAP SMEAR  10/26/2024   • ZOSTER VACCINE  Completed              Assessment & Plan   CMS Preventative Services Quick Reference  Risk Factors Identified During Encounter  Immunizations Discussed/Encouraged (specific Immunizations; Prevnar 20 (Pneumococcal 20-valent conjugate) and Shingrix  Inadequate Social Support, Isolation, Loneliness, Lack of Transportation, Financial Difficulties, or Caregiver Stress   Obesity/Overweight   Polypharmacy  Tobacco Use/Dependance (use dotphrase .tobaccocessation for documentation)  The above risks/problems have been discussed with the patient.  Follow up actions/plans if indicated are seen below in the Assessment/Plan Section.  Pertinent information has  been shared with the patient in the After Visit Summary.    Della Gonzalez  reports that she has been smoking cigarettes. She has a 20.00 pack-year smoking history. She has never used smokeless tobacco.. I have educated her on the risk of diseases from using tobacco products such as cancer, COPD, heart disease and cataracts.     I advised her to quit and she is not willing to quit.    I spent 3  minutes counseling the patient.         Diagnoses and all orders for this visit:    1. Type 2 diabetes mellitus with other skin complication, without long-term current use of insulin ([facility]) (Primary)    2. Essential hypertension  -     benazepril (LOTENSIN) 10 MG tablet; Take 1 tablet by mouth Daily.  Dispense: 90 tablet; Refill: 3    3. Mixed hyperlipidemia    4. Hypothyroidism, unspecified type    5. Medicare annual wellness visit, subsequent    6. Post-menopausal  -     DEXA Bone Density Axial; Future    7. Breast cancer screening by mammogram  -     Mammo Screening Bilateral With CAD; Future    8. Need for vaccination against Streptococcus pneumoniae using pneumococcal conjugate vaccine 13  -     Pneumococcal Conjugate Vaccine 20-Valent (PCV20)    9. Bronchitis, allergic, unspecified asthma severity, with acute exacerbation  -     cetirizine (zyrTEC) 10 MG tablet; Take 1 tablet by mouth Daily.  Dispense: 90 tablet; Refill: 1    10. Allergic sinusitis  -     cetirizine (zyrTEC) 10 MG tablet; Take 1 tablet by mouth Daily.  Dispense: 90 tablet; Refill: 1    11. Sinus headache  -     cetirizine (zyrTEC) 10 MG tablet; Take 1 tablet by mouth Daily.  Dispense: 90 tablet; Refill: 1    12. Tobacco abuse    13. Class 2 severe obesity due to excess calories with serious comorbidity and body mass index (BMI) of 38.0 to 38.9 in adult ([facility])    Patient encouraged to continue healthy diet rich in fresh vegetables and lean proteins, limiting concentrated carbs and simple sugars.  Patient encouraged to continue daily exercise with goal of 30  min sustained activity.  Patient encouraged to arrange eye exam and transportation via PACS.    Follow Up:   Return in about 3 months (around 10/29/2022) for Next scheduled follow up (POCT Hgb A1c).     An After Visit Summary and PPPS were made available to the patient.                   MELECIO Thorne   This note is electronically signed.

## 2022-07-29 NOTE — PROGRESS NOTES
"Chief Complaint  Skin Lesion and Peripheral Neuropathy (GABAPENTIN rebel)    Subjective        Della Gonzalez presents to [facility]  History of Present Illness  MED REFILL/NEUROPATHY:  Patient has long-standing neuropathy from DM.  She does not routinely take her Gabapentin, instead taking it only when her neuropathic pain is most bothersome, typically when it would interrupt sleep.  She is not requesting a refill at this time.  UDS and controlled substance agreement are up to date.  JADYN is reviewed.  BITES:  Patient has sustained several mosquito bites on both lower extremities.  She has a history of MRSA and frequent cutaneous abscesses.  She states she is trying really hard not to scratch.  The bites are only a couple of days old.    Objective   Vital Signs:  /76 (BP Location: Right arm, Patient Position: Sitting, Cuff Size: Adult)   Pulse 53   Temp 97.9 °F (36.6 °C)   Ht 152.4 cm (60\") Comment: pt reported  Wt 90.4 kg (199 lb 3.2 oz)   SpO2 93%   BMI 38.90 kg/m²   Estimated body mass index is 38.9 kg/m² as calculated from the following:    Height as of this encounter: 152.4 cm (60\").    Weight as of this encounter: 90.4 kg (199 lb 3.2 oz).      Physical Exam  Vitals and nursing note reviewed.   Constitutional:       General: She is not in acute distress.     Appearance: Normal appearance. She is obese. She is not ill-appearing.   Musculoskeletal:         General: Normal range of motion.      Comments: SEE FOOT EXAM SAME DAY - MEDICARE WELLNESS VISIT.   Skin:     General: Skin is warm and dry.      Comments: Both lower extremities have multiple 1 cm whelps consistent with bites from mosquitos.  None are unroofed or draining.  All are erythematous and pruritic.   Neurological:      Mental Status: She is alert.   Psychiatric:         Thought Content: Thought content normal.        Result Review :                Assessment and Plan   Diagnoses and all orders for this " visit:    1. Long-term use of high-risk medication (Primary)    2. Bug bite, initial encounter  -     triamcinolone (KENALOG) 0.1 % cream; Apply 1 application topically to the appropriate area as directed 2 (Two) Times a Day.  Dispense: 15 g; Refill: 2    3. Polyneuropathy    Continue current treatment plan.  Ok to use the gabapentin on PRN basis.         Follow Up   Return in about 3 months (around 10/29/2022) for Next scheduled follow up.  Patient was given instructions and counseling regarding her condition or for health maintenance advice. Please see specific information pulled into the AVS if appropriate.     MELECIO Thorne  This note is electronically signed.

## 2022-08-19 ENCOUNTER — OFFICE VISIT (OUTPATIENT)
Dept: FAMILY MEDICINE CLINIC | Facility: CLINIC | Age: 62
End: 2022-08-19

## 2022-08-19 VITALS
SYSTOLIC BLOOD PRESSURE: 132 MMHG | WEIGHT: 198 LBS | BODY MASS INDEX: 38.87 KG/M2 | TEMPERATURE: 96.3 F | HEART RATE: 64 BPM | HEIGHT: 60 IN | OXYGEN SATURATION: 97 % | DIASTOLIC BLOOD PRESSURE: 84 MMHG

## 2022-08-19 DIAGNOSIS — B07.9 VERRUCA: ICD-10-CM

## 2022-08-19 DIAGNOSIS — B37.2 CUTANEOUS CANDIDIASIS: Primary | ICD-10-CM

## 2022-08-19 DIAGNOSIS — L73.9 FOLLICULITIS: ICD-10-CM

## 2022-08-19 PROCEDURE — 17110 DESTRUCTION B9 LES UP TO 14: CPT | Performed by: NURSE PRACTITIONER

## 2022-08-19 PROCEDURE — 99213 OFFICE O/P EST LOW 20 MIN: CPT | Performed by: NURSE PRACTITIONER

## 2022-08-19 RX ORDER — SULFAMETHOXAZOLE AND TRIMETHOPRIM 800; 160 MG/1; MG/1
1 TABLET ORAL 2 TIMES DAILY
Qty: 14 TABLET | Refills: 0 | Status: SHIPPED | OUTPATIENT
Start: 2022-08-19 | End: 2022-09-28

## 2022-08-19 RX ORDER — NYSTATIN 100000 U/G
1 CREAM TOPICAL 2 TIMES DAILY
Qty: 30 G | Refills: 5 | Status: SHIPPED | OUTPATIENT
Start: 2022-08-19 | End: 2022-09-28

## 2022-08-19 NOTE — PROGRESS NOTES
"Chief Complaint  Skin Lesion    Subjective    {Problem List  Visit Diagnosis   Encounters  Notes  Medications  Labs  Result Review Imaging  Media :23}    Della Gonzalez presents to [facility]  History of Present Illness  Patient has return of multiple cystic lesions.  She has hard, tender \"spots\" on her chin is previous areas of folliculitis.  She also has a rash under the right breast that she is treating with cornstarch.  Overall, she states this area is improved but does have a new \"hard\" area at the upper aspect of the rash.  This area is like the areas present on the chin.  Lastly, she has a lesion on the outer labia that she is unsure if it is the \"same kind of spot or something different.\"  She has been unable to look at it.  She has squeezed it \"but nothing came out.\"    Objective   Vital Signs:  /84 (BP Location: Left arm, Patient Position: Sitting, Cuff Size: Adult)   Pulse 64   Temp 96.3 °F (35.7 °C)   Ht 152.4 cm (60\") Comment: pt reported  Wt 89.8 kg (198 lb)   SpO2 97%   BMI 38.67 kg/m²   Estimated body mass index is 38.67 kg/m² as calculated from the following:    Height as of this encounter: 152.4 cm (60\").    Weight as of this encounter: 89.8 kg (198 lb).          Physical Exam  Vitals and nursing note reviewed.   Constitutional:       General: She is not in acute distress.     Appearance: Normal appearance. She is obese. She is not ill-appearing.   HENT:      Head: Normocephalic and atraumatic.   Cardiovascular:      Rate and Rhythm: Normal rate and regular rhythm.      Pulses: Normal pulses.      Heart sounds: Normal heart sounds. No murmur heard.  Pulmonary:      Effort: Pulmonary effort is normal.      Breath sounds: Normal breath sounds.   Musculoskeletal:      Right lower leg: No edema.      Left lower leg: No edema.   Skin:     General: Skin is warm and dry.      Capillary Refill: Capillary refill takes less than 2 seconds.      Findings: " Lesion present.      Comments: Cystic lesions x 3 on posterior chin.  Cystic lesion at the upper aspect of cutaneous rash on the right breast.  Pink, shiny skin is present measuring 5 cm x 3.2 cm.  There is 3 mm verruca present on the left outer labia.  SEE PROCEDURE NOTE.   Neurological:      Mental Status: She is alert and oriented to person, place, and time.   Psychiatric:         Thought Content: Thought content normal.        Result Review :         Cryotherapy, Skin Lesion    Date/Time: 8/19/2022 2:13 PM  Performed by: Vijaya Henao APRN  Authorized by: Vijaya Henao APRN   Preparation: Patient was prepped and draped in the usual sterile fashion.  Local anesthesia used: no    Anesthesia:  Local anesthesia used: no    Sedation:  Patient sedated: no    Patient tolerance: patient tolerated the procedure well with no immediate complications  Comments: Informed consent gained.  30 seconds of cryotherapy applied.            Assessment and Plan   Diagnoses and all orders for this visit:    1. Cutaneous candidiasis (Primary)  -     nystatin (MYCOSTATIN) 427443 UNIT/GM cream; Apply 1 application topically to the appropriate area as directed 2 (Two) Times a Day.  Dispense: 30 g; Refill: 5    2. Folliculitis  -     sulfamethoxazole-trimethoprim (Bactrim DS) 800-160 MG per tablet; Take 1 tablet by mouth 2 (Two) Times a Day.  Dispense: 14 tablet; Refill: 0    3. Verruca  -     Cryotherapy, Skin Lesion             Follow Up   Return if symptoms worsen or fail to improve.  Patient was given instructions and counseling regarding her condition or for health maintenance advice. Please see specific information pulled into the AVS if appropriate.     MELECIO Thorne  This note is electronically signed.

## 2022-08-31 DIAGNOSIS — K21.9 GASTROESOPHAGEAL REFLUX DISEASE, UNSPECIFIED WHETHER ESOPHAGITIS PRESENT: ICD-10-CM

## 2022-08-31 RX ORDER — OMEPRAZOLE 20 MG/1
20 CAPSULE, DELAYED RELEASE ORAL DAILY
Qty: 90 CAPSULE | Refills: 1 | Status: SHIPPED | OUTPATIENT
Start: 2022-08-31 | End: 2023-02-24

## 2022-09-07 ENCOUNTER — TELEPHONE (OUTPATIENT)
Dept: FAMILY MEDICINE CLINIC | Facility: CLINIC | Age: 62
End: 2022-09-07

## 2022-09-07 ENCOUNTER — OFFICE VISIT (OUTPATIENT)
Dept: FAMILY MEDICINE CLINIC | Facility: CLINIC | Age: 62
End: 2022-09-07

## 2022-09-07 ENCOUNTER — DOCUMENTATION (OUTPATIENT)
Dept: GENERAL RADIOLOGY | Facility: HOSPITAL | Age: 62
End: 2022-09-07

## 2022-09-07 VITALS
OXYGEN SATURATION: 93 % | HEART RATE: 61 BPM | DIASTOLIC BLOOD PRESSURE: 70 MMHG | TEMPERATURE: 98.1 F | SYSTOLIC BLOOD PRESSURE: 101 MMHG | BODY MASS INDEX: 37.22 KG/M2 | WEIGHT: 190.6 LBS

## 2022-09-07 DIAGNOSIS — Z12.31 ENCOUNTER FOR SCREENING MAMMOGRAM FOR MALIGNANT NEOPLASM OF BREAST: ICD-10-CM

## 2022-09-07 DIAGNOSIS — R05.9 COUGH: Primary | ICD-10-CM

## 2022-09-07 DIAGNOSIS — R25.2 MUSCLE CRAMPS: ICD-10-CM

## 2022-09-07 PROCEDURE — 99213 OFFICE O/P EST LOW 20 MIN: CPT | Performed by: NURSE PRACTITIONER

## 2022-09-07 PROCEDURE — 96372 THER/PROPH/DIAG INJ SC/IM: CPT | Performed by: NURSE PRACTITIONER

## 2022-09-07 RX ORDER — TRIAMCINOLONE ACETONIDE 40 MG/ML
80 INJECTION, SUSPENSION INTRA-ARTICULAR; INTRAMUSCULAR ONCE
Status: COMPLETED | OUTPATIENT
Start: 2022-09-07 | End: 2022-09-07

## 2022-09-07 RX ORDER — PROMETHAZINE HYDROCHLORIDE AND CODEINE PHOSPHATE 6.25; 1 MG/5ML; MG/5ML
5 SOLUTION ORAL EVERY 4 HOURS PRN
Qty: 180 ML | Refills: 0 | Status: SHIPPED | OUTPATIENT
Start: 2022-09-07 | End: 2022-09-28

## 2022-09-07 RX ORDER — METHYLPREDNISOLONE 4 MG/1
TABLET ORAL
Qty: 1 EACH | Refills: 0 | Status: SHIPPED | OUTPATIENT
Start: 2022-09-07 | End: 2022-09-07

## 2022-09-07 RX ADMIN — TRIAMCINOLONE ACETONIDE 80 MG: 40 INJECTION, SUSPENSION INTRA-ARTICULAR; INTRAMUSCULAR at 16:39

## 2022-09-07 NOTE — TELEPHONE ENCOUNTER
Caller: Della Gonzalez    Relationship: Self    Best call back number: [phone]    What medication are you requesting: ANTIBIOTIC    What are your current symptoms: HEADACHE, BODY ACHES, LEG CRAMPS, CONGESTION, COUGHING UP MUCUS, SORE THROAT    How long have you been experiencing symptoms: COUPLE OF DAYS     Have you had these symptoms before:    [x] Yes  [] No    Have you been treated for these symptoms before:   [] Yes  [x] No    If a prescription is needed, what is your preferred pharmacy and phone number: [facility] [place], [place] - [address] [phone] [facility] [phone]      Additional notes: PATIENT STATES HER SYMPTOMS ARE GETTING WORSE BUT SHE IS UNABLE TO COME INTO THE OFFICE FOR AN APPOINTMENT.

## 2022-09-07 NOTE — PROGRESS NOTES
Chief Complaint   Patient presents with   • Nasal Congestion        Subjective   Della Gonzalez is a 62 y.o. female who presents today for cough, muscle cramps, and both arms with some numbness at the elbows.     HPI   Started feeling ill two days ago. She could not sleep because of the cough and muscle cramps. She also complains of a sore throat and headache.     No Known Allergies      OBJECTIVE:  Vitals:    09/07/22 1608   BP: 101/70   BP Location: Right arm   Patient Position: Sitting   Cuff Size: Adult   Pulse: 61   Temp: 98.1 °F (36.7 °C)   SpO2: 93%   Weight: 86.5 kg (190 lb 9.6 oz)     Physical Exam  Vitals and nursing note reviewed.   Constitutional:       Appearance: Normal appearance.   HENT:      Right Ear: Tympanic membrane normal.      Left Ear: Tympanic membrane normal.      Mouth/Throat:      Mouth: Mucous membranes are moist.      Pharynx: Oropharynx is clear.   Cardiovascular:      Rate and Rhythm: Normal rate and regular rhythm.      Pulses: Normal pulses.      Heart sounds: Normal heart sounds.   Pulmonary:      Breath sounds: Examination of the right-upper field reveals wheezing. Examination of the right-middle field reveals wheezing. Examination of the right-lower field reveals wheezing. Wheezing present.   Neurological:      Mental Status: She is alert.         Class 2 Severe Obesity (BMI >=35 and <=39.9). Obesity-related health conditions include the following: hypertension, diabetes mellitus and dyslipidemias. Obesity is unchanged. BMI is is above average; BMI management plan is completed. We discussed portion control and increasing exercise.        ASSESSMENT/ PLAN:    Diagnoses and all orders for this visit:    1. Cough (Primary)  -     triamcinolone acetonide (KENALOG-40) injection 80 mg  -     methylPREDNISolone (MEDROL) 4 MG dose pack; Take as directed on package instructions.  Dispense: 1 each; Refill: 0  -     promethazine-codeine (PHENERGAN with CODEINE) 6.25-10 MG/5ML solution; Take 5  mL by mouth Every 4 (Four) Hours As Needed for Cough.  Dispense: 180 mL; Refill: 0    2. Muscle cramps  -     triamcinolone acetonide (KENALOG-40) injection 80 mg  -     methylPREDNISolone (MEDROL) 4 MG dose pack; Take as directed on package instructions.  Dispense: 1 each; Refill: 0          Management Plan:     An After Visit Summary was printed and given to the patient at discharge.    Follow-up: Return if symptoms worsen or fail to improve.    I spent 20 minutes caring for Della on this date of service. This time includes time spent by me in the following activities: preparing for the visit, obtaining and/or reviewing a separately obtained history, performing a medically appropriate examination and/or evaluation, ordering medications, tests, or procedures and documenting information in the medical record     MELECIO Bhakta 9/7/2022 16:25 CDT  This note was electronically signed.

## 2022-09-28 ENCOUNTER — TELEPHONE (OUTPATIENT)
Dept: FAMILY MEDICINE CLINIC | Facility: CLINIC | Age: 62
End: 2022-09-28

## 2022-09-28 ENCOUNTER — OFFICE VISIT (OUTPATIENT)
Dept: FAMILY MEDICINE CLINIC | Facility: CLINIC | Age: 62
End: 2022-09-28

## 2022-09-28 VITALS
BODY MASS INDEX: 37.89 KG/M2 | HEIGHT: 60 IN | DIASTOLIC BLOOD PRESSURE: 79 MMHG | WEIGHT: 193 LBS | SYSTOLIC BLOOD PRESSURE: 126 MMHG | OXYGEN SATURATION: 99 % | HEART RATE: 62 BPM | TEMPERATURE: 97.9 F

## 2022-09-28 DIAGNOSIS — R51.9 FACIAL PAIN: ICD-10-CM

## 2022-09-28 DIAGNOSIS — L02.01 CUTANEOUS ABSCESS OF FACE: Primary | ICD-10-CM

## 2022-09-28 DIAGNOSIS — R11.0 NAUSEA: ICD-10-CM

## 2022-09-28 PROCEDURE — 99214 OFFICE O/P EST MOD 30 MIN: CPT | Performed by: NURSE PRACTITIONER

## 2022-09-28 PROCEDURE — 96372 THER/PROPH/DIAG INJ SC/IM: CPT | Performed by: NURSE PRACTITIONER

## 2022-09-28 RX ORDER — ONDANSETRON 4 MG/1
4 TABLET, ORALLY DISINTEGRATING ORAL EVERY 8 HOURS PRN
Qty: 12 TABLET | Refills: 0 | Status: SHIPPED | OUTPATIENT
Start: 2022-09-28

## 2022-09-28 RX ORDER — CEFTRIAXONE 1 G/1
1 INJECTION, POWDER, FOR SOLUTION INTRAMUSCULAR; INTRAVENOUS EVERY 24 HOURS
Status: SHIPPED | OUTPATIENT
Start: 2022-09-28 | End: 2022-10-01

## 2022-09-28 RX ORDER — HYDROCODONE BITARTRATE AND ACETAMINOPHEN 5; 325 MG/1; MG/1
1 TABLET ORAL 3 TIMES DAILY PRN
Qty: 30 TABLET | Refills: 0 | Status: SHIPPED | OUTPATIENT
Start: 2022-09-28 | End: 2022-11-01

## 2022-09-28 RX ADMIN — CEFTRIAXONE 1 G: 1 INJECTION, POWDER, FOR SOLUTION INTRAMUSCULAR; INTRAVENOUS at 10:02

## 2022-09-28 NOTE — PROGRESS NOTES
"Chief Complaint  Abscess (On face)    Subjective        Della Gonzalez presents to [facility]  History of Present Illness  Patient has a history of multiple abscesses and boils.  She has had 2 small areas on her chin that she admittedly has picked and squeezed.  Both of these seemed to be resolving; however, when she awoke yesterday she had a hard swollen area on the left outer eyebrow.  It continued to bother her through the day yesterday and she did not sleep well last night secondary to facial pain in this area.  She admits that she scratched and squeezed it yesterday.  Upon arriving both b/p and pulse were elevated; however, she had walked her from her home in a hurry.  Both normalized rapidly.    Objective   Vital Signs:  /79 (BP Location: Left arm, Patient Position: Sitting, Cuff Size: Adult)   Pulse 62   Temp 97.9 °F (36.6 °C)   Ht 152.4 cm (60\") Comment: pt reported  Wt 87.5 kg (193 lb)   SpO2 99%   BMI 37.69 kg/m²   Estimated body mass index is 37.69 kg/m² as calculated from the following:    Height as of this encounter: 152.4 cm (60\").    Weight as of this encounter: 87.5 kg (193 lb).      Physical Exam  Vitals and nursing note reviewed.   Constitutional:       General: She is not in acute distress.     Appearance: Normal appearance. She is obese. She is not ill-appearing.   HENT:      Head: Normocephalic and atraumatic.   Cardiovascular:      Rate and Rhythm: Normal rate and regular rhythm.      Heart sounds: Normal heart sounds.   Pulmonary:      Effort: Pulmonary effort is normal.      Breath sounds: Normal breath sounds.   Skin:     General: Skin is warm and dry.      Findings: Erythema present.      Comments: Two 3-4 mm unroofed lesions on the chin near the right lower lip.  There are scabs present with no drainage or erythema in this area.  On the left lateral eyebrow, there is a 3 cm tender indurated area with 2 pinpoint open areas.  No current drainage.  " Erythema is diffuse without streaking.   Neurological:      Mental Status: She is alert and oriented to person, place, and time.   Psychiatric:         Thought Content: Thought content normal.        Result Review :                Assessment and Plan   Diagnoses and all orders for this visit:    1. Cutaneous abscess of face (Primary)  -     cefTRIAXone (ROCEPHIN) injection 1 g  -     HYDROcodone-acetaminophen (Norco) 5-325 MG per tablet; Take 1 tablet by mouth 3 (Three) Times a Day As Needed for Moderate Pain.  Dispense: 30 tablet; Refill: 0    2. Facial pain  -     HYDROcodone-acetaminophen (Norco) 5-325 MG per tablet; Take 1 tablet by mouth 3 (Three) Times a Day As Needed for Moderate Pain.  Dispense: 30 tablet; Refill: 0    3. Nausea  -     ondansetron ODT (Zofran ODT) 4 MG disintegrating tablet; Place 1 tablet on the tongue Every 8 (Eight) Hours As Needed for Nausea.  Dispense: 12 tablet; Refill: 0    Will see patient back tomorrow for another antibiotic injection and assessment of her abscess.  She is instructed to apply warm Epsom salt compress to the abscess for 10 min at least 3 times today.         Follow Up   Return in about 1 day (around 9/29/2022) for nurse visit for Rocephin injection.  Patient was given instructions and counseling regarding her condition or for health maintenance advice. Please see specific information pulled into the AVS if appropriate.     MELECIO Thorne  This note is electronically signed.

## 2022-09-29 ENCOUNTER — CLINICAL SUPPORT (OUTPATIENT)
Dept: FAMILY MEDICINE CLINIC | Facility: CLINIC | Age: 62
End: 2022-09-29

## 2022-09-29 DIAGNOSIS — L02.01 FACIAL ABSCESS: ICD-10-CM

## 2022-09-29 PROCEDURE — 96372 THER/PROPH/DIAG INJ SC/IM: CPT | Performed by: NURSE PRACTITIONER

## 2022-09-29 NOTE — PROGRESS NOTES
Patient presented to the office for Rocephin 1g injection. Injection given in right glute. Patient tolerated with no reaction. NDC [address]-0

## 2022-09-30 ENCOUNTER — CLINICAL SUPPORT (OUTPATIENT)
Dept: FAMILY MEDICINE CLINIC | Facility: CLINIC | Age: 62
End: 2022-09-30
Payer: MEDICARE

## 2022-09-30 DIAGNOSIS — L02.01 CUTANEOUS ABSCESS OF FACE: ICD-10-CM

## 2022-09-30 DIAGNOSIS — L02.01 FACIAL ABSCESS: Primary | ICD-10-CM

## 2022-09-30 RX ORDER — CEFDINIR 300 MG/1
300 CAPSULE ORAL 2 TIMES DAILY
Qty: 14 CAPSULE | Refills: 0 | Status: SHIPPED | OUTPATIENT
Start: 2022-09-30 | End: 2022-11-01

## 2022-10-06 NOTE — ANESTHESIA PRE PROCEDURE
Chief Complaint  Back Pain (Back pain with deep breath x 3 days )    Subjective          Magi Short presents to [facility]  History of Present Illness  Pt has had posterior chest wall pain with deep breathing    Pt has had recent negative covid test at home.  URI   This is a new problem. Episode onset: 5 days. The problem has been gradually worsening. The maximum temperature recorded prior to her arrival was 100.4 - 100.9 F. Associated symptoms include congestion, coughing and a sore throat. Pertinent negatives include no abdominal pain, chest pain, diarrhea, dysuria, ear pain, headaches, joint pain, joint swelling, nausea, neck pain, plugged ear sensation, rash, rhinorrhea, sinus pain, sneezing, swollen glands, vomiting or wheezing. She has tried nothing for the symptoms.       Objective   No Known Allergies    There is no immunization history on file for this patient.  Past Medical History:   Diagnosis Date   • Chronic pancreatitis (HCC)    • COPD (chronic obstructive pulmonary disease) (Formerly [facility])       Past Surgical History:   Procedure Laterality Date   •  SECTION      x2   • CHOLECYSTECTOMY     • D & C WITH SUCTION      x2   • PANCREAS SURGERY     • TUBAL ABDOMINAL LIGATION        Social History     Socioeconomic History   • Marital status:    Tobacco Use   • Smoking status: Current Every Day Smoker   • Smokeless tobacco: Never Used   Vaping Use   • Vaping Use: Never used   Substance and Sexual Activity   • Alcohol use: Never   • Drug use: Never   • Sexual activity: Never     Birth control/protection: None        Current Outpatient Medications:   •  albuterol (ACCUNEB) 1.25 MG/3ML nebulizer solution, Take 3 mL by nebulization Every 6 (Six) Hours As Needed for Wheezing., Disp: 360 mL, Rfl: 5  •  albuterol sulfate  (90 Base) MCG/ACT inhaler, Inhale 2 puffs Every 6 (Six) Hours As Needed for Wheezing., Disp: 18 g, Rfl: 1  •  buprenorphine-naloxone (SUBOXONE) 8-2  MG per SL tablet, , Disp: , Rfl:   •  Creon 63686-92810 units capsule delayed-release particles capsule, , Disp: , Rfl:   •  famotidine (PEPCID) 20 MG tablet, Take 1 tablet by mouth At Night As Needed for Heartburn., Disp: 30 tablet, Rfl: 5  •  fluticasone (FLONASE) 50 MCG/ACT nasal spray, 2 sprays by Each Nare route Daily., Disp: , Rfl:   •  gabapentin (NEURONTIN) 100 MG capsule, , Disp: , Rfl:   •  gabapentin (NEURONTIN) 300 MG capsule, Take 300 mg by mouth 3 (Three) Times a Day., Disp: , Rfl:   •  meclizine (ANTIVERT) 25 MG tablet, Take 1 tablet by mouth 3 (Three) Times a Day As Needed for Dizziness., Disp: 21 tablet, Rfl: 0  •  potassium chloride (KLOR-CON) 8 MEQ CR tablet, Take 1 tablet by mouth Daily., Disp: 30 tablet, Rfl: 5  •  potassium chloride ER (K-TAB) 20 MEQ tablet controlled-release ER tablet, , Disp: , Rfl:   •  Symbicort 160-4.5 MCG/ACT inhaler, INHALE TWO PUFFS BY MOUTH TWICE A DAY, Disp: 10.2 g, Rfl: 5  •  azithromycin (Zithromax Z-Serge) 250 MG tablet, Take 2 tablets by mouth on day 1, then 1 tablet daily on days 2-5, Disp: 6 tablet, Rfl: 0  •  multivitamin (multivitamin) tablet tablet, Take 1 tablet by mouth Daily., Disp: 30 tablet, Rfl: 1  •  nystatin (MYCOSTATIN) 100,000 unit/mL suspension, Swish and swallow 5 mL 4 (Four) Times a Day for 7 days., Disp: 140 mL, Rfl: 0  •  predniSONE (DELTASONE) 20 MG tablet, Take 3 tablets by mouth Daily for 5 days., Disp: 15 tablet, Rfl: 0   History reviewed. No pertinent family history.       Vital Signs:   Vitals:    10/06/22 1014   BP: 102/80   Pulse: 85   Temp: 98 °F (36.7 °C)   SpO2: 99%   Weight: 59.9 kg (132 lb)       Review of Systems   HENT: Positive for congestion and sore throat. Negative for ear pain, rhinorrhea, sinus pain and sneezing.    Respiratory: Positive for cough. Negative for wheezing.    Cardiovascular: Negative for chest pain.   Gastrointestinal: Negative for abdominal pain, diarrhea, nausea and vomiting.   Genitourinary: Negative for  14.8 02/02/2018    INR 1.16 02/02/2018       HCG (If Applicable): No results found for: PREGTESTUR, PREGSERUM, HCG, HCGQUANT     ABGs: No results found for: PHART, PO2ART, NNA3OET, WFT2QDE, BEART, X2UKCSFQ     Type & Screen (If Applicable):  No results found for: [facility]    Anesthesia Evaluation  Patient summary reviewed and Nursing notes reviewed no history of anesthetic complications:   Airway: Mallampati: II  TM distance: <3 FB   Neck ROM: full  Mouth opening: < 3 FB Dental: normal exam         Pulmonary:normal exam    (+) COPD:  asthma: current smoker          Patient smoked on day of surgery. Cardiovascular:    (+) hypertension:,          Beta Blocker:  Not on Beta Blocker         Neuro/Psych:   Negative Neuro/Psych ROS              GI/Hepatic/Renal:   (+) GERD:, hepatitis:, liver disease:, morbid obesity (BMI 46)          Endo/Other: Negative Endo/Other ROS                    Abdominal:           Vascular:                                          Anesthesia Plan      general     ASA 3       Induction: intravenous. Anesthetic plan and risks discussed with patient. Plan discussed with CRNA.                   Carissa Barreto CRNA   3/23/2018 dysuria.   Musculoskeletal: Negative for joint pain and neck pain.   Skin: Negative for rash.   Neurological: Negative for headaches.      Physical Exam  Vitals reviewed.   Constitutional:       Appearance: Normal appearance. She is well-developed.   HENT:      Head: Normocephalic and atraumatic.      Right Ear: External ear normal.      Left Ear: External ear normal.      Mouth/Throat:      Mouth: Mucous membranes are moist.      Pharynx: Oropharynx is clear. Posterior oropharyngeal erythema present. No oropharyngeal exudate.      Comments: Inflamed skin in mouth- possible thrush  Eyes:      Conjunctiva/sclera: Conjunctivae normal.      Pupils: Pupils are equal, round, and reactive to light.   Cardiovascular:      Rate and Rhythm: Normal rate and regular rhythm.      Pulses: Normal pulses.      Heart sounds: Normal heart sounds. No murmur heard.    No friction rub. No gallop.   Pulmonary:      Effort: Pulmonary effort is normal.      Breath sounds: Normal breath sounds. No wheezing or rhonchi.   Abdominal:      General: Bowel sounds are normal. There is no distension.      Palpations: Abdomen is soft.      Tenderness: There is no abdominal tenderness.   Musculoskeletal:         General: Normal range of motion.      Cervical back: Normal range of motion and neck supple.   Skin:     General: Skin is warm and dry.      Capillary Refill: Capillary refill takes less than 2 seconds.   Neurological:      General: No focal deficit present.      Mental Status: She is alert and oriented to person, place, and time.      Cranial Nerves: No cranial nerve deficit.   Psychiatric:         Mood and Affect: Mood and affect normal.         Behavior: Behavior normal.         Thought Content: Thought content normal.         Judgment: Judgment normal.        Result Review :   The following data was reviewed by: Shivam Arteaga MD on 10/06/2022:    Data reviewed: Radiologic studies I viewed and interpreted 2 views chest x-rays:NAD.           Assessment and Plan    Diagnoses and all orders for this visit:    1. Acute cough (Primary)  -     XR Chest PA & Lateral (In Office)  -     CBC Auto Differential    2. Pleurisy    3. Thrush  -     nystatin (MYCOSTATIN) 100,000 unit/mL suspension; Swish and swallow 5 mL 4 (Four) Times a Day for 7 days.  Dispense: 140 mL; Refill: 0    4. Muscle cramps  -     CBC Auto Differential  -     Comprehensive Metabolic Panel  -     Magnesium  -     CK    5. Pharyngitis, unspecified etiology  -     Throat / Upper Respiratory Culture - Swab, Throat    6. Bronchitis  -     azithromycin (Zithromax Z-Serge) 250 MG tablet; Take 2 tablets by mouth on day 1, then 1 tablet daily on days 2-5  Dispense: 6 tablet; Refill: 0  -     predniSONE (DELTASONE) 20 MG tablet; Take 3 tablets by mouth Daily for 5 days.  Dispense: 15 tablet; Refill: 0  -     multivitamin (multivitamin) tablet tablet; Take 1 tablet by mouth Daily.  Dispense: 30 tablet; Refill: 1  -     Home Nebulizer Accessories            Follow Up   Return if symptoms worsen or fail to improve.  Patient was given instructions and counseling regarding her condition or for health maintenance advice. Please see specific information pulled into the AVS if appropriate.

## 2022-10-20 DIAGNOSIS — F51.01 PRIMARY INSOMNIA: ICD-10-CM

## 2022-10-20 RX ORDER — TRAZODONE HYDROCHLORIDE 150 MG/1
150 TABLET ORAL
Qty: 90 TABLET | Refills: 1 | Status: SHIPPED | OUTPATIENT
Start: 2022-10-20

## 2022-10-20 NOTE — TELEPHONE ENCOUNTER
Rx Refill Note  Requested Prescriptions     Pending Prescriptions Disp Refills   • traZODone (DESYREL) 150 MG tablet 90 tablet 1     Sig: Take 1 tablet by mouth every night at bedtime.      Last office visit with prescribing clinician: 9/28/2022  Next office visit with prescribing clinician: 11/01/2022  Last RX: 05/09/2022  [facility]    Thomas Collier MA  10/20/22, 12:54 CDT

## 2022-10-20 NOTE — TELEPHONE ENCOUNTER
Rx Refill Note  Requested Prescriptions     Pending Prescriptions Disp Refills   • traZODone (DESYREL) 150 MG tablet 90 tablet 1     Sig: Take 1 tablet by mouth every night at bedtime.      Last office visit with prescribing clinician: Visit date not found      Next office visit with prescribing clinician: Visit date not found     LABS:CDA-10/26/2021   Selena Riddle MA  10/20/22, 14:16 CDT

## 2022-10-25 ENCOUNTER — TELEPHONE (OUTPATIENT)
Dept: FAMILY MEDICINE CLINIC | Facility: CLINIC | Age: 62
End: 2022-10-25

## 2022-10-25 NOTE — TELEPHONE ENCOUNTER
Caller: Della Gonzalez    Relationship: Self    Best call back number: [phone]    What is the medical concern/diagnosis: FEET    What specialty or service is being requested: PODIATRY    What is the provider, practice or medical service name: [facility]      Any additional details: HAS APPOINTMENT COMING UP ON 10/31/22

## 2022-10-31 ENCOUNTER — OFFICE VISIT (OUTPATIENT)
Dept: PODIATRY | Facility: CLINIC | Age: 62
End: 2022-10-31

## 2022-10-31 VITALS
HEART RATE: 68 BPM | HEIGHT: 60 IN | DIASTOLIC BLOOD PRESSURE: 78 MMHG | OXYGEN SATURATION: 96 % | SYSTOLIC BLOOD PRESSURE: 136 MMHG | WEIGHT: 192 LBS | BODY MASS INDEX: 37.69 KG/M2

## 2022-10-31 DIAGNOSIS — L60.0 INGROWN TOENAIL: ICD-10-CM

## 2022-10-31 DIAGNOSIS — E11.40 TYPE 2 DIABETES MELLITUS WITH DIABETIC NEUROPATHY, WITH LONG-TERM CURRENT USE OF INSULIN: ICD-10-CM

## 2022-10-31 DIAGNOSIS — Z72.0 TOBACCO ABUSE: ICD-10-CM

## 2022-10-31 DIAGNOSIS — L60.2 ONYCHOGRYPHOSIS: Primary | ICD-10-CM

## 2022-10-31 DIAGNOSIS — B35.1 ONYCHOMYCOSIS: ICD-10-CM

## 2022-10-31 DIAGNOSIS — Z79.4 TYPE 2 DIABETES MELLITUS WITH DIABETIC NEUROPATHY, WITH LONG-TERM CURRENT USE OF INSULIN: ICD-10-CM

## 2022-10-31 PROCEDURE — 11721 DEBRIDE NAIL 6 OR MORE: CPT | Performed by: NURSE PRACTITIONER

## 2022-11-01 ENCOUNTER — OFFICE VISIT (OUTPATIENT)
Dept: FAMILY MEDICINE CLINIC | Facility: CLINIC | Age: 62
End: 2022-11-01

## 2022-11-01 VITALS
BODY MASS INDEX: 38.28 KG/M2 | HEIGHT: 60 IN | OXYGEN SATURATION: 98 % | TEMPERATURE: 97.5 F | SYSTOLIC BLOOD PRESSURE: 126 MMHG | DIASTOLIC BLOOD PRESSURE: 74 MMHG | HEART RATE: 54 BPM | WEIGHT: 195 LBS

## 2022-11-01 DIAGNOSIS — R51.9 FACIAL PAIN: ICD-10-CM

## 2022-11-01 DIAGNOSIS — E11.628 TYPE 2 DIABETES MELLITUS WITH OTHER SKIN COMPLICATION, WITHOUT LONG-TERM CURRENT USE OF INSULIN: Primary | ICD-10-CM

## 2022-11-01 DIAGNOSIS — J20.9 ACUTE BRONCHITIS, UNSPECIFIED ORGANISM: ICD-10-CM

## 2022-11-01 DIAGNOSIS — L02.01 CUTANEOUS ABSCESS OF FACE: ICD-10-CM

## 2022-11-01 LAB
EXPIRATION DATE: ABNORMAL
HBA1C MFR BLD: 9.1 %
Lab: ABNORMAL

## 2022-11-01 PROCEDURE — 3052F HG A1C>EQUAL 8.0%<EQUAL 9.0%: CPT | Performed by: NURSE PRACTITIONER

## 2022-11-01 PROCEDURE — 83036 HEMOGLOBIN GLYCOSYLATED A1C: CPT | Performed by: NURSE PRACTITIONER

## 2022-11-01 PROCEDURE — 96372 THER/PROPH/DIAG INJ SC/IM: CPT | Performed by: NURSE PRACTITIONER

## 2022-11-01 PROCEDURE — 99214 OFFICE O/P EST MOD 30 MIN: CPT | Performed by: NURSE PRACTITIONER

## 2022-11-01 RX ORDER — CEFTRIAXONE 1 G/1
1 INJECTION, POWDER, FOR SOLUTION INTRAMUSCULAR; INTRAVENOUS ONCE
Status: COMPLETED | OUTPATIENT
Start: 2022-11-01 | End: 2022-11-01

## 2022-11-01 RX ORDER — AMOXICILLIN AND CLAVULANATE POTASSIUM 875; 125 MG/1; MG/1
1 TABLET, FILM COATED ORAL 2 TIMES DAILY
Qty: 14 TABLET | Refills: 0 | Status: SHIPPED | OUTPATIENT
Start: 2022-11-01 | End: 2022-11-22

## 2022-11-01 RX ORDER — GUAIFENESIN 600 MG/1
1200 TABLET, EXTENDED RELEASE ORAL 2 TIMES DAILY
Qty: 40 TABLET | Refills: 0 | Status: SHIPPED | OUTPATIENT
Start: 2022-11-01 | End: 2022-11-11

## 2022-11-01 RX ADMIN — CEFTRIAXONE 1 G: 1 INJECTION, POWDER, FOR SOLUTION INTRAMUSCULAR; INTRAVENOUS at 10:34

## 2022-11-01 NOTE — PROGRESS NOTES
"Chief Complaint  Rash and Hypertension    Subjective        Della Gonzalez presents to [facility]  History of Present Illness  Return of \"sores\" on her face.  Recurrent monthly of late.  Last 2 times treated with cefdinir.  Prior to that treated with bactrim x 3.    DM:  A1c elevated.  Patient admits to eating \"way too many sweets\" lately and not walking as much.  She denies symptoms of hypoglycemia or hyperglycemia.  She is compliant with medications 75% of the time.    COUGH:  Patient admits to coughing more frequently.  Occasionally productive.  Has nebulizer at home but has not been using it.  No other URI symptoms are present.    Objective   Vital Signs:  /74 (BP Location: Left arm, Patient Position: Sitting, Cuff Size: Adult)   Pulse 54   Temp 97.5 °F (36.4 °C)   Ht 152.4 cm (60\")   Wt 88.5 kg (195 lb)   SpO2 98%   BMI 38.08 kg/m²   Estimated body mass index is 38.08 kg/m² as calculated from the following:    Height as of this encounter: 152.4 cm (60\").    Weight as of this encounter: 88.5 kg (195 lb).      Physical Exam  Vitals and nursing note reviewed.   Constitutional:       General: She is not in acute distress.     Appearance: Normal appearance. She is obese. She is not ill-appearing.   Cardiovascular:      Rate and Rhythm: Regular rhythm. Bradycardia present.      Heart sounds: Normal heart sounds. No murmur heard.  Pulmonary:      Effort: Pulmonary effort is normal.      Breath sounds: Normal breath sounds.   Skin:     General: Skin is warm and dry.      Findings: Lesion present.      Comments: Multiple cystic lesions on the face, denuded from \"picking\".  2 on forehead; 1 on nose; 2 near upper lip and 3 on the chin.   Neurological:      Mental Status: She is alert and oriented to person, place, and time.        Result Review :                Assessment and Plan   Diagnoses and all orders for this visit:    1. Type 2 diabetes mellitus with other skin " complication, without long-term current use of insulin (HCC) (Primary)  -     POCT glycated hemoglobin, total    2. Cutaneous abscess of face  -     cefTRIAXone (ROCEPHIN) injection 1 g  -     amoxicillin-clavulanate (Augmentin) 875-125 MG per tablet; Take 1 tablet by mouth 2 (Two) Times a Day.  Dispense: 14 tablet; Refill: 0    3. Facial pain  -     cefTRIAXone (ROCEPHIN) injection 1 g  -     amoxicillin-clavulanate (Augmentin) 875-125 MG per tablet; Take 1 tablet by mouth 2 (Two) Times a Day.  Dispense: 14 tablet; Refill: 0    4. Acute bronchitis, unspecified organism  -     cefTRIAXone (ROCEPHIN) injection 1 g  -     amoxicillin-clavulanate (Augmentin) 875-125 MG per tablet; Take 1 tablet by mouth 2 (Two) Times a Day.  Dispense: 14 tablet; Refill: 0  -     guaiFENesin (Mucinex) 600 MG 12 hr tablet; Take 2 tablets by mouth 2 (Two) Times a Day for 10 days.  Dispense: 40 tablet; Refill: 0             Follow Up {Instructions Charge Capture  Follow-up Communications :23}  Return in about 3 months (around 2/1/2023) for diabetes follow up with labs prior.  Patient was given instructions and counseling regarding her condition or for health maintenance advice. Please see specific information pulled into the AVS if appropriate.     MELECIO Thorne  This note is electronically signed.

## 2022-11-07 DIAGNOSIS — E03.9 HYPOTHYROIDISM, UNSPECIFIED TYPE: ICD-10-CM

## 2022-11-07 DIAGNOSIS — E11.42 TYPE 2 DIABETES MELLITUS WITH DIABETIC POLYNEUROPATHY, WITHOUT LONG-TERM CURRENT USE OF INSULIN: ICD-10-CM

## 2022-11-07 RX ORDER — LEVOTHYROXINE SODIUM 0.05 MG/1
50 TABLET ORAL DAILY
Qty: 90 TABLET | Refills: 1 | Status: SHIPPED | OUTPATIENT
Start: 2022-11-07

## 2022-11-07 NOTE — TELEPHONE ENCOUNTER
Rx Refill Note  Requested Prescriptions     Pending Prescriptions Disp Refills   • linagliptin (Tradjenta) 5 MG tablet tablet 90 tablet 1     Sig: Take 1 tablet by mouth Daily.   • levothyroxine (SYNTHROID, LEVOTHROID) 50 MCG tablet 90 tablet 1     Sig: Take 1 tablet by mouth Daily.      Last office visit with prescribing clinician: 11/1/2022      Next office visit with prescribing clinician: 2/1/2023   Cyn Still MA  11/07/22, 09:28 CST

## 2022-11-22 ENCOUNTER — OFFICE VISIT (OUTPATIENT)
Dept: FAMILY MEDICINE CLINIC | Facility: CLINIC | Age: 62
End: 2022-11-22

## 2022-11-22 VITALS
BODY MASS INDEX: 38.09 KG/M2 | TEMPERATURE: 97.7 F | OXYGEN SATURATION: 98 % | WEIGHT: 194 LBS | SYSTOLIC BLOOD PRESSURE: 134 MMHG | DIASTOLIC BLOOD PRESSURE: 80 MMHG | HEIGHT: 60 IN | HEART RATE: 60 BPM

## 2022-11-22 DIAGNOSIS — B37.2 CUTANEOUS CANDIDIASIS: ICD-10-CM

## 2022-11-22 DIAGNOSIS — H01.005 BLEPHARITIS OF LOWER EYELIDS OF BOTH EYES, UNSPECIFIED TYPE: ICD-10-CM

## 2022-11-22 DIAGNOSIS — H01.004 BLEPHARITIS OF LEFT UPPER EYELID, UNSPECIFIED TYPE: ICD-10-CM

## 2022-11-22 DIAGNOSIS — H01.002 BLEPHARITIS OF LOWER EYELIDS OF BOTH EYES, UNSPECIFIED TYPE: ICD-10-CM

## 2022-11-22 DIAGNOSIS — L02.01 CUTANEOUS ABSCESS OF FACE: Primary | ICD-10-CM

## 2022-11-22 PROCEDURE — 99214 OFFICE O/P EST MOD 30 MIN: CPT | Performed by: NURSE PRACTITIONER

## 2022-11-22 RX ORDER — FLUCONAZOLE 150 MG/1
150 TABLET ORAL ONCE
Qty: 1 TABLET | Refills: 0 | Status: SHIPPED | OUTPATIENT
Start: 2022-11-22 | End: 2022-11-22

## 2022-11-22 RX ORDER — SULFAMETHOXAZOLE AND TRIMETHOPRIM 800; 160 MG/1; MG/1
1 TABLET ORAL 2 TIMES DAILY
Qty: 20 TABLET | Refills: 0 | Status: SHIPPED | OUTPATIENT
Start: 2022-11-22 | End: 2023-02-06

## 2022-11-22 RX ORDER — NYSTATIN 100000 U/G
1 CREAM TOPICAL 2 TIMES DAILY
Qty: 30 G | Refills: 2 | Status: SHIPPED | OUTPATIENT
Start: 2022-11-22

## 2022-11-22 NOTE — PROGRESS NOTES
"Chief Complaint  Abscess    Subjective        Della Gonzalez presents to [facility]  History of Present Illness  Patient continues to have hardened tender areas on her face, one on the forehead and 3 on chin.  Patient, unfortunately, keeps touching the areas and occasionally squeezing them.  She admits to having a tender, hard area on the labia as well.  She just finished a round of augmentin but states the area on her chin improved only minimally.    Objective   Vital Signs:  /80 (BP Location: Left arm, Patient Position: Sitting, Cuff Size: Adult)   Pulse 60   Temp 97.7 °F (36.5 °C)   Ht 152.4 cm (60\")   Wt 88 kg (194 lb)   SpO2 98%   BMI 37.89 kg/m²   Estimated body mass index is 37.89 kg/m² as calculated from the following:    Height as of this encounter: 152.4 cm (60\").    Weight as of this encounter: 88 kg (194 lb).          Physical Exam  Vitals and nursing note reviewed. Exam conducted with a chaperone present.   Constitutional:       General: She is not in acute distress.     Appearance: Normal appearance. She is obese. She is not ill-appearing.   HENT:      Head: Normocephalic and atraumatic.   Eyes:      Comments: Right eyelids with redness and swelling at the lash lines.   Cardiovascular:      Rate and Rhythm: Normal rate and regular rhythm.   Musculoskeletal:      Right lower leg: No edema.      Left lower leg: No edema.   Skin:     General: Skin is warm and dry.      Findings: Lesion and rash present.      Comments: 5mm indurated, scabbed area on mid forehead; 3 separate indurations on the chin, each 5-6 mm, with erythema.  No drainage.  5mm induration left labia majora with erythema, no drainage.  Rash present in abdominal folds and bilateral groins.  Congruent erythema, shiny pink skin, consistent with candida.   Neurological:      Mental Status: She is alert and oriented to person, place, and time.        Result Review :                Assessment and Plan "   Diagnoses and all orders for this visit:    1. Cutaneous abscess of face (Primary)  -     sulfamethoxazole-trimethoprim (Bactrim DS) 800-160 MG per tablet; Take 1 tablet by mouth 2 (Two) Times a Day.  Dispense: 20 tablet; Refill: 0    2. Blepharitis of lower eyelids of both eyes, unspecified type  -     Neomycin-Polymyxin-Dexameth 0.1 % ointment; Apply 1 application to eye(s) as directed by provider 2 (Two) Times a Day.  Dispense: 3.5 g; Refill: 1    3. Blepharitis of left upper eyelid, unspecified type  -     Neomycin-Polymyxin-Dexameth 0.1 % ointment; Apply 1 application to eye(s) as directed by provider 2 (Two) Times a Day.  Dispense: 3.5 g; Refill: 1    4. Cutaneous candidiasis  -     fluconazole (Diflucan) 150 MG tablet; Take 1 tablet by mouth 1 (One) Time for 1 dose.  Dispense: 1 tablet; Refill: 0  -     nystatin (MYCOSTATIN) 861915 UNIT/GM cream; Apply 1 application topically to the appropriate area as directed 2 (Two) Times a Day.  Dispense: 30 g; Refill: 2    Patient urged to keep hand clean and away from face.  Encouraged to use hot compresses on right eyelids.         Follow Up   Return in about 2 weeks (around 12/6/2022) for Recheck.  Patient was given instructions and counseling regarding her condition or for health maintenance advice. Please see specific information pulled into the AVS if appropriate.     MELECIO Thorne  This note is electronically signed.

## 2022-12-28 ENCOUNTER — TELEPHONE (OUTPATIENT)
Dept: FAMILY MEDICINE CLINIC | Facility: CLINIC | Age: 62
End: 2022-12-28

## 2022-12-28 DIAGNOSIS — L02.01 CUTANEOUS ABSCESS OF FACE: Primary | ICD-10-CM

## 2022-12-28 RX ORDER — CEPHALEXIN 500 MG/1
500 CAPSULE ORAL 3 TIMES DAILY
Qty: 21 CAPSULE | Refills: 0 | Status: SHIPPED | OUTPATIENT
Start: 2022-12-28 | End: 2023-02-06

## 2022-12-28 RX ORDER — CEPHALEXIN 250 MG/1
250 CAPSULE ORAL DAILY
Qty: 30 CAPSULE | Refills: 2 | Status: SHIPPED | OUTPATIENT
Start: 2022-12-28 | End: 2023-02-06

## 2022-12-28 NOTE — TELEPHONE ENCOUNTER
Caller: Della Gonzalez    Relationship: Self    Best call back number: [phone]    What medication are you requesting:   ANTI BIOTIC     What are your current symptoms:   ISSUE WITH CHIN    How long have you been experiencing symptoms:   OFF AND ON FOR THE LONGEST    Have you had these symptoms before:    [x] Yes  [] No    Have you been treated for these symptoms before:   [x] Yes  [] No    If a prescription is needed, what is your preferred pharmacy and phone number: [facility] [place], [place] - [address] [place] [address] [phone] [facility] [phone]      Additional notes:  N/A

## 2022-12-29 RX ORDER — NADOLOL 20 MG/1
TABLET ORAL
Qty: 90 TABLET | Refills: 1 | Status: SHIPPED | OUTPATIENT
Start: 2022-12-29

## 2022-12-29 NOTE — TELEPHONE ENCOUNTER
Rx Refill Note  Requested Prescriptions     Pending Prescriptions Disp Refills   • nadolol (CORGARD) 20 MG tablet [Pharmacy Med Name: NADOLOL 20MG TABS] 90 tablet 1     Sig: TAKE ONE TABLET BY MOUTH EVERY DAY      Last office visit with prescribing clinician: 11/22/2022   Last telemedicine visit with prescribing clinician: 1/30/2023   Next office visit with prescribing clinician: 2/1/2023   {Selena Riddle MA  12/29/22, 10:47 CST        Labs:07/25/2022

## 2023-01-04 NOTE — PROGRESS NOTES
Chief Complaint  No chief complaint on file.    Subjective        Della Gonzalez presents to [facility]  History of Present Illness    Objective   Vital Signs:  There were no vitals taken for this visit.  Estimated body mass index is 37.89 kg/m² as calculated from the following:    Height as of 11/22/22: 152.4 cm (60\").    Weight as of 11/22/22: 88 kg (194 lb).          Physical Exam   Result Review :                Assessment and Plan   There are no diagnoses linked to this encounter.         Follow Up   No follow-ups on file.  Patient was given instructions and counseling regarding her condition or for health maintenance advice. Please see specific information pulled into the AVS if appropriate.

## 2023-01-20 DIAGNOSIS — E11.628 TYPE 2 DIABETES MELLITUS WITH OTHER SKIN COMPLICATION, WITHOUT LONG-TERM CURRENT USE OF INSULIN: ICD-10-CM

## 2023-01-20 DIAGNOSIS — Z12.31 BREAST CANCER SCREENING BY MAMMOGRAM: ICD-10-CM

## 2023-01-20 NOTE — TELEPHONE ENCOUNTER
Caller: Della Gonzalez    Relationship: Self    Best call back number: [phone]    Requested Prescriptions:   Requested Prescriptions     Pending Prescriptions Disp Refills   • Ertugliflozin L-PyroglutamicAc (Steglatro) 15 MG tablet 90 tablet 1     Sig: Take 1 tablet by mouth Every Morning.        Pharmacy where request should be sent: [facility] [address] [phone] [facility] [phone]      Additional details provided by patient: PATIENT HAS ONE LEFT.     Does the patient have less than a 3 day supply:  [x] Yes  [] No    Would you like a call back once the refill request has been completed: [x] Yes [] No    If the office needs to give you a call back, can they leave a voicemail: [x] Yes [] No    Keiry Sahni Rep   01/20/23 15:41 CST

## 2023-01-20 NOTE — TELEPHONE ENCOUNTER
Rx Refill Note  Requested Prescriptions     Pending Prescriptions Disp Refills   • Ertugliflozin L-PyroglutamicAc (Steglatro) 15 MG tablet 90 tablet 1     Sig: Take 1 tablet by mouth Every Morning.      Last office visit with prescribing clinician: 11/22/2022   Last telemedicine visit with prescribing clinician: 1/30/2023   Next office visit with prescribing clinician: 2/1/2023         Christina Jose MA  01/20/23, 15:56 CST

## 2023-01-26 DIAGNOSIS — J30.9 ALLERGIC SINUSITIS: ICD-10-CM

## 2023-01-26 DIAGNOSIS — R51.9 SINUS HEADACHE: ICD-10-CM

## 2023-01-26 DIAGNOSIS — J45.901 BRONCHITIS, ALLERGIC, UNSPECIFIED ASTHMA SEVERITY, WITH ACUTE EXACERBATION: ICD-10-CM

## 2023-01-26 RX ORDER — CETIRIZINE HYDROCHLORIDE 10 MG/1
TABLET ORAL
Qty: 90 TABLET | Refills: 1 | Status: SHIPPED | OUTPATIENT
Start: 2023-01-26 | End: 2023-02-24

## 2023-01-26 NOTE — TELEPHONE ENCOUNTER
Rx Refill Note  Requested Prescriptions     Pending Prescriptions Disp Refills   • cetirizine (zyrTEC) 10 MG tablet [Pharmacy Med Name: CETIRIZINE HCL 10MG TABS] 90 tablet 1     Sig: TAKE ONE TABLET BY MOUTH EVERY DAY      Last office visit with prescribing clinician: 11/22/2022   Last telemedicine visit with prescribing clinician: 1/30/2023   Next office visit with prescribing clinician: 2/1/2023           Christina Jose MA  01/26/23, 10:14 CST

## 2023-01-30 DIAGNOSIS — E11.628 TYPE 2 DIABETES MELLITUS WITH OTHER SKIN COMPLICATION, WITHOUT LONG-TERM CURRENT USE OF INSULIN: Primary | ICD-10-CM

## 2023-01-31 LAB
ALBUMIN SERPL-MCNC: 4.4 G/DL (ref 3.8–4.8)
ALBUMIN/GLOB SERPL: 1.8 {RATIO} (ref 1.2–2.2)
ALP SERPL-CCNC: 102 IU/L (ref 44–121)
ALT SERPL-CCNC: 24 IU/L (ref 0–32)
AST SERPL-CCNC: 27 IU/L (ref 0–40)
BILIRUB SERPL-MCNC: 0.6 MG/DL (ref 0–1.2)
BUN SERPL-MCNC: 13 MG/DL (ref 8–27)
BUN/CREAT SERPL: 28 (ref 12–28)
CALCIUM SERPL-MCNC: 8.9 MG/DL (ref 8.7–10.3)
CHLORIDE SERPL-SCNC: 104 MMOL/L (ref 96–106)
CO2 SERPL-SCNC: 25 MMOL/L (ref 20–29)
CREAT SERPL-MCNC: 0.46 MG/DL (ref 0.57–1)
EGFRCR SERPLBLD CKD-EPI 2021: 107 ML/MIN/1.73
GLOBULIN SER CALC-MCNC: 2.5 G/DL (ref 1.5–4.5)
GLUCOSE SERPL-MCNC: 214 MG/DL (ref 70–99)
HBA1C MFR BLD: 9.1 % (ref 4.8–5.6)
POTASSIUM SERPL-SCNC: 4.1 MMOL/L (ref 3.5–5.2)
PROT SERPL-MCNC: 6.9 G/DL (ref 6–8.5)
SODIUM SERPL-SCNC: 142 MMOL/L (ref 134–144)

## 2023-02-01 ENCOUNTER — TELEPHONE (OUTPATIENT)
Dept: PODIATRY | Facility: CLINIC | Age: 63
End: 2023-02-01
Payer: MEDICARE

## 2023-02-01 NOTE — TELEPHONE ENCOUNTER
Called patient to confirmed appointment for 02/02/2023 @1115 with Rommel MAJANO. I had to leave a voicemail for patient to call back to confirm or to reschedule if needed.

## 2023-02-02 ENCOUNTER — OFFICE VISIT (OUTPATIENT)
Dept: PODIATRY | Facility: CLINIC | Age: 63
End: 2023-02-02
Payer: MEDICARE

## 2023-02-02 VITALS
SYSTOLIC BLOOD PRESSURE: 128 MMHG | WEIGHT: 193 LBS | HEART RATE: 62 BPM | DIASTOLIC BLOOD PRESSURE: 82 MMHG | OXYGEN SATURATION: 99 % | HEIGHT: 60 IN | BODY MASS INDEX: 37.89 KG/M2

## 2023-02-02 DIAGNOSIS — E78.2 MIXED HYPERLIPIDEMIA: ICD-10-CM

## 2023-02-02 DIAGNOSIS — Z79.4 TYPE 2 DIABETES MELLITUS WITH DIABETIC NEUROPATHY, WITH LONG-TERM CURRENT USE OF INSULIN: ICD-10-CM

## 2023-02-02 DIAGNOSIS — E11.9 ENCOUNTER FOR DIABETIC FOOT EXAM: ICD-10-CM

## 2023-02-02 DIAGNOSIS — B35.3 TINEA PEDIS OF BOTH FEET: Primary | ICD-10-CM

## 2023-02-02 DIAGNOSIS — Z72.0 TOBACCO ABUSE: ICD-10-CM

## 2023-02-02 DIAGNOSIS — L60.2 ONYCHOGRYPHOSIS: ICD-10-CM

## 2023-02-02 DIAGNOSIS — L60.0 INGROWN TOENAIL: ICD-10-CM

## 2023-02-02 DIAGNOSIS — B35.1 ONYCHOMYCOSIS: ICD-10-CM

## 2023-02-02 DIAGNOSIS — E11.40 TYPE 2 DIABETES MELLITUS WITH DIABETIC NEUROPATHY, WITH LONG-TERM CURRENT USE OF INSULIN: ICD-10-CM

## 2023-02-02 PROCEDURE — 99213 OFFICE O/P EST LOW 20 MIN: CPT | Performed by: NURSE PRACTITIONER

## 2023-02-02 PROCEDURE — 11721 DEBRIDE NAIL 6 OR MORE: CPT | Performed by: NURSE PRACTITIONER

## 2023-02-02 RX ORDER — ATORVASTATIN CALCIUM 10 MG/1
TABLET, FILM COATED ORAL
Qty: 90 TABLET | Refills: 1 | Status: SHIPPED | OUTPATIENT
Start: 2023-02-02 | End: 2023-02-03 | Stop reason: SDUPTHER

## 2023-02-02 RX ORDER — PRENATAL VIT 91/IRON/FOLIC/DHA 28-975-200
1 COMBINATION PACKAGE (EA) ORAL 2 TIMES DAILY
Qty: 42 G | Refills: 5 | Status: SHIPPED | OUTPATIENT
Start: 2023-02-02

## 2023-02-02 NOTE — TELEPHONE ENCOUNTER
Rx Refill Note  Requested Prescriptions     Pending Prescriptions Disp Refills   • atorvastatin (LIPITOR) 10 MG tablet [Pharmacy Med Name: ATORVASTATIN CALCIUM 10MG TABS] 90 tablet 1     Sig: TAKE ONE TABLET BY MOUTH EVERY DAY      Last office visit with prescribing clinician: 11/22/2022   Last telemedicine visit with prescribing clinician: 2/6/2023   Next office visit with prescribing clinician: 2/6/2023        .    Christina Jose MA  02/02/23, 09:29 CST

## 2023-02-03 DIAGNOSIS — E78.2 MIXED HYPERLIPIDEMIA: ICD-10-CM

## 2023-02-03 DIAGNOSIS — E11.42 TYPE 2 DIABETES MELLITUS WITH DIABETIC POLYNEUROPATHY, WITHOUT LONG-TERM CURRENT USE OF INSULIN: ICD-10-CM

## 2023-02-03 RX ORDER — ATORVASTATIN CALCIUM 10 MG/1
10 TABLET, FILM COATED ORAL DAILY
Qty: 90 TABLET | Refills: 1 | Status: SHIPPED | OUTPATIENT
Start: 2023-02-03

## 2023-02-03 NOTE — TELEPHONE ENCOUNTER
Rx Refill Note  Requested Prescriptions     Pending Prescriptions Disp Refills   • linagliptin (Tradjenta) 5 MG tablet tablet 90 tablet 1     Sig: Take 1 tablet by mouth Daily.   • atorvastatin (LIPITOR) 10 MG tablet 90 tablet 1     Sig: Take 1 tablet by mouth Daily.      Last office visit with prescribing clinician: 11/22/2022   Last telemedicine visit with prescribing clinician: 2/6/2023   Next office visit with prescribing clinician: 2/6/2023       Cyn Still MA  02/03/23, 10:25 CST

## 2023-02-03 NOTE — PROGRESS NOTES
[facility] - PODIATRY    Today's Date: 02/03/23    Patient Name: Della Gonzalez  MRN: [id number]  CSN: [id number]  PCP: Vijaya Henao APRN  Referring Provider: No ref. provider found    SUBJECTIVE     Chief Complaint   Patient presents with   • Follow-up     Vijaya Henao APRN-11/22/2022-3 month f/u for diabetic foot care-pt states she is here today for diabetic foot care-pt denies pain   • Diabetes     Didn't check     HPI: Della Gonzalez, a 62 y.o.female, comes to clinic as a(n) established patient presenting for diabetic foot exam and complaining of thickened, irregular toenails. Patient has h/o cirrhosis, DM2, GERD, tobacco use. Patient is NIDDM and unsure of last BG level.  Does not routinely check blood glucose. Notes mild numbness/tingling in feet.  States her toenails are long, thick, discolored and crumbly.  Patient states she is unable to take care of her nails at home due to shape and thickness of nails. States she has recently noticed some changes in the skin of the left foot with associated pruritus. Continues daily tobacco use.  Denies pain today. Relates previous treatment(s) including care by podiatrist. Denies any constitutional symptoms. No other pedal complaints at this time.    Past Medical History:   Diagnosis Date   • Cirrhosis of liver (HCC)    • Diabetes mellitus (HCC)    • GERD (gastroesophageal reflux disease)    • Liver disease      Past Surgical History:   Procedure Laterality Date   • COLONOSCOPY       Family History   Problem Relation Age of Onset   • Diabetes Mother    • No Known Problems Father      Social History     Socioeconomic History   • Marital status: Single   Tobacco Use   • Smoking status: Every Day     Packs/day: 0.50     Years: 40.00     Pack years: 20.00     Types: Cigarettes     Passive exposure: Current   • Smokeless tobacco: Never   Vaping Use   • Vaping Use: Never used   Substance and Sexual Activity   • Alcohol use: No   • Drug use: No   •  Sexual activity: Not Currently     Partners: Male     No Known Allergies  Current Outpatient Medications   Medication Sig Dispense Refill   • benazepril (LOTENSIN) 10 MG tablet Take 1 tablet by mouth Daily. 90 tablet 3   • cephalexin (Keflex) 250 MG capsule Take 1 capsule by mouth Daily. Begin after completion of the 500 mg prescription. 30 capsule 2   • cephalexin (Keflex) 500 MG capsule Take 1 capsule by mouth 3 (Three) Times a Day. 21 capsule 0   • cetirizine (zyrTEC) 10 MG tablet TAKE ONE TABLET BY MOUTH EVERY DAY 90 tablet 1   • Ertugliflozin L-PyroglutamicAc (Steglatro) 15 MG tablet Take 1 tablet by mouth Every Morning. 90 tablet 1   • levothyroxine (SYNTHROID, LEVOTHROID) 50 MCG tablet Take 1 tablet by mouth Daily. 90 tablet 1   • nadolol (CORGARD) 20 MG tablet TAKE ONE TABLET BY MOUTH EVERY DAY 90 tablet 1   • Neomycin-Polymyxin-Dexameth 0.1 % ointment Apply 1 application to eye(s) as directed by provider 2 (Two) Times a Day. 3.5 g 1   • nystatin (MYCOSTATIN) 802345 UNIT/GM cream Apply 1 application topically to the appropriate area as directed 2 (Two) Times a Day. 30 g 2   • omeprazole (priLOSEC) 20 MG capsule Take 1 capsule by mouth Daily. 90 capsule 1   • ondansetron ODT (Zofran ODT) 4 MG disintegrating tablet Place 1 tablet on the tongue Every 8 (Eight) Hours As Needed for Nausea. 12 tablet 0   • sulfamethoxazole-trimethoprim (Bactrim DS) 800-160 MG per tablet Take 1 tablet by mouth 2 (Two) Times a Day. 20 tablet 0   • SUMAtriptan (Imitrex) 100 MG tablet Take one tablet at onset of headache. May repeat dose one time in 2 hours if headache not relieved. 9 tablet 2   • tiZANidine (ZANAFLEX) 4 MG tablet Take 1 tablet by mouth Every 8 (Eight) Hours As Needed for Muscle Spasms. 60 tablet 1   • traZODone (DESYREL) 150 MG tablet Take 1 tablet by mouth every night at bedtime. 90 tablet 1   • triamcinolone (KENALOG) 0.1 % cream Apply 1 application topically to the appropriate area as directed 2 (Two) Times a Day.  15 g 2   • atorvastatin (LIPITOR) 10 MG tablet Take 1 tablet by mouth Daily. 90 tablet 1   • linagliptin (Tradjenta) 5 MG tablet tablet Take 1 tablet by mouth Daily. 90 tablet 1   • terbinafine (lamISIL) 1 % cream Apply 1 application topically to the appropriate area as directed 2 (Two) Times a Day. 42 g 5     No current facility-administered medications for this visit.     Review of Systems   Constitutional: Negative for chills and fever.   HENT: Negative for congestion.    Respiratory: Negative for shortness of breath.    Cardiovascular: Positive for leg swelling. Negative for chest pain.   Gastrointestinal: Negative for constipation, diarrhea, nausea and vomiting.   Musculoskeletal: Positive for arthralgias. Negative for gait problem.        Foot pain   Skin: Positive for color change. Negative for wound.        Pruritus of left foot   Neurological: Positive for numbness.       OBJECTIVE     Vitals:    02/02/23 1054   BP: 128/82   Pulse: 62   SpO2: 99%       PHYSICAL EXAM  GEN:   Accompanied by none.     Foot/Ankle Exam:       General:   Diabetic Foot Exam Performed    Appearance: appears stated age and healthy and obesity    Orientation: AAOx3    Affect: appropriate    Gait: unimpaired    Assistance: independent    Shoe Gear:  Casual shoes    VASCULAR      Right Foot Vascularity   Dorsalis pedis:  2+  Posterior tibial:  2+  Skin Temperature: warm    Edema Grading:  None  CFT:  3  Pedal Hair Growth:  Present  Varicosities: mild varicosities       Left Foot Vascularity   Dorsalis pedis:  2+  Posterior tibial:  2+  Skin Temperature: warm    Edema Grading:  None  CFT:  3  Pedal Hair Growth:  Present  Varicosities: mild varicosities        NEUROLOGIC     Right Foot Neurologic   Light touch sensation:  Diminished  Vibratory sensation:  Diminished  Hot/Cold sensation: diminished    Protective Sensation using [place]-John Paul Monofilament:  8     Left Foot Neurologic   Light touch sensation:  Diminished  Vibratory  sensation:  Diminished  Hot/cold sensation: diminished    Protective Sensation using [place]-John Paul Monofilament:  8     MUSCULOSKELETAL      Right Foot Musculoskeletal   Ecchymosis:  None  Tenderness: toenails and toe 1    Arch:  Normal  Hallux valgus: No    Hallux limitus: No       Left Foot Musculoskeletal   Ecchymosis:  None  Tenderness: toenails and toe 1    Arch:  Normal  Hallux valgus: No    Hallux limitus: No       MUSCLE STRENGTH     Right Foot Muscle Strength   Foot dorsiflexion:  5  Foot plantar flexion:  5  Foot inversion:  5  Foot eversion:  5     Left Foot Muscle Strength   Foot dorsiflexion:  5  Foot plantar flexion:  5  Foot inversion:  5  Foot eversion:  5     RANGE OF MOTION      Right Foot Range of Motion   Foot and ankle ROM within normal limits       Left Foot Range of Motion   Foot and ankle ROM within normal limits       DERMATOLOGIC     Right Foot Dermatologic   Skin: tinea    Nails: onychomycosis, abnormally thick, subungual debris, dystrophic nails and ingrown toenail (Hallux)       Left Foot Dermatologic   Skin: tinea    Nails: onychomycosis, abnormally thick, subungual debris, dystrophic nails and ingrown toenail (Hallux)        RADIOLOGY/NUCLEAR:  No results found.    LABORATORY/CULTURE RESULTS:      PATHOLOGY RESULTS:       ASSESSMENT/PLAN     Diagnoses and all orders for this visit:    1. Tinea pedis of both feet (Primary)  -     terbinafine (lamISIL) 1 % cream; Apply 1 application topically to the appropriate area as directed 2 (Two) Times a Day.  Dispense: 42 g; Refill: 5    2. Onychogryphosis    3. Onychomycosis    4. Ingrown toenail    5. Type 2 diabetes mellitus with diabetic neuropathy, with long-term current use of insulin ([facility])    6. Tobacco abuse    7. Encounter for diabetic foot exam ([facility])      Comprehensive lower extremity examination and evaluation was performed.  Discussed findings and treatment plan including risks, benefits, and treatment options with patient in  detail. Patient agreed with treatment plan  Diabetic foot exam performed.  After verbal consent obtained, nail(s) x10 debrided of length and thickness with nail nipper without incidence  After verbal consent obtained, nail(s) x2 debrided of offending borders with nail nipper without incidence  Patient may maintain nails and calluses at home utilizing emery board or pumice stone between visits as needed  Reviewed at home diabetic foot care including daily foot checks   Tobacco cessation needed.   Continue to follow with PCP for diabetic management.  RX Lamisil 1% cream, 1 application BID x 3 weeks  An After Visit Summary was printed and given to the patient at discharge, including (if requested) any available informative/educational handouts regarding diagnosis, treatment, or medications. All questions were answered to patient/family satisfaction. Should symptoms fail to improve or worsen they agree to call or return to clinic or to go to the Emergency Department. Discussed the importance of following up with any needed screening tests/labs/specialist appointments and any requested follow-up recommended by me today. Importance of maintaining follow-up discussed and patient accepts that missed appointments can delay diagnosis and potentially lead to worsening of conditions.  Return in about 3 months (around 5/2/2023)., or sooner if acute issues arise.        This document has been electronically signed by MELECIO Hassan on February 3, 2023 16:21 CST

## 2023-02-06 ENCOUNTER — OFFICE VISIT (OUTPATIENT)
Dept: FAMILY MEDICINE CLINIC | Facility: CLINIC | Age: 63
End: 2023-02-06
Payer: MEDICARE

## 2023-02-06 VITALS
SYSTOLIC BLOOD PRESSURE: 130 MMHG | WEIGHT: 198 LBS | HEIGHT: 60 IN | RESPIRATION RATE: 20 BRPM | HEART RATE: 62 BPM | DIASTOLIC BLOOD PRESSURE: 82 MMHG | OXYGEN SATURATION: 94 % | TEMPERATURE: 97.9 F | BODY MASS INDEX: 38.87 KG/M2

## 2023-02-06 DIAGNOSIS — E11.42 TYPE 2 DIABETES MELLITUS WITH DIABETIC POLYNEUROPATHY, WITHOUT LONG-TERM CURRENT USE OF INSULIN: Primary | ICD-10-CM

## 2023-02-06 DIAGNOSIS — G43.009 MIGRAINE WITHOUT AURA AND WITHOUT STATUS MIGRAINOSUS, NOT INTRACTABLE: ICD-10-CM

## 2023-02-06 PROCEDURE — 99214 OFFICE O/P EST MOD 30 MIN: CPT | Performed by: NURSE PRACTITIONER

## 2023-02-06 RX ORDER — SUMATRIPTAN 100 MG/1
TABLET, FILM COATED ORAL
Qty: 9 TABLET | Refills: 2 | Status: SHIPPED | OUTPATIENT
Start: 2023-02-06

## 2023-02-06 NOTE — PROGRESS NOTES
"Chief Complaint  Diabetes and medication refills    Subjective        Della Gonzalez presents to [facility]  History of Present Illness  DM:  Patient admits to eating more freely over the holidays as well as not getting her usual amount of exercise due to the cold and weather.  During the other seasons she walks around town on a daily basis.  She denies symptoms consistent with hypoglycemia or hyperglycemia.  She is compliant with her medication regimen.    MIGRAINE:  Patient is out of migraine medication.  She is requesting refill.  It works very well normally to abort an impending migraine.  The integrity of her migraines is unchanged.    Objective   Vital Signs:  /82 (BP Location: Left arm, Patient Position: Sitting, Cuff Size: Adult)   Pulse 62   Temp 97.9 °F (36.6 °C) (Temporal)   Resp 20   Ht 152.4 cm (60\")   Wt 89.8 kg (198 lb)   SpO2 94%   BMI 38.67 kg/m²   Estimated body mass index is 38.67 kg/m² as calculated from the following:    Height as of this encounter: 152.4 cm (60\").    Weight as of this encounter: 89.8 kg (198 lb).          Physical Exam  Vitals and nursing note reviewed.   Constitutional:       General: She is not in acute distress.     Appearance: Normal appearance. She is obese. She is not ill-appearing.   HENT:      Head: Normocephalic and atraumatic.   Cardiovascular:      Rate and Rhythm: Normal rate and regular rhythm.      Pulses: Normal pulses.      Heart sounds: Normal heart sounds. No murmur heard.  Pulmonary:      Effort: Pulmonary effort is normal.      Breath sounds: Normal breath sounds.   Abdominal:      General: Bowel sounds are normal.      Palpations: Abdomen is soft.   Musculoskeletal:      Right lower leg: No edema.      Left lower leg: No edema.   Skin:     General: Skin is warm and dry.   Neurological:      Mental Status: She is alert and oriented to person, place, and time.        Result Review :    Component  Ref Range & Units 7 " d ago  (1/30/23) 3 mo ago  (11/1/22) 6 mo ago  (7/25/22) 1 yr ago  (1/28/22) 1 yr ago  (10/26/21) 1 yr ago  (7/20/21) 1 yr ago  (4/27/21)   Hemoglobin A1C  4.8 - 5.6 % 9.1 High   9.1 Abnormal  R  7.9 High  CM  9.0 Abnormal  R  8.0 Abnormal  R  7.8 High  CM  6.9 Abnormal  R, CM    Comment:          Prediabetes: 5.7 - 6.4      Component  Ref Range & Units 7 d ago  (1/30/23)  [facility] 6 mo ago  (7/25/22)  [facility] 9 mo ago  (4/29/22)  [facility] 11 mo ago  (2/23/22)  [facility] 1 yr ago  (8/16/21)  [facility] 1 yr ago  (7/20/21)  [facility] 1 yr ago  (3/9/21)  [facility]   Glucose  70 - 99 mg/dL 214 High   200 High  R  151 High  R  166 High  R  159 High  R  150 High  R  163 High  R    BUN  8 - 27 mg/dL 13  12   10 R  14 R  17     Creatinine  0.57 - 1.00 mg/dL 0.46 Low   0.49 Low    0.5 R  0.4 Low  R  0.52 Low      EGFR Result  >59 mL/min/1.73 107  106         BUN/Creatinine Ratio  12 - 28 28  24     33 High      Sodium  134 - 144 mmol/L 142  138   139 R  140 R  143     Potassium  3.5 - 5.2 mmol/L 4.1  4.0   4.3 R  4.0 R  4.3     Chloride  96 - 106 mmol/L 104  104   101 R  101 R  102     Total CO2  20 - 29 mmol/L 25  18 Low    25 R  29 R  22     Calcium  8.7 - 10.3 mg/dL 8.9  9.5   9.5 R  9.8 R  9.3     Total Protein  6.0 - 8.5 g/dL 6.9  7.5   8.0 R  8.0 R  7.6     Albumin  3.8 - 4.8 g/dL 4.4  4.6   4.5 R  4.5 R  4.4     Globulin  1.5 - 4.5 g/dL 2.5  2.9     3.2     A/G Ratio  1.2 - 2.2 1.8  1.6     1.4     Total Bilirubin  0.0 - 1.2 mg/dL 0.6  0.9   0.7 R  0.8 R  1.0     Alkaline Phosphatase  44 - 121 IU/L 102  96   96 R  112 High  R  119 R     AST (SGOT)  0 - 40 IU/L 27  27   21 R  35 High  R  24     ALT (SGPT)  0 - 32 IU/L 24  18   15 R  28 R  20     Resulting Agency [facility]                      Assessment and Plan   Diagnoses and all orders for this visit:    1. Type 2 diabetes mellitus with diabetic polyneuropathy, without long-term current use of insulin (HCC)  (Primary)  -     metFORMIN (Glucophage) 1000 MG tablet; Take 1 tablet by mouth 2 (Two) Times a Day With Meals.  Dispense: 60 tablet; Refill: 2  -     Comprehensive metabolic panel; Future  -     Hemoglobin A1c; Future    2. Migraine without aura and without status migrainosus, not intractable  -     SUMAtriptan (Imitrex) 100 MG tablet; Take one tablet at onset of headache. May repeat dose one time in 2 hours if headache not relieved.  Dispense: 9 tablet; Refill: 2    Patient urged to keep carb content to approximately 40/meal and resume daily walking as soon as practical.         Follow Up   Return in about 3 months (around 5/6/2023) for diabetes follow up with labs prior.  Patient was given instructions and counseling regarding her condition or for health maintenance advice. Please see specific information pulled into the AVS if appropriate.     MELECIO Thorne  This note is electronically signed.

## 2023-02-24 DIAGNOSIS — J45.901 BRONCHITIS, ALLERGIC, UNSPECIFIED ASTHMA SEVERITY, WITH ACUTE EXACERBATION: ICD-10-CM

## 2023-02-24 DIAGNOSIS — K21.9 GASTROESOPHAGEAL REFLUX DISEASE, UNSPECIFIED WHETHER ESOPHAGITIS PRESENT: ICD-10-CM

## 2023-02-24 DIAGNOSIS — R51.9 SINUS HEADACHE: ICD-10-CM

## 2023-02-24 DIAGNOSIS — J30.9 ALLERGIC SINUSITIS: ICD-10-CM

## 2023-02-24 RX ORDER — CETIRIZINE HYDROCHLORIDE 10 MG/1
TABLET ORAL
Qty: 90 TABLET | Refills: 1 | Status: SHIPPED | OUTPATIENT
Start: 2023-02-24

## 2023-02-24 RX ORDER — OMEPRAZOLE 20 MG/1
CAPSULE, DELAYED RELEASE ORAL
Qty: 90 CAPSULE | Refills: 1 | Status: SHIPPED | OUTPATIENT
Start: 2023-02-24

## 2023-02-24 NOTE — TELEPHONE ENCOUNTER
Rx Refill Note  Requested Prescriptions     Pending Prescriptions Disp Refills   • cetirizine (zyrTEC) 10 MG tablet [Pharmacy Med Name: CETIRIZINE HCL 10MG TABS] 90 tablet 1     Sig: TAKE ONE TABLET BY MOUTH EVERY DAY      Last office visit with prescribing clinician: 2/6/2023   Next office visit with prescribing clinician: 5/5/2023           On 01/26/2023 90 tablets one refill sent to [facility]          {Selena Riddle MA  02/24/23, 10:37 CST

## 2023-02-24 NOTE — TELEPHONE ENCOUNTER
May 20, 2021        Dear Racquel,    You are scheduled for a coronary angiogram on Thursday June 3rd at 12:30 pm at the [facility], [address] [place] [address], [place] [address]. There is  parking in front of the hospital.  Please check in at the [facility] down the cardozo from the hospital lobby.      An angiogram is a test to find out if you have coronary artery disease. Contrast dye is injected into the arteries of the heart through a tiny catheter.  This allows the doctor to see on a X-ray screen where, or if, an artery is narrowed or blocked.      Pre-procedure instructions:  1. Please make arrangements to have a , your will be given mild sedation and will not be allowed to drive for 24 hours.   2. Do not eat or drink 6 hours prior to your procedure.  Sips of water are allowed up to 2 hours prior to the procedure.  3. Take your usual morning medications with sips of water as you normally would.   4. You will need to bridge your coumadin with Lovenox (enoxaparin) prior to this procedure.  Please follow the instructions below:       a. Take your last dose of coumadin on Friday May 28th.     b. Start your first Lovenox injection on Arsen May 30th in the evening.     c. On Monday May 31st and Tuesday June 1st, take one dose of Lovenox in the evenings         then stop the Lovenox until after the Angiogram.     d.Resume Lovenox on Friday June 4th in the evening and continue one time daily in the           evening unitl last dose on Sunday June 13th.  Then stop for you surgery on Tuesday June 15th.      e. A new prescription for an addition 7 syringes has been sent to the [facility]                      Pharmacy.         6. Please note that you will be discharged several hours after the procedure. This is an outpatient procedure.    7. If your angiogram is in preparation for another heart surgery, and you are found to have blockages in your arteries your  Rx Refill Note  Requested Prescriptions     Pending Prescriptions Disp Refills   • omeprazole (priLOSEC) 20 MG capsule [Pharmacy Med Name: OMEPRAZOLE 20MG CPDR] 90 capsule 1     Sig: TAKE ONE CAPSULE BY MOUTH EVERY DAY      Last office visit with prescribing clinician:02/06/2023  Last telemedicine visit with prescribing clinician: 5/1/2023   Next office visit with prescribing clinician: 5/5/2023    Cyn Still MA  02/24/23, 10:09 CST     surgeon will most likely bypass these blockages at the same time he performs your other open heart surgery.      The risks associated with this procedure depends on the health of the patient.  Inserting and moving the catheter through the artery may cause:   Bleeding or bruising, blood clots, injury to the artery or vein, a very small risk of stroke and a very small chance of a heart attack.      Please feel free to call me with any questions or concerns.        Staci Quintanilla RN  [facility]  Cardiothoracic Surgery Care Coordinator  [phone]

## 2023-03-31 ENCOUNTER — OFFICE VISIT (OUTPATIENT)
Dept: FAMILY MEDICINE CLINIC | Facility: CLINIC | Age: 63
End: 2023-03-31
Payer: MEDICARE

## 2023-03-31 VITALS
DIASTOLIC BLOOD PRESSURE: 82 MMHG | HEART RATE: 68 BPM | OXYGEN SATURATION: 97 % | SYSTOLIC BLOOD PRESSURE: 122 MMHG | TEMPERATURE: 97.4 F | BODY MASS INDEX: 38.87 KG/M2 | RESPIRATION RATE: 20 BRPM | WEIGHT: 198 LBS | HEIGHT: 60 IN

## 2023-03-31 DIAGNOSIS — J40 BRONCHITIS: ICD-10-CM

## 2023-03-31 DIAGNOSIS — J30.2 SEASONAL ALLERGIC RHINITIS, UNSPECIFIED TRIGGER: Primary | ICD-10-CM

## 2023-03-31 RX ORDER — METHYLPREDNISOLONE SODIUM SUCCINATE 125 MG/2ML
125 INJECTION, POWDER, LYOPHILIZED, FOR SOLUTION INTRAMUSCULAR; INTRAVENOUS EVERY 6 HOURS
Status: SHIPPED | OUTPATIENT
Start: 2023-03-31

## 2023-03-31 RX ORDER — FLUTICASONE PROPIONATE 50 MCG
2 SPRAY, SUSPENSION (ML) NASAL DAILY
Qty: 16 G | Refills: 0 | Status: SHIPPED | OUTPATIENT
Start: 2023-03-31

## 2023-03-31 RX ORDER — AZITHROMYCIN 250 MG/1
TABLET, FILM COATED ORAL
Qty: 6 TABLET | Refills: 0 | Status: SHIPPED | OUTPATIENT
Start: 2023-03-31 | End: 2023-04-07

## 2023-03-31 RX ADMIN — METHYLPREDNISOLONE SODIUM SUCCINATE 125 MG: 125 INJECTION, POWDER, LYOPHILIZED, FOR SOLUTION INTRAMUSCULAR; INTRAVENOUS at 13:17

## 2023-03-31 NOTE — PROGRESS NOTES
"       Chief Complaint  Nasal Congestion    Subjective        Della Gonzalez presents to [facility]    HPI    Pt here w/ acute concern of  productive cough, chest congestion, nasal congestion. No f/c, or acute sob. She is sob to some degree at baseline. She does smoke 1/2 ppd. She has h/o allergies, says zyrtec \"sometimes works\".    Past Medical History:   Diagnosis Date   • Cirrhosis of liver (HCC)    • Diabetes mellitus (HCC)    • GERD (gastroesophageal reflux disease)    • Liver disease      Past Surgical History:   Procedure Laterality Date   • COLONOSCOPY       Social History     Socioeconomic History   • Marital status: Single   Tobacco Use   • Smoking status: Every Day     Packs/day: 0.50     Years: 40.00     Pack years: 20.00     Types: Cigarettes     Passive exposure: Current   • Smokeless tobacco: Never   Vaping Use   • Vaping Use: Never used   Substance and Sexual Activity   • Alcohol use: No   • Drug use: No   • Sexual activity: Not Currently     Partners: Male       Objective   Vital Signs:  /82 (BP Location: Right arm, Patient Position: Sitting, Cuff Size: Adult)   Pulse 68   Temp 97.4 °F (36.3 °C) (Temporal)   Resp 20   Ht 152.4 cm (60\")   Wt 89.8 kg (198 lb)   SpO2 97%   BMI 38.67 kg/m²   Estimated body mass index is 38.67 kg/m² as calculated from the following:    Height as of this encounter: 152.4 cm (60\").    Weight as of this encounter: 89.8 kg (198 lb).             Physical Exam  Vitals reviewed.   Constitutional:       Appearance: Normal appearance.   HENT:      Head: Normocephalic.      Ears:      Comments: Mild serous effusion BL     Nose: Nose normal.      Mouth/Throat:      Mouth: Mucous membranes are moist.      Comments:  Posterior OP mildly erythematous   Eyes:      Extraocular Movements: Extraocular movements intact.   Cardiovascular:      Rate and Rhythm: Normal rate and regular rhythm.      Heart sounds: Normal heart sounds.   Pulmonary:     "  Effort: Pulmonary effort is normal.      Breath sounds: Normal breath sounds.      Comments: CTAB  Musculoskeletal:         General: Normal range of motion.      Cervical back: Normal range of motion.   Skin:     General: Skin is warm and dry.   Neurological:      General: No focal deficit present.      Mental Status: She is alert and oriented to person, place, and time.   Psychiatric:         Mood and Affect: Mood normal.         Behavior: Behavior normal.        Result Review :                   Assessment and Plan   Diagnoses and all orders for this visit:    1. Seasonal allergic rhinitis, unspecified trigger (Primary)  -Discussed basis for sx likely AR-related. Recommend dc zyrtec, step up to nasal steroid. Can also use mucinex. Abx if fails to improve. IM solumedrol 125mg given in office.   -     fluticasone (FLONASE) 50 MCG/ACT nasal spray; 2 sprays into the nostril(s) as directed by provider Daily.  Dispense: 16 g; Refill: 0    2. Bronchitis  -     azithromycin (Zithromax Z-Dante) 250 MG tablet; Take 2 tablets by mouth on day 1, then 1 tablet daily on days 2-5  Dispense: 6 tablet; Refill: 0      EMR Dragon/Transcription disclaimer:   Much of this encounter note is an electronic transcription/translation of spoken language to printed text. The electronic translation of spoken language may permit erroneous, or at times, nonsensical words or phrases to be inadvertently transcribed; although attempts have made to review the note for such errors, some may still exist. Please excuse any unrecognized transcription errors and contact us if the air is unintelligible or needs documented correction. Also, portions of this note have been copied forward, however, changed to reflect the most current clinical status of this patient.  Follow Up   No follow-ups on file.  Patient was given instructions and counseling regarding her condition or for health maintenance advice. Please see specific information pulled into the AVS if  appropriate.

## 2023-04-07 DIAGNOSIS — L02.01 CUTANEOUS ABSCESS OF FACE: Primary | ICD-10-CM

## 2023-04-07 RX ORDER — CEPHALEXIN 500 MG/1
500 CAPSULE ORAL 3 TIMES DAILY
Qty: 21 CAPSULE | Refills: 0 | Status: SHIPPED | OUTPATIENT
Start: 2023-04-07

## 2023-04-11 ENCOUNTER — TELEPHONE (OUTPATIENT)
Dept: FAMILY MEDICINE CLINIC | Facility: CLINIC | Age: 63
End: 2023-04-11
Payer: MEDICARE

## 2023-04-11 NOTE — TELEPHONE ENCOUNTER
I called patient left a vm on secured vm stating she should have had some refills on her imitrex and if she calls the pharmacy they should fill this but to call us back if there is a problem

## 2023-04-11 NOTE — TELEPHONE ENCOUNTER
Pt called stating she was prescribed medication for her headaches and migraines and thinks the medication is . She was wanting to know if Vijaya could call her in something for those again and have that sent to [facility].

## 2023-05-02 LAB
ALBUMIN SERPL-MCNC: 4.3 G/DL (ref 3.8–4.8)
ALBUMIN/GLOB SERPL: 1.7 {RATIO} (ref 1.2–2.2)
ALP SERPL-CCNC: 72 IU/L (ref 44–121)
ALT SERPL-CCNC: 13 IU/L (ref 0–32)
AST SERPL-CCNC: 15 IU/L (ref 0–40)
BILIRUB SERPL-MCNC: 0.9 MG/DL (ref 0–1.2)
BUN SERPL-MCNC: 10 MG/DL (ref 8–27)
BUN/CREAT SERPL: 24 (ref 12–28)
CALCIUM SERPL-MCNC: 8.8 MG/DL (ref 8.7–10.3)
CHLORIDE SERPL-SCNC: 102 MMOL/L (ref 96–106)
CO2 SERPL-SCNC: 24 MMOL/L (ref 20–29)
CREAT SERPL-MCNC: 0.41 MG/DL (ref 0.57–1)
EGFRCR SERPLBLD CKD-EPI 2021: 110 ML/MIN/1.73
GLOBULIN SER CALC-MCNC: 2.6 G/DL (ref 1.5–4.5)
GLUCOSE SERPL-MCNC: 148 MG/DL (ref 70–99)
HBA1C MFR BLD: 7.9 % (ref 4.8–5.6)
POTASSIUM SERPL-SCNC: 3.8 MMOL/L (ref 3.5–5.2)
PROT SERPL-MCNC: 6.9 G/DL (ref 6–8.5)
SODIUM SERPL-SCNC: 142 MMOL/L (ref 134–144)

## 2023-05-05 ENCOUNTER — OFFICE VISIT (OUTPATIENT)
Dept: FAMILY MEDICINE CLINIC | Facility: CLINIC | Age: 63
End: 2023-05-05
Payer: MEDICARE

## 2023-05-05 VITALS
WEIGHT: 192 LBS | HEART RATE: 57 BPM | HEIGHT: 60 IN | DIASTOLIC BLOOD PRESSURE: 84 MMHG | OXYGEN SATURATION: 98 % | SYSTOLIC BLOOD PRESSURE: 132 MMHG | BODY MASS INDEX: 37.69 KG/M2 | TEMPERATURE: 98.2 F

## 2023-05-05 DIAGNOSIS — J45.901 BRONCHITIS, ALLERGIC, UNSPECIFIED ASTHMA SEVERITY, WITH ACUTE EXACERBATION: ICD-10-CM

## 2023-05-05 DIAGNOSIS — J30.9 ALLERGIC SINUSITIS: ICD-10-CM

## 2023-05-05 DIAGNOSIS — E03.9 HYPOTHYROIDISM, UNSPECIFIED TYPE: ICD-10-CM

## 2023-05-05 DIAGNOSIS — E11.628 TYPE 2 DIABETES MELLITUS WITH OTHER SKIN COMPLICATION, WITHOUT LONG-TERM CURRENT USE OF INSULIN: Primary | ICD-10-CM

## 2023-05-05 RX ORDER — TRIAMCINOLONE ACETONIDE 40 MG/ML
40 INJECTION, SUSPENSION INTRA-ARTICULAR; INTRAMUSCULAR ONCE
Status: COMPLETED | OUTPATIENT
Start: 2023-05-05 | End: 2023-05-05

## 2023-05-05 RX ORDER — LEVOTHYROXINE SODIUM 0.05 MG/1
50 TABLET ORAL DAILY
Qty: 90 TABLET | Refills: 1 | Status: SHIPPED | OUTPATIENT
Start: 2023-05-05

## 2023-05-05 RX ORDER — ALBUTEROL SULFATE 90 UG/1
2 AEROSOL, METERED RESPIRATORY (INHALATION) EVERY 4 HOURS PRN
Qty: 18 G | Refills: 2 | Status: SHIPPED | OUTPATIENT
Start: 2023-05-05

## 2023-05-05 RX ADMIN — TRIAMCINOLONE ACETONIDE 40 MG: 40 INJECTION, SUSPENSION INTRA-ARTICULAR; INTRAMUSCULAR at 10:03

## 2023-05-05 NOTE — PROGRESS NOTES
"Chief Complaint  Diabetes (3 month )    Subjective        Della Gonzalez presents to [facility]  History of Present Illness  T2DM:  Patient presents for compliance visit.  She has had great improvement in A1c this quarter.  She states she has had better diet control and has been walking more since the weather is nice. She has no symptoms related to hypoglycemia or hyperglycemia.  SINUS HEADACHE/WHEEZING:  Since the weather has been better, she has been walking daily.  Since outside more, she has developed a daily sinus headache in the frontal sinus area and wheezing that is most bothersome at night.  No treatment provided.    Objective   Vital Signs:  /84 (BP Location: Left arm, Patient Position: Sitting, Cuff Size: Adult)   Pulse 57   Temp 98.2 °F (36.8 °C)   Ht 152.4 cm (60\")   Wt 87.1 kg (192 lb)   SpO2 98%   BMI 37.50 kg/m²   Estimated body mass index is 37.5 kg/m² as calculated from the following:    Height as of this encounter: 152.4 cm (60\").    Weight as of this encounter: 87.1 kg (192 lb).      Physical Exam  Vitals and nursing note reviewed.   Constitutional:       General: She is not in acute distress.     Appearance: Normal appearance. She is obese. She is not ill-appearing.   HENT:      Head: Normocephalic and atraumatic.      Comments: Frontal sinus pain to percussion.  Cardiovascular:      Rate and Rhythm: Regular rhythm. Bradycardia present.      Heart sounds: Normal heart sounds. No murmur heard.  Pulmonary:      Effort: Pulmonary effort is normal.      Breath sounds: Wheezing present.      Comments: Inspiratory and expiratory wheezes present throughout lung fields.  Musculoskeletal:      Right lower leg: No edema.      Left lower leg: No edema.   Skin:     General: Skin is warm and dry.   Neurological:      Mental Status: She is alert and oriented to person, place, and time.   Psychiatric:         Thought Content: Thought content normal.        Result " Review :                Assessment and Plan   Diagnoses and all orders for this visit:    1. Type 2 diabetes mellitus with other skin complication, without long-term current use of insulin (Primary)    2. Hypothyroidism, unspecified type  -     levothyroxine (SYNTHROID, LEVOTHROID) 50 MCG tablet; Take 1 tablet by mouth Daily.  Dispense: 90 tablet; Refill: 1    3. Bronchitis, allergic, unspecified asthma severity, with acute exacerbation  -     triamcinolone acetonide (KENALOG-40) injection 40 mg  -     albuterol sulfate  (90 Base) MCG/ACT inhaler; Inhale 2 puffs Every 4 (Four) Hours As Needed for Wheezing or Shortness of Air.  Dispense: 18 g; Refill: 2    4. Allergic sinusitis  -     triamcinolone acetonide (KENALOG-40) injection 40 mg    Continue current treatment plan.         Follow Up   Return in about 3 months (around 8/5/2023) for medicare wellness with labs prior.  Patient was given instructions and counseling regarding her condition or for health maintenance advice. Please see specific information pulled into the AVS if appropriate.     MELECIO Thorne  This note is electronically signed.

## 2023-05-11 DIAGNOSIS — M62.838 MUSCLE SPASMS OF BOTH LOWER EXTREMITIES: ICD-10-CM

## 2023-05-11 RX ORDER — TIZANIDINE 4 MG/1
4 TABLET ORAL EVERY 8 HOURS PRN
Qty: 60 TABLET | Refills: 1 | Status: SHIPPED | OUTPATIENT
Start: 2023-05-11

## 2023-05-11 NOTE — TELEPHONE ENCOUNTER
Rx Refill Note  Requested Prescriptions     Pending Prescriptions Disp Refills   • tiZANidine (ZANAFLEX) 4 MG tablet 60 tablet 1     Sig: Take 1 tablet by mouth Every 8 (Eight) Hours As Needed for Muscle Spasms.      Last office visit with prescribing clinician: 5/5/2023     Next office visit with prescribing clinician: 8/9/2023   {  Amy Barahona MA  05/11/23, 15:06 CDT

## 2023-05-22 ENCOUNTER — TELEPHONE (OUTPATIENT)
Dept: FAMILY MEDICINE CLINIC | Facility: CLINIC | Age: 63
End: 2023-05-22
Payer: MEDICARE

## 2023-06-16 ENCOUNTER — OFFICE VISIT (OUTPATIENT)
Dept: FAMILY MEDICINE CLINIC | Facility: CLINIC | Age: 63
End: 2023-06-16
Payer: MEDICARE

## 2023-06-16 VITALS
DIASTOLIC BLOOD PRESSURE: 78 MMHG | BODY MASS INDEX: 35.97 KG/M2 | HEIGHT: 60 IN | WEIGHT: 183.2 LBS | SYSTOLIC BLOOD PRESSURE: 124 MMHG | RESPIRATION RATE: 18 BRPM | TEMPERATURE: 97.8 F | HEART RATE: 64 BPM | OXYGEN SATURATION: 98 %

## 2023-06-16 DIAGNOSIS — E11.42 TYPE 2 DIABETES MELLITUS WITH DIABETIC POLYNEUROPATHY, WITHOUT LONG-TERM CURRENT USE OF INSULIN: ICD-10-CM

## 2023-06-16 DIAGNOSIS — B37.2 CUTANEOUS CANDIDIASIS: ICD-10-CM

## 2023-06-16 DIAGNOSIS — L73.9 FOLLICULITIS: Primary | ICD-10-CM

## 2023-06-16 DIAGNOSIS — E66.01 CLASS 2 SEVERE OBESITY DUE TO EXCESS CALORIES WITH SERIOUS COMORBIDITY AND BODY MASS INDEX (BMI) OF 35.0 TO 35.9 IN ADULT: ICD-10-CM

## 2023-06-16 RX ORDER — NYSTATIN 100000 U/G
1 CREAM TOPICAL 2 TIMES DAILY
Qty: 30 G | Refills: 2 | Status: SHIPPED | OUTPATIENT
Start: 2023-06-16

## 2023-06-16 RX ORDER — NABUMETONE 500 MG/1
TABLET, FILM COATED ORAL
COMMUNITY
Start: 2023-05-06

## 2023-06-16 RX ORDER — CEPHALEXIN 500 MG/1
500 CAPSULE ORAL 3 TIMES DAILY
Qty: 30 CAPSULE | Refills: 0 | Status: SHIPPED | OUTPATIENT
Start: 2023-06-16

## 2023-06-16 RX ORDER — CEFTRIAXONE 1 G/1
1 INJECTION, POWDER, FOR SOLUTION INTRAMUSCULAR; INTRAVENOUS ONCE
Status: COMPLETED | OUTPATIENT
Start: 2023-06-16 | End: 2023-06-16

## 2023-06-16 RX ADMIN — CEFTRIAXONE 1 G: 1 INJECTION, POWDER, FOR SOLUTION INTRAMUSCULAR; INTRAVENOUS at 10:05

## 2023-06-16 NOTE — PROGRESS NOTES
"Chief Complaint  Rash (Several areas redness and raised)    Subjective        Della Gonzalez presents to [facility]  History of Present Illness  Patient presents upon return from her [place] trip with several new lesions consistent with her history of folliculitis.  She has lesions on her left leg, neck and labia.  She also has a confluent rash on her left groin and under the left breast.    Objective   Vital Signs:  /78 (BP Location: Left arm, Patient Position: Sitting, Cuff Size: Adult)   Pulse 64   Temp 97.8 °F (36.6 °C) (Temporal)   Resp 18   Ht 152.4 cm (60\")   Wt 83.1 kg (183 lb 3.2 oz)   SpO2 98%   BMI 35.78 kg/m²   Estimated body mass index is 35.78 kg/m² as calculated from the following:    Height as of this encounter: 152.4 cm (60\").    Weight as of this encounter: 83.1 kg (183 lb 3.2 oz).    Class 2 Severe Obesity (BMI >=35 and <=39.9). Obesity-related health conditions include the following: obstructive sleep apnea, hypertension, coronary heart disease, diabetes mellitus, dyslipidemias, GERD, and osteoarthritis. Obesity is improving with lifestyle modifications. BMI is is above average; BMI management plan is completed. We discussed low calorie, low carb based diet program, portion control, and increasing exercise.          Physical Exam  Vitals and nursing note reviewed.   Constitutional:       General: She is not in acute distress.     Appearance: Normal appearance. She is obese. She is not ill-appearing.   HENT:      Head: Normocephalic and atraumatic.   Cardiovascular:      Rate and Rhythm: Normal rate and regular rhythm.      Heart sounds: Normal heart sounds.   Pulmonary:      Effort: Pulmonary effort is normal.      Breath sounds: Normal breath sounds.   Skin:     General: Skin is warm and dry.      Comments: Follicle lesions x 7 on left lower extremity in various stages of healing; x 2 on her right neck; x 2 on labia. There is a confluent rash, shiny and " pink in the left groin and under the left breast.   Neurological:      Mental Status: She is alert and oriented to person, place, and time.      Result Review :                Assessment and Plan   Diagnoses and all orders for this visit:    1. Folliculitis (Primary)  -     cefTRIAXone (ROCEPHIN) injection 1 g  -     cephalexin (Keflex) 500 MG capsule; Take 1 capsule by mouth 3 (Three) Times a Day.  Dispense: 30 capsule; Refill: 0    2. Cutaneous candidiasis  -     nystatin (MYCOSTATIN) 174565 UNIT/GM cream; Apply 1 application topically to the appropriate area as directed 2 (Two) Times a Day.  Dispense: 30 g; Refill: 2    3. Type 2 diabetes mellitus with diabetic polyneuropathy, without long-term current use of insulin  -     metFORMIN (Glucophage) 1000 MG tablet; Take 1 tablet by mouth 2 (Two) Times a Day With Meals.  Dispense: 60 tablet; Refill: 2    4. Class 2 severe obesity due to excess calories with serious comorbidity and body mass index (BMI) of 35.0 to 35.9 in adult             Follow Up   Return if symptoms worsen or fail to improve, for Keep scheduled appt.  Patient was given instructions and counseling regarding her condition or for health maintenance advice. Please see specific information pulled into the AVS if appropriate.     MELECIO Thorne  This note is electronically signed.

## 2023-07-22 DIAGNOSIS — I10 ESSENTIAL HYPERTENSION: ICD-10-CM

## 2023-07-24 DIAGNOSIS — E11.628 TYPE 2 DIABETES MELLITUS WITH OTHER SKIN COMPLICATION, WITHOUT LONG-TERM CURRENT USE OF INSULIN: ICD-10-CM

## 2023-07-24 DIAGNOSIS — I10 ESSENTIAL HYPERTENSION: ICD-10-CM

## 2023-07-24 RX ORDER — BENAZEPRIL HYDROCHLORIDE 10 MG/1
TABLET ORAL
Qty: 90 TABLET | Refills: 3 | Status: SHIPPED | OUTPATIENT
Start: 2023-07-24

## 2023-07-24 RX ORDER — BENAZEPRIL HYDROCHLORIDE 10 MG/1
10 TABLET ORAL DAILY
Qty: 90 TABLET | Refills: 3 | OUTPATIENT
Start: 2023-07-24

## 2023-07-24 NOTE — TELEPHONE ENCOUNTER
Caller: Della Gonzalez    Relationship: Self    Best call back number: [phone]     Requested Prescriptions:   Requested Prescriptions     Pending Prescriptions Disp Refills    Ertugliflozin L-PyroglutamicAc (Steglatro) 15 MG tablet 90 tablet 1     Sig: Take 1 tablet by mouth Every Morning.    benazepril (LOTENSIN) 10 MG tablet 90 tablet 3     Sig: Take 1 tablet by mouth Daily.        Pharmacy where request should be sent: [facility] [address] [phone] [facility] [phone]      Last office visit with prescribing clinician: 6/16/2023   Last telemedicine visit with prescribing clinician: Visit date not found   Next office visit with prescribing clinician: 8/9/2023     Additional details provided by patient: COMPLETELY OUT    Does the patient have less than a 3 day supply:  [x] Yes  [] No    Would you like a call back once the refill request has been completed: [] Yes [] No    If the office needs to give you a call back, can they leave a voicemail: [] Yes [] No    Keiry Alejandre Rep   07/24/23 12:47 CDT

## 2023-07-24 NOTE — TELEPHONE ENCOUNTER
Rx Refill Note  Requested Prescriptions     Pending Prescriptions Disp Refills    Ertugliflozin L-PyroglutamicAc (Steglatro) 15 MG tablet 90 tablet 1     Sig: Take 1 tablet by mouth Every Morning.    benazepril (LOTENSIN) 10 MG tablet 90 tablet 3     Sig: Take 1 tablet by mouth Daily.      Last office visit with prescribing clinician: 6/16/2023         Shivani Rayo MA  07/24/23, 13:15 CDT

## 2023-07-24 NOTE — TELEPHONE ENCOUNTER
Rx Refill Note  Requested Prescriptions     Pending Prescriptions Disp Refills    benazepril (LOTENSIN) 10 MG tablet [Pharmacy Med Name: BENAZEPRIL HCL 10MG TABS] 90 tablet 3     Sig: TAKE ONE TABLET BY MOUTH EVERY DAY      Last office visit with prescribing clinician: 6/16/2023         Shivani Rayo MA  07/24/23, 08:44 CDT

## 2023-07-31 DIAGNOSIS — E03.9 HYPOTHYROIDISM, UNSPECIFIED TYPE: ICD-10-CM

## 2023-07-31 RX ORDER — LEVOTHYROXINE SODIUM 0.05 MG/1
TABLET ORAL
Qty: 90 TABLET | Refills: 1 | Status: SHIPPED | OUTPATIENT
Start: 2023-07-31

## 2023-08-09 ENCOUNTER — OFFICE VISIT (OUTPATIENT)
Dept: FAMILY MEDICINE CLINIC | Facility: CLINIC | Age: 63
End: 2023-08-09
Payer: MEDICARE

## 2023-08-09 VITALS
HEIGHT: 60 IN | WEIGHT: 180.2 LBS | DIASTOLIC BLOOD PRESSURE: 89 MMHG | BODY MASS INDEX: 35.38 KG/M2 | TEMPERATURE: 97.8 F | HEART RATE: 65 BPM | OXYGEN SATURATION: 95 % | SYSTOLIC BLOOD PRESSURE: 128 MMHG

## 2023-08-09 DIAGNOSIS — G62.9 POLYNEUROPATHY: ICD-10-CM

## 2023-08-09 DIAGNOSIS — E11.628 TYPE 2 DIABETES MELLITUS WITH OTHER SKIN COMPLICATION, WITHOUT LONG-TERM CURRENT USE OF INSULIN: Primary | ICD-10-CM

## 2023-08-09 DIAGNOSIS — L73.9 FOLLICULITIS: ICD-10-CM

## 2023-08-09 DIAGNOSIS — R19.7 DIARRHEA, UNSPECIFIED TYPE: ICD-10-CM

## 2023-08-09 DIAGNOSIS — J45.41 MODERATE PERSISTENT ASTHMA WITH ACUTE EXACERBATION: ICD-10-CM

## 2023-08-09 PROCEDURE — 1160F RVW MEDS BY RX/DR IN RCRD: CPT | Performed by: NURSE PRACTITIONER

## 2023-08-09 PROCEDURE — 99214 OFFICE O/P EST MOD 30 MIN: CPT | Performed by: NURSE PRACTITIONER

## 2023-08-09 PROCEDURE — 1159F MED LIST DOCD IN RCRD: CPT | Performed by: NURSE PRACTITIONER

## 2023-08-09 PROCEDURE — 3079F DIAST BP 80-89 MM HG: CPT | Performed by: NURSE PRACTITIONER

## 2023-08-09 PROCEDURE — 3074F SYST BP LT 130 MM HG: CPT | Performed by: NURSE PRACTITIONER

## 2023-08-09 PROCEDURE — 3051F HG A1C>EQUAL 7.0%<8.0%: CPT | Performed by: NURSE PRACTITIONER

## 2023-08-09 RX ORDER — LOPERAMIDE HYDROCHLORIDE 2 MG/1
2 TABLET ORAL 4 TIMES DAILY PRN
Qty: 120 TABLET | Refills: 1 | Status: SHIPPED | OUTPATIENT
Start: 2023-08-09

## 2023-08-09 RX ORDER — DOXYCYCLINE HYCLATE 100 MG/1
100 CAPSULE ORAL 2 TIMES DAILY
Qty: 20 CAPSULE | Refills: 0 | Status: SHIPPED | OUTPATIENT
Start: 2023-08-09

## 2023-08-09 RX ORDER — GABAPENTIN 300 MG/1
300 CAPSULE ORAL 2 TIMES DAILY PRN
Qty: 60 CAPSULE | Refills: 2 | Status: SHIPPED | OUTPATIENT
Start: 2023-08-09

## 2023-08-09 NOTE — PROGRESS NOTES
"Chief Complaint  Diabetes (3 month Diabetic )    Subjective        Dlela Gonzalez presents to [facility]  History of Present Illness  T2DM: Patient presents for 3 month DM visit. She has started walking for exercise again and thinks that is what has made a difference in her A1c which is the best it has been in a year. No change in medication or diet. No symptoms consistent with hypoglycemia or hyperglycemia.  She is requesting a refill of gabapentin which she takes PRN and is actually off her med list.  Her last fill was in June 2021.  She states \"I don't need it very often but I do take one when my legs feel like pins and needles or restless at night.  It continues to be effective when she uses it.    She once again has areas of infected follicles on her chin and  neck, in the same areas as before. Unfortunately she continues to put her hands all over the areas and pick once they start to be raised.      She is also complaining of diarrhea for the past 2 days.  She generally feels well but is having a couple of large loose stools daily.  She attributes this to eating cabbage lately.    Objective   Vital Signs:  /89 (BP Location: Left arm, Patient Position: Sitting, Cuff Size: Large Adult)   Pulse 65   Temp 97.8 øF (36.6 øC)   Ht 152.4 cm (60\")   Wt 81.7 kg (180 lb 3.2 oz)   SpO2 95%   BMI 35.19 kg/mý   Estimated body mass index is 35.19 kg/mý as calculated from the following:    Height as of this encounter: 152.4 cm (60\").    Weight as of this encounter: 81.7 kg (180 lb 3.2 oz).    Class 2 Severe Obesity (BMI >=35 and <=39.9). Obesity-related health conditions include the following: obstructive sleep apnea, hypertension, coronary heart disease, diabetes mellitus, dyslipidemias, GERD, peripheral vascular disease, and osteoarthritis. Obesity is unchanged. BMI is is above average; BMI management plan is completed. We discussed low calorie, low carb based diet program, portion " control, and increasing exercise.        Physical Exam  Vitals and nursing note reviewed.   Constitutional:       General: She is not in acute distress.     Appearance: Normal appearance. She is obese. She is not ill-appearing.   HENT:      Head: Normocephalic and atraumatic.   Cardiovascular:      Rate and Rhythm: Normal rate and regular rhythm.      Heart sounds: Normal heart sounds. No murmur heard.  Pulmonary:      Effort: Pulmonary effort is normal.      Breath sounds: Wheezing present.      Comments: Inspiratory and expiratory wheezes present in all quadrants.  Musculoskeletal:         General: Normal range of motion.      Right lower leg: No edema.      Left lower leg: No edema.   Lymphadenopathy:      Cervical: No cervical adenopathy.   Skin:     General: Skin is warm and dry.      Findings: Lesion present.      Comments: Raised, abscessed follicles of the chin and neck just beneath the chin.  Mild erythema present as well.  No active drainage.   Neurological:      Mental Status: She is alert and oriented to person, place, and time.      Result Review :                Assessment and Plan   Diagnoses and all orders for this visit:    1. Type 2 diabetes mellitus with other skin complication, without long-term current use of insulin (Primary)    2. Polyneuropathy  -     gabapentin (NEURONTIN) 300 MG capsule; Take 1 capsule by mouth 2 (Two) Times a Day As Needed (leg pain).  Dispense: 60 capsule; Refill: 2    3. Diarrhea, unspecified type  -     loperamide (Imodium A-D) 2 MG tablet; Take 1 tablet by mouth 4 (Four) Times a Day As Needed for Diarrhea.  Dispense: 120 tablet; Refill: 1    4. Folliculitis  -     doxycycline (VIBRAMYCIN) 100 MG capsule; Take 1 capsule by mouth 2 (Two) Times a Day.  Dispense: 20 capsule; Refill: 0    5. Moderate persistent asthma with acute exacerbation  -     doxycycline (VIBRAMYCIN) 100 MG capsule; Take 1 capsule by mouth 2 (Two) Times a Day.  Dispense: 20 capsule; Refill:  0    Patient advised to keep skin of face and neck clean with antibacterial liquid soap.  Do not touch hands to these areas as this increases the chance of recurrent infection.         Follow Up   Return in about 3 months (around 11/9/2023) for medicare wellness with labs prior.  Patient was given instructions and counseling regarding her condition or for health maintenance advice. Please see specific information pulled into the AVS if appropriate.     MELECIO Thorne  This note is electronically signed.

## 2023-08-21 DIAGNOSIS — J30.9 ALLERGIC SINUSITIS: ICD-10-CM

## 2023-08-21 DIAGNOSIS — J45.901 BRONCHITIS, ALLERGIC, UNSPECIFIED ASTHMA SEVERITY, WITH ACUTE EXACERBATION: ICD-10-CM

## 2023-08-21 DIAGNOSIS — R51.9 SINUS HEADACHE: ICD-10-CM

## 2023-08-21 RX ORDER — CETIRIZINE HYDROCHLORIDE 10 MG/1
TABLET ORAL
Qty: 90 TABLET | Refills: 1 | Status: SHIPPED | OUTPATIENT
Start: 2023-08-21

## 2023-08-21 NOTE — TELEPHONE ENCOUNTER
Rx Refill Note  Requested Prescriptions     Pending Prescriptions Disp Refills    cetirizine (zyrTEC) 10 MG tablet [Pharmacy Med Name: CETIRIZINE HCL 10MG TABS] 90 tablet 1     Sig: TAKE ONE TABLET BY MOUTH EVERY DAY              Shivani Rayo MA  08/21/23, 09:34 CDT

## 2023-08-25 DIAGNOSIS — K21.9 GASTROESOPHAGEAL REFLUX DISEASE, UNSPECIFIED WHETHER ESOPHAGITIS PRESENT: ICD-10-CM

## 2023-08-25 RX ORDER — OMEPRAZOLE 20 MG/1
CAPSULE, DELAYED RELEASE ORAL
Qty: 90 CAPSULE | Refills: 1 | Status: SHIPPED | OUTPATIENT
Start: 2023-08-25

## 2023-08-25 NOTE — TELEPHONE ENCOUNTER
Rx Refill Note  Requested Prescriptions     Pending Prescriptions Disp Refills    omeprazole (priLOSEC) 20 MG capsule [Pharmacy Med Name: OMEPRAZOLE 20MG CPDR] 90 capsule 1     Sig: TAKE ONE CAPSULE BY MOUTH EVERY DAY     Amy Barahona MA  08/25/23, 09:34 CDT

## 2023-09-18 ENCOUNTER — OFFICE VISIT (OUTPATIENT)
Dept: FAMILY MEDICINE CLINIC | Facility: CLINIC | Age: 63
End: 2023-09-18
Payer: MEDICARE

## 2023-09-18 VITALS
TEMPERATURE: 97.1 F | BODY MASS INDEX: 35.5 KG/M2 | OXYGEN SATURATION: 98 % | SYSTOLIC BLOOD PRESSURE: 129 MMHG | HEIGHT: 60 IN | DIASTOLIC BLOOD PRESSURE: 81 MMHG | WEIGHT: 180.8 LBS | HEART RATE: 71 BPM

## 2023-09-18 DIAGNOSIS — M62.838 MUSCLE SPASMS OF BOTH LOWER EXTREMITIES: ICD-10-CM

## 2023-09-18 DIAGNOSIS — L03.113 CELLULITIS OF RIGHT UPPER ARM: ICD-10-CM
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
## 2023-09-18 DIAGNOSIS — S40.861A INSECT BITE OF RIGHT UPPER ARM, INITIAL ENCOUNTER: Primary | ICD-10-CM

## 2023-09-18 DIAGNOSIS — L73.9 FOLLICULITIS: ICD-10-CM

## 2023-09-18 PROCEDURE — 1159F MED LIST DOCD IN RCRD: CPT | Performed by: NURSE PRACTITIONER

## 2023-09-18 PROCEDURE — 3079F DIAST BP 80-89 MM HG: CPT | Performed by: NURSE PRACTITIONER

## 2023-09-18 PROCEDURE — 99214 OFFICE O/P EST MOD 30 MIN: CPT | Performed by: NURSE PRACTITIONER

## 2023-09-18 PROCEDURE — 3051F HG A1C>EQUAL 7.0%<8.0%: CPT | Performed by: NURSE PRACTITIONER

## 2023-09-18 PROCEDURE — 3074F SYST BP LT 130 MM HG: CPT | Performed by: NURSE PRACTITIONER

## 2023-09-18 PROCEDURE — 96372 THER/PROPH/DIAG INJ SC/IM: CPT | Performed by: NURSE PRACTITIONER

## 2023-09-18 PROCEDURE — 1160F RVW MEDS BY RX/DR IN RCRD: CPT | Performed by: NURSE PRACTITIONER

## 2023-09-18 RX ORDER — TRIAMCINOLONE ACETONIDE 40 MG/ML
40 INJECTION, SUSPENSION INTRA-ARTICULAR; INTRAMUSCULAR ONCE
Status: COMPLETED | OUTPATIENT
Start: 2023-09-18 | End: 2023-09-18

## 2023-09-18 RX ORDER — TRIAMCINOLONE ACETONIDE 1 MG/G
1 CREAM TOPICAL 2 TIMES DAILY
Qty: 80 G | Refills: 3 | Status: SHIPPED | OUTPATIENT
Start: 2023-09-18

## 2023-09-18 RX ORDER — TIZANIDINE 4 MG/1
4 TABLET ORAL EVERY 8 HOURS PRN
Qty: 60 TABLET | Refills: 2 | Status: SHIPPED | OUTPATIENT
Start: 2023-09-18

## 2023-09-18 RX ORDER — DOXYCYCLINE HYCLATE 100 MG/1
100 CAPSULE ORAL 2 TIMES DAILY
Qty: 20 CAPSULE | Refills: 0 | Status: SHIPPED | OUTPATIENT
Start: 2023-09-18

## 2023-09-18 RX ADMIN — TRIAMCINOLONE ACETONIDE 40 MG: 40 INJECTION, SUSPENSION INTRA-ARTICULAR; INTRAMUSCULAR at 10:15

## 2023-09-18 NOTE — PROGRESS NOTES
"Chief Complaint  Insect Bite (Right upper arm, red, itching )    Subjective        Della Gonzalez presents to [facility]  History of Present Illness  Patient has a large \"bite\" on her right upper arm, present for about 48 hours.  Very itchy and red.  She also has some new open areas on her face from \"picking.\"    Objective   Vital Signs:  /81 (BP Location: Left arm, Patient Position: Sitting, Cuff Size: Adult)   Pulse 71   Temp 97.1 °F (36.2 °C)   Ht 152.4 cm (60\")   Wt 82 kg (180 lb 12.8 oz)   SpO2 98%   BMI 35.31 kg/m²   Estimated body mass index is 35.31 kg/m² as calculated from the following:    Height as of this encounter: 152.4 cm (60\").    Weight as of this encounter: 82 kg (180 lb 12.8 oz).    Class 2 Severe Obesity (BMI >=35 and <=39.9). Obesity-related health conditions include the following: obstructive sleep apnea, hypertension, coronary heart disease, diabetes mellitus, dyslipidemias, and GERD. Obesity is unchanged. BMI is is above average; BMI management plan is completed. We discussed low calorie, low carb based diet program, portion control, and increasing exercise.        Physical Exam  Vitals and nursing note reviewed.   Constitutional:       General: She is not in acute distress.     Appearance: Normal appearance. She is obese. She is not ill-appearing.   HENT:      Head: Normocephalic and atraumatic.   Cardiovascular:      Rate and Rhythm: Normal rate and regular rhythm.      Heart sounds: Normal heart sounds.   Skin:     General: Skin is warm and dry.      Findings: Erythema and lesion present.      Comments: Multiple open follicles on chin and jawline in various stages of healing.  Mild surrounding erythema. No active drainage.  The right upper arm has a 12 cm indurated erythematous warm area with mild streaking up to the shoulder.  In the centermost portion, there is a 3 cm area of significantly greater induration without evidence of bite or entry " jhoana.   Neurological:      Mental Status: She is alert and oriented to person, place, and time.      Result Review :                Assessment and Plan   Diagnoses and all orders for this visit:    1. Insect bite of right upper arm, initial encounter (Primary)  -     triamcinolone (KENALOG) 0.1 % cream; Apply 1 application  topically to the appropriate area as directed 2 (Two) Times a Day.  Dispense: 80 g; Refill: 3  -     doxycycline (VIBRAMYCIN) 100 MG capsule; Take 1 capsule by mouth 2 (Two) Times a Day.  Dispense: 20 capsule; Refill: 0  -     triamcinolone acetonide (KENALOG-40) injection 40 mg    2. Cellulitis of right upper arm  -     doxycycline (VIBRAMYCIN) 100 MG capsule; Take 1 capsule by mouth 2 (Two) Times a Day.  Dispense: 20 capsule; Refill: 0    3. Folliculitis  -     doxycycline (VIBRAMYCIN) 100 MG capsule; Take 1 capsule by mouth 2 (Two) Times a Day.  Dispense: 20 capsule; Refill: 0    4. Muscle spasms of both lower extremities  -     tiZANidine (ZANAFLEX) 4 MG tablet; Take 1 tablet by mouth Every 8 (Eight) Hours As Needed for Muscle Spasms.  Dispense: 60 tablet; Refill: 2             Follow Up   Return if symptoms worsen or fail to improve (keep scheduled appt).  Patient was given instructions and counseling regarding her condition or for health maintenance advice. Please see specific information pulled into the AVS if appropriate.     MELECIO Thorne  This note is electronically signed.

## 2023-10-04 ENCOUNTER — TELEPHONE (OUTPATIENT)
Dept: FAMILY MEDICINE CLINIC | Facility: CLINIC | Age: 63
End: 2023-10-04
Payer: MEDICARE
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
## 2023-10-04 DIAGNOSIS — L73.9 FOLLICULITIS: ICD-10-CM

## 2023-10-04 DIAGNOSIS — S40.861A INSECT BITE OF RIGHT UPPER ARM, INITIAL ENCOUNTER: ICD-10-CM

## 2023-10-04 DIAGNOSIS — L03.113 CELLULITIS OF RIGHT UPPER ARM: ICD-10-CM

## 2023-10-04 RX ORDER — DOXYCYCLINE HYCLATE 100 MG/1
CAPSULE ORAL
Qty: 20 CAPSULE | Refills: 0 | Status: SHIPPED | OUTPATIENT
Start: 2023-10-04

## 2023-10-04 NOTE — TELEPHONE ENCOUNTER
Rx Refill Note  Requested Prescriptions     Pending Prescriptions Disp Refills    doxycycline (VIBRAMYCIN) 100 MG capsule [Pharmacy Med Name: DOXYCYCLINE HYCLATE 100MG CAPS] 20 capsule 0     Sig: TAKE ONE CAPSULE BY MOUTH TWICE A DAY      Last office visit with prescribing clinician: 9/18/2023   Last telemedicine visit with prescribing clinician: Visit date not found   Next office visit with prescribing clinician: 11/10/2023       Cyn Still MA  10/04/23, 14:21 CDT

## 2023-10-04 NOTE — TELEPHONE ENCOUNTER
Pt called stating she is needing the antibiotic Vijaya called in for her face to be called in for refills sent to [facility].

## 2023-10-17 ENCOUNTER — OFFICE VISIT (OUTPATIENT)
Dept: GASTROENTEROLOGY | Age: 63
End: 2023-10-17
Payer: MEDICARE

## 2023-10-17 VITALS
DIASTOLIC BLOOD PRESSURE: 70 MMHG | WEIGHT: 174 LBS | OXYGEN SATURATION: 96 % | HEIGHT: 60 IN | BODY MASS INDEX: 34.16 KG/M2 | SYSTOLIC BLOOD PRESSURE: 120 MMHG | HEART RATE: 64 BPM

## 2023-10-17 DIAGNOSIS — K74.60 CIRRHOSIS OF LIVER WITHOUT ASCITES, UNSPECIFIED HEPATIC CIRRHOSIS TYPE (HCC): Primary | ICD-10-CM

## 2023-10-17 DIAGNOSIS — I85.10 SECONDARY ESOPHAGEAL VARICES WITHOUT BLEEDING (HCC): ICD-10-CM

## 2023-10-17 PROCEDURE — G8417 CALC BMI ABV UP PARAM F/U: HCPCS | Performed by: NURSE PRACTITIONER

## 2023-10-17 PROCEDURE — 4004F PT TOBACCO SCREEN RCVD TLK: CPT | Performed by: NURSE PRACTITIONER

## 2023-10-17 PROCEDURE — 3078F DIAST BP <80 MM HG: CPT | Performed by: NURSE PRACTITIONER

## 2023-10-17 PROCEDURE — 3074F SYST BP LT 130 MM HG: CPT | Performed by: NURSE PRACTITIONER

## 2023-10-17 PROCEDURE — 3017F COLORECTAL CA SCREEN DOC REV: CPT | Performed by: NURSE PRACTITIONER

## 2023-10-17 PROCEDURE — 99214 OFFICE O/P EST MOD 30 MIN: CPT | Performed by: NURSE PRACTITIONER

## 2023-10-17 PROCEDURE — G8484 FLU IMMUNIZE NO ADMIN: HCPCS | Performed by: NURSE PRACTITIONER

## 2023-10-17 PROCEDURE — G8427 DOCREV CUR MEDS BY ELIG CLIN: HCPCS | Performed by: NURSE PRACTITIONER

## 2023-10-17 RX ORDER — CETIRIZINE HYDROCHLORIDE 10 MG/1
10 TABLET ORAL DAILY
COMMUNITY

## 2023-10-17 RX ORDER — SUMATRIPTAN 100 MG/1
TABLET, FILM COATED ORAL
COMMUNITY
Start: 2023-02-06

## 2023-10-17 NOTE — PROGRESS NOTES
Subjective:     Patient ID: Valerie Teixeira is a 58 y.o. female  PCP: DANIELLA Fitzgerald - CNP  Referring Provider: No ref. provider found    HPI  Patient presents to the office today with the following complaints: Endoscopy      Pt seen today for follow up, requesting EGD. Hx Cirrhosis with esophageal varices. Pt has not been seen in office since 3/2022. She is due for labs and liver US. Pt states she is moving to [place] in January 2024. She denies any melena, blood in stool. Denies any abdominal pain, constipation. Chronic issues with sleep. Last EGD 4/29/2022 - w/variceal banding-Small esophageal varices, portal hypertensive gastropathy, 6 month recall  Last Colonoscopy 3/2021 - HP, 5 year recall   Denies any family hx colon cancer or colon polyps     Assessment:     1. Cirrhosis of liver without ascites, unspecified hepatic cirrhosis type ([address])    2. Secondary esophageal varices without bleeding ([facility])            Plan:   - Labs today (CBC, CMP, AFP, Pt/INR)   - Check US liver  - Follow up in 8 weeks  - Schedule EGD  Nothing to eat or drink after midnight. No driving for 24 hours after procedure. Bring a  to procedure. No aspirin, NSAIDs, fish oil 5 days before procedure. I have discussed the benefits, alternatives, and risks (including bleeding, perforation and death)  for pursuing Endoscopy (EGD/Colonscopy/EUS/ERCP) with the patient and they are willing to continue. We also discussed the need for anesthesia, IV access, proper dietary changes, medication changes if necessary, and need for bowel prep (if ordered) prior to their Endoscopic procedure. They are aware they must have someone accompany them to their scheduled procedure to drive them home - they agree to the above and are willing to continue. Orders  Orders Placed This Encounter   Procedures    US LIVER     This procedure can be scheduled via [employer].   Access your [facility] account by visiting [email or web address].

## 2023-10-18 ASSESSMENT — ENCOUNTER SYMPTOMS
COUGH: 0
ABDOMINAL DISTENTION: 0
BLOOD IN STOOL: 0
CONSTIPATION: 0
ANAL BLEEDING: 0
VOMITING: 0
CHOKING: 0
DIARRHEA: 0
NAUSEA: 0
ABDOMINAL PAIN: 0
SHORTNESS OF BREATH: 0
RECTAL PAIN: 0
TROUBLE SWALLOWING: 0

## 2023-10-23 ENCOUNTER — HOSPITAL ENCOUNTER (OUTPATIENT)
Dept: ULTRASOUND IMAGING | Age: 63
Discharge: HOME | End: 2023-10-23
Payer: MEDICARE

## 2023-10-23 DIAGNOSIS — K74.60 CIRRHOSIS OF LIVER WITHOUT ASCITES, UNSPECIFIED HEPATIC CIRRHOSIS TYPE (HCC): ICD-10-CM

## 2023-10-23 PROCEDURE — 76705 ECHO EXAM OF ABDOMEN: CPT

## 2023-10-24 DIAGNOSIS — E03.9 HYPOTHYROIDISM, UNSPECIFIED TYPE: ICD-10-CM

## 2023-10-24 DIAGNOSIS — E11.42 TYPE 2 DIABETES MELLITUS WITH DIABETIC POLYNEUROPATHY, WITHOUT LONG-TERM CURRENT USE OF INSULIN: ICD-10-CM

## 2023-10-24 RX ORDER — LEVOTHYROXINE SODIUM 0.05 MG/1
50 TABLET ORAL DAILY
Qty: 90 TABLET | Refills: 1 | Status: SHIPPED | OUTPATIENT
Start: 2023-10-24

## 2023-10-24 NOTE — TELEPHONE ENCOUNTER
Caller: Della Gonzalez    Relationship: Self    Best call back number: [phone]    Requested Prescriptions:   Requested Prescriptions     Pending Prescriptions Disp Refills    levothyroxine (SYNTHROID, LEVOTHROID) 50 MCG tablet 90 tablet 1     Sig: Take 1 tablet by mouth Daily.    linagliptin (Tradjenta) 5 MG tablet tablet 90 tablet 1     Sig: Take 1 tablet by mouth Daily.        Pharmacy where request should be sent: [facility] [address] [phone] [facility] [phone]      Last office visit with prescribing clinician: 9/18/2023   Last telemedicine visit with prescribing clinician: Visit date not found   Next office visit with prescribing clinician: 11/15/2023     Does the patient have less than a 3 day supply:  [x] Yes  [] No    Would you like a call back once the refill request has been completed: [] Yes [x] No    If the office needs to give you a call back, can they leave a voicemail: [] Yes [x] No    Keiry Gilbert Rep   10/24/23 09:20 CDT

## 2023-10-24 NOTE — TELEPHONE ENCOUNTER
Rx Refill Note  Requested Prescriptions     Pending Prescriptions Disp Refills    levothyroxine (SYNTHROID, LEVOTHROID) 50 MCG tablet 90 tablet 1     Sig: Take 1 tablet by mouth Daily.    linagliptin (Tradjenta) 5 MG tablet tablet 90 tablet 1     Sig: Take 1 tablet by mouth Daily.      Last office visit with prescribing clinician: 9/18/2023         Shivani Rayo MA  10/24/23, 09:24 CDT

## 2023-10-25 DIAGNOSIS — R77.2 ELEVATED AFP: Primary | ICD-10-CM

## 2023-10-25 DIAGNOSIS — K74.60 CIRRHOSIS OF LIVER WITHOUT ASCITES, UNSPECIFIED HEPATIC CIRRHOSIS TYPE (HCC): ICD-10-CM

## 2023-10-26 ENCOUNTER — TELEPHONE (OUTPATIENT)
Dept: GASTROENTEROLOGY | Age: 63
End: 2023-10-26

## 2023-10-26 NOTE — TELEPHONE ENCOUNTER
----- Message from DANIELLA Patel NP sent at 10/25/2023  6:26 PM CDT -----  Stable US liver without mass. Gallstones noted. Her AFP was elevated, I recommend continuing with MRI abdomen for further evaluation of liver and r/o any liver masses. Called and notified patient of results and recommendations as per [address] [place] [address] [place]      Per patient if doesn't answer to leave message on answering machine       MRI Scheduled   Nov 28th arrival 130 pm for 2 pm   [facility] [address]   NPO  6 hours prior   NO Metallic [employer].          Called patient LVM for the patient

## 2023-11-10 ENCOUNTER — ANESTHESIA (OUTPATIENT)
Dept: ENDOSCOPY | Age: 63
End: 2023-11-10
Payer: MEDICARE

## 2023-11-10 ENCOUNTER — HOSPITAL ENCOUNTER (OUTPATIENT)
Age: 63
Setting detail: OUTPATIENT SURGERY
Discharge: HOME | End: 2023-11-10
Attending: INTERNAL MEDICINE | Admitting: INTERNAL MEDICINE
Payer: MEDICARE

## 2023-11-10 ENCOUNTER — ANESTHESIA EVENT (OUTPATIENT)
Dept: ENDOSCOPY | Age: 63
End: 2023-11-10
Payer: MEDICARE

## 2023-11-10 VITALS
BODY MASS INDEX: 33.96 KG/M2 | OXYGEN SATURATION: 96 % | SYSTOLIC BLOOD PRESSURE: 142 MMHG | DIASTOLIC BLOOD PRESSURE: 80 MMHG | RESPIRATION RATE: 18 BRPM | HEART RATE: 68 BPM | HEIGHT: 60 IN | TEMPERATURE: 96 F | WEIGHT: 173 LBS

## 2023-11-10 LAB
GLUCOSE BLD-MCNC: 129 MG/DL (ref 70–99)
PERFORMED ON: ABNORMAL

## 2023-11-10 PROCEDURE — 3609017100 HC EGD: Performed by: INTERNAL MEDICINE

## 2023-11-10 PROCEDURE — 43235 EGD DIAGNOSTIC BRUSH WASH: CPT | Performed by: INTERNAL MEDICINE

## 2023-11-10 PROCEDURE — 3700000000 HC ANESTHESIA ATTENDED CARE: Performed by: INTERNAL MEDICINE

## 2023-11-10 PROCEDURE — 2500000003 HC RX 250 WO HCPCS: Performed by: NURSE ANESTHETIST, CERTIFIED REGISTERED

## 2023-11-10 PROCEDURE — 2709999900 HC NON-CHARGEABLE SUPPLY: Performed by: INTERNAL MEDICINE

## 2023-11-10 PROCEDURE — 7100000010 HC PHASE II RECOVERY - FIRST 15 MIN: Performed by: INTERNAL MEDICINE

## 2023-11-10 PROCEDURE — 82962 GLUCOSE BLOOD TEST: CPT

## 2023-11-10 PROCEDURE — 7100000011 HC PHASE II RECOVERY - ADDTL 15 MIN: Performed by: INTERNAL MEDICINE

## 2023-11-10 PROCEDURE — 6360000002 HC RX W HCPCS: Performed by: NURSE ANESTHETIST, CERTIFIED REGISTERED

## 2023-11-10 PROCEDURE — 2580000003 HC RX 258: Performed by: NURSE ANESTHETIST, CERTIFIED REGISTERED

## 2023-11-10 PROCEDURE — 2580000003 HC RX 258: Performed by: INTERNAL MEDICINE

## 2023-11-10 RX ORDER — SODIUM CHLORIDE, SODIUM LACTATE, POTASSIUM CHLORIDE, CALCIUM CHLORIDE 600; 310; 30; 20 MG/100ML; MG/100ML; MG/100ML; MG/100ML
INJECTION, SOLUTION INTRAVENOUS CONTINUOUS PRN
Status: DISCONTINUED | OUTPATIENT
Start: 2023-11-10 | End: 2023-11-10 | Stop reason: SDUPTHER

## 2023-11-10 RX ORDER — SODIUM CHLORIDE, SODIUM LACTATE, POTASSIUM CHLORIDE, CALCIUM CHLORIDE 600; 310; 30; 20 MG/100ML; MG/100ML; MG/100ML; MG/100ML
INJECTION, SOLUTION INTRAVENOUS CONTINUOUS
Status: DISCONTINUED | OUTPATIENT
Start: 2023-11-10 | End: 2023-11-10 | Stop reason: HOSPADM

## 2023-11-10 RX ORDER — PROPOFOL 10 MG/ML
INJECTION, EMULSION INTRAVENOUS PRN
Status: DISCONTINUED | OUTPATIENT
Start: 2023-11-10 | End: 2023-11-10 | Stop reason: SDUPTHER

## 2023-11-10 RX ORDER — TRAZODONE HYDROCHLORIDE 100 MG/1
100 TABLET ORAL NIGHTLY
COMMUNITY

## 2023-11-10 RX ORDER — LIDOCAINE HYDROCHLORIDE 10 MG/ML
INJECTION, SOLUTION EPIDURAL; INFILTRATION; INTRACAUDAL; PERINEURAL PRN
Status: DISCONTINUED | OUTPATIENT
Start: 2023-11-10 | End: 2023-11-10 | Stop reason: SDUPTHER

## 2023-11-10 RX ADMIN — SODIUM CHLORIDE, POTASSIUM CHLORIDE, SODIUM LACTATE AND CALCIUM CHLORIDE: 600; 310; 30; 20 INJECTION, SOLUTION INTRAVENOUS at 12:49

## 2023-11-10 RX ADMIN — PROPOFOL 100 MG: 10 INJECTION, EMULSION INTRAVENOUS at 12:57

## 2023-11-10 RX ADMIN — SODIUM CHLORIDE, SODIUM LACTATE, POTASSIUM CHLORIDE, AND CALCIUM CHLORIDE: 600; 310; 30; 20 INJECTION, SOLUTION INTRAVENOUS at 12:57

## 2023-11-10 RX ADMIN — LIDOCAINE HYDROCHLORIDE 50 MG: 10 INJECTION, SOLUTION EPIDURAL; INFILTRATION; INTRACAUDAL; PERINEURAL at 12:57

## 2023-11-10 ASSESSMENT — PAIN SCALES - GENERAL
PAINLEVEL_OUTOF10: 0
PAINLEVEL_OUTOF10: 8
PAINLEVEL_OUTOF10: 6
PAINLEVEL_OUTOF10: 0
PAINLEVEL_OUTOF10: 8

## 2023-11-10 ASSESSMENT — PAIN DESCRIPTION - LOCATION
LOCATION: HEAD

## 2023-11-10 ASSESSMENT — LIFESTYLE VARIABLES: SMOKING_STATUS: 1

## 2023-11-10 ASSESSMENT — PAIN - FUNCTIONAL ASSESSMENT: PAIN_FUNCTIONAL_ASSESSMENT: 0-10

## 2023-11-10 NOTE — DISCHARGE INSTRUCTIONS
REPEAT IN 1 YEAR         Colonoscopy: What to Expect at [address]  After a colonoscopy, you'll stay at the clinic until you wake up. Then you can go home. But you'll need to arrange for a ride. Your doctor will tell you when you can eat and do your other usual activities. Your doctor will talk to you about when you'll need your next colonoscopy. Your doctor can help you decide how often you need to be checked. This will depend on the results of your test and your risk for colorectal cancer. After the test, you may be bloated or have gas pains. You may need to pass gas. If a biopsy was done or a polyp was removed, you may have streaks of blood in your stool (feces) for a few days. Problems such as heavy rectal bleeding may not occur until several weeks after the test. This isn't common. But it can happen after polyps are removed. This care sheet gives you a general idea about how long it will take for you to recover. But each person recovers at a different pace. Follow the steps below to get better as quickly as possible. How can you care for yourself at home? Activity    Rest when you feel tired. You can do your normal activities when it feels okay to do so. Diet    Follow your doctor's directions for eating. Unless your doctor has told you not to, drink plenty of fluids. This helps to replace the fluids that were lost during the colon prep. Do not drink alcohol. Medicines    Your doctor will tell you if and when you can restart your medicines. You will also be given instructions about taking any new medicines. If you stopped taking aspirin or some other blood thinner, your doctor will tell you when to start taking it again. If polyps were removed or a biopsy was done during the test, your doctor may tell you not to take aspirin or other anti-inflammatory medicines for a few days. These include ibuprofen (Advil, Motrin) and naproxen (Aleve).    Other instructions    For your

## 2023-11-10 NOTE — ANESTHESIA POSTPROCEDURE EVALUATION
Department of Anesthesiology  Postprocedure Note    Patient: Bong Tam  MRN: [id number]  YOB: 1960  Date of evaluation: 11/10/2023      Procedure Summary     Date: 11/10/23 Room / Location: [address]    Anesthesia Start: 1257 Anesthesia Stop: 1303    Procedure: EGD BIOPSY Diagnosis:       Hx of esophageal varices      (Hx of esophageal varices [Z87.19])    Surgeons: Shannan De La Fuente MD Responsible Provider: DANIELLA Laureano CRNA    Anesthesia Type: general, TIVA ASA Status: 3          Anesthesia Type: No value filed.     Daniele Phase I: Daniele Score: 10    Daniele Phase II:        Anesthesia Post Evaluation    Patient location during evaluation: PACU  Patient participation: complete - patient participated  Level of consciousness: sleepy but conscious  Pain score: 0  Airway patency: patent  Nausea & Vomiting: no nausea and no vomiting  Complications: no  Cardiovascular status: hemodynamically stable  Respiratory status: acceptable  Hydration status: euvolemic  Multimodal analgesia pain management approach  Pain management: adequate

## 2023-11-10 NOTE — H&P
Patient Name: Krystina Gutierres  : 1960  MRN: [id number]  DATE: 11/10/23    Allergies: No Known Allergies     ENDOSCOPY  History and Physical    Procedure:    [] Diagnostic Colonoscopy       [] Screening Colonoscopy  [x] EGD      [] ERCP      [] EUS       [] Other    [x] Previous office notes/History and Physical reviewed from the patients chart. Please see EMR for further details of HPI. I have examined the patient's status immediately prior to the procedure and:      Indications/HPI:    []Abdominal Pain   []Barretts  []Screening/Surveillance   []History of Polyps  []Dysphagia            [] +Cologard/DNA testing  []Abnormal Imaging              []EOE Hx              [] Family Hx of CRC/Polyps  []Anemia                            []Food Impaction       []Recent Poor Prep  []GI Bleed             []Lymphadenopathy  []History of Polyps  []Change in bowel habits []Heartburn/Reflux  []Cancer- GI/Lung  []Chest Pain - Non Cardiac []Heme (+) Stool []Ulcers  []Constipation  []Hemoptysis  []Incontinence    []Diarrhea  []Hypoxemia  []Rectal Bleed (BRBPR)  []Nausea/Vomiting   [x] Varices  []Crohns/Colitis  []Pancreatic Cyst   [x] Cirrhosis   []Pancreatitis    []Abnormal MRCP  []Elevated LFT [] Stent Removal, Previous ERCP  []Other:     Anesthesia:   [x] MAC [] Moderate Sedation   [] General   [] None     ROS: 12 pt Review of Symptoms was negative unless mentioned above    Medications:   Prior to Admission medications    Medication Sig Start Date End Date Taking? Authorizing Provider   metFORMIN (GLUCOPHAGE) 1000 MG tablet  10/10/23   Edvin Welsh MD   SUMAtriptan (IMITREX) 100 MG tablet Take one tablet at onset of headache. May repeat dose one time in 2 hours if headache not relieved.  23   Edvin Welsh MD   cetirizine (ZYRTEC) 10 MG tablet Take 1 tablet by mouth daily    Edvin Welsh MD   levothyroxine (SYNTHROID) 50 MCG tablet Take 1 tablet by mouth Daily    Edvin Welsh MD repeat in 8 wks    UPPER GASTROINTESTINAL ENDOSCOPY N/A 08/25/2020    Dr Chantale Torres-w/variceal banding x 3, grade 1-2 varices, portal hypertensive gastropathy, repeat in 3 months    UPPER GASTROINTESTINAL ENDOSCOPY N/A 12/04/2020    Dr Chantale Torres-w/variceal banding x 5-Grade II varices, portal hypertensive gastropathy, repeat in 3 months    UPPER GASTROINTESTINAL ENDOSCOPY N/A 03/09/2021    Dr [place] of previous banding seen, no varices, portal hypertensive gastropathy, repeat in 6 months    UPPER GASTROINTESTINAL ENDOSCOPY N/A 04/29/2022    Dr LUIS FELIPE Torres-w/variceal banding-Small esophageal varices, portal hypertensive gastropathy, 6 month recall    US GUIDED LIVER BIOPSY PERCUTANEOUS  05/07/2019    Dr. Miracle Hayden; Grade 2, Stage 4, iron stain (-)       Social History:  Social History     Tobacco Use    Smoking status: Every Day     Packs/day: 0.50     Years: 45.00     Additional pack years: 0.00     Total pack years: 22.50     Types: Cigarettes    Smokeless tobacco: Never   Vaping Use    Vaping Use: Never used   Substance Use Topics    Alcohol use: No     Comment:  stopped drinking 2014? Drug use: No     Types: Cocaine     Comment: Stopped in  2014?- Pt did not have to do any rehab       Vital Signs: There were no vitals filed for this visit. Physical Exam:  Cardiac:  [x]WNL  []Comments:  Pulmonary:  [x]WNL   []Comments:  Neuro/Mental Status:  [x]WNL  []Comments:  Abdominal:  [x]WNL    []Comments:  Other:   []WNL  []Comments:    Informed Consent:  The risks and benefits of the procedure have been discussed with either the patient or if they cannot consent, their representative. Assessment:  Patient examined and appropriate for planned sedation and procedure. Plan:  Proceed with planned sedation and procedure as above.          Yen Alcazar MD

## 2023-11-11 NOTE — OP NOTE
Endoscopic Procedure Note    Patient: Flores Nevarez: 1960  Med Rec#: [id number] Acc#: [id number]     Primary Care Provider DANIELLA Lechuga - CNP  Referring Provider:     Endoscopist: Pino Rojas MD    Date of Procedure:  11/10/2023    Procedure:   1. EGD     Indications:   1. History of esophageal varices with banding  2. Surveillance exam    Anesthesia:  Sedation was administered by anesthesia who monitored the patient during the procedure. Estimated Blood Loss: minimal    Procedure:   After reviewing the patient's chart and obtaining informed consent, the patient was placed in the left lateral decubitus position. A forward-viewing Olympus endoscope was lubricated and inserted through the mouth into the oropharynx. Under direct visualization, the upper esophagus was intubated. The scope was advanced to the level of the third portion of duodenum. Scope was slowly withdrawn with careful inspection of the mucosal surfaces. The scope was retroflexed for inspection of the gastric fundus and incisura. Findings and maneuvers are listed in impression below. The patient tolerated the procedure well. The scope was removed. There were no immediate complications. Findings:   Esophagus: abnormal: scarring noted from previous banding procedures. No enlarged varices. No high risk stigmata. There is no hiatal hernia present. Stomach:  abnormal: mild gastritis noted. Duodenum: normal      IMPRESSION:  Evidence of previous variceal banding. RECOMMENDATIONS:    1. Repeat Exam in 1 yr  2. Continue current meds    The results were discussed with the patient and family. A copy of the images obtained were given to the patient.      Charlie Adan MD  11/10/2023

## 2023-11-14 NOTE — PROGRESS NOTES
"Chief Complaint  Cellulitis (2 day rebel) and Nasal Congestion    Subjective          Della Gonzalez presents to [facility] for   History of Present Illness  Patient returns this morning for assessment of abscess of the left labia and associated cellulitis.  She states she feels well and overall the pain has lessened since seen on Monday.  She reports no fever or associated systemic symptoms.  The abscess continues to drain.    Objective   Vital Signs:   /85 (BP Location: Right arm, Patient Position: Sitting, Cuff Size: Large Adult)   Pulse 64   Temp 98.6 °F (37 °C)   Ht 152.4 cm (60\") Comment: pt reported  Wt 89.5 kg (197 lb 6.4 oz)   SpO2 98%   BMI 38.55 kg/m²     Physical Exam  Vitals signs and nursing note reviewed. Exam conducted with a chaperone present.   Constitutional:       Appearance: Normal appearance. She is obese.   HENT:      Head: Normocephalic and atraumatic.   Cardiovascular:      Rate and Rhythm: Normal rate and regular rhythm.      Pulses: Normal pulses.      Heart sounds: Normal heart sounds. No murmur. No gallop.    Pulmonary:      Effort: Pulmonary effort is normal.      Breath sounds: Wheezing present. No rhonchi.   Abdominal:      General: Bowel sounds are normal.      Palpations: Abdomen is soft.   Genitourinary:      Musculoskeletal: Normal range of motion.   Neurological:      Mental Status: She is alert.        Result Review :                 Assessment and Plan    Problem List Items Addressed This Visit     None      Visit Diagnoses     Abscess of left genital labia    -  Primary    Relevant Medications    cefTRIAXone (ROCEPHIN) injection 1 g (Completed)    Cellulitis of left buttock        Relevant Medications    cefTRIAXone (ROCEPHIN) injection 1 g (Completed)      Patient will follow up tomorrow with check in office.  She defers referral to gen surgeon at this time.    Follow Up   Return in about 1 day (around 1/8/2021) for Recheck.  Patient " was given instructions and counseling regarding her condition or for health maintenance advice. Please see specific information pulled into the AVS if appropriate.        Sotyktu Pregnancy And Lactation Text: There is insufficient data to evaluate whether or not Sotyktu is safe to use during pregnancy.   It is not known if Sotyktu passes into breast milk and whether or not it is safe to use when breastfeeding.

## 2023-11-15 ENCOUNTER — TELEPHONE (OUTPATIENT)
Dept: FAMILY MEDICINE CLINIC | Facility: CLINIC | Age: 63
End: 2023-11-15

## 2023-11-15 ENCOUNTER — OFFICE VISIT (OUTPATIENT)
Dept: FAMILY MEDICINE CLINIC | Facility: CLINIC | Age: 63
End: 2023-11-15
Payer: MEDICARE

## 2023-11-15 VITALS
TEMPERATURE: 97.1 F | HEART RATE: 65 BPM | SYSTOLIC BLOOD PRESSURE: 137 MMHG | OXYGEN SATURATION: 96 % | WEIGHT: 173 LBS | HEIGHT: 60 IN | DIASTOLIC BLOOD PRESSURE: 82 MMHG | BODY MASS INDEX: 33.96 KG/M2

## 2023-11-15 DIAGNOSIS — Z00.00 MEDICARE ANNUAL WELLNESS VISIT, SUBSEQUENT: Primary | ICD-10-CM

## 2023-11-15 DIAGNOSIS — B37.2 CANDIDAL DERMATITIS: ICD-10-CM

## 2023-11-15 DIAGNOSIS — G43.009 MIGRAINE WITHOUT AURA AND WITHOUT STATUS MIGRAINOSUS, NOT INTRACTABLE: ICD-10-CM

## 2023-11-15 DIAGNOSIS — K21.9 GASTROESOPHAGEAL REFLUX DISEASE, UNSPECIFIED WHETHER ESOPHAGITIS PRESENT: ICD-10-CM

## 2023-11-15 DIAGNOSIS — Z12.31 BREAST CANCER SCREENING BY MAMMOGRAM: ICD-10-CM

## 2023-11-15 DIAGNOSIS — F17.210 CIGARETTE NICOTINE DEPENDENCE WITHOUT COMPLICATION: ICD-10-CM

## 2023-11-15 DIAGNOSIS — Z23 NEED FOR THIRD BOOSTER DOSE OF COVID-19 VACCINE: ICD-10-CM

## 2023-11-15 DIAGNOSIS — E03.9 HYPOTHYROIDISM, UNSPECIFIED TYPE: ICD-10-CM

## 2023-11-15 DIAGNOSIS — E66.9 OBESITY (BMI 30.0-34.9): ICD-10-CM

## 2023-11-15 DIAGNOSIS — E78.2 MIXED HYPERLIPIDEMIA: ICD-10-CM

## 2023-11-15 DIAGNOSIS — I10 ESSENTIAL HYPERTENSION: ICD-10-CM

## 2023-11-15 DIAGNOSIS — Z78.0 POST-MENOPAUSAL: ICD-10-CM

## 2023-11-15 DIAGNOSIS — E11.42 TYPE 2 DIABETES MELLITUS WITH DIABETIC POLYNEUROPATHY, WITHOUT LONG-TERM CURRENT USE OF INSULIN: ICD-10-CM

## 2023-11-15 RX ORDER — NYSTATIN 100000 [USP'U]/G
POWDER TOPICAL 2 TIMES DAILY
Qty: 60 G | Refills: 2 | Status: SHIPPED | OUTPATIENT
Start: 2023-11-15

## 2023-11-15 RX ORDER — OMEPRAZOLE 20 MG/1
20 CAPSULE, DELAYED RELEASE ORAL DAILY
Qty: 90 CAPSULE | Refills: 1 | Status: SHIPPED | OUTPATIENT
Start: 2023-11-15

## 2023-11-15 NOTE — PROGRESS NOTES
The ABCs of the Annual Wellness Visit  Subsequent Medicare Wellness Visit    Subjective      Della Gonzalez is a 63 y.o. female who presents for a Subsequent Medicare Wellness Visit.    The following portions of the patient's history were reviewed and   updated as appropriate: allergies, current medications, past family history, past medical history, past social history, past surgical history, and problem list.    Compared to one year ago, the patient feels her physical   health is the same.    Compared to one year ago, the patient feels her mental   health is the same.    Recent Hospitalizations:  She was not admitted to the hospital during the last year.       Current Medical Providers:  Patient Care Team:  Vijaya Henao APRN as PCP - General (Family Medicine)    Outpatient Medications Prior to Visit   Medication Sig Dispense Refill    albuterol sulfate  (90 Base) MCG/ACT inhaler Inhale 2 puffs Every 4 (Four) Hours As Needed for Wheezing or Shortness of Air. 18 g 2    atorvastatin (LIPITOR) 10 MG tablet Take 1 tablet by mouth Daily. 90 tablet 1    benazepril (LOTENSIN) 10 MG tablet TAKE ONE TABLET BY MOUTH EVERY DAY 90 tablet 3    cetirizine (zyrTEC) 10 MG tablet TAKE ONE TABLET BY MOUTH EVERY DAY 90 tablet 1    doxycycline (VIBRAMYCIN) 100 MG capsule TAKE ONE CAPSULE BY MOUTH TWICE A DAY 20 capsule 0    Ertugliflozin L-PyroglutamicAc (Steglatro) 15 MG tablet Take 1 tablet by mouth Every Morning. 90 tablet 1    fluticasone (FLONASE) 50 MCG/ACT nasal spray 2 sprays into the nostril(s) as directed by provider Daily. 16 g 0    gabapentin (NEURONTIN) 300 MG capsule Take 1 capsule by mouth 2 (Two) Times a Day As Needed (leg pain). 60 capsule 2    levothyroxine (SYNTHROID, LEVOTHROID) 50 MCG tablet Take 1 tablet by mouth Daily. 90 tablet 1    linagliptin (Tradjenta) 5 MG tablet tablet Take 1 tablet by mouth Daily. 90 tablet 1    loperamide (Imodium A-D) 2 MG tablet Take 1 tablet by mouth 4 (Four) Times a Day  As Needed for Diarrhea. 120 tablet 1    nabumetone (RELAFEN) 500 MG tablet       nadolol (CORGARD) 20 MG tablet Take 1 tablet by mouth Daily. 90 tablet 1    Neomycin-Polymyxin-Dexameth 0.1 % ointment Apply 1 application to eye(s) as directed by provider 2 (Two) Times a Day. 3.5 g 1    nystatin (MYCOSTATIN) 437309 UNIT/GM cream Apply 1 application topically to the appropriate area as directed 2 (Two) Times a Day. 30 g 2    ondansetron ODT (Zofran ODT) 4 MG disintegrating tablet Place 1 tablet on the tongue Every 8 (Eight) Hours As Needed for Nausea. 12 tablet 0    terbinafine (lamISIL) 1 % cream Apply 1 application topically to the appropriate area as directed 2 (Two) Times a Day. 42 g 5    tiZANidine (ZANAFLEX) 4 MG tablet Take 1 tablet by mouth Every 8 (Eight) Hours As Needed for Muscle Spasms. 60 tablet 2    traZODone (DESYREL) 150 MG tablet Take 1 tablet by mouth every night at bedtime. 90 tablet 1    triamcinolone (KENALOG) 0.1 % cream Apply 1 application  topically to the appropriate area as directed 2 (Two) Times a Day. 80 g 3    metFORMIN (Glucophage) 1000 MG tablet Take 1 tablet by mouth 2 (Two) Times a Day With Meals. 60 tablet 2    omeprazole (priLOSEC) 20 MG capsule TAKE ONE CAPSULE BY MOUTH EVERY DAY 90 capsule 1    SUMAtriptan (Imitrex) 100 MG tablet Take one tablet at onset of headache. May repeat dose one time in 2 hours if headache not relieved. 9 tablet 2     No facility-administered medications prior to visit.       No opioid medication identified on active medication list. I have reviewed chart for other potential  high risk medication/s and harmful drug interactions in the elderly.        Aspirin is not on active medication list.  Aspirin use is not indicated based on review of current medical condition/s. Risk of harm outweighs potential benefits.  .    Patient Active Problem List   Diagnosis    Diabetes mellitus    Morbidly obese    Mixed hyperlipidemia    Cirrhosis of liver without ascites     "Elevated AFP    Esophageal varices determined by endoscopy    Essential hypertension    Gastritis without bleeding    History of hepatitis C    Nonintractable headache    Portal hypertension    Gallstones    Hypothyroidism    Cigarette nicotine dependence without complication    Post-menopausal    Gastroesophageal reflux disease    Migraine without aura and without status migrainosus, not intractable     Advance Care Planning   Advance Care Planning     Advance Directive is not on file.  ACP discussion was declined by the patient. Patient does not have an advance directive, declines further assistance.     Objective    Vitals:    11/15/23 1310   BP: 137/82   BP Location: Left arm   Patient Position: Sitting   Cuff Size: Large Adult   Pulse: 65   Temp: 97.1 °F (36.2 °C)   SpO2: 96%   Weight: 78.5 kg (173 lb)   Height: 152.4 cm (60\")   PainSc: 0-No pain     Estimated body mass index is 33.79 kg/m² as calculated from the following:    Height as of this encounter: 152.4 cm (60\").    Weight as of this encounter: 78.5 kg (173 lb).           Does the patient have evidence of cognitive impairment?   No    Lab Results   Component Value Date    CHLPL 191 2023    TRIG 139 2023    HDL 48 2023     (H) 2023    VLDL 25 2023    HGBA1C 7.1 (H) 2023          HEALTH RISK ASSESSMENT    Smoking Status:  Social History     Tobacco Use   Smoking Status Every Day    Packs/day: 0.50    Years: 40.00    Additional pack years: 0.00    Total pack years: 20.00    Types: Cigarettes    Passive exposure: Current   Smokeless Tobacco Never     Alcohol Consumption:  Social History     Substance and Sexual Activity   Alcohol Use No     Fall Risk Screen:    STEADI Fall Risk Assessment was completed, and patient is at LOW risk for falls.Assessment completed on:11/15/2023    Depression Screenin/15/2023     1:10 PM   PHQ-2/PHQ-9 Depression Screening   Little Interest or Pleasure in Doing Things 0-->not " at all   Feeling Down, Depressed or Hopeless 0-->not at all   PHQ-9: Brief Depression Severity Measure Score 0       Health Habits and Functional and Cognitive Screenin/15/2023     1:10 PM   Functional & Cognitive Status   Do you have difficulty preparing food and eating? No   Do you have difficulty bathing yourself, getting dressed or grooming yourself? No   Do you have difficulty using the toilet? No   Do you have difficulty moving around from place to place? No   Do you have trouble with steps or getting out of a bed or a chair? No   Current Diet Unhealthy Diet   Dental Exam Not up to date   Eye Exam Up to date   Exercise (times per week) 0 times per week   Current Exercises Include No Regular Exercise   Do you need help using the phone?  No   Are you deaf or do you have serious difficulty hearing?  No   Do you need help to go to places out of walking distance? No   Do you need help shopping? No   Do you need help preparing meals?  No   Do you need help with housework?  No   Do you need help with laundry? No   Do you need help taking your medications? No   Do you need help managing money? No   Do you ever drive or ride in a car without wearing a seat belt? No   Have you felt unusual stress, anger or loneliness in the last month? No   Who do you live with? Alone   If you need help, do you have trouble finding someone available to you? No   Have you been bothered in the last four weeks by sexual problems? No   Do you have difficulty concentrating, remembering or making decisions? No       Age-appropriate Screening Schedule:  Refer to the list below for future screening recommendations based on patient's age, sex and/or medical conditions. Orders for these recommended tests are listed in the plan section. The patient has been provided with a written plan.    Health Maintenance   Topic Date Due    LUNG CANCER SCREENING  2022    Hepatitis B (1 of 3 - Risk 3-dose series) 11/15/2024 (Originally  4/17/2019)    TDAP/TD VACCINES (1 - Tdap) 11/15/2024 (Originally 4/17/1979)    DIABETIC FOOT EXAM  02/03/2024    HEMOGLOBIN A1C  05/07/2024    BMI FOLLOWUP  09/18/2024    DIABETIC EYE EXAM  09/21/2024    PAP SMEAR  10/26/2024    LIPID PANEL  11/07/2024    URINE MICROALBUMIN  11/07/2024    ANNUAL WELLNESS VISIT  11/15/2024    MAMMOGRAM  01/19/2025    COLORECTAL CANCER SCREENING  03/09/2031    HEPATITIS C SCREENING  Completed    COVID-19 Vaccine  Completed    Pneumococcal Vaccine 0-64  Completed    INFLUENZA VACCINE  Completed    ZOSTER VACCINE  Completed                Physical Exam  Vitals and nursing note reviewed.   Constitutional:       General: She is not in acute distress.     Appearance: Normal appearance. She is obese. She is not ill-appearing.   HENT:      Head: Normocephalic and atraumatic.   Neck:      Vascular: No carotid bruit.   Cardiovascular:      Rate and Rhythm: Normal rate and regular rhythm.      Pulses:           Dorsalis pedis pulses are 2+ on the right side and 2+ on the left side.        Posterior tibial pulses are 2+ on the right side and 2+ on the left side.      Heart sounds: Normal heart sounds. No murmur heard.  Pulmonary:      Effort: Pulmonary effort is normal.      Breath sounds: Normal breath sounds.   Abdominal:      General: Bowel sounds are normal.      Palpations: Abdomen is soft.   Musculoskeletal:         General: Normal range of motion.      Cervical back: Neck supple.      Right lower leg: No edema.      Left lower leg: No edema.      Right foot: Normal range of motion.      Left foot: Normal range of motion.   Feet:      Right foot:      Protective Sensation: 5 sites tested.  5 sites sensed.      Skin integrity: Skin integrity normal.      Toenail Condition: Right toenails are abnormally thick.      Left foot:      Protective Sensation: 5 sites tested.  5 sites sensed.      Skin integrity: Skin integrity normal.      Toenail Condition: Left toenails are abnormally thick.    Skin:     General: Skin is warm and dry.   Neurological:      Mental Status: She is alert and oriented to person, place, and time.        CMS Preventative Services Quick Reference  Risk Factors Identified During Encounter:    Immunizations Discussed/Encouraged: Tdap, Influenza, and COVID19  Polypharmacy: Medication List reviewed and Medications are appropriate for patient  Tobacco Use/Dependance Risk (use dotphrase .tobaccocessation for documentation)    The above risks/problems have been discussed with the patient.  Pertinent information has been shared with the patient in the After Visit Summary.    Diagnoses and all orders for this visit:    1. Medicare annual wellness visit, subsequent (Primary)    2. Candidal dermatitis  -     nystatin (MYCOSTATIN) 314358 UNIT/GM powder; Apply  topically to the appropriate area as directed 2 (Two) Times a Day.  Dispense: 60 g; Refill: 2    3. Cigarette nicotine dependence without complication  -     CT Chest Low Dose Wo; Future    4. Hypothyroidism, unspecified type    5. Type 2 diabetes mellitus with diabetic polyneuropathy, without long-term current use of insulin  -     metFORMIN (Glucophage) 1000 MG tablet; Take 1 tablet by mouth 2 (Two) Times a Day With Meals.  Dispense: 180 tablet; Refill: 1    6. Essential hypertension    7. Mixed hyperlipidemia    8. Breast cancer screening by mammogram  -     Mammo Screening Digital Tomosynthesis Bilateral With CAD; Future    9. Post-menopausal  -     Cancel: DEXA Bone Density Axial; Future    10. Gastroesophageal reflux disease, unspecified whether esophagitis present  -     omeprazole (priLOSEC) 20 MG capsule; Take 1 capsule by mouth Daily.  Dispense: 90 capsule; Refill: 1    11. Migraine without aura and without status migrainosus, not intractable  -     ubrogepant (Ubrelvy) 100 MG tablet; Take 1 tablet by mouth 1 (One) Time As Needed (at onset of headache).  Dispense: 4 tablet; Refill: 0    12. Need for third booster dose of  COVID-19 vaccine  -     COVID-19 F23 (Pfizer) 12yrs+ (COMIRNATY)    13. Obesity (BMI 30.0-34.9)    Patient encouraged to partake of healthy diet, low carb, rich in fresh vegetables and lean proteins.  Patient encouraged to participate in daily exercise with goal of 30 min sustained activity.    Follow Up:   Next Medicare Wellness visit to be scheduled in 1 year.      An After Visit Summary and PPPS were made available to the patient.

## 2023-11-15 NOTE — TELEPHONE ENCOUNTER
Caller: Della Gonzalez    Relationship: Self    Best call back number: [phone]     What form or medical record are you requesting: PAPER COPY OF PATIENTS COVID VACCINE RECORD    How would you like to receive the form or medical records (pick-up, mail, fax): MAIL TO ADDRESS ON FILE    Timeframe paperwork needed: ASAP

## 2023-11-22 ENCOUNTER — TELEPHONE (OUTPATIENT)
Dept: FAMILY MEDICINE CLINIC | Facility: CLINIC | Age: 63
End: 2023-11-22
Payer: MEDICARE

## 2023-11-22 NOTE — TELEPHONE ENCOUNTER
Pt presented in office today requesting we send a referral to [facility] for pt to have mri completed at [facility] 11/28/23, please advice I have not received anything on this through fax, pt stated she received a call from [facility] this morning that this needed completed for pt to be taken to [facility]

## 2023-11-28 ENCOUNTER — TELEPHONE (OUTPATIENT)
Dept: FAMILY MEDICINE CLINIC | Facility: CLINIC | Age: 63
End: 2023-11-28
Payer: MEDICARE

## 2023-11-28 ENCOUNTER — HOSPITAL ENCOUNTER (OUTPATIENT)
Dept: MRI IMAGING | Age: 63
Discharge: HOME | End: 2023-11-28
Payer: MEDICARE

## 2023-11-28 DIAGNOSIS — K74.60 CIRRHOSIS OF LIVER WITHOUT ASCITES, UNSPECIFIED HEPATIC CIRRHOSIS TYPE (HCC): ICD-10-CM

## 2023-11-28 DIAGNOSIS — R77.2 ELEVATED AFP: ICD-10-CM

## 2023-11-28 DIAGNOSIS — F51.01 PRIMARY INSOMNIA: ICD-10-CM

## 2023-11-28 PROCEDURE — 6360000004 HC RX CONTRAST MEDICATION: Performed by: NURSE PRACTITIONER

## 2023-11-28 PROCEDURE — A9577 INJ MULTIHANCE: HCPCS | Performed by: NURSE PRACTITIONER

## 2023-11-28 PROCEDURE — 74183 MRI ABD W/O CNTR FLWD CNTR: CPT

## 2023-11-28 RX ORDER — TRAZODONE HYDROCHLORIDE 150 MG/1
150 TABLET ORAL
Qty: 90 TABLET | Refills: 1 | Status: SHIPPED | OUTPATIENT
Start: 2023-11-28

## 2023-11-28 RX ADMIN — GADOBENATE DIMEGLUMINE 16 ML: 529 INJECTION, SOLUTION INTRAVENOUS at 14:07

## 2023-11-29 ENCOUNTER — TELEPHONE (OUTPATIENT)
Dept: GASTROENTEROLOGY | Age: 63
End: 2023-11-29

## 2023-11-29 DIAGNOSIS — K74.60 CIRRHOSIS OF LIVER WITHOUT ASCITES, UNSPECIFIED HEPATIC CIRRHOSIS TYPE (HCC): Primary | ICD-10-CM

## 2023-11-29 DIAGNOSIS — R89.9 ABNORMAL LABORATORY TEST RESULT: ICD-10-CM

## 2023-11-29 NOTE — TELEPHONE ENCOUNTER
----- Message from DANIELLA Wiggins NP sent at 11/28/2023  3:57 PM CST -----  MRI shows a \"tiny\" lesion on her liver, possible small cyst.  Let's repeat the AFP prior to her next OV. Called several times and LVM.      Sent my chart message to the patient     Routed to [facility]

## 2023-11-30 ENCOUNTER — HOSPITAL ENCOUNTER (OUTPATIENT)
Dept: CT IMAGING | Facility: HOSPITAL | Age: 63
Discharge: HOME | End: 2023-11-30
Admitting: NURSE PRACTITIONER
Payer: MEDICARE

## 2023-11-30 ENCOUNTER — TELEPHONE (OUTPATIENT)
Dept: FAMILY MEDICINE CLINIC | Facility: CLINIC | Age: 63
End: 2023-11-30
Payer: MEDICARE

## 2023-11-30 DIAGNOSIS — F17.210 CIGARETTE NICOTINE DEPENDENCE WITHOUT COMPLICATION: ICD-10-CM

## 2023-11-30 PROCEDURE — 71271 CT THORAX LUNG CANCER SCR C-: CPT

## 2023-11-30 NOTE — TELEPHONE ENCOUNTER
Pt called stating she has had a very bad headache and is wondering if Vijaya could call her In a prescription for Ubrelvy. She states she has one pill left and then will be out.

## 2023-12-07 ENCOUNTER — TELEPHONE (OUTPATIENT)
Dept: GASTROENTEROLOGY | Age: 63
End: 2023-12-07

## 2023-12-07 NOTE — TELEPHONE ENCOUNTER
----- [facility] Juli [place], [facility] sent at 11/29/2023  8:28 AM CST -----  Regarding: AFP  11- per [facility]. repeat AFP prior to next visit (12-)     12- Called Lvm for the patient that she is needing to get her CMP lab done prior to next apt

## 2023-12-14 ENCOUNTER — TELEPHONE (OUTPATIENT)
Dept: FAMILY MEDICINE CLINIC | Facility: CLINIC | Age: 63
End: 2023-12-14
Payer: MEDICARE

## 2023-12-14 ENCOUNTER — TELEPHONE (OUTPATIENT)
Dept: PODIATRY | Facility: CLINIC | Age: 63
End: 2023-12-14
Payer: MEDICARE

## 2023-12-14 ENCOUNTER — OFFICE VISIT (OUTPATIENT)
Dept: GASTROENTEROLOGY | Age: 63
End: 2023-12-14
Payer: MEDICARE

## 2023-12-14 VITALS
WEIGHT: 173 LBS | HEIGHT: 60 IN | BODY MASS INDEX: 33.96 KG/M2 | SYSTOLIC BLOOD PRESSURE: 139 MMHG | HEART RATE: 64 BPM | OXYGEN SATURATION: 97 % | DIASTOLIC BLOOD PRESSURE: 80 MMHG

## 2023-12-14 DIAGNOSIS — L73.9 FOLLICULITIS: ICD-10-CM

## 2023-12-14 DIAGNOSIS — K76.9 HEPATIC LESION: ICD-10-CM

## 2023-12-14 DIAGNOSIS — R77.2 ELEVATED AFP: ICD-10-CM

## 2023-12-14 DIAGNOSIS — S40.861A INSECT BITE OF RIGHT UPPER ARM, INITIAL ENCOUNTER: ICD-10-CM

## 2023-12-14 DIAGNOSIS — K74.60 CIRRHOSIS OF LIVER WITHOUT ASCITES, UNSPECIFIED HEPATIC CIRRHOSIS TYPE (HCC): ICD-10-CM

## 2023-12-14 DIAGNOSIS — K74.60 CIRRHOSIS OF LIVER WITHOUT ASCITES, UNSPECIFIED HEPATIC CIRRHOSIS TYPE (HCC): Primary | ICD-10-CM
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
## 2023-12-14 DIAGNOSIS — R89.9 ABNORMAL LABORATORY TEST RESULT: ICD-10-CM

## 2023-12-14 DIAGNOSIS — L03.113 CELLULITIS OF RIGHT UPPER ARM: ICD-10-CM

## 2023-12-14 LAB — AFP SERPL-MCNC: 33.3 NG/ML (ref 0–8.3)

## 2023-12-14 PROCEDURE — 3079F DIAST BP 80-89 MM HG: CPT | Performed by: NURSE PRACTITIONER

## 2023-12-14 PROCEDURE — G8427 DOCREV CUR MEDS BY ELIG CLIN: HCPCS | Performed by: NURSE PRACTITIONER

## 2023-12-14 PROCEDURE — 3017F COLORECTAL CA SCREEN DOC REV: CPT | Performed by: NURSE PRACTITIONER

## 2023-12-14 PROCEDURE — 3075F SYST BP GE 130 - 139MM HG: CPT | Performed by: NURSE PRACTITIONER

## 2023-12-14 PROCEDURE — 99213 OFFICE O/P EST LOW 20 MIN: CPT | Performed by: NURSE PRACTITIONER

## 2023-12-14 PROCEDURE — G8484 FLU IMMUNIZE NO ADMIN: HCPCS | Performed by: NURSE PRACTITIONER

## 2023-12-14 PROCEDURE — G8417 CALC BMI ABV UP PARAM F/U: HCPCS | Performed by: NURSE PRACTITIONER

## 2023-12-14 PROCEDURE — 4004F PT TOBACCO SCREEN RCVD TLK: CPT | Performed by: NURSE PRACTITIONER

## 2023-12-14 RX ORDER — DOXYCYCLINE HYCLATE 100 MG/1
100 CAPSULE ORAL 2 TIMES DAILY
Qty: 20 CAPSULE | Refills: 0 | Status: SHIPPED | OUTPATIENT
Start: 2023-12-14

## 2023-12-14 NOTE — PROGRESS NOTES
Subjective:     Patient ID: Neelima Ray is a 58 y.o. female  PCP: Anabela Wilson, APRN - CNP  Referring Provider: No ref. provider found    HPI  Patient presents to the office today with the following complaints: Endoscopy      Pt seen today for follow up after MRI abdomen for elevated AFP. AFP 29.5 on 10/17/2023. MRI noted \"tiny lesion which could represent a dysplastic nodule vs small hepatoma. \"  Repeat AFP was drawn today, results are not available at time of visit. Pt denies any concerns or issues today. Last EGD 11/10/2023 - Scarring noted from previous banding procedures, gastritis  Last Colonoscopy 3/9/2021 - [facility], 5 year recall   Denies any family hx colon cancer or colon polyps    Assessment:     1. Cirrhosis of liver without ascites, unspecified hepatic cirrhosis type ([address])    2. Hepatic lesion    3. Elevated AFP            Plan:   - Await repeat AFP   - Consider referral to Hepatology pending results   - Follow up in 6 months with US liver, CBC, CMP, Pt/INR, AFP   - Call with any questions or concerns      Orders  No orders of the defined types were placed in this encounter. Medications  No orders of the defined types were placed in this encounter.         Patient History:     Past Medical History:   Diagnosis Date    Arthritis     Asthma     Cirrhosis ([address])     COPD (chronic obstructive pulmonary disease) (HCC)     Esophageal varices (HCC)     GERD (gastroesophageal reflux disease)     Hepatitis C     Hyperlipidemia     Hypertension        Past Surgical History:   Procedure Laterality Date    COLONOSCOPY  03/02/2016    Dr Johnson [place] bleeding internal hemorrhoids o/w normal; fair prep (5 yr)    COLONOSCOPY N/A 03/09/2021    Dr Gladis Araujo, 5 yr recall    ID EGD TRANSORAL BIOPSY SINGLE/MULTIPLE N/A 02/07/2017    Dr Dion Stewart Grade I esophageal varices, gastritis/gastropathy    ID EGD TRANSORAL BIOPSY SINGLE/MULTIPLE N/A 03/23/2018    Dr Griffith [place] I esophageal varices, erosive/active

## 2023-12-14 NOTE — TELEPHONE ENCOUNTER
Pt called stating she is wondering if Vijaya could her in an antibiotic for bumps on her chin. She would like that to be sent to [facility].

## 2023-12-15 ENCOUNTER — TELEPHONE (OUTPATIENT)
Dept: GASTROENTEROLOGY | Age: 63
End: 2023-12-15

## 2023-12-15 NOTE — TELEPHONE ENCOUNTER
----- Message from DANIELLA High - NP sent at 12/14/2023  3:29 PM CST -----  Please let pt know her AFP has increased slightly from 29.5 to 33.3.   I recommend referral to Hepatology for evaluation due to elevate AFP and hepatic lesion      12- Called Lvm in regards to results and recommendations as per [facility] APRN     Referral faxed to [facility]  Ph [phone] Fax [phone]

## 2023-12-22 DIAGNOSIS — G43.009 MIGRAINE WITHOUT AURA AND WITHOUT STATUS MIGRAINOSUS, NOT INTRACTABLE: ICD-10-CM

## 2023-12-22 RX ORDER — NADOLOL 20 MG/1
20 TABLET ORAL DAILY
Qty: 90 TABLET | Refills: 1 | Status: SHIPPED | OUTPATIENT
Start: 2023-12-22

## 2023-12-22 NOTE — TELEPHONE ENCOUNTER
Rx Refill Note  Requested Prescriptions     Pending Prescriptions Disp Refills    nadolol (CORGARD) 20 MG tablet [Pharmacy Med Name: NADOLOL 20MG TABS] 90 tablet 1     Sig: TAKE ONE TABLET BY MOUTH EVERY DAY          Amy Barahona MA  12/22/23, 08:56 CST

## 2023-12-26 ENCOUNTER — OFFICE VISIT (OUTPATIENT)
Dept: FAMILY MEDICINE CLINIC | Facility: CLINIC | Age: 63
End: 2023-12-26
Payer: MEDICARE

## 2023-12-26 VITALS
BODY MASS INDEX: 33.57 KG/M2 | OXYGEN SATURATION: 100 % | SYSTOLIC BLOOD PRESSURE: 112 MMHG | TEMPERATURE: 97.8 F | WEIGHT: 171 LBS | HEIGHT: 60 IN | RESPIRATION RATE: 20 BRPM | DIASTOLIC BLOOD PRESSURE: 68 MMHG | HEART RATE: 96 BPM

## 2023-12-26 DIAGNOSIS — J30.2 SEASONAL ALLERGIC RHINITIS, UNSPECIFIED TRIGGER: ICD-10-CM

## 2023-12-26 DIAGNOSIS — R09.89 RUNNY NOSE: Primary | ICD-10-CM

## 2023-12-26 PROCEDURE — 3078F DIAST BP <80 MM HG: CPT | Performed by: STUDENT IN AN ORGANIZED HEALTH CARE EDUCATION/TRAINING PROGRAM

## 2023-12-26 PROCEDURE — 3074F SYST BP LT 130 MM HG: CPT | Performed by: STUDENT IN AN ORGANIZED HEALTH CARE EDUCATION/TRAINING PROGRAM

## 2023-12-26 PROCEDURE — 3051F HG A1C>EQUAL 7.0%<8.0%: CPT | Performed by: STUDENT IN AN ORGANIZED HEALTH CARE EDUCATION/TRAINING PROGRAM

## 2023-12-26 RX ORDER — METHYLPREDNISOLONE SODIUM SUCCINATE 125 MG/2ML
125 INJECTION, POWDER, LYOPHILIZED, FOR SOLUTION INTRAMUSCULAR; INTRAVENOUS ONCE
Status: COMPLETED | OUTPATIENT
Start: 2023-12-26 | End: 2023-12-26

## 2023-12-26 RX ORDER — FLUTICASONE PROPIONATE 50 MCG
2 SPRAY, SUSPENSION (ML) NASAL DAILY
Qty: 16 G | Refills: 5 | Status: SHIPPED | OUTPATIENT
Start: 2023-12-26

## 2023-12-26 RX ORDER — METHYLPREDNISOLONE SODIUM SUCCINATE 125 MG/2ML
125 INJECTION, POWDER, LYOPHILIZED, FOR SOLUTION INTRAMUSCULAR; INTRAVENOUS EVERY 6 HOURS
Status: DISCONTINUED | OUTPATIENT
Start: 2023-12-26 | End: 2023-12-26

## 2023-12-26 RX ADMIN — METHYLPREDNISOLONE SODIUM SUCCINATE 125 MG: 125 INJECTION, POWDER, LYOPHILIZED, FOR SOLUTION INTRAMUSCULAR; INTRAVENOUS at 15:54

## 2023-12-26 NOTE — PROGRESS NOTES
"       Chief Complaint  Nasal Congestion, Generalized Body Aches, Wheezing, and Headache    Subjective        Della Gonzalez presents to [facility]    HPI    Head congestion  -3 day history of frontal sinus pressure/discomfort, nasal congestion, runny nose, watery eyes, mild cough. States complete upper body/shoulders/chest bothers her. Endorses some mild dyspnea, is current smoker. Has h/o allergic rhinitis, has been taking zyrtec. No fever, chills, ear pain or any other sx.    Past Medical History:   Diagnosis Date    Cirrhosis of liver     Diabetes mellitus     GERD (gastroesophageal reflux disease)     Liver disease      Past Surgical History:   Procedure Laterality Date    COLONOSCOPY       Social History     Socioeconomic History    Marital status: Single   Tobacco Use    Smoking status: Every Day     Packs/day: 0.50     Years: 40.00     Additional pack years: 0.00     Total pack years: 20.00     Types: Cigarettes     Passive exposure: Current    Smokeless tobacco: Never   Vaping Use    Vaping Use: Never used   Substance and Sexual Activity    Alcohol use: No    Drug use: No    Sexual activity: Not Currently     Partners: Male       Objective   Vital Signs:  /68   Pulse 96   Temp 97.8 °F (36.6 °C) (Temporal)   Resp 20   Ht 152.4 cm (60\")   Wt 77.6 kg (171 lb)   SpO2 100%   BMI 33.40 kg/m²   Estimated body mass index is 33.4 kg/m² as calculated from the following:    Height as of this encounter: 152.4 cm (60\").    Weight as of this encounter: 77.6 kg (171 lb).              Physical Exam  Vitals reviewed.   Constitutional:       Appearance: Normal appearance.   HENT:      Head: Normocephalic.      Comments: Frontal sinus ttp     Nose: Nose normal.      Mouth/Throat:      Mouth: Mucous membranes are moist.      Comments: Drainage in posterior op  Eyes:      Extraocular Movements: Extraocular movements intact.   Cardiovascular:      Rate and Rhythm: Normal rate and " regular rhythm.      Heart sounds: Normal heart sounds.   Pulmonary:      Effort: Pulmonary effort is normal.      Breath sounds: Normal breath sounds.   Musculoskeletal:         General: Normal range of motion.      Cervical back: Normal range of motion.   Skin:     General: Skin is warm and dry.   Neurological:      General: No focal deficit present.      Mental Status: She is alert and oriented to person, place, and time.   Psychiatric:         Mood and Affect: Mood normal.         Behavior: Behavior normal.        Result Review :                   Assessment and Plan   Diagnoses and all orders for this visit:    1. Seasonal allergic rhinitis, unspecified trigger  -Does not appear infection at this point. Recommend stepping up therapy to nasal steroid, fu in 1 week if not improving, or sooner. 125mg IM solumedrol given in-office.  -     fluticasone (FLONASE) 50 MCG/ACT nasal spray; 2 sprays into the nostril(s) as directed by provider Daily.  Dispense: 16 g; Refill: 5             EMR Dragon/Transcription disclaimer:   Much of this encounter note is an electronic transcription/translation of spoken language to printed text. The electronic translation of spoken language may permit erroneous, or at times, nonsensical words or phrases to be inadvertently transcribed; although attempts have made to review the note for such errors, some may still exist. Please excuse any unrecognized transcription errors and contact us if the air is unintelligible or needs documented correction. Also, portions of this note have been copied forward, however, changed to reflect the most current clinical status of this patient.  Follow Up   No follow-ups on file.  Patient was given instructions and counseling regarding her condition or for health maintenance advice. Please see specific information pulled into the AVS if appropriate.

## 2023-12-27 ENCOUNTER — PATIENT ROUNDING (BHMG ONLY) (OUTPATIENT)
Dept: FAMILY MEDICINE CLINIC | Facility: CLINIC | Age: 63
End: 2023-12-27
Payer: MEDICARE

## 2023-12-27 NOTE — PROGRESS NOTES
"December 27, 2023    Hello, may I speak with Della Gonzalez?    My name is roseline    I am  with [facility]  [address] [id number] [phone].    Before we get started may I verify your date of birth? 1960    I am calling to officially welcome you to our practice and ask about your recent visit. Is this a good time to talk? yes    Tell me about your visit with us. What things went well?  \"everything went fine\"        We're always looking for ways to make our patients' experiences even better. Do you have recommendations on ways we may improve?  no    Overall were you satisfied with your first visit to our practice? yes       I appreciate you taking the time to speak with me today. Is there anything else I can do for you? no      Thank you, and have a great day.      "

## 2023-12-27 NOTE — PROGRESS NOTES
[facility] - PODIAT[facility]    Today's Date: 01/04/24    Patient Name: Della Gonzalez  MRN: [id number]  CSN: [id number]  PCP: Vijaya Henao APRN  Referring Provider: No ref. provider found    SUBJECTIVE     Chief Complaint   Patient presents with    Follow-up     JulianoAkashVijayarajinder River, 11/15/2023 f/u foot nail care- pt states feet doing good other than long nails- pt denies pain     Diabetes     HPI: Della Gonzalez, a 63 y.o.female, comes to clinic as a(n) established patient presenting for diabetic foot exam and complaining of thickened, irregular toenails . Patient has h/o cirrhosis, DM2, GERD, tobacco use . Patient is NIDDM and unsure of last BG level.  Does not routinely check blood glucose, but reports hemoglobin A1c of 7.1%.. Notes mild numbness/tingling in feet.  States her toenails are long, thick, discolored and crumbly.  Patient states she is unable to take care of her nails at home due to shape and thickness of nails. Continues daily tobacco use.   Denies pain today . Relates previous treatment(s) including care by podiatrist . Denies any constitutional symptoms. No other pedal complaints at this time.    Past Medical History:   Diagnosis Date    Cirrhosis of liver     Diabetes mellitus     GERD (gastroesophageal reflux disease)     Liver disease      Past Surgical History:   Procedure Laterality Date    COLONOSCOPY       Family History   Problem Relation Age of Onset    Diabetes Mother     No Known Problems Father      Social History     Socioeconomic History    Marital status: Single   Tobacco Use    Smoking status: Every Day     Packs/day: 0.50     Years: 40.00     Additional pack years: 0.00     Total pack years: 20.00     Types: Cigarettes     Passive exposure: Current    Smokeless tobacco: Never   Vaping Use    Vaping Use: Never used   Substance and Sexual Activity    Alcohol use: No    Drug use: No    Sexual activity: Not Currently     Partners: Male     No Known Allergies  Current  Outpatient Medications   Medication Sig Dispense Refill    albuterol sulfate  (90 Base) MCG/ACT inhaler Inhale 2 puffs Every 4 (Four) Hours As Needed for Wheezing or Shortness of Air. 18 g 2    atorvastatin (LIPITOR) 10 MG tablet Take 1 tablet by mouth Daily. 90 tablet 1    benazepril (LOTENSIN) 10 MG tablet TAKE ONE TABLET BY MOUTH EVERY DAY 90 tablet 3    cetirizine (zyrTEC) 10 MG tablet TAKE ONE TABLET BY MOUTH EVERY DAY 90 tablet 1    Ertugliflozin L-PyroglutamicAc (Steglatro) 15 MG tablet Take 1 tablet by mouth Every Morning. 90 tablet 1    fluticasone (FLONASE) 50 MCG/ACT nasal spray 2 sprays into the nostril(s) as directed by provider Daily. 16 g 5    gabapentin (NEURONTIN) 300 MG capsule Take 1 capsule by mouth 2 (Two) Times a Day As Needed (leg pain). 60 capsule 2    levothyroxine (SYNTHROID, LEVOTHROID) 50 MCG tablet Take 1 tablet by mouth Daily. 90 tablet 1    linagliptin (Tradjenta) 5 MG tablet tablet Take 1 tablet by mouth Daily. 90 tablet 1    loperamide (Imodium A-D) 2 MG tablet Take 1 tablet by mouth 4 (Four) Times a Day As Needed for Diarrhea. 120 tablet 1    metFORMIN (Glucophage) 1000 MG tablet Take 1 tablet by mouth 2 (Two) Times a Day With Meals. 180 tablet 1    nabumetone (RELAFEN) 500 MG tablet       nadolol (CORGARD) 20 MG tablet TAKE ONE TABLET BY MOUTH EVERY DAY 90 tablet 1    Neomycin-Polymyxin-Dexameth 0.1 % ointment Apply 1 application to eye(s) as directed by provider 2 (Two) Times a Day. 3.5 g 1    nystatin (MYCOSTATIN) 893782 UNIT/GM cream Apply 1 application topically to the appropriate area as directed 2 (Two) Times a Day. 30 g 2    nystatin (MYCOSTATIN) 123205 UNIT/GM powder Apply  topically to the appropriate area as directed 2 (Two) Times a Day. 60 g 2    omeprazole (priLOSEC) 20 MG capsule Take 1 capsule by mouth Daily. 90 capsule 1    ondansetron ODT (Zofran ODT) 4 MG disintegrating tablet Place 1 tablet on the tongue Every 8 (Eight) Hours As Needed for Nausea. 12 tablet  0    terbinafine (lamISIL) 1 % cream Apply 1 application topically to the appropriate area as directed 2 (Two) Times a Day. 42 g 5    tiZANidine (ZANAFLEX) 4 MG tablet Take 1 tablet by mouth Every 8 (Eight) Hours As Needed for Muscle Spasms. 60 tablet 2    traZODone (DESYREL) 150 MG tablet Take 1 tablet by mouth every night at bedtime. 90 tablet 1    triamcinolone (KENALOG) 0.1 % cream Apply 1 application  topically to the appropriate area as directed 2 (Two) Times a Day. 80 g 3    ubrogepant (Ubrelvy) 100 MG tablet Take 1 tablet by mouth 1 (One) Time As Needed (at onset of headache). 4 tablet 0     No current facility-administered medications for this visit.     Review of Systems   Constitutional:  Negative for chills and fever.   HENT:  Negative for congestion.    Respiratory:  Negative for shortness of breath.    Cardiovascular:  Positive for leg swelling. Negative for chest pain.   Gastrointestinal:  Negative for constipation, diarrhea, nausea and vomiting.   Musculoskeletal:  Positive for arthralgias. Negative for gait problem.        Foot pain   Skin:  Negative for wound.        Pruritus of left foot   Neurological:  Positive for numbness.       OBJECTIVE     Vitals:    01/04/24 1334   BP: 152/78   Pulse: 65   SpO2: 96%         PHYSICAL EXAM  GEN:   Accompanied by none.     Foot/Ankle Exam    GENERAL  Diabetic foot exam performed    Appearance:  obese  Orientation:  AAOx3  Affect:  appropriate  Gait:  unimpaired  Assistance:  independent  Right shoe gear: casual shoe  Left shoe gear: casual shoe    VASCULAR     Right Foot Vascularity   Dorsalis pedis:  2+  Posterior tibial:  2+  Skin temperature:  warm  Edema grading:  None  CFT:  3  Pedal hair growth:  Present  Varicosities:  mild varicosities     Left Foot Vascularity   Dorsalis pedis:  2+  Posterior tibial:  2+  Skin temperature:  warm  Edema grading:  None  CFT:  3  Pedal hair growth:  Present  Varicosities:  mild varicosities     NEUROLOGIC     Right Foot  Neurologic   Light touch sensation: diminished  Vibratory sensation: diminished  Hot/Cold sensation: diminished  Protective Sensation using [place] Monofilament:   Sites intact: 7     Left Foot Neurologic   Light touch sensation: diminished  Vibratory sensation: diminished  Hot/Cold sensation:  diminished  Protective Sensation using [place] Monofilament:   Sites intact: 7  Sites tested: 10    MUSCULOSKELETAL     Right Foot Musculoskeletal   Tenderness:  toenail problem    Arch:  Normal  Hallux valgus: No    Hallux limitus: No       Left Foot Musculoskeletal   Tenderness:  toenail problem  Arch:  Normal  Hallux valgus: No    Hallux limitus: No      MUSCLE STRENGTH     Right Foot Muscle Strength   Foot dorsiflexion:  5  Foot plantar flexion:  5  Foot inversion:  5  Foot eversion:  5     Left Foot Muscle Strength   Foot dorsiflexion:  5  Foot plantar flexion:  5  Foot inversion:  5  Foot eversion:  5    RANGE OF MOTION     Right Foot Range of Motion   Foot and ankle ROM within normal limits       Left Foot Range of Motion   Foot and ankle ROM within normal limits      DERMATOLOGIC      Right Foot Dermatologic   Skin  Right foot skin is intact.   Nails  1.  Positive for elongated, onychomycosis, abnormal thickness and subungual debris.  2.  Positive for elongated and abnormal thickness.  3.  Positive for elongated and abnormal thickness.  4.  Positive for elongated and abnormal thickness.  5.  Positive for elongated and abnormal thickness.     Left Foot Dermatologic   Skin  Left foot skin is intact.   Nails  1.  Positive for elongated, onychomycosis, abnormal thickness and subungual debris.  2.  Positive for elongated and abnormal thickness.  3.  Positive for elongated and abnormal thickness.  4.  Positive for elongated and abnormally thick.  5.  Positive for elongated and abnormally thick.      RADIOLOGY/NUCLEAR:  No results found.    LABORATORY/CULTURE RESULTS:      PATHOLOGY RESULTS:        ASSESSMENT/PLAN     Diagnoses and all orders for this visit:    1. Onychogryphosis (Primary)    2. Onychomycosis    3. Type 2 diabetes mellitus with diabetic neuropathy, with long-term current use of insulin    4. Encounter for diabetic foot exam    5. Tobacco abuse        Comprehensive lower extremity examination and evaluation was performed.  Discussed findings and treatment plan including risks, benefits, and treatment options with patient in detail. Patient agreed with treatment plan  Diabetic foot exam performed.  After verbal consent obtained, nail(s) x10 debrided of length and thickness with nail nipper without incidence  Patient may maintain nails and calluses at home utilizing emery board or pumice stone between visits as needed  Reviewed at home diabetic foot care including daily foot checks   Tobacco cessation needed.   Continue to follow with PCP for diabetic management.  An After Visit Summary was printed and given to the patient at discharge, including (if requested) any available informative/educational handouts regarding diagnosis, treatment, or medications. All questions were answered to patient/family satisfaction. Should symptoms fail to improve or worsen they agree to call or return to clinic or to go to the Emergency Department. Discussed the importance of following up with any needed screening tests/labs/specialist appointments and any requested follow-up recommended by me today. Importance of maintaining follow-up discussed and patient accepts that missed appointments can delay diagnosis and potentially lead to worsening of conditions.  Return in about 3 months (around 4/4/2024) for [facility]., or sooner if acute issues arise.        This document has been electronically signed by MELECIO Souza on January 4, 2024 14:50 CST

## 2024-01-01 ENCOUNTER — PREP FOR PROCEDURE (OUTPATIENT)
Dept: SURGERY | Age: 64
End: 2024-01-01

## 2024-01-03 ENCOUNTER — TELEPHONE (OUTPATIENT)
Dept: PODIATRY | Facility: CLINIC | Age: 64
End: 2024-01-03
Payer: MEDICARE

## 2024-01-03 NOTE — TELEPHONE ENCOUNTER
Called patient regarding appt on 01/04/2024. Left message for patient to return call if any questions or concerns arise.

## 2024-01-04 ENCOUNTER — PATIENT ROUNDING (BHMG ONLY) (OUTPATIENT)
Dept: PODIATRY | Facility: CLINIC | Age: 64
End: 2024-01-04

## 2024-01-04 ENCOUNTER — OFFICE VISIT (OUTPATIENT)
Dept: PODIATRY | Facility: CLINIC | Age: 64
End: 2024-01-04
Payer: MEDICARE

## 2024-01-04 VITALS
WEIGHT: 171 LBS | OXYGEN SATURATION: 96 % | HEIGHT: 60 IN | HEART RATE: 65 BPM | BODY MASS INDEX: 33.57 KG/M2 | SYSTOLIC BLOOD PRESSURE: 152 MMHG | DIASTOLIC BLOOD PRESSURE: 78 MMHG

## 2024-01-04 DIAGNOSIS — L60.2 ONYCHOGRYPHOSIS: Primary | ICD-10-CM

## 2024-01-04 DIAGNOSIS — E11.40 TYPE 2 DIABETES MELLITUS WITH DIABETIC NEUROPATHY, WITH LONG-TERM CURRENT USE OF INSULIN: ICD-10-CM

## 2024-01-04 DIAGNOSIS — B35.1 ONYCHOMYCOSIS: ICD-10-CM

## 2024-01-04 DIAGNOSIS — E11.9 ENCOUNTER FOR DIABETIC FOOT EXAM: ICD-10-CM

## 2024-01-04 DIAGNOSIS — Z79.4 TYPE 2 DIABETES MELLITUS WITH DIABETIC NEUROPATHY, WITH LONG-TERM CURRENT USE OF INSULIN: ICD-10-CM

## 2024-01-04 DIAGNOSIS — Z72.0 TOBACCO ABUSE: ICD-10-CM

## 2024-01-04 NOTE — PROGRESS NOTES
January 4, 2024    Hello, may I speak with Della Gonzalez?    My name is JUNE      I am  with [facility]  [address], [address]  [place] [address] [phone].    Before we get started may I verify your date of birth? 1960    I am calling to officially welcome you to our practice and ask about your recent visit. Is this a good time to talk? yes    Tell me about your visit with us. What things went well?  VISIT WENT WELL       We're always looking for ways to make our patients' experiences even better. Do you have recommendations on ways we may improve?  no    Overall were you satisfied with your first visit to our practice? yes       I appreciate you taking the time to speak with me today. Is there anything else I can do for you? no      Thank you, and have a great day.

## 2024-01-17 DIAGNOSIS — E11.628 TYPE 2 DIABETES MELLITUS WITH OTHER SKIN COMPLICATION, WITHOUT LONG-TERM CURRENT USE OF INSULIN: ICD-10-CM

## 2024-01-17 RX ORDER — ERTUGLIFLOZIN 15 MG/1
15 TABLET, FILM COATED ORAL EVERY MORNING
Qty: 90 TABLET | Refills: 1 | Status: SHIPPED | OUTPATIENT
Start: 2024-01-17

## 2024-01-17 RX ORDER — NABUMETONE 500 MG/1
500 TABLET, FILM COATED ORAL 2 TIMES DAILY PRN
Qty: 60 TABLET | Refills: 5 | Status: SHIPPED | OUTPATIENT
Start: 2024-01-17

## 2024-01-17 NOTE — TELEPHONE ENCOUNTER
Rx Refill Note  Requested Prescriptions     Pending Prescriptions Disp Refills    nabumetone (RELAFEN) 500 MG tablet [Pharmacy Med Name: NABUMETONE 500MG TABS] 60 tablet 5     Sig: TAKE ONE TABLET BY MOUTH TWICE A DAY AS NEEDED FOR MODERATE PAIN    Steglatro 15 MG tablet [Pharmacy Med Name: STEGLATRO 15MG TABS] 90 tablet 1     Sig: TAKE ONE TABLET BY MOUTH EVERY MORNING        Cyn Still MA  01/17/24, 10:53 CST

## 2024-01-23 DIAGNOSIS — Z12.31 BREAST CANCER SCREENING BY MAMMOGRAM: ICD-10-CM

## 2024-01-24 DIAGNOSIS — E78.2 MIXED HYPERLIPIDEMIA: ICD-10-CM

## 2024-01-24 RX ORDER — ATORVASTATIN CALCIUM 10 MG/1
10 TABLET, FILM COATED ORAL DAILY
Qty: 90 TABLET | Refills: 1 | Status: SHIPPED | OUTPATIENT
Start: 2024-01-24

## 2024-01-24 NOTE — TELEPHONE ENCOUNTER
Rx Refill Note  Requested Prescriptions     Pending Prescriptions Disp Refills    atorvastatin (LIPITOR) 10 MG tablet [Pharmacy Med Name: ATORVASTATIN CALCIUM 10MG TABS] 90 tablet 1     Sig: TAKE ONE TABLET BY MOUTH EVERY DAY      Last office visit with prescribing clinician: 11/15/2023         Shivani Rayo MA  01/24/24, 09:56 CST

## 2024-01-26 ENCOUNTER — TELEPHONE (OUTPATIENT)
Dept: FAMILY MEDICINE CLINIC | Facility: CLINIC | Age: 64
End: 2024-01-26
Payer: MEDICARE

## 2024-01-26 DIAGNOSIS — M25.551 BILATERAL HIP PAIN: Primary | ICD-10-CM

## 2024-01-26 DIAGNOSIS — M25.552 BILATERAL HIP PAIN: Primary | ICD-10-CM

## 2024-01-26 RX ORDER — TRAMADOL HYDROCHLORIDE 50 MG/1
50 TABLET ORAL 2 TIMES DAILY PRN
Qty: 40 TABLET | Refills: 0 | Status: SHIPPED | OUTPATIENT
Start: 2024-01-26

## 2024-01-26 NOTE — TELEPHONE ENCOUNTER
Pt called stating she is experiencing very bad pain in her right hip, she is wondering if Vijaya could send her in a stronger medication to help with the pain. If approved she would like this to be sent to [facility].

## 2024-01-29 ENCOUNTER — OFFICE VISIT (OUTPATIENT)
Dept: FAMILY MEDICINE CLINIC | Facility: CLINIC | Age: 64
End: 2024-01-29
Payer: MEDICARE

## 2024-01-29 ENCOUNTER — TELEPHONE (OUTPATIENT)
Dept: FAMILY MEDICINE CLINIC | Facility: CLINIC | Age: 64
End: 2024-01-29
Payer: MEDICARE

## 2024-01-29 VITALS
HEART RATE: 68 BPM | SYSTOLIC BLOOD PRESSURE: 118 MMHG | HEIGHT: 60 IN | BODY MASS INDEX: 33.38 KG/M2 | OXYGEN SATURATION: 100 % | TEMPERATURE: 97.8 F | WEIGHT: 170 LBS | DIASTOLIC BLOOD PRESSURE: 83 MMHG

## 2024-01-29 DIAGNOSIS — M54.31 RIGHT SCIATIC NERVE PAIN: Primary | ICD-10-CM

## 2024-01-29 PROCEDURE — 99213 OFFICE O/P EST LOW 20 MIN: CPT | Performed by: NURSE PRACTITIONER

## 2024-01-29 PROCEDURE — 3079F DIAST BP 80-89 MM HG: CPT | Performed by: NURSE PRACTITIONER

## 2024-01-29 PROCEDURE — 1159F MED LIST DOCD IN RCRD: CPT | Performed by: NURSE PRACTITIONER

## 2024-01-29 PROCEDURE — 3074F SYST BP LT 130 MM HG: CPT | Performed by: NURSE PRACTITIONER

## 2024-01-29 PROCEDURE — 1160F RVW MEDS BY RX/DR IN RCRD: CPT | Performed by: NURSE PRACTITIONER

## 2024-01-29 RX ORDER — GABAPENTIN 600 MG/1
600 TABLET ORAL 3 TIMES DAILY
Qty: 90 TABLET | Refills: 2 | Status: SHIPPED | OUTPATIENT
Start: 2024-01-29

## 2024-01-29 NOTE — PROGRESS NOTES
"Chief Complaint   Patient presents with    Hip Pain     Right sciatic nerve pain        Subjective   Della Gonzalez is a 63 y.o. female who presents today for sciatic pain right hip.      HPI   Patient has been dealing with sciatic pain x several years. The pain has gotten worse recently and she has been taking more of gabapentin 300 mg than prescribed. She is requesting 600 mg. She states the sciatic pain affects her right thigh and then progresses all the way down her leg. She has difficulty ambulating.     No Known Allergies      OBJECTIVE:  Vitals:    01/29/24 0907   BP: 118/83   Pulse: 68   Temp: 97.8 °F (36.6 °C)   SpO2: 100%   Weight: 77.1 kg (170 lb)   Height: 152.4 cm (60\")     Physical Exam  Vitals and nursing note reviewed.   Constitutional:       Appearance: Normal appearance.   Cardiovascular:      Rate and Rhythm: Normal rate and regular rhythm.      Pulses: Normal pulses.      Heart sounds: Normal heart sounds.   Pulmonary:      Effort: Pulmonary effort is normal.      Breath sounds: Normal breath sounds.   Musculoskeletal:      Right hip: Tenderness and crepitus present. Decreased range of motion. Decreased strength.   Skin:     General: Skin is warm and dry.   Neurological:      General: No focal deficit present.      Mental Status: She is alert and oriented to person, place, and time.   Psychiatric:         Mood and Affect: Mood normal.         Behavior: Behavior normal.         Thought Content: Thought content normal.         Judgment: Judgment normal.                    ASSESSMENT/ PLAN:    Diagnoses and all orders for this visit:    1. Right sciatic nerve pain (Primary)  -     gabapentin (Neurontin) 600 MG tablet; Take 1 tablet by mouth 3 (Three) Times a Day.  Dispense: 90 tablet; Refill: 2      Procedures     Management Plan:   Try gabapentin 600 mg tid.   An After Visit Summary was printed and given to the patient at discharge.    Follow-up: Return if symptoms worsen or fail to improve.     "     Zhanna Cabral, APRN 1/29/2024 09:41 CST  This note was electronically signed.

## 2024-01-30 ENCOUNTER — PATIENT ROUNDING (BHMG ONLY) (OUTPATIENT)
Dept: FAMILY MEDICINE CLINIC | Facility: CLINIC | Age: 64
End: 2024-01-30
Payer: MEDICARE

## 2024-01-30 NOTE — PROGRESS NOTES
January 30, 2024    Hello, may I speak with Della Gonzalez?    My name is Lesia    I am  with [facility]  [address] [address] [phone].    Before we get started may I verify your date of birth? 1960    I am calling to officially welcome you to our practice and ask about your recent visit. Is this a good time to talk? PT DID NOT ANSWER, UNABLE TO LEAVE VM.     Tell me about your visit with us. What things went well?         We're always looking for ways to make our patients' experiences even better. Do you have recommendations on ways we may improve?      Overall were you satisfied with your first visit to our practice?        I appreciate you taking the time to speak with me today. Is there anything else I can do for you?       Thank you, and have a great day.

## 2024-02-01 DIAGNOSIS — L73.9 FOLLICULITIS: Primary | ICD-10-CM

## 2024-02-01 RX ORDER — DOXYCYCLINE HYCLATE 100 MG
100 TABLET ORAL 2 TIMES DAILY
Qty: 20 TABLET | Refills: 0 | Status: SHIPPED | OUTPATIENT
Start: 2024-02-01

## 2024-02-01 RX ORDER — DOXYCYCLINE HYCLATE 100 MG
100 TABLET ORAL 2 TIMES DAILY
COMMUNITY
End: 2024-02-01 | Stop reason: SDUPTHER

## 2024-02-01 NOTE — TELEPHONE ENCOUNTER
Pt requesting a refill of previous antibiotic sent for folliculitis, pt stated her chin has flared up.   Rx Refill Note  Requested Prescriptions     Pending Prescriptions Disp Refills    doxycycline (VIBRAMYICN) 100 MG tablet 20 tablet      Sig: Take 1 tablet by mouth 2 (Two) Times a Day.        Amy Barahona MA  02/01/24, 07:44 CST

## 2024-02-12 DIAGNOSIS — J30.9 ALLERGIC SINUSITIS: ICD-10-CM

## 2024-02-12 DIAGNOSIS — R51.9 SINUS HEADACHE: ICD-10-CM

## 2024-02-12 DIAGNOSIS — J45.901 BRONCHITIS, ALLERGIC, UNSPECIFIED ASTHMA SEVERITY, WITH ACUTE EXACERBATION: ICD-10-CM

## 2024-02-12 RX ORDER — CETIRIZINE HYDROCHLORIDE 10 MG/1
10 TABLET ORAL DAILY
Qty: 90 TABLET | Refills: 1 | Status: SHIPPED | OUTPATIENT
Start: 2024-02-12

## 2024-02-14 ENCOUNTER — OFFICE VISIT (OUTPATIENT)
Dept: FAMILY MEDICINE CLINIC | Facility: CLINIC | Age: 64
End: 2024-02-14
Payer: MEDICARE

## 2024-02-14 VITALS
SYSTOLIC BLOOD PRESSURE: 139 MMHG | DIASTOLIC BLOOD PRESSURE: 84 MMHG | TEMPERATURE: 97.3 F | HEART RATE: 71 BPM | BODY MASS INDEX: 33.77 KG/M2 | WEIGHT: 172 LBS | HEIGHT: 60 IN | OXYGEN SATURATION: 96 %

## 2024-02-14 DIAGNOSIS — M62.838 MUSCLE SPASMS OF BOTH LOWER EXTREMITIES: ICD-10-CM

## 2024-02-14 DIAGNOSIS — E03.9 HYPOTHYROIDISM, UNSPECIFIED TYPE: ICD-10-CM

## 2024-02-14 DIAGNOSIS — J45.901 BRONCHITIS, ALLERGIC, UNSPECIFIED ASTHMA SEVERITY, WITH ACUTE EXACERBATION: ICD-10-CM

## 2024-02-14 DIAGNOSIS — K21.9 GASTROESOPHAGEAL REFLUX DISEASE, UNSPECIFIED WHETHER ESOPHAGITIS PRESENT: ICD-10-CM

## 2024-02-14 DIAGNOSIS — E11.42 TYPE 2 DIABETES MELLITUS WITH DIABETIC POLYNEUROPATHY, WITHOUT LONG-TERM CURRENT USE OF INSULIN: ICD-10-CM

## 2024-02-14 RX ORDER — LEVOTHYROXINE SODIUM 0.05 MG/1
50 TABLET ORAL DAILY
Qty: 90 TABLET | Refills: 1 | Status: SHIPPED | OUTPATIENT
Start: 2024-02-14

## 2024-02-14 RX ORDER — ALBUTEROL SULFATE 90 UG/1
2 AEROSOL, METERED RESPIRATORY (INHALATION) EVERY 4 HOURS PRN
Qty: 18 G | Refills: 2 | Status: SHIPPED | OUTPATIENT
Start: 2024-02-14

## 2024-02-14 RX ORDER — OMEPRAZOLE 20 MG/1
20 CAPSULE, DELAYED RELEASE ORAL DAILY
Qty: 90 CAPSULE | Refills: 1 | Status: SHIPPED | OUTPATIENT
Start: 2024-02-14

## 2024-02-14 RX ORDER — TIZANIDINE 4 MG/1
4 TABLET ORAL EVERY 8 HOURS PRN
Qty: 60 TABLET | Refills: 2 | Status: SHIPPED | OUTPATIENT
Start: 2024-02-14

## 2024-02-15 ENCOUNTER — PATIENT ROUNDING (BHMG ONLY) (OUTPATIENT)
Dept: FAMILY MEDICINE CLINIC | Facility: CLINIC | Age: 64
End: 2024-02-15
Payer: MEDICARE

## 2024-02-27 ENCOUNTER — TELEPHONE (OUTPATIENT)
Dept: FAMILY MEDICINE CLINIC | Facility: CLINIC | Age: 64
End: 2024-02-27
Payer: MEDICARE

## 2024-02-27 DIAGNOSIS — L73.9 FOLLICULITIS: Primary | ICD-10-CM

## 2024-02-27 RX ORDER — SULFAMETHOXAZOLE AND TRIMETHOPRIM 800; 160 MG/1; MG/1
1 TABLET ORAL 2 TIMES DAILY
Qty: 14 TABLET | Refills: 0 | Status: SHIPPED | OUTPATIENT
Start: 2024-02-27

## 2024-02-27 NOTE — TELEPHONE ENCOUNTER
Pt verbally stated she is wanting to know if Vijaya could refills her antibiotic that is for her chin, she would like that to be sent to [facility] if approved.

## 2024-03-18 ENCOUNTER — OFFICE VISIT (OUTPATIENT)
Dept: FAMILY MEDICINE CLINIC | Facility: CLINIC | Age: 64
End: 2024-03-18
Payer: MEDICARE

## 2024-03-18 VITALS
TEMPERATURE: 97.5 F | DIASTOLIC BLOOD PRESSURE: 82 MMHG | HEART RATE: 75 BPM | SYSTOLIC BLOOD PRESSURE: 138 MMHG | BODY MASS INDEX: 33.18 KG/M2 | OXYGEN SATURATION: 98 % | HEIGHT: 60 IN | WEIGHT: 169 LBS

## 2024-03-18 DIAGNOSIS — B34.9 ACUTE VIRAL SYNDROME: Primary | ICD-10-CM

## 2024-03-18 DIAGNOSIS — R09.89 RUNNY NOSE: ICD-10-CM

## 2024-03-18 DIAGNOSIS — R11.10 VOMITING IN ADULT: ICD-10-CM

## 2024-03-18 DIAGNOSIS — R52 BODY ACHES: ICD-10-CM

## 2024-03-18 LAB
EXPIRATION DATE: NORMAL
FLUAV AG UPPER RESP QL IA.RAPID: NOT DETECTED
FLUBV AG UPPER RESP QL IA.RAPID: NOT DETECTED
INTERNAL CONTROL: NORMAL
Lab: NORMAL
SARS-COV-2 AG UPPER RESP QL IA.RAPID: NOT DETECTED

## 2024-03-18 RX ORDER — ONDANSETRON 4 MG/1
4 TABLET, ORALLY DISINTEGRATING ORAL EVERY 8 HOURS PRN
Qty: 12 TABLET | Refills: 0 | Status: SHIPPED | OUTPATIENT
Start: 2024-03-18

## 2024-03-18 RX ORDER — TRIAMCINOLONE ACETONIDE 40 MG/ML
40 INJECTION, SUSPENSION INTRA-ARTICULAR; INTRAMUSCULAR ONCE
Status: COMPLETED | OUTPATIENT
Start: 2024-03-18 | End: 2024-03-18

## 2024-03-18 RX ADMIN — TRIAMCINOLONE ACETONIDE 40 MG: 40 INJECTION, SUSPENSION INTRA-ARTICULAR; INTRAMUSCULAR at 09:26

## 2024-03-18 NOTE — PROGRESS NOTES
"Chief Complaint  Vomiting and Dizziness    Subjective        Della Gonzalez presents to [facility]  History of Present Illness  Poor historian, difficult to obtain history. The vomiting was yesterday x 1 and was mucus, discolored (yellow). No true nausea. She admits to body aches and headache. She complains of runny nose. She tells of an episode of chest pain requiring a visit to the ER (with admission) but then tells me that was years ago. She denies recent chest pain. She tells that the cardiac workup was normal. She continues to smoke despite multiple recommendations to stop.    Objective   Vital Signs:  /82 (BP Location: Left arm, Patient Position: Sitting, Cuff Size: Large Adult)   Pulse 75   Temp 97.5 °F (36.4 °C)   Ht 152.4 cm (60\")   Wt 76.7 kg (169 lb)   SpO2 98%   BMI 33.01 kg/m²   Estimated body mass index is 33.01 kg/m² as calculated from the following:    Height as of this encounter: 152.4 cm (60\").    Weight as of this encounter: 76.7 kg (169 lb).             Physical Exam  Vitals and nursing note reviewed.   Constitutional:       General: She is not in acute distress.     Appearance: Normal appearance. She is obese. She is not ill-appearing.   HENT:      Head: Normocephalic and atraumatic.   Cardiovascular:      Rate and Rhythm: Normal rate and regular rhythm.      Heart sounds: Normal heart sounds.   Pulmonary:      Effort: Pulmonary effort is normal.      Breath sounds: Normal breath sounds.      Comments: Diminished breath sounds throughout lung fields. No adventitious breath sounds appreciated.  Musculoskeletal:      Right lower leg: No edema.      Left lower leg: No edema.   Skin:     General: Skin is warm and dry.   Neurological:      Mental Status: She is alert and oriented to person, place, and time.        Result Review :                Assessment and Plan   Diagnoses and all orders for this visit:    1. Acute viral syndrome (Primary)  -     " triamcinolone acetonide (KENALOG-40) injection 40 mg    2. Body aches  -     POCT SARS-CoV-2 Antigen ANNALISE + Flu    3. Runny nose  -     POCT SARS-CoV-2 Antigen ANNALISE + Flu    4. Vomiting in adult  -     POCT SARS-CoV-2 Antigen ANNALISE + Flu  -     ondansetron ODT (Zofran ODT) 4 MG disintegrating tablet; Place 1 tablet on the tongue Every 8 (Eight) Hours As Needed for Nausea.  Dispense: 12 tablet; Refill: 0             Follow Up   Return if symptoms worsen or fail to improve (keep scheduled appt).  Patient was given instructions and counseling regarding her condition or for health maintenance advice. Please see specific information pulled into the AVS if appropriate.     MELECIO Thorne  This note is electronically signed.

## 2024-03-26 ENCOUNTER — TELEPHONE (OUTPATIENT)
Dept: FAMILY MEDICINE CLINIC | Facility: CLINIC | Age: 64
End: 2024-03-26

## 2024-03-26 DIAGNOSIS — L02.01 CUTANEOUS ABSCESS OF FACE: Primary | ICD-10-CM

## 2024-03-26 RX ORDER — DOXYCYCLINE HYCLATE 100 MG/1
100 CAPSULE ORAL 2 TIMES DAILY
Qty: 14 CAPSULE | Refills: 0 | Status: SHIPPED | OUTPATIENT
Start: 2024-03-26

## 2024-03-26 NOTE — TELEPHONE ENCOUNTER
Caller: Della Gonzalez    Relationship: Self    Best call back number:      [phone]     Requested Prescriptions:      WANTS ANOTHER REFILL/RX FOR ANTIBIOTIC FOR CHIN    Pharmacy where request should be sent: [facility]    Last office visit with prescribing clinician: 3/18/2024   Last telemedicine visit with prescribing clinician: Visit date not found   Next office visit with prescribing clinician: 5/15/2024     Additional details provided by patient:     Does the patient have less than a 3 day supply:  [x] Yes  [] No    Would you like a call back once the refill request has been completed: [] Yes [x] No    If the office needs to give you a call back, can they leave a voicemail: [] Yes [x] No    Keiry Warner Rep   03/26/24 11:00 CDT

## 2024-04-02 ENCOUNTER — TELEPHONE (OUTPATIENT)
Dept: GASTROENTEROLOGY | Age: 64
End: 2024-04-02

## 2024-04-02 DIAGNOSIS — R77.2 ELEVATED AFP: ICD-10-CM

## 2024-04-02 DIAGNOSIS — K74.60 CIRRHOSIS OF LIVER WITHOUT ASCITES, UNSPECIFIED HEPATIC CIRRHOSIS TYPE (HCC): Primary | ICD-10-CM

## 2024-04-02 DIAGNOSIS — K76.9 HEPATIC LESION: ICD-10-CM

## 2024-04-02 NOTE — TELEPHONE ENCOUNTER
----- Message from Katie Medrano MA sent at 3/11/2024 10:22 AM CDT -----  Regarding: FW: Liver us and labs    ----- Message -----  From: Katie Medrano MA  Sent: 3/16/2024  12:00 AM CDT  To: Katie Medrano MA  Subject: Liver us and labs                                Us and labs due in 6 months  (April )       Orders Placed       04- Called Lvm for the patient that she is due for her Labs and liver US.  Will need done prior to her apt with ODILIA BREWER on 06-17-24

## 2024-05-02 ENCOUNTER — TELEPHONE (OUTPATIENT)
Dept: FAMILY MEDICINE CLINIC | Facility: CLINIC | Age: 64
End: 2024-05-02
Payer: MEDICARE

## 2024-05-02 NOTE — TELEPHONE ENCOUNTER
Pt called into the office to request that a new glucometer be sent over for her, over to [facility], pt stated she has lost hers and is unsure what the name of it was and what the name of the test strips are that she uses. Please advice

## 2024-05-03 DIAGNOSIS — E11.42 TYPE 2 DIABETES MELLITUS WITH DIABETIC POLYNEUROPATHY, WITHOUT LONG-TERM CURRENT USE OF INSULIN: Primary | ICD-10-CM

## 2024-05-03 RX ORDER — BLOOD-GLUCOSE METER
1 KIT MISCELLANEOUS AS NEEDED
Qty: 1 EACH | Refills: 0 | Status: SHIPPED | OUTPATIENT
Start: 2024-05-03

## 2024-05-03 RX ORDER — LANCETS
EACH MISCELLANEOUS
Qty: 100 EACH | Refills: 12 | Status: SHIPPED | OUTPATIENT
Start: 2024-05-03

## 2024-05-08 ENCOUNTER — NURSE TRIAGE (OUTPATIENT)
Dept: CALL CENTER | Facility: HOSPITAL | Age: 64
End: 2024-05-08
Payer: MEDICARE

## 2024-05-09 ENCOUNTER — OFFICE VISIT (OUTPATIENT)
Dept: FAMILY MEDICINE CLINIC | Facility: CLINIC | Age: 64
End: 2024-05-09
Payer: MEDICARE

## 2024-05-09 VITALS
OXYGEN SATURATION: 98 % | DIASTOLIC BLOOD PRESSURE: 71 MMHG | HEIGHT: 60 IN | RESPIRATION RATE: 18 BRPM | HEART RATE: 60 BPM | WEIGHT: 168 LBS | SYSTOLIC BLOOD PRESSURE: 106 MMHG | BODY MASS INDEX: 32.98 KG/M2 | TEMPERATURE: 97 F

## 2024-05-09 DIAGNOSIS — E11.65 TYPE 2 DIABETES MELLITUS WITH HYPERGLYCEMIA, WITH LONG-TERM CURRENT USE OF INSULIN: Primary | ICD-10-CM

## 2024-05-09 DIAGNOSIS — Z79.4 TYPE 2 DIABETES MELLITUS WITH HYPERGLYCEMIA, WITH LONG-TERM CURRENT USE OF INSULIN: Primary | ICD-10-CM

## 2024-05-09 DIAGNOSIS — L20.9 ATOPIC DERMATITIS, UNSPECIFIED TYPE: ICD-10-CM

## 2024-05-09 DIAGNOSIS — M25.551 BILATERAL HIP PAIN: ICD-10-CM

## 2024-05-09 DIAGNOSIS — M25.552 BILATERAL HIP PAIN: ICD-10-CM

## 2024-05-09 LAB
EXPIRATION DATE: ABNORMAL
HBA1C MFR BLD: 7.5 % (ref 4.5–5.7)
Lab: ABNORMAL

## 2024-05-09 PROCEDURE — 3074F SYST BP LT 130 MM HG: CPT | Performed by: NURSE PRACTITIONER

## 2024-05-09 PROCEDURE — 1126F AMNT PAIN NOTED NONE PRSNT: CPT | Performed by: NURSE PRACTITIONER

## 2024-05-09 PROCEDURE — 99213 OFFICE O/P EST LOW 20 MIN: CPT | Performed by: NURSE PRACTITIONER

## 2024-05-09 PROCEDURE — 3078F DIAST BP <80 MM HG: CPT | Performed by: NURSE PRACTITIONER

## 2024-05-09 PROCEDURE — 3051F HG A1C>EQUAL 7.0%<8.0%: CPT | Performed by: NURSE PRACTITIONER

## 2024-05-09 PROCEDURE — 83036 HEMOGLOBIN GLYCOSYLATED A1C: CPT | Performed by: NURSE PRACTITIONER

## 2024-05-09 RX ORDER — PIOGLITAZONEHYDROCHLORIDE 30 MG/1
30 TABLET ORAL DAILY
Qty: 30 TABLET | Refills: 5 | Status: SHIPPED | OUTPATIENT
Start: 2024-05-09

## 2024-05-09 RX ORDER — TRIAMCINOLONE ACETONIDE 1 MG/G
1 CREAM TOPICAL 2 TIMES DAILY
Qty: 80 G | Refills: 3 | Status: SHIPPED | OUTPATIENT
Start: 2024-05-09

## 2024-05-09 RX ORDER — TRAMADOL HYDROCHLORIDE 50 MG/1
50 TABLET ORAL 2 TIMES DAILY PRN
Qty: 40 TABLET | Refills: 0 | Status: SHIPPED | OUTPATIENT
Start: 2024-05-09

## 2024-05-10 ENCOUNTER — HOSPITAL ENCOUNTER (OUTPATIENT)
Dept: ULTRASOUND IMAGING | Age: 64
Discharge: HOME | End: 2024-05-10
Payer: MEDICARE

## 2024-05-10 DIAGNOSIS — K76.9 HEPATIC LESION: ICD-10-CM

## 2024-05-10 DIAGNOSIS — K74.60 CIRRHOSIS OF LIVER WITHOUT ASCITES, UNSPECIFIED HEPATIC CIRRHOSIS TYPE (HCC): ICD-10-CM

## 2024-05-10 DIAGNOSIS — R77.2 ELEVATED AFP: ICD-10-CM

## 2024-05-10 LAB
AFP SERPL-MCNC: 129.9 NG/ML (ref 0–8.3)
ALBUMIN SERPL-MCNC: 4.3 G/DL (ref 3.5–5.2)
ALP SERPL-CCNC: 84 U/L (ref 35–104)
ALT SERPL-CCNC: 15 U/L (ref 5–33)
ANION GAP SERPL CALCULATED.3IONS-SCNC: 12 MMOL/L (ref 7–19)
AST SERPL-CCNC: 20 U/L (ref 5–32)
BILIRUB SERPL-MCNC: 0.7 MG/DL (ref 0.2–1.2)
BUN SERPL-MCNC: 16 MG/DL (ref 8–23)
CALCIUM SERPL-MCNC: 9.4 MG/DL (ref 8.8–10.2)
CHLORIDE SERPL-SCNC: 103 MMOL/L (ref 98–111)
CO2 SERPL-SCNC: 26 MMOL/L (ref 22–29)
CREAT SERPL-MCNC: 0.4 MG/DL (ref 0.5–0.9)
ERYTHROCYTE [DISTWIDTH] IN BLOOD BY AUTOMATED COUNT: 13.2 % (ref 11.5–14.5)
GLUCOSE SERPL-MCNC: 137 MG/DL (ref 74–109)
HCT VFR BLD AUTO: 42.1 % (ref 37–47)
HGB BLD-MCNC: 14 G/DL (ref 12–16)
INR PPP: 1.09 (ref 0.88–1.18)
MCH RBC QN AUTO: 29.4 PG (ref 27–31)
MCHC RBC AUTO-ENTMCNC: 33.3 G/DL (ref 33–37)
MCV RBC AUTO: 88.4 FL (ref 81–99)
PLATELET # BLD AUTO: 146 K/UL (ref 130–400)
PMV BLD AUTO: 10.7 FL (ref 9.4–12.3)
POTASSIUM SERPL-SCNC: 4.4 MMOL/L (ref 3.5–5)
PROT SERPL-MCNC: 7.4 G/DL (ref 6.6–8.7)
PROTHROMBIN TIME: 13.8 SEC (ref 12–14.6)
RBC # BLD AUTO: 4.76 M/UL (ref 4.2–5.4)
SODIUM SERPL-SCNC: 141 MMOL/L (ref 136–145)
WBC # BLD AUTO: 6 K/UL (ref 4.8–10.8)

## 2024-05-10 PROCEDURE — 76705 ECHO EXAM OF ABDOMEN: CPT

## 2024-05-14 DIAGNOSIS — R77.2 ELEVATED AFP: Primary | ICD-10-CM

## 2024-05-28 ENCOUNTER — CLINICAL SUPPORT (OUTPATIENT)
Dept: FAMILY MEDICINE CLINIC | Facility: CLINIC | Age: 64
End: 2024-05-28
Payer: MEDICARE

## 2024-05-28 DIAGNOSIS — F51.01 PRIMARY INSOMNIA: ICD-10-CM

## 2024-05-28 DIAGNOSIS — Z79.899 ENCOUNTER FOR LONG-TERM (CURRENT) USE OF OTHER MEDICATIONS: Primary | ICD-10-CM

## 2024-05-28 DIAGNOSIS — M25.551 BILATERAL HIP PAIN: ICD-10-CM

## 2024-05-28 DIAGNOSIS — M25.552 BILATERAL HIP PAIN: ICD-10-CM

## 2024-05-28 DIAGNOSIS — M62.838 MUSCLE SPASMS OF BOTH LOWER EXTREMITIES: ICD-10-CM

## 2024-05-28 RX ORDER — TRAZODONE HYDROCHLORIDE 150 MG/1
150 TABLET ORAL NIGHTLY
Qty: 90 TABLET | Refills: 1 | Status: SHIPPED | OUTPATIENT
Start: 2024-05-28

## 2024-05-28 RX ORDER — TRAMADOL HYDROCHLORIDE 50 MG/1
50 TABLET ORAL 2 TIMES DAILY PRN
Qty: 40 TABLET | Refills: 0 | OUTPATIENT
Start: 2024-05-28

## 2024-05-28 RX ORDER — TIZANIDINE 4 MG/1
4 TABLET ORAL EVERY 8 HOURS PRN
Qty: 60 TABLET | Refills: 2 | Status: SHIPPED | OUTPATIENT
Start: 2024-05-28

## 2024-05-28 NOTE — TELEPHONE ENCOUNTER
Rx Refill Note  Requested Prescriptions     Pending Prescriptions Disp Refills    tiZANidine (ZANAFLEX) 4 MG tablet [Pharmacy Med Name: TIZANIDINE HCL 4MG TABS] 60 tablet 2     Sig: TAKE ONE TABLET BY MOUTH EVERY 8 HOURS AS NEEDED FOR MUSCLE SPASMS    traZODone (DESYREL) 150 MG tablet [Pharmacy Med Name: TRAZODONE HCL 150MG TABS] 90 tablet 1     Sig: TAKE ONE TABLET BY MOUTH EVERY EVENING AT BEDTIME      Last office visit with prescribing clinician: 3/18/2024   Last telemedicine visit with prescribing clinician: Visit date not found   Next office visit with prescribing clinician: 8/14/2024       Shivani Rayo MA  05/28/24, 10:14 CDT

## 2024-05-28 NOTE — TELEPHONE ENCOUNTER
Rx Refill Note  Requested Prescriptions     Pending Prescriptions Disp Refills    traMADol (ULTRAM) 50 MG tablet [Pharmacy Med Name: TRAMADOL HCL 50MG TABS] 40 tablet 0     Sig: TAKE ONE TABLET BY MOUTH TWICE A DAY AS NEEDED FOR MODERATE PAIN      Last office visit with prescribing clinician: 5/9/2024   Last telemedicine visit with prescribing clinician: Visit date not found   Next office visit with prescribing clinician: 8/14/24 patient due compliance uds/csa overdue 10/26/22  {    Shivani Rayo MA  05/28/24, 10:23 CDT

## 2024-05-30 NOTE — PROGRESS NOTES
[facility] - PODIATRY    Today's Date: 06/05/24    Patient Name: Della Gonzalez  MRN: [id number]  CSN: [id number]  PCP: Vijaya Henao APRN  Referring Provider: No ref. provider found    SUBJECTIVE     Chief Complaint   Patient presents with   • Follow-up     Vijaya Henao, 05/19/2024 3 MO FU DIABETIC NAIL CARE-pt states she is here today for diabetic nail/foot care-pt denies pain   • Diabetes     HPI: Della Gonzalez, a 64 y.o.female, comes to clinic as a(n) established patient presenting for diabetic foot exam and complaining of thickened, irregular toenails . Patient has h/o cirrhosis, DM2, GERD, tobacco use . Patient is NIDDM and unsure of last BG level.  Does not routinely check blood glucose, but reports hemoglobin A1c of 7.5%.. Notes mild numbness/tingling in feet.  States her toenails are long, thick, discolored and crumbly.  Patient states she is unable to take care of her nails at home due to shape and thickness of nails. Patient reports rash and peeling to bilateral feet.  Denies pain today . Relates previous treatment(s) including care by podiatrist . Denies any constitutional symptoms. No other pedal complaints at this time.    Past Medical History:   Diagnosis Date   • Cirrhosis of liver    • Diabetes mellitus    • GERD (gastroesophageal reflux disease)    • Liver disease      Past Surgical History:   Procedure Laterality Date   • COLONOSCOPY       Family History   Problem Relation Age of Onset   • Diabetes Mother    • No Known Problems Father      Social History     Socioeconomic History   • Marital status: Single   Tobacco Use   • Smoking status: Every Day     Current packs/day: 0.50     Average packs/day: 0.5 packs/day for 40.0 years (20.0 ttl pk-yrs)     Types: Cigarettes     Passive exposure: Current   • Smokeless tobacco: Never   Vaping Use   • Vaping status: Never Used   Substance and Sexual Activity   • Alcohol use: No   • Drug use: No   • Sexual activity: Not Currently      Partners: Male     No Known Allergies  Current Outpatient Medications   Medication Sig Dispense Refill   • albuterol sulfate  (90 Base) MCG/ACT inhaler Inhale 2 puffs Every 4 (Four) Hours As Needed for Wheezing or Shortness of Air. 18 g 2   • atorvastatin (LIPITOR) 10 MG tablet TAKE ONE TABLET BY MOUTH EVERY DAY 90 tablet 1   • benazepril (LOTENSIN) 10 MG tablet TAKE ONE TABLET BY MOUTH EVERY DAY 90 tablet 3   • cetirizine (zyrTEC) 10 MG tablet Take 1 tablet by mouth Daily. 90 tablet 1   • ciclopirox (PENLAC) 8 % solution Apply  topically to the appropriate area as directed Every Night for 336 days. Apply as directed to affected toenails. 6 mL 6   • clotrimazole-betamethasone (LOTRISONE) 1-0.05 % cream Apply 1 Application topically to the appropriate area as directed 2 (Two) Times a Day. Apply to affected area twice daily 45 g 3   • doxycycline (VIBRAMYCIN) 100 MG capsule Take 1 capsule by mouth 2 (Two) Times a Day. 14 capsule 0   • fluticasone (FLONASE) 50 MCG/ACT nasal spray 2 sprays into the nostril(s) as directed by provider Daily. 16 g 5   • gabapentin (Neurontin) 600 MG tablet Take 1 tablet by mouth 3 (Three) Times a Day. 90 tablet 2   • glucose blood test strip Use as instructed for daily BG testing 100 each 12   • glucose monitor monitoring kit Use 1 each As Needed (for daily use). No brand preference; whatever is covered by her insurance. 1 each 0   • Lancets (onetouch ultrasoft) lancets For daily BG testing 100 each 12   • levothyroxine (SYNTHROID, LEVOTHROID) 50 MCG tablet Take 1 tablet by mouth Daily. 90 tablet 1   • linagliptin (Tradjenta) 5 MG tablet tablet Take 1 tablet by mouth Daily. 90 tablet 1   • loperamide (Imodium A-D) 2 MG tablet Take 1 tablet by mouth 4 (Four) Times a Day As Needed for Diarrhea. 120 tablet 1   • metFORMIN (Glucophage) 1000 MG tablet Take 1 tablet by mouth 2 (Two) Times a Day With Meals. 180 tablet 1   • nabumetone (RELAFEN) 500 MG tablet TAKE ONE TABLET BY MOUTH TWICE  A DAY AS NEEDED FOR MODERATE PAIN 60 tablet 5   • nadolol (CORGARD) 20 MG tablet TAKE ONE TABLET BY MOUTH EVERY DAY 90 tablet 1   • Neomycin-Polymyxin-Dexameth 0.1 % ointment Apply 1 application to eye(s) as directed by provider 2 (Two) Times a Day. 3.5 g 1   • nystatin (MYCOSTATIN) 848353 UNIT/GM cream Apply 1 application topically to the appropriate area as directed 2 (Two) Times a Day. 30 g 2   • nystatin (MYCOSTATIN) 905906 UNIT/GM powder Apply  topically to the appropriate area as directed 2 (Two) Times a Day. 60 g 2   • omeprazole (priLOSEC) 20 MG capsule Take 1 capsule by mouth Daily. 90 capsule 1   • ondansetron ODT (Zofran ODT) 4 MG disintegrating tablet Place 1 tablet on the tongue Every 8 (Eight) Hours As Needed for Nausea. 12 tablet 0   • pioglitazone (Actos) 30 MG tablet Take 1 tablet by mouth Daily. 30 tablet 5   • Steglatro 15 MG tablet TAKE ONE TABLET BY MOUTH EVERY MORNING 90 tablet 1   • terbinafine (lamISIL) 1 % cream Apply 1 application topically to the appropriate area as directed 2 (Two) Times a Day. 42 g 5   • tiZANidine (ZANAFLEX) 4 MG tablet TAKE ONE TABLET BY MOUTH EVERY 8 HOURS AS NEEDED FOR MUSCLE SPASMS 60 tablet 2   • traMADol (ULTRAM) 50 MG tablet Take 1 tablet by mouth 2 (Two) Times a Day As Needed for Moderate Pain. 40 tablet 0   • traZODone (DESYREL) 150 MG tablet TAKE ONE TABLET BY MOUTH EVERY EVENING AT BEDTIME 90 tablet 1   • triamcinolone (KENALOG) 0.1 % cream Apply 1 application  topically to the appropriate area as directed 2 (Two) Times a Day. 80 g 3   • triamcinolone (KENALOG) 0.1 % cream Apply 1 Application topically to the appropriate area as directed 2 (Two) Times a Day. 80 g 3     No current facility-administered medications for this visit.     Review of Systems   Constitutional:  Negative for chills and fever.   HENT:  Negative for congestion.    Respiratory:  Negative for shortness of breath.    Cardiovascular:  Positive for leg swelling. Negative for chest pain.    Gastrointestinal:  Negative for constipation, diarrhea, nausea and vomiting.   Musculoskeletal:  Positive for arthralgias. Negative for gait problem.        Foot pain   Skin:  Negative for wound.        Pruritus of left foot   Neurological:  Positive for numbness.       OBJECTIVE     Vitals:    06/05/24 1507   BP: 120/80   Pulse: 63   SpO2: 98%     PHYSICAL EXAM  GEN:   Accompanied by none.     Foot/Ankle Exam    GENERAL  Diabetic foot exam performed    Appearance:  obese  Orientation:  AAOx3  Affect:  appropriate  Gait:  unimpaired  Assistance:  independent  Right shoe gear: casual shoe  Left shoe gear: casual shoe    VASCULAR     Right Foot Vascularity   Dorsalis pedis:  2+  Posterior tibial:  2+  Skin temperature:  warm  Edema grading:  None  CFT:  3  Pedal hair growth:  Present  Varicosities:  mild varicosities     Left Foot Vascularity   Dorsalis pedis:  2+  Posterior tibial:  2+  Skin temperature:  warm  Edema grading:  None  CFT:  3  Pedal hair growth:  Present  Varicosities:  mild varicosities     NEUROLOGIC     Right Foot Neurologic   Light touch sensation: diminished  Vibratory sensation: diminished  Hot/Cold sensation: diminished  Protective Sensation using [place] Monofilament:   Sites intact: 7     Left Foot Neurologic   Light touch sensation: diminished  Vibratory sensation: diminished  Hot/Cold sensation:  diminished  Protective Sensation using [place] Monofilament:   Sites intact: 7  Sites tested: 10    MUSCULOSKELETAL     Right Foot Musculoskeletal   Tenderness:  toenail problem    Arch:  Normal  Hallux valgus: No    Hallux limitus: No       Left Foot Musculoskeletal   Tenderness:  toenail problem  Arch:  Normal  Hallux valgus: No    Hallux limitus: No      MUSCLE STRENGTH     Right Foot Muscle Strength   Foot dorsiflexion:  5  Foot plantar flexion:  5  Foot inversion:  5  Foot eversion:  5     Left Foot Muscle Strength   Foot dorsiflexion:  5  Foot plantar flexion:  5  Foot  inversion:  5  Foot eversion:  5    RANGE OF MOTION     Right Foot Range of Motion   Foot and ankle ROM within normal limits       Left Foot Range of Motion   Foot and ankle ROM within normal limits      DERMATOLOGIC      Right Foot Dermatologic   Skin  Positive for tinea (moccasin).   Nails  1.  Positive for elongated, onychomycosis, abnormal thickness and subungual debris.  2.  Positive for elongated and abnormal thickness.  3.  Positive for elongated and abnormal thickness.  4.  Positive for elongated and abnormal thickness.  5.  Positive for elongated and abnormal thickness.     Left Foot Dermatologic   Skin  Positive for tinea (moccasin).   Nails  1.  Positive for elongated, onychomycosis, abnormal thickness and subungual debris.  2.  Positive for elongated and abnormal thickness.  3.  Positive for elongated and abnormal thickness.  4.  Positive for elongated and abnormally thick.  5.  Positive for elongated and abnormally thick.      RADIOLOGY/NUCLEAR:  US Liver    Result Date: 5/11/2024  Narrative: EXAM: LIMITED ABDOMINAL ULTRASOUND. HISTORY: Cirrhosis of liver without ascites TECHNIQUE: Ultrasonography liver, gallbladder, pancreas, spleen and right kidney Color Doppler imaging of the portal vein was performed. Images were obtained and stored in a permanent archive. COMPARISON: 11/28/2023 MRI, 10/23/2023. FINDINGS: Pancreas: Normal sonographic appearance of the head and body. Tail is obscured. Liver: Coarsened echogenicity.  Nodular surface contour.  No lesions. - Main portal vein: Normal hepatopetal flow. Biliary: The gallbladder is mildly dilated.  A 2.5 cm gallstone is seen..  No wall thickening.  Negative sonographic Cosby's sign. -Common bile duct measures 3.8 mm. Right Kidney: No mass, calculus, or hydronephrosis. Aorta:  Visualized portion without aneurysmal dilatation. IVC: Patent on color doppler.  No abnormality on limited grey scale image. Spleen:  Homogeneous.  12.9 cm. A 1.4 cm splenule is  incidentally noted. Other: No ascites.    Impression: Cirrhotic liver morphology.  No hepatic lesions identified.  The hepatic lesion seen on MRI is not visualized in this exam. Minimal splenomegaly. Cholelithiasis.  No sonographic evidence of acute cholecystitis. The right renal echogenic lesion seen on previous ultrasound is not visualized in this exam. ______________________________________ Electronically signed by: PRETTY MARTÍNEZ M.D. Date:     05/11/2024 Time:    18:52      LABORATORY/CULTURE RESULTS:      PATHOLOGY RESULTS:       ASSESSMENT/PLAN     Diagnoses and all orders for this visit:    1. Onychomycosis (Primary)  -     ciclopirox (PENLAC) 8 % solution; Apply  topically to the appropriate area as directed Every Night for 336 days. Apply as directed to affected toenails.  Dispense: 6 mL; Refill: 6    2. Tinea pedis of both feet  -     clotrimazole-betamethasone (LOTRISONE) 1-0.05 % cream; Apply 1 Application topically to the appropriate area as directed 2 (Two) Times a Day. Apply to affected area twice daily  Dispense: 45 g; Refill: 3    3. Onychogryphosis    4. Type 2 diabetes mellitus with diabetic neuropathy, with long-term current use of insulin    Comprehensive lower extremity examination and evaluation was performed.  Discussed findings and treatment plan including risks, benefits, and treatment options with patient in detail. Patient agreed with treatment plan  After verbal consent obtained, nail(s) x10 debrided of length and thickness with nail nipper without incidence  Patient may maintain nails and calluses at home utilizing emery board or pumice stone between visits as needed  Reviewed at home diabetic foot care including daily foot checks   Begin use of Lotrisone to bilateral feet BID.  Begin use of Penlac to bilateral hallux nails nightly.   Tobacco cessation needed.   Continue to follow with PCP for diabetic management.  An After Visit Summary was printed and given to the patient at discharge,  including (if requested) any available informative/educational handouts regarding diagnosis, treatment, or medications. All questions were answered to patient/family satisfaction. Should symptoms fail to improve or worsen they agree to call or return to clinic or to go to the Emergency Department. Discussed the importance of following up with any needed screening tests/labs/specialist appointments and any requested follow-up recommended by me today. Importance of maintaining follow-up discussed and patient accepts that missed appointments can delay diagnosis and potentially lead to worsening of conditions.  Return in about 3 months (around 9/5/2024) for Follow-up with Podiatry APRN., or sooner if acute issues arise.        This document has been electronically signed by MELECIO Souza on June 5, 2024 16:33 CDT

## 2024-06-04 LAB — DRUGS UR: NORMAL

## 2024-06-05 ENCOUNTER — OFFICE VISIT (OUTPATIENT)
Dept: PODIATRY | Facility: CLINIC | Age: 64
End: 2024-06-05
Payer: MEDICARE

## 2024-06-05 VITALS
SYSTOLIC BLOOD PRESSURE: 120 MMHG | DIASTOLIC BLOOD PRESSURE: 80 MMHG | BODY MASS INDEX: 33.38 KG/M2 | HEIGHT: 60 IN | WEIGHT: 170 LBS | OXYGEN SATURATION: 98 % | HEART RATE: 63 BPM

## 2024-06-05 DIAGNOSIS — E11.40 TYPE 2 DIABETES MELLITUS WITH DIABETIC NEUROPATHY, WITH LONG-TERM CURRENT USE OF INSULIN: ICD-10-CM

## 2024-06-05 DIAGNOSIS — B35.1 ONYCHOMYCOSIS: Primary | ICD-10-CM

## 2024-06-05 DIAGNOSIS — B35.3 TINEA PEDIS OF BOTH FEET: ICD-10-CM

## 2024-06-05 DIAGNOSIS — Z79.4 TYPE 2 DIABETES MELLITUS WITH DIABETIC NEUROPATHY, WITH LONG-TERM CURRENT USE OF INSULIN: ICD-10-CM

## 2024-06-05 DIAGNOSIS — L60.2 ONYCHOGRYPHOSIS: ICD-10-CM

## 2024-06-05 RX ORDER — CICLOPIROX 80 MG/ML
SOLUTION TOPICAL NIGHTLY
Qty: 6 ML | Refills: 6 | Status: SHIPPED | OUTPATIENT
Start: 2024-06-05 | End: 2025-05-07

## 2024-06-05 RX ORDER — CLOTRIMAZOLE AND BETAMETHASONE DIPROPIONATE 10; .64 MG/G; MG/G
1 CREAM TOPICAL 2 TIMES DAILY
Qty: 45 G | Refills: 3 | Status: SHIPPED | OUTPATIENT
Start: 2024-06-05

## 2024-06-13 ENCOUNTER — HOSPITAL ENCOUNTER (OUTPATIENT)
Dept: MRI IMAGING | Age: 64
Discharge: HOME | End: 2024-06-13
Payer: MEDICAID

## 2024-06-13 DIAGNOSIS — R77.2 ELEVATED AFP: ICD-10-CM

## 2024-06-13 PROCEDURE — 6360000004 HC RX CONTRAST MEDICATION: Performed by: NURSE PRACTITIONER

## 2024-06-13 PROCEDURE — A9577 INJ MULTIHANCE: HCPCS | Performed by: NURSE PRACTITIONER

## 2024-06-13 PROCEDURE — 74183 MRI ABD W/O CNTR FLWD CNTR: CPT

## 2024-06-13 RX ADMIN — GADOBENATE DIMEGLUMINE 16 ML: 529 INJECTION, SOLUTION INTRAVENOUS at 11:31

## 2024-06-17 ENCOUNTER — TELEPHONE (OUTPATIENT)
Dept: GASTROENTEROLOGY | Age: 64
End: 2024-06-17

## 2024-06-17 ENCOUNTER — OFFICE VISIT (OUTPATIENT)
Dept: GASTROENTEROLOGY | Age: 64
End: 2024-06-17
Payer: MEDICARE

## 2024-06-17 VITALS
BODY MASS INDEX: 34.16 KG/M2 | OXYGEN SATURATION: 97 % | WEIGHT: 174 LBS | HEART RATE: 69 BPM | SYSTOLIC BLOOD PRESSURE: 115 MMHG | DIASTOLIC BLOOD PRESSURE: 80 MMHG | HEIGHT: 60 IN

## 2024-06-17 DIAGNOSIS — K74.60 CIRRHOSIS OF LIVER WITHOUT ASCITES, UNSPECIFIED HEPATIC CIRRHOSIS TYPE (HCC): ICD-10-CM

## 2024-06-17 DIAGNOSIS — K76.9 HEPATIC LESION: Primary | ICD-10-CM

## 2024-06-17 DIAGNOSIS — R77.2 ELEVATED AFP: ICD-10-CM

## 2024-06-17 PROCEDURE — 3017F COLORECTAL CA SCREEN DOC REV: CPT | Performed by: NURSE PRACTITIONER

## 2024-06-17 PROCEDURE — 4004F PT TOBACCO SCREEN RCVD TLK: CPT | Performed by: NURSE PRACTITIONER

## 2024-06-17 PROCEDURE — 3074F SYST BP LT 130 MM HG: CPT | Performed by: NURSE PRACTITIONER

## 2024-06-17 PROCEDURE — 3079F DIAST BP 80-89 MM HG: CPT | Performed by: NURSE PRACTITIONER

## 2024-06-17 PROCEDURE — G8417 CALC BMI ABV UP PARAM F/U: HCPCS | Performed by: NURSE PRACTITIONER

## 2024-06-17 PROCEDURE — 1123F ACP DISCUSS/DSCN MKR DOCD: CPT | Performed by: NURSE PRACTITIONER

## 2024-06-17 PROCEDURE — G8400 PT W/DXA NO RESULTS DOC: HCPCS | Performed by: NURSE PRACTITIONER

## 2024-06-17 PROCEDURE — 1090F PRES/ABSN URINE INCON ASSESS: CPT | Performed by: NURSE PRACTITIONER

## 2024-06-17 PROCEDURE — G8427 DOCREV CUR MEDS BY ELIG CLIN: HCPCS | Performed by: NURSE PRACTITIONER

## 2024-06-17 PROCEDURE — 99214 OFFICE O/P EST MOD 30 MIN: CPT | Performed by: NURSE PRACTITIONER

## 2024-06-17 RX ORDER — PIOGLITAZONEHYDROCHLORIDE 30 MG/1
30 TABLET ORAL DAILY
COMMUNITY
Start: 2024-05-09

## 2024-06-17 ASSESSMENT — ENCOUNTER SYMPTOMS
DIARRHEA: 0
SHORTNESS OF BREATH: 0
ANAL BLEEDING: 0
NAUSEA: 0
ABDOMINAL PAIN: 0
TROUBLE SWALLOWING: 0
RECTAL PAIN: 0
COUGH: 0
BLOOD IN STOOL: 0
CHOKING: 0
CONSTIPATION: 0
VOMITING: 0
ABDOMINAL DISTENTION: 0

## 2024-06-17 NOTE — PROGRESS NOTES
chest pain.   Gastrointestinal:  Negative for abdominal distention, abdominal pain, anal bleeding, blood in stool, constipation, diarrhea, nausea, rectal pain and vomiting.   Allergic/Immunologic: Negative for food allergies.   All other systems reviewed and are negative.        Objective:     /80 (Site: Left Upper Arm)   Pulse 69   Ht 1.524 m (5')   Wt 78.9 kg (174 lb)   SpO2 97%   BMI 33.98 kg/m²     Physical Exam  Constitutional:       Appearance: Normal appearance.   HENT:      Head: Normocephalic.      Right Ear: External ear normal.      Left Ear: External ear normal.      Nose: Nose normal.   Eyes:      General:         Right eye: No discharge.         Left eye: No discharge.      Extraocular Movements: Extraocular movements intact.      Conjunctiva/sclera: Conjunctivae normal.   Cardiovascular:      Rate and Rhythm: Normal rate.   Pulmonary:      Effort: Pulmonary effort is normal. No respiratory distress.   Abdominal:      General: There is no distension.   Musculoskeletal:         General: Normal range of motion.      Cervical back: Normal range of motion.   Skin:     General: Skin is warm and dry.   Neurological:      General: No focal deficit present.      Mental Status: She is alert and oriented to person, place, and time.   Psychiatric:         Mood and Affect: Mood normal.         Behavior: Behavior normal.

## 2024-06-17 NOTE — TELEPHONE ENCOUNTER
----- Message from DANIELLA Nelson NP sent at 6/17/2024  1:15 PM CDT -----  Pt needs appt with Hepatology ASAP, increasing AFP with liver mass enlarging in size          6.17.24  Called [facility] and spoke with Kimberley. Ask her if patient can get appointment sooner than her one in October and Kimberley said that patient has canceled 3 appointments already. Told her about new lab work and Kimberley transferred me to someone and no one answered. Left voicemail for them to call back.

## 2024-06-21 NOTE — TELEPHONE ENCOUNTER
6.21.24  Called [facility] and spoke with Marielena. Patient has 4 appointments schedule and cancelled 4 appointments. Marielena said patient is schedule in October 2024. Marielena said that they did have a cancellation for July 29th at 1:00pm. Marielena has put patient there and let patient know.      Routed to Anna BREWER

## 2024-06-24 DIAGNOSIS — G43.009 MIGRAINE WITHOUT AURA AND WITHOUT STATUS MIGRAINOSUS, NOT INTRACTABLE: ICD-10-CM

## 2024-06-24 RX ORDER — NADOLOL 20 MG/1
20 TABLET ORAL DAILY
Qty: 90 TABLET | Refills: 1 | Status: SHIPPED | OUTPATIENT
Start: 2024-06-24

## 2024-06-24 NOTE — TELEPHONE ENCOUNTER
Rx Refill Note  Requested Prescriptions     Pending Prescriptions Disp Refills    nadolol (CORGARD) 20 MG tablet 90 tablet 1     Sig: Take 1 tablet by mouth Daily.      Last office visit with prescribing clinician: 3/18/2024   Last telemedicine visit with prescribing clinician: Visit date not found   Next office visit with prescribing clinician: 8/14/2024     Amy Barahona MA  06/24/24, 09:48 CDT

## 2024-07-09 ENCOUNTER — TELEPHONE (OUTPATIENT)
Dept: GASTROENTEROLOGY | Age: 64
End: 2024-07-09

## 2024-07-09 DIAGNOSIS — M25.552 BILATERAL HIP PAIN: ICD-10-CM

## 2024-07-09 DIAGNOSIS — M25.551 BILATERAL HIP PAIN: ICD-10-CM

## 2024-07-09 RX ORDER — TRAMADOL HYDROCHLORIDE 50 MG/1
50 TABLET ORAL 2 TIMES DAILY PRN
Qty: 40 TABLET | Refills: 0 | Status: SHIPPED | OUTPATIENT
Start: 2024-07-09

## 2024-07-09 NOTE — TELEPHONE ENCOUNTER
Rx Refill Note  Requested Prescriptions     Pending Prescriptions Disp Refills    traMADol (ULTRAM) 50 MG tablet [Pharmacy Med Name: TRAMADOL HCL 50MG TABS] 40 tablet 0     Sig: TAKE ONE TABLET BY MOUTH TWICE A DAY AS NEEDED FOR MODERATE PAIN      Last office visit with prescribing clinician: 5/9/2024   Last telemedicine visit with prescribing clinician: Visit date not found   Next office visit with prescribing clinician:8/12/24      Shivani Rayo MA  07/09/24, 09:20 CDT

## 2024-07-09 NOTE — TELEPHONE ENCOUNTER
07- Patient called stated that she is having pain about an 8 that happened about a week ago but finally went away.       Recommended that if her pain is 8 out of 10 then she should proceed to the ER.       Patient stated that maybe it was more like about a 5-6 and that she is out getting groceries right now.  The pain is located on her left side below her ribs more about her waist line.      Questioned if she had been having regular bms-she stated that she had two this am and has been going regular as that the is on Metformin. The last time she had this pain about a week ago she took a pain pill on hand and finally it went away.     Recommend that she is taking pain pills then it would be recommended to proceed to the ER to see what is going on.       She stated that right now she is going to get her running done and if needed then she will just call an ambulance to come get her .       Routed to  ODILIA BREWER

## 2024-07-17 DIAGNOSIS — E11.628 TYPE 2 DIABETES MELLITUS WITH OTHER SKIN COMPLICATION, WITHOUT LONG-TERM CURRENT USE OF INSULIN: ICD-10-CM

## 2024-07-17 RX ORDER — ERTUGLIFLOZIN 15 MG/1
15 TABLET, FILM COATED ORAL EVERY MORNING
Qty: 90 TABLET | Refills: 1 | Status: SHIPPED | OUTPATIENT
Start: 2024-07-17

## 2024-07-17 NOTE — TELEPHONE ENCOUNTER
Rx Refill Note  Requested Prescriptions     Pending Prescriptions Disp Refills    Steglatro 15 MG tablet [Pharmacy Med Name: STEGLATRO 15MG TABS] 90 tablet 1     Sig: TAKE ONE TABLET BY MOUTH EVERY MORNING      Last office visit with prescribing clinician: 3/18/2024   Last telemedicine visit with prescribing clinician: Visit date not found   Next office visit with prescribing clinician: 8/14/2024   {    Shivani Rayo MA  07/17/24, 09:12 CDT

## 2024-07-18 DIAGNOSIS — R51.9 SINUS HEADACHE: ICD-10-CM

## 2024-07-18 DIAGNOSIS — J30.9 ALLERGIC SINUSITIS: ICD-10-CM

## 2024-07-18 DIAGNOSIS — J45.901 BRONCHITIS, ALLERGIC, UNSPECIFIED ASTHMA SEVERITY, WITH ACUTE EXACERBATION: ICD-10-CM

## 2024-07-18 DIAGNOSIS — I10 ESSENTIAL HYPERTENSION: ICD-10-CM

## 2024-07-18 RX ORDER — BENAZEPRIL HYDROCHLORIDE 10 MG/1
TABLET ORAL
Qty: 90 TABLET | Refills: 3 | Status: SHIPPED | OUTPATIENT
Start: 2024-07-18

## 2024-07-18 RX ORDER — CETIRIZINE HYDROCHLORIDE 10 MG/1
10 TABLET ORAL DAILY
Qty: 90 TABLET | Refills: 1 | Status: SHIPPED | OUTPATIENT
Start: 2024-07-18

## 2024-07-18 NOTE — TELEPHONE ENCOUNTER
Rx Refill Note  Requested Prescriptions     Pending Prescriptions Disp Refills    benazepril (LOTENSIN) 10 MG tablet [Pharmacy Med Name: BENAZEPRIL HCL 10MG TABS] 90 tablet 3     Sig: TAKE ONE TABLET BY MOUTH EVERY DAY      Last office visit with prescribing clinician: 3/18/2024   Last telemedicine visit with prescribing clinician: Visit date not found   Next office visit with prescribing clinician: 8/14/2024   {    Shivani Rayo MA  07/18/24, 10:57 CDT

## 2024-07-18 NOTE — TELEPHONE ENCOUNTER
Rx Refill Note  Requested Prescriptions     Pending Prescriptions Disp Refills    cetirizine (zyrTEC) 10 MG tablet 90 tablet 1     Sig: Take 1 tablet by mouth Daily.      Last office visit with prescribing clinician: 3/18/2024   Last telemedicine visit with prescribing clinician: Visit date not found   Next office visit with prescribing clinician: 7/18/2024       Shivani Rayo MA  07/18/24, 10:51 CDT

## 2024-07-22 ENCOUNTER — TELEPHONE (OUTPATIENT)
Dept: FAMILY MEDICINE CLINIC | Facility: CLINIC | Age: 64
End: 2024-07-22

## 2024-07-22 ENCOUNTER — TELEPHONE (OUTPATIENT)
Dept: FAMILY MEDICINE CLINIC | Facility: CLINIC | Age: 64
End: 2024-07-22
Payer: MEDICARE

## 2024-07-22 NOTE — TELEPHONE ENCOUNTER
Caller: DEBBIE    Relationship:  [phone]    REF# [id number]    Best call back number: [phone]    Who are you requesting to speak with     CLINICAL STAFF    Do you know the name of the person who called:     DEBBIE WITH [facility]    What was the call regarding:     HAS 2 CLINICAL QUESTIONS CONCERNING THIS PA    Is it okay if the provider responds through [email or web address]: NO    PLEASE CALL HUMANKRISTEN

## 2024-07-23 ENCOUNTER — OFFICE VISIT (OUTPATIENT)
Dept: FAMILY MEDICINE CLINIC | Facility: CLINIC | Age: 64
End: 2024-07-23
Payer: MEDICARE

## 2024-07-23 ENCOUNTER — TELEPHONE (OUTPATIENT)
Dept: FAMILY MEDICINE CLINIC | Facility: CLINIC | Age: 64
End: 2024-07-23
Payer: MEDICARE

## 2024-07-23 VITALS
WEIGHT: 178.2 LBS | HEIGHT: 60 IN | TEMPERATURE: 98.2 F | HEART RATE: 66 BPM | OXYGEN SATURATION: 97 % | DIASTOLIC BLOOD PRESSURE: 64 MMHG | BODY MASS INDEX: 34.99 KG/M2 | SYSTOLIC BLOOD PRESSURE: 92 MMHG

## 2024-07-23 DIAGNOSIS — E11.65 TYPE 2 DIABETES MELLITUS WITH HYPERGLYCEMIA, WITH LONG-TERM CURRENT USE OF INSULIN: Primary | ICD-10-CM

## 2024-07-23 DIAGNOSIS — R20.2 TINGLING OF SKIN: ICD-10-CM

## 2024-07-23 DIAGNOSIS — M62.838 MUSCLE SPASMS OF BOTH LOWER EXTREMITIES: Chronic | ICD-10-CM

## 2024-07-23 DIAGNOSIS — Z79.4 TYPE 2 DIABETES MELLITUS WITH HYPERGLYCEMIA, WITH LONG-TERM CURRENT USE OF INSULIN: Primary | ICD-10-CM

## 2024-07-23 DIAGNOSIS — I95.9 HYPOTENSION, UNSPECIFIED HYPOTENSION TYPE: ICD-10-CM

## 2024-07-23 LAB — GLUCOSE BLDC GLUCOMTR-MCNC: 294 MG/DL (ref 70–130)

## 2024-07-23 PROCEDURE — 3074F SYST BP LT 130 MM HG: CPT | Performed by: NURSE PRACTITIONER

## 2024-07-23 PROCEDURE — 99213 OFFICE O/P EST LOW 20 MIN: CPT | Performed by: NURSE PRACTITIONER

## 2024-07-23 PROCEDURE — 3051F HG A1C>EQUAL 7.0%<8.0%: CPT | Performed by: NURSE PRACTITIONER

## 2024-07-23 PROCEDURE — 1125F AMNT PAIN NOTED PAIN PRSNT: CPT | Performed by: NURSE PRACTITIONER

## 2024-07-23 PROCEDURE — 3078F DIAST BP <80 MM HG: CPT | Performed by: NURSE PRACTITIONER

## 2024-07-23 PROCEDURE — 82948 REAGENT STRIP/BLOOD GLUCOSE: CPT | Performed by: NURSE PRACTITIONER

## 2024-07-23 RX ORDER — PIOGLITAZONEHYDROCHLORIDE 45 MG/1
45 TABLET ORAL DAILY
Qty: 30 TABLET | Refills: 3 | Status: SHIPPED | OUTPATIENT
Start: 2024-07-23

## 2024-07-23 RX ORDER — TIZANIDINE 4 MG/1
4 TABLET ORAL EVERY 8 HOURS PRN
Qty: 60 TABLET | Refills: 2 | Status: SHIPPED | OUTPATIENT
Start: 2024-07-23

## 2024-07-24 DIAGNOSIS — B37.2 CANDIDAL DERMATITIS: ICD-10-CM

## 2024-07-24 RX ORDER — NYSTATIN 100000 [USP'U]/G
POWDER TOPICAL
Qty: 60 G | Refills: 2 | Status: SHIPPED | OUTPATIENT
Start: 2024-07-24

## 2024-07-24 NOTE — TELEPHONE ENCOUNTER
Rx Refill Note  Requested Prescriptions     Pending Prescriptions Disp Refills    nystatin 547389 UNIT/GM powder [Pharmacy Med Name: NYSTOP [id number]/GM POWD] 60 g 2     Sig: APPLY TOPICALLY TO THE APPROPRIATE AREA AS DIRECTED TWO TIMES A DAY      Last office visit with prescribing clinician: 3/18/2024   Last telemedicine visit with prescribing clinician: Visit date not found   Next office visit with prescribing clinician: 8/14/2024     Svetlana La MA  07/24/24, 12:37 CDT

## 2024-07-25 DIAGNOSIS — Z79.4 TYPE 2 DIABETES MELLITUS WITH HYPERGLYCEMIA, WITH LONG-TERM CURRENT USE OF INSULIN: Primary | ICD-10-CM

## 2024-07-25 DIAGNOSIS — E11.65 TYPE 2 DIABETES MELLITUS WITH HYPERGLYCEMIA, WITH LONG-TERM CURRENT USE OF INSULIN: Primary | ICD-10-CM

## 2024-07-25 RX ORDER — CANAGLIFLOZIN 300 MG/1
300 TABLET, FILM COATED ORAL DAILY
Qty: 30 TABLET | Refills: 5 | Status: SHIPPED | OUTPATIENT
Start: 2024-07-25

## 2024-08-08 ENCOUNTER — TELEPHONE (OUTPATIENT)
Dept: FAMILY MEDICINE CLINIC | Facility: CLINIC | Age: 64
End: 2024-08-08

## 2024-08-08 DIAGNOSIS — E78.2 MIXED HYPERLIPIDEMIA: ICD-10-CM

## 2024-08-08 RX ORDER — ATORVASTATIN CALCIUM 10 MG/1
10 TABLET, FILM COATED ORAL DAILY
Qty: 90 TABLET | Refills: 1 | Status: SHIPPED | OUTPATIENT
Start: 2024-08-08

## 2024-08-08 NOTE — TELEPHONE ENCOUNTER
Rx Refill Note  Requested Prescriptions     Pending Prescriptions Disp Refills    atorvastatin (LIPITOR) 10 MG tablet [Pharmacy Med Name: ATORVASTATIN CALCIUM 10MG TABS] 90 tablet 1     Sig: TAKE ONE TABLET BY MOUTH EVERY DAY       Cyn Still MA  08/08/24, 09:52 CDT

## 2024-08-08 NOTE — TELEPHONE ENCOUNTER
Caller: Della Gonzalez    Relationship: Self    Best call back number: [phone]     What is the medical concern/diagnosis: PAIN     What specialty or service is being requested: PAIN MANAGEMENT    What is the provider, practice or medical service name:     [facility]  [address] , [address], [place] [address]  [phone]      Any additional details: PATIENT STATES SHE HAS AN APPOINTMENT SCHEDULED HERE FOR NEXT THURSDAY. ALSO NEEDS THE REFERRAL FOR PACS TRANSPORTATION    PLEASE CALL ONCE REFERRAL IS PLACED

## 2024-08-14 ENCOUNTER — TELEPHONE (OUTPATIENT)
Dept: FAMILY MEDICINE CLINIC | Facility: CLINIC | Age: 64
End: 2024-08-14
Payer: MEDICARE

## 2024-08-14 ENCOUNTER — OFFICE VISIT (OUTPATIENT)
Dept: FAMILY MEDICINE CLINIC | Facility: CLINIC | Age: 64
End: 2024-08-14
Payer: MEDICARE

## 2024-08-14 VITALS
TEMPERATURE: 97.3 F | WEIGHT: 179 LBS | DIASTOLIC BLOOD PRESSURE: 69 MMHG | BODY MASS INDEX: 35.14 KG/M2 | SYSTOLIC BLOOD PRESSURE: 122 MMHG | HEART RATE: 64 BPM | OXYGEN SATURATION: 97 % | HEIGHT: 60 IN

## 2024-08-14 DIAGNOSIS — F51.01 PRIMARY INSOMNIA: Primary | ICD-10-CM

## 2024-08-14 DIAGNOSIS — K21.9 GASTROESOPHAGEAL REFLUX DISEASE, UNSPECIFIED WHETHER ESOPHAGITIS PRESENT: ICD-10-CM

## 2024-08-14 DIAGNOSIS — Z79.4 TYPE 2 DIABETES MELLITUS WITH HYPERGLYCEMIA, WITH LONG-TERM CURRENT USE OF INSULIN: Primary | ICD-10-CM

## 2024-08-14 DIAGNOSIS — E11.65 TYPE 2 DIABETES MELLITUS WITH HYPERGLYCEMIA, WITH LONG-TERM CURRENT USE OF INSULIN: Primary | ICD-10-CM

## 2024-08-14 PROCEDURE — 1160F RVW MEDS BY RX/DR IN RCRD: CPT | Performed by: NURSE PRACTITIONER

## 2024-08-14 PROCEDURE — 3074F SYST BP LT 130 MM HG: CPT | Performed by: NURSE PRACTITIONER

## 2024-08-14 PROCEDURE — 3078F DIAST BP <80 MM HG: CPT | Performed by: NURSE PRACTITIONER

## 2024-08-14 PROCEDURE — 3051F HG A1C>EQUAL 7.0%<8.0%: CPT | Performed by: NURSE PRACTITIONER

## 2024-08-14 PROCEDURE — 1126F AMNT PAIN NOTED NONE PRSNT: CPT | Performed by: NURSE PRACTITIONER

## 2024-08-14 PROCEDURE — 1159F MED LIST DOCD IN RCRD: CPT | Performed by: NURSE PRACTITIONER

## 2024-08-14 PROCEDURE — 99214 OFFICE O/P EST MOD 30 MIN: CPT | Performed by: NURSE PRACTITIONER

## 2024-08-14 RX ORDER — SEMAGLUTIDE 0.68 MG/ML
0.25 INJECTION, SOLUTION SUBCUTANEOUS WEEKLY
Qty: 3 ML | Refills: 0 | Status: SHIPPED | OUTPATIENT
Start: 2024-08-14

## 2024-08-14 RX ORDER — OMEPRAZOLE 20 MG/1
20 CAPSULE, DELAYED RELEASE ORAL DAILY
Qty: 90 CAPSULE | Refills: 1 | Status: SHIPPED | OUTPATIENT
Start: 2024-08-14

## 2024-08-14 RX ORDER — DOXEPIN HYDROCHLORIDE 50 MG/1
50 CAPSULE ORAL NIGHTLY
Qty: 30 CAPSULE | Refills: 2 | Status: SHIPPED | OUTPATIENT
Start: 2024-08-14

## 2024-08-14 NOTE — PROGRESS NOTES
"Chief Complaint  Diabetes (3 month Diabetic, Gabapentin )    Subjective        Della Gonzalez presents to [facility]  History of Present Illness  Patient presents for 3 month compliance for gabapentin and diabetes check up. She notes compliance with meds and activity (walks around town daily) but continues to struggle with low carb diet, \"some days are better than others.\" She denies symptoms related to hypoglycemia or hyperglycemia.    Objective   Vital Signs:  /69 (BP Location: Left arm, Patient Position: Sitting, Cuff Size: Large Adult)   Pulse 64   Temp 97.3 °F (36.3 °C)   Ht 152.4 cm (60\")   Wt 81.2 kg (179 lb)   SpO2 97%   BMI 34.96 kg/m²   Estimated body mass index is 34.96 kg/m² as calculated from the following:    Height as of this encounter: 152.4 cm (60\").    Weight as of this encounter: 81.2 kg (179 lb).             Physical Exam  Vitals and nursing note reviewed.   Constitutional:       General: She is not in acute distress.     Appearance: Normal appearance. She is obese. She is not ill-appearing.   HENT:      Head: Normocephalic and atraumatic.   Cardiovascular:      Rate and Rhythm: Normal rate and regular rhythm.      Heart sounds: Normal heart sounds. No murmur heard.  Pulmonary:      Effort: Pulmonary effort is normal.      Breath sounds: Normal breath sounds.   Musculoskeletal:      Right lower leg: No edema.      Left lower leg: No edema.   Skin:     General: Skin is warm and dry.   Neurological:      Mental Status: She is alert and oriented to person, place, and time.        Result Review :                Assessment and Plan   Diagnoses and all orders for this visit:    1. Type 2 diabetes mellitus with hyperglycemia, with long-term current use of insulin (Primary)  -     Semaglutide,0.25 or 0.5MG/DOS, (Ozempic, 0.25 or 0.5 MG/DOSE,) 2 MG/3ML solution pen-injector; Inject 0.25 mg under the skin into the appropriate area as directed 1 (One) Time Per " Week.  Dispense: 3 mL; Refill: 0    2. Gastroesophageal reflux disease, unspecified whether esophagitis present  -     omeprazole (priLOSEC) 20 MG capsule; Take 1 capsule by mouth Daily.  Dispense: 90 capsule; Refill: 1             Follow Up   Return in about 3 months (around 11/18/2024) for medicare wellness with labs prior.  Patient was given instructions and counseling regarding her condition or for health maintenance advice. Please see specific information pulled into the AVS if appropriate.     MELECIO Thorne  This note is electronically signed.

## 2024-08-14 NOTE — TELEPHONE ENCOUNTER
Patient returns to clinic and states Trazodone is not working.  Patient is requesting a different medication.

## 2024-08-26 ENCOUNTER — TELEPHONE (OUTPATIENT)
Dept: FAMILY MEDICINE CLINIC | Facility: CLINIC | Age: 64
End: 2024-08-26
Payer: MEDICARE

## 2024-08-26 DIAGNOSIS — G89.29 CHRONIC LEFT HIP PAIN: Primary | ICD-10-CM

## 2024-08-26 DIAGNOSIS — M25.552 CHRONIC LEFT HIP PAIN: Primary | ICD-10-CM

## 2024-08-26 NOTE — TELEPHONE ENCOUNTER
Pt verbally stated she was seen by her therapist at [address] and was advised that she would need an xray of her left hip due to arthritis, she Is wondering if Vijaya [place] [facility] [place]

## 2024-08-29 ENCOUNTER — TELEPHONE (OUTPATIENT)
Dept: FAMILY MEDICINE CLINIC | Facility: CLINIC | Age: 64
End: 2024-08-29
Payer: MEDICARE

## 2024-09-04 ENCOUNTER — TELEPHONE (OUTPATIENT)
Age: 64
End: 2024-09-04
Payer: MEDICARE

## 2024-09-04 NOTE — TELEPHONE ENCOUNTER
Hub to relay  Called patient regarding appt on 09/05/2024. Left message for patient to return call if any questions or concerns arise.

## 2024-09-06 ENCOUNTER — TELEPHONE (OUTPATIENT)
Dept: FAMILY MEDICINE CLINIC | Facility: CLINIC | Age: 64
End: 2024-09-06

## 2024-09-06 NOTE — TELEPHONE ENCOUNTER
Caller: Della Gonzalez    Relationship: Self    Best call back number: [phone]    What is the best time to reach you: SOON PLEASE     Who are you requesting to speak with (clinical staff, provider,  specific staff member): SURESH AQUINO     Do you know the name of the person who called: SURESH OR KENIA     What was the call regarding: PATIENT REQUESTING A CALL BACK FROM SURESH OR KENIA PLEASE     Is it okay if the provider responds through [employer]: PREFERS A CALL BACK

## 2024-09-09 NOTE — TELEPHONE ENCOUNTER
Caller: Della Gonzalez    Relationship: Self    Best call back number: [phone]    What is the best time to reach you: NOT SPECIFIED     Who are you requesting to speak with (clinical staff, provider,  specific staff member): KENIA OR SURESH    What was the call regarding:     PATIENT IS REQUESTING A CALLBACK FROM KENIA OR SURESH. PATIENT DID NOT DISCLOSE REASON FOR CALL.     Is it okay if the provider responds through enMarkithart: PLEASE CALL

## 2024-09-10 NOTE — TELEPHONE ENCOUNTER
Called and spoke with pt, she was needing a Hospital f/u appt with Vij[facility]. This has been scheduled.

## 2024-09-16 ENCOUNTER — OFFICE VISIT (OUTPATIENT)
Age: 64
End: 2024-09-16
Payer: MEDICARE

## 2024-09-16 VITALS
BODY MASS INDEX: 35.14 KG/M2 | OXYGEN SATURATION: 96 % | DIASTOLIC BLOOD PRESSURE: 80 MMHG | HEIGHT: 60 IN | HEART RATE: 81 BPM | WEIGHT: 179 LBS | SYSTOLIC BLOOD PRESSURE: 124 MMHG

## 2024-09-16 DIAGNOSIS — M79.671 RIGHT FOOT PAIN: ICD-10-CM

## 2024-09-16 DIAGNOSIS — L60.2 ONYCHOGRYPHOSIS: ICD-10-CM

## 2024-09-16 DIAGNOSIS — Z72.0 TOBACCO ABUSE: ICD-10-CM

## 2024-09-16 DIAGNOSIS — B35.1 ONYCHOMYCOSIS: Primary | ICD-10-CM

## 2024-09-16 DIAGNOSIS — E11.40 TYPE 2 DIABETES MELLITUS WITH DIABETIC NEUROPATHY, WITH LONG-TERM CURRENT USE OF INSULIN: ICD-10-CM

## 2024-09-16 DIAGNOSIS — Z79.4 TYPE 2 DIABETES MELLITUS WITH DIABETIC NEUROPATHY, WITH LONG-TERM CURRENT USE OF INSULIN: ICD-10-CM

## 2024-09-16 PROCEDURE — 11721 DEBRIDE NAIL 6 OR MORE: CPT | Performed by: NURSE PRACTITIONER

## 2024-09-16 PROCEDURE — 3074F SYST BP LT 130 MM HG: CPT | Performed by: NURSE PRACTITIONER

## 2024-09-16 PROCEDURE — 1160F RVW MEDS BY RX/DR IN RCRD: CPT | Performed by: NURSE PRACTITIONER

## 2024-09-16 PROCEDURE — 3079F DIAST BP 80-89 MM HG: CPT | Performed by: NURSE PRACTITIONER

## 2024-09-16 PROCEDURE — 99213 OFFICE O/P EST LOW 20 MIN: CPT | Performed by: NURSE PRACTITIONER

## 2024-09-16 PROCEDURE — 1159F MED LIST DOCD IN RCRD: CPT | Performed by: NURSE PRACTITIONER

## 2024-09-24 ENCOUNTER — OFFICE VISIT (OUTPATIENT)
Dept: FAMILY MEDICINE CLINIC | Facility: CLINIC | Age: 64
End: 2024-09-24
Payer: MEDICARE

## 2024-09-24 VITALS
HEART RATE: 83 BPM | OXYGEN SATURATION: 96 % | WEIGHT: 185 LBS | HEIGHT: 60 IN | BODY MASS INDEX: 36.32 KG/M2 | SYSTOLIC BLOOD PRESSURE: 126 MMHG | DIASTOLIC BLOOD PRESSURE: 72 MMHG | TEMPERATURE: 98.1 F

## 2024-09-24 DIAGNOSIS — M19.90 ARTHRITIS: Chronic | ICD-10-CM

## 2024-09-24 DIAGNOSIS — E11.42 TYPE 2 DIABETES MELLITUS WITH DIABETIC POLYNEUROPATHY, WITHOUT LONG-TERM CURRENT USE OF INSULIN: Chronic | ICD-10-CM

## 2024-09-24 DIAGNOSIS — M54.31 RIGHT SCIATIC NERVE PAIN: ICD-10-CM

## 2024-09-24 DIAGNOSIS — K74.60 CIRRHOSIS OF LIVER WITHOUT ASCITES, UNSPECIFIED HEPATIC CIRRHOSIS TYPE: Primary | Chronic | ICD-10-CM

## 2024-09-24 DIAGNOSIS — I10 ESSENTIAL HYPERTENSION: Chronic | ICD-10-CM

## 2024-09-24 PROCEDURE — 99214 OFFICE O/P EST MOD 30 MIN: CPT | Performed by: NURSE PRACTITIONER

## 2024-09-24 PROCEDURE — 3074F SYST BP LT 130 MM HG: CPT | Performed by: NURSE PRACTITIONER

## 2024-09-24 PROCEDURE — 3051F HG A1C>EQUAL 7.0%<8.0%: CPT | Performed by: NURSE PRACTITIONER

## 2024-09-24 PROCEDURE — 3078F DIAST BP <80 MM HG: CPT | Performed by: NURSE PRACTITIONER

## 2024-09-24 PROCEDURE — 1159F MED LIST DOCD IN RCRD: CPT | Performed by: NURSE PRACTITIONER

## 2024-09-24 PROCEDURE — 1160F RVW MEDS BY RX/DR IN RCRD: CPT | Performed by: NURSE PRACTITIONER

## 2024-09-24 PROCEDURE — 1125F AMNT PAIN NOTED PAIN PRSNT: CPT | Performed by: NURSE PRACTITIONER

## 2024-09-24 RX ORDER — PROMETHAZINE HYDROCHLORIDE 25 MG/1
25 TABLET ORAL EVERY 6 HOURS PRN
COMMUNITY

## 2024-09-24 RX ORDER — NABUMETONE 500 MG/1
500 TABLET, FILM COATED ORAL 2 TIMES DAILY PRN
Qty: 60 TABLET | Refills: 0 | Status: SHIPPED | OUTPATIENT
Start: 2024-09-24

## 2024-09-24 RX ORDER — GABAPENTIN 600 MG/1
600 TABLET ORAL 3 TIMES DAILY
Qty: 90 TABLET | Refills: 0 | Status: SHIPPED | OUTPATIENT
Start: 2024-09-24

## 2024-09-24 RX ORDER — MAGNESIUM OXIDE 400 MG/1
400 TABLET ORAL DAILY
COMMUNITY

## 2024-09-25 LAB
ALBUMIN SERPL-MCNC: 3.7 G/DL (ref 3.9–4.9)
ALP SERPL-CCNC: 78 IU/L (ref 44–121)
ALT SERPL-CCNC: 12 IU/L (ref 0–32)
AST SERPL-CCNC: 38 IU/L (ref 0–40)
BASOPHILS # BLD AUTO: 0.1 X10E3/UL (ref 0–0.2)
BASOPHILS NFR BLD AUTO: 2 %
BILIRUB SERPL-MCNC: 1 MG/DL (ref 0–1.2)
BUN SERPL-MCNC: 7 MG/DL (ref 8–27)
BUN/CREAT SERPL: 14 (ref 12–28)
CALCIUM SERPL-MCNC: 8.8 MG/DL (ref 8.7–10.3)
CHLORIDE SERPL-SCNC: 103 MMOL/L (ref 96–106)
CHOLEST SERPL-MCNC: 164 MG/DL (ref 100–199)
CO2 SERPL-SCNC: 23 MMOL/L (ref 20–29)
CREAT SERPL-MCNC: 0.49 MG/DL (ref 0.57–1)
EGFRCR SERPLBLD CKD-EPI 2021: 105 ML/MIN/1.73
EOSINOPHIL # BLD AUTO: 0.2 X10E3/UL (ref 0–0.4)
EOSINOPHIL NFR BLD AUTO: 6 %
ERYTHROCYTE [DISTWIDTH] IN BLOOD BY AUTOMATED COUNT: 14.5 % (ref 11.7–15.4)
GLOBULIN SER CALC-MCNC: 2.7 G/DL (ref 1.5–4.5)
GLUCOSE SERPL-MCNC: 188 MG/DL (ref 70–99)
HBA1C MFR BLD: 7.3 % (ref 4.8–5.6)
HCT VFR BLD AUTO: 35.8 % (ref 34–46.6)
HDLC SERPL-MCNC: 37 MG/DL
HGB BLD-MCNC: 11.5 G/DL (ref 11.1–15.9)
IMM GRANULOCYTES # BLD AUTO: 0 X10E3/UL (ref 0–0.1)
IMM GRANULOCYTES NFR BLD AUTO: 0 %
LDLC SERPL CALC-MCNC: 102 MG/DL (ref 0–99)
LDLC/HDLC SERPL: 2.8 RATIO (ref 0–3.2)
LYMPHOCYTES # BLD AUTO: 0.8 X10E3/UL (ref 0.7–3.1)
LYMPHOCYTES NFR BLD AUTO: 19 %
MAGNESIUM SERPL-MCNC: 1.6 MG/DL (ref 1.6–2.3)
MCH RBC QN AUTO: 31.3 PG (ref 26.6–33)
MCHC RBC AUTO-ENTMCNC: 32.1 G/DL (ref 31.5–35.7)
MCV RBC AUTO: 97 FL (ref 79–97)
MONOCYTES # BLD AUTO: 0.3 X10E3/UL (ref 0.1–0.9)
MONOCYTES NFR BLD AUTO: 8 %
NEUTROPHILS # BLD AUTO: 2.7 X10E3/UL (ref 1.4–7)
NEUTROPHILS NFR BLD AUTO: 65 %
PLATELET # BLD AUTO: 161 X10E3/UL (ref 150–450)
POTASSIUM SERPL-SCNC: 4.2 MMOL/L (ref 3.5–5.2)
PROT SERPL-MCNC: 6.4 G/DL (ref 6–8.5)
RBC # BLD AUTO: 3.68 X10E6/UL (ref 3.77–5.28)
SODIUM SERPL-SCNC: 142 MMOL/L (ref 134–144)
TRIGL SERPL-MCNC: 142 MG/DL (ref 0–149)
TSH SERPL DL<=0.005 MIU/L-ACNC: 2.07 UIU/ML (ref 0.45–4.5)
VLDLC SERPL CALC-MCNC: 25 MG/DL (ref 5–40)
WBC # BLD AUTO: 4.2 X10E3/UL (ref 3.4–10.8)

## 2024-09-26 ENCOUNTER — TELEPHONE (OUTPATIENT)
Dept: FAMILY MEDICINE CLINIC | Facility: CLINIC | Age: 64
End: 2024-09-26

## 2024-09-26 RX ORDER — FUROSEMIDE 20 MG
20 TABLET ORAL DAILY
Qty: 3 TABLET | Refills: 0 | Status: SHIPPED | OUTPATIENT
Start: 2024-09-26 | End: 2024-09-29

## 2024-09-26 NOTE — TELEPHONE ENCOUNTER
Caller: Della Gonzalez    Relationship: Self    Best call back number: [phone]     Caller requesting test results: PATIENT    What test was performed: LABS    When was the test performed: 9.24.2024    Additional notes: PLEASE CONTACT PATIENT TO DISCUSS RESULTS.

## 2024-10-01 ENCOUNTER — TRANSITIONAL CARE MANAGEMENT TELEPHONE ENCOUNTER (OUTPATIENT)
Dept: FAMILY MEDICINE CLINIC | Facility: CLINIC | Age: 64
End: 2024-10-01
Payer: MEDICARE

## 2024-10-01 ENCOUNTER — READMISSION MANAGEMENT (OUTPATIENT)
Dept: CALL CENTER | Facility: HOSPITAL | Age: 64
End: 2024-10-01
Payer: MEDICARE

## 2024-10-01 NOTE — OUTREACH NOTE
Prep Survey      Flowsheet Row Responses   [facility] facility patient discharged from? Non-BH   Is LACE score < 7 ? Non-BH Discharge   Eligibility Not Eligible   What are the reasons patient is not eligible? Other  [[facility] discharge]   Does the patient have one of the following disease processes/diagnoses(primary or secondary)? Other   Prep survey completed? Yes            NAZIA PETERSON - Registered Nurse

## 2024-10-03 ENCOUNTER — TRANSITIONAL CARE MANAGEMENT TELEPHONE ENCOUNTER (OUTPATIENT)
Dept: FAMILY MEDICINE CLINIC | Facility: CLINIC | Age: 64
End: 2024-10-03
Payer: MEDICARE

## 2024-10-08 ENCOUNTER — HOSPITAL ENCOUNTER (EMERGENCY)
Age: 64
Discharge: HOME | End: 2024-10-08
Attending: PEDIATRICS
Payer: MEDICARE

## 2024-10-08 ENCOUNTER — APPOINTMENT (OUTPATIENT)
Dept: GENERAL RADIOLOGY | Age: 64
End: 2024-10-08
Attending: PEDIATRICS
Payer: MEDICARE

## 2024-10-08 ENCOUNTER — APPOINTMENT (OUTPATIENT)
Dept: CT IMAGING | Age: 64
End: 2024-10-08
Payer: MEDICARE

## 2024-10-08 ENCOUNTER — OFFICE VISIT (OUTPATIENT)
Dept: FAMILY MEDICINE CLINIC | Facility: CLINIC | Age: 64
End: 2024-10-08
Payer: MEDICARE

## 2024-10-08 VITALS
TEMPERATURE: 98.1 F | DIASTOLIC BLOOD PRESSURE: 80 MMHG | RESPIRATION RATE: 19 BRPM | OXYGEN SATURATION: 97 % | SYSTOLIC BLOOD PRESSURE: 150 MMHG | WEIGHT: 184.2 LBS | BODY MASS INDEX: 34.78 KG/M2 | HEART RATE: 56 BPM | HEIGHT: 61 IN

## 2024-10-08 VITALS
DIASTOLIC BLOOD PRESSURE: 62 MMHG | OXYGEN SATURATION: 91 % | BODY MASS INDEX: 36.12 KG/M2 | TEMPERATURE: 98.8 F | HEIGHT: 60 IN | HEART RATE: 75 BPM | RESPIRATION RATE: 17 BRPM | WEIGHT: 184 LBS | SYSTOLIC BLOOD PRESSURE: 156 MMHG

## 2024-10-08 DIAGNOSIS — I10 UNCONTROLLED HYPERTENSION: ICD-10-CM

## 2024-10-08 DIAGNOSIS — Z09 HOSPITAL DISCHARGE FOLLOW-UP: Primary | ICD-10-CM

## 2024-10-08 DIAGNOSIS — J96.01 ACUTE HYPOXIC RESPIRATORY FAILURE: ICD-10-CM

## 2024-10-08 DIAGNOSIS — L08.9 SKIN INFECTION: ICD-10-CM

## 2024-10-08 DIAGNOSIS — J81.0 ACUTE PULMONARY EDEMA: Primary | ICD-10-CM

## 2024-10-08 DIAGNOSIS — E11.42 TYPE 2 DIABETES MELLITUS WITH DIABETIC POLYNEUROPATHY, WITHOUT LONG-TERM CURRENT USE OF INSULIN: ICD-10-CM

## 2024-10-08 DIAGNOSIS — J44.9 CHRONIC OBSTRUCTIVE PULMONARY DISEASE, UNSPECIFIED COPD TYPE: ICD-10-CM

## 2024-10-08 DIAGNOSIS — B35.3 TINEA PEDIS OF LEFT FOOT: ICD-10-CM

## 2024-10-08 LAB
ALBUMIN SERPL-MCNC: 4.1 G/DL (ref 3.5–5.2)
ALP SERPL-CCNC: 126 U/L (ref 35–104)
ALT SERPL-CCNC: 19 U/L (ref 5–33)
ANION GAP SERPL CALCULATED.3IONS-SCNC: 13 MMOL/L (ref 7–19)
AST SERPL-CCNC: 39 U/L (ref 5–32)
BASOPHILS # BLD: 0 K/UL (ref 0–0.2)
BASOPHILS NFR BLD: 0.6 % (ref 0–1)
BILIRUB SERPL-MCNC: 0.9 MG/DL (ref 0.2–1.2)
BNP BLD-MCNC: 1132 PG/ML (ref 0–124)
BUN SERPL-MCNC: 9 MG/DL (ref 8–23)
CALCIUM SERPL-MCNC: 8.8 MG/DL (ref 8.8–10.2)
CHLORIDE SERPL-SCNC: 101 MMOL/L (ref 98–111)
CO2 SERPL-SCNC: 27 MMOL/L (ref 22–29)
CREAT SERPL-MCNC: 0.3 MG/DL (ref 0.5–0.9)
EOSINOPHIL # BLD: 0.3 K/UL (ref 0–0.6)
EOSINOPHIL NFR BLD: 4.4 % (ref 0–5)
ERYTHROCYTE [DISTWIDTH] IN BLOOD BY AUTOMATED COUNT: 14 % (ref 11.5–14.5)
EXPIRATION DATE: ABNORMAL
GLUCOSE SERPL-MCNC: 307 MG/DL (ref 70–99)
HBA1C MFR BLD: 7.7 % (ref 4.5–5.7)
HCG SERPL QL: NEGATIVE
HCT VFR BLD AUTO: 38.9 % (ref 37–47)
HGB BLD-MCNC: 12.5 G/DL (ref 12–16)
IMM GRANULOCYTES # BLD: 0 K/UL
LYMPHOCYTES # BLD: 0.3 K/UL (ref 1.1–4.5)
LYMPHOCYTES NFR BLD: 4.3 % (ref 20–40)
Lab: ABNORMAL
MCH RBC QN AUTO: 30.8 PG (ref 27–31)
MCHC RBC AUTO-ENTMCNC: 32.1 G/DL (ref 33–37)
MCV RBC AUTO: 95.8 FL (ref 81–99)
MONOCYTES # BLD: 0.2 K/UL (ref 0–0.9)
MONOCYTES NFR BLD: 2.7 % (ref 0–10)
NEUTROPHILS # BLD: 5.9 K/UL (ref 1.5–7.5)
NEUTS SEG NFR BLD: 87.6 % (ref 50–65)
PLATELET # BLD AUTO: 171 K/UL (ref 130–400)
PMV BLD AUTO: 10.3 FL (ref 9.4–12.3)
POTASSIUM SERPL-SCNC: 3.2 MMOL/L (ref 3.5–5)
PROT SERPL-MCNC: 7.5 G/DL (ref 6.4–8.3)
RBC # BLD AUTO: 4.06 M/UL (ref 4.2–5.4)
SODIUM SERPL-SCNC: 141 MMOL/L (ref 136–145)
TROPONIN, HIGH SENSITIVITY: 12 NG/L (ref 0–14)
WBC # BLD AUTO: 6.8 K/UL (ref 4.8–10.8)

## 2024-10-08 PROCEDURE — 6370000000 HC RX 637 (ALT 250 FOR IP): Performed by: PEDIATRICS

## 2024-10-08 PROCEDURE — 96375 TX/PRO/DX INJ NEW DRUG ADDON: CPT

## 2024-10-08 PROCEDURE — 94640 AIRWAY INHALATION TREATMENT: CPT

## 2024-10-08 PROCEDURE — 71045 X-RAY EXAM CHEST 1 VIEW: CPT

## 2024-10-08 PROCEDURE — 80053 COMPREHEN METABOLIC PANEL: CPT

## 2024-10-08 PROCEDURE — 83880 ASSAY OF NATRIURETIC PEPTIDE: CPT

## 2024-10-08 PROCEDURE — 84484 ASSAY OF TROPONIN QUANT: CPT

## 2024-10-08 PROCEDURE — 70450 CT HEAD/BRAIN W/O DYE: CPT

## 2024-10-08 PROCEDURE — 93005 ELECTROCARDIOGRAM TRACING: CPT | Performed by: PEDIATRICS

## 2024-10-08 PROCEDURE — 6360000002 HC RX W HCPCS: Performed by: PEDIATRICS

## 2024-10-08 PROCEDURE — 85025 COMPLETE CBC W/AUTO DIFF WBC: CPT

## 2024-10-08 PROCEDURE — 99285 EMERGENCY DEPT VISIT HI MDM: CPT

## 2024-10-08 PROCEDURE — 96376 TX/PRO/DX INJ SAME DRUG ADON: CPT

## 2024-10-08 PROCEDURE — 96374 THER/PROPH/DIAG INJ IV PUSH: CPT

## 2024-10-08 PROCEDURE — 36415 COLL VENOUS BLD VENIPUNCTURE: CPT

## 2024-10-08 PROCEDURE — 84703 CHORIONIC GONADOTROPIN ASSAY: CPT

## 2024-10-08 PROCEDURE — 2580000003 HC RX 258: Performed by: PEDIATRICS

## 2024-10-08 RX ORDER — ONDANSETRON 2 MG/ML
4 INJECTION INTRAMUSCULAR; INTRAVENOUS ONCE
Status: COMPLETED | OUTPATIENT
Start: 2024-10-08 | End: 2024-10-08

## 2024-10-08 RX ORDER — HYDRALAZINE HYDROCHLORIDE 20 MG/ML
10 INJECTION INTRAMUSCULAR; INTRAVENOUS ONCE
Status: COMPLETED | OUTPATIENT
Start: 2024-10-08 | End: 2024-10-08

## 2024-10-08 RX ORDER — IPRATROPIUM BROMIDE AND ALBUTEROL SULFATE 2.5; .5 MG/3ML; MG/3ML
1 SOLUTION RESPIRATORY (INHALATION) ONCE
Status: COMPLETED | OUTPATIENT
Start: 2024-10-08 | End: 2024-10-08

## 2024-10-08 RX ORDER — MUPIROCIN CALCIUM 20 MG/G
1 CREAM TOPICAL 2 TIMES DAILY
Qty: 15 G | Refills: 0 | Status: SHIPPED | OUTPATIENT
Start: 2024-10-08

## 2024-10-08 RX ORDER — TRAZODONE HYDROCHLORIDE 150 MG/1
150 TABLET ORAL NIGHTLY
COMMUNITY

## 2024-10-08 RX ORDER — PRENATAL VIT 91/IRON/FOLIC/DHA 28-975-200
1 COMBINATION PACKAGE (EA) ORAL 2 TIMES DAILY
Qty: 28.4 G | Refills: 0 | Status: SHIPPED | OUTPATIENT
Start: 2024-10-08 | End: 2024-10-22

## 2024-10-08 RX ORDER — ACETAMINOPHEN 325 MG/1
650 TABLET ORAL ONCE
Status: COMPLETED | OUTPATIENT
Start: 2024-10-08 | End: 2024-10-08

## 2024-10-08 RX ORDER — ALBUTEROL SULFATE 90 UG/1
2 INHALANT RESPIRATORY (INHALATION) 4 TIMES DAILY PRN
Qty: 18 G | Refills: 0 | Status: SHIPPED | OUTPATIENT
Start: 2024-10-08

## 2024-10-08 RX ORDER — FUROSEMIDE 20 MG
20 TABLET ORAL DAILY
Qty: 3 TABLET | Refills: 0 | Status: SHIPPED | OUTPATIENT
Start: 2024-10-08

## 2024-10-08 RX ORDER — FUROSEMIDE 10 MG/ML
40 INJECTION INTRAMUSCULAR; INTRAVENOUS ONCE
Status: COMPLETED | OUTPATIENT
Start: 2024-10-08 | End: 2024-10-08

## 2024-10-08 RX ORDER — FUROSEMIDE 10 MG/ML
20 INJECTION INTRAMUSCULAR; INTRAVENOUS ONCE
Status: COMPLETED | OUTPATIENT
Start: 2024-10-08 | End: 2024-10-08

## 2024-10-08 RX ORDER — DOXEPIN HYDROCHLORIDE 50 MG/1
50 CAPSULE ORAL NIGHTLY
COMMUNITY

## 2024-10-08 RX ADMIN — WATER 125 MG: 1 INJECTION INTRAMUSCULAR; INTRAVENOUS; SUBCUTANEOUS at 18:23

## 2024-10-08 RX ADMIN — ACETAMINOPHEN 650 MG: 325 TABLET ORAL at 18:41

## 2024-10-08 RX ADMIN — IPRATROPIUM BROMIDE AND ALBUTEROL SULFATE 1 DOSE: 2.5; .5 SOLUTION RESPIRATORY (INHALATION) at 18:40

## 2024-10-08 RX ADMIN — FUROSEMIDE 40 MG: 10 INJECTION, SOLUTION INTRAMUSCULAR; INTRAVENOUS at 21:26

## 2024-10-08 RX ADMIN — FUROSEMIDE 20 MG: 10 INJECTION, SOLUTION INTRAMUSCULAR; INTRAVENOUS at 20:29

## 2024-10-08 RX ADMIN — HYDRALAZINE HYDROCHLORIDE 10 MG: 20 INJECTION, SOLUTION INTRAMUSCULAR; INTRAVENOUS at 18:23

## 2024-10-08 RX ADMIN — ONDANSETRON 4 MG: 2 INJECTION INTRAMUSCULAR; INTRAVENOUS at 18:42

## 2024-10-08 ASSESSMENT — PAIN - FUNCTIONAL ASSESSMENT: PAIN_FUNCTIONAL_ASSESSMENT: 0-10

## 2024-10-08 ASSESSMENT — PAIN SCALES - GENERAL: PAINLEVEL_OUTOF10: 9

## 2024-10-08 NOTE — PROGRESS NOTES
"Chief Complaint   Patient presents with    Hospital Follow Up Visit        Subjective   Della Gonzalez is a 64 y.o. female who presents today for the following     HPI   Follow up after hospital admission.  Patient was at [facility] from 09/29/24- 10/01/24  Patient first went to [facility] and then was transferred to [place].   Discharge diagnosis:  Acute hypoxic respiratory failure   Acute on chronic respiratory failure with hypoxia   [facility] performed CTA put it is not in chart. It will be requested.   Patient states she has been diagnosed with COPD in the past. She is currently using only her albuterol inhaler.       Left foot rash- She has not put anything on it over the counter. Patient states it does itch.   Left ear sore- Unsure of why it is sore but has a areas she has been scratching.     No Known Allergies      OBJECTIVE:  Vitals:    10/08/24 1025 10/08/24 1030   BP: 152/82 150/80   Pulse: 56    Resp: 19    Temp: 98.1 °F (36.7 °C)    TempSrc: Skin    SpO2: 97%    Weight: 83.6 kg (184 lb 3.2 oz)    Height: 154.4 cm (60.79\")      Physical Exam  Vitals and nursing note reviewed.   Constitutional:       Appearance: Normal appearance.   HENT:      Head: Normocephalic and atraumatic.      Ears:        Comments: Erythematous area with scab noted on marked area. No drainage noted.      Nose: Nose normal.   Eyes:      Pupils: Pupils are equal, round, and reactive to light.   Cardiovascular:      Rate and Rhythm: Normal rate and regular rhythm.   Pulmonary:      Breath sounds: Normal breath sounds. No wheezing or rhonchi.   Abdominal:      General: Bowel sounds are normal.   Musculoskeletal:         General: Normal range of motion.        Feet:    Feet:      Comments: Area marked erythematous and dry.   Skin:     General: Skin is warm and dry.   Neurological:      Mental Status: She is alert and oriented to person, place, and time.   Psychiatric:         Mood and Affect: " Mood normal.         Behavior: Behavior normal.         Thought Content: Thought content normal.         Judgment: Judgment normal.                    ASSESSMENT/ PLAN:    Diagnoses and all orders for this visit:    1. Hospital discharge follow-up (Primary)    2. Type 2 diabetes mellitus with diabetic polyneuropathy, without long-term current use of insulin  -     POC Glycosylated Hemoglobin (Hb A1C)    3. Acute hypoxic respiratory failure    4. Chronic obstructive pulmonary disease, unspecified COPD type  -     Budeson-Glycopyrrol-Formoterol (BREZTRI) 160-9-4.8 MCG/ACT aerosol inhaler; Inhale 2 puffs 2 (Two) Times a Day.  Dispense: 1 each; Refill: 0    5. Tinea pedis of left foot  -     terbinafine (Athletes Foot, Terbinafine,) 1 % cream; Apply 1 Application topically to the appropriate area as directed 2 (Two) Times a Day for 14 days. Apply on foot.  Dispense: 28.4 g; Refill: 0    6. Skin infection  -     mupirocin (BACTROBAN) 2 % cream; Apply 1 Application topically to the appropriate area as directed 2 (Two) Times a Day. Apply on external left ear.  Dispense: 15 g; Refill: 0      Procedures       Follow-up: Return in about 2 weeks (around 10/22/2024) for Follow up on copd.      MELECIO Amaya 10/8/2024 11:27 CDT  This note was electronically signed.

## 2024-10-08 NOTE — ED TRIAGE NOTES
Pt arrives to ER via [facility] for COPD exacerbation which was resolved en route with duo-neb by EMS

## 2024-10-09 LAB
EKG P AXIS: 46 DEGREES
EKG P-R INTERVAL: 172 MS
EKG Q-T INTERVAL: 444 MS
EKG QRS DURATION: 84 MS
EKG QTC CALCULATION (BAZETT): 463 MS
EKG T AXIS: -37 DEGREES

## 2024-10-09 PROCEDURE — 93010 ELECTROCARDIOGRAM REPORT: CPT | Performed by: INTERNAL MEDICINE

## 2024-10-09 NOTE — DISCHARGE INSTRUCTIONS
Use your albuterol 3 times daily while coughing or wheezing.  Lasix 20 mg once daily x 3 days will make you pee more often but will help your breathing.  Return or seek medical attention with increasing or severe pain, increased difficulty breathing, or other concerns.

## 2024-10-09 NOTE — ED PROVIDER NOTES
Current or Ex-Partner: No     Emotionally Abused: No     Physically Abused: No     Sexually Abused: No    [facility]    Housing Stability       SCREENINGS    Enoc Coma Scale  Eye Opening: Spontaneous  Best Verbal Response: Oriented  Best Motor Response: Obeys commands  Enoc Coma Scale Score: 15        PHYSICAL EXAM    (up to 7 for level 4, 8 or more for level 5)     ED Triage Vitals [10/08/24 1730]   BP Systolic BP Percentile Diastolic BP Percentile Temp Temp src Pulse Respirations SpO2   (!) 223/90 -- -- 98.8 °F (37.1 °C) -- 77 24 93 %      Height Weight - Scale         1.524 m (5') 83.5 kg (184 lb)             Physical Exam    DIAGNOSTIC RESULTS     EKG: All EKG's areinterpreted by the Emergency Department Physician who either signs or Co-signs this chart in the absence of a cardiologist.    EKG dated 10/8/2024 at 1750 5 PM: Normal sinus rhythm, rate 68.  Multiple PVC, PAC.   QRS 88 QTc 459.  T wave inversions in 3 and aVF.    RADIOLOGY:  Non-plain film images such as CT, Ultrasound and MRI are read by the radiologist. Plain radiographic images are visualized and preliminarily interpreted bythe emergency physician with the below findings:          CT Head W/O Contrast   Final Result   1.  No acute intracranial abnormality.  Chronic changes as described.        All CT scans are performed using dose optimization techniques as appropriate to the performed exam and include    at least one of the following: Automated exposure control, adjustment of the mA and/or kV according to size, and the use of iterative reconstruction technique.        ______________________________________    Electronically signed by: SHANNA PETIT M.D.   Date:     10/08/2024   Time:    19:41       XR CHEST PORTABLE   Final Result       1. Probable congestive heart failure with cardiomegaly and pulmonary edema.               .         
same name as above

## 2024-10-10 ENCOUNTER — OFFICE VISIT (OUTPATIENT)
Dept: FAMILY MEDICINE CLINIC | Facility: CLINIC | Age: 64
End: 2024-10-10
Payer: MEDICARE

## 2024-10-10 VITALS
WEIGHT: 180 LBS | HEIGHT: 60 IN | BODY MASS INDEX: 35.34 KG/M2 | OXYGEN SATURATION: 95 % | DIASTOLIC BLOOD PRESSURE: 79 MMHG | HEART RATE: 65 BPM | SYSTOLIC BLOOD PRESSURE: 137 MMHG | TEMPERATURE: 98.2 F

## 2024-10-10 DIAGNOSIS — F17.210 CIGARETTE NICOTINE DEPENDENCE WITHOUT COMPLICATION: ICD-10-CM

## 2024-10-10 DIAGNOSIS — F41.9 ANXIETY: ICD-10-CM

## 2024-10-10 DIAGNOSIS — Z09 FOLLOW-UP EXAM: Primary | ICD-10-CM

## 2024-10-10 DIAGNOSIS — I51.7 CARDIOMEGALY: ICD-10-CM

## 2024-10-10 DIAGNOSIS — Z12.2 ENCOUNTER FOR SCREENING FOR LUNG CANCER: ICD-10-CM

## 2024-10-10 DIAGNOSIS — J81.0 ACUTE PULMONARY EDEMA: ICD-10-CM

## 2024-10-10 DIAGNOSIS — E87.6 LOW BLOOD POTASSIUM: ICD-10-CM

## 2024-10-10 PROBLEM — K80.20 GALLSTONE: Status: ACTIVE | Noted: 2019-04-29

## 2024-10-10 PROBLEM — M51.369 LUMBAR DEGENERATIVE DISC DISEASE: Status: ACTIVE | Noted: 2024-10-10

## 2024-10-10 PROBLEM — F17.200 NICOTINE DEPENDENCE: Status: ACTIVE | Noted: 2023-11-15

## 2024-10-10 PROBLEM — J96.01 ACUTE HYPOXIC RESPIRATORY FAILURE: Status: ACTIVE | Noted: 2024-09-28

## 2024-10-10 PROBLEM — I85.00 ESOPHAGEAL VARIX: Status: ACTIVE | Noted: 2018-03-05

## 2024-10-10 PROCEDURE — 1125F AMNT PAIN NOTED PAIN PRSNT: CPT | Performed by: NURSE PRACTITIONER

## 2024-10-10 PROCEDURE — 1159F MED LIST DOCD IN RCRD: CPT | Performed by: NURSE PRACTITIONER

## 2024-10-10 PROCEDURE — 3078F DIAST BP <80 MM HG: CPT | Performed by: NURSE PRACTITIONER

## 2024-10-10 PROCEDURE — 3075F SYST BP GE 130 - 139MM HG: CPT | Performed by: NURSE PRACTITIONER

## 2024-10-10 PROCEDURE — 99213 OFFICE O/P EST LOW 20 MIN: CPT | Performed by: NURSE PRACTITIONER

## 2024-10-10 PROCEDURE — 1160F RVW MEDS BY RX/DR IN RCRD: CPT | Performed by: NURSE PRACTITIONER

## 2024-10-10 PROCEDURE — 3051F HG A1C>EQUAL 7.0%<8.0%: CPT | Performed by: NURSE PRACTITIONER

## 2024-10-10 RX ORDER — FUROSEMIDE 20 MG
20 TABLET ORAL DAILY
Qty: 10 TABLET | Refills: 0 | Status: SHIPPED | OUTPATIENT
Start: 2024-10-10

## 2024-10-10 RX ORDER — SERTRALINE HYDROCHLORIDE 25 MG/1
25 TABLET, FILM COATED ORAL DAILY
Qty: 30 TABLET | Refills: 0 | Status: SHIPPED | OUTPATIENT
Start: 2024-10-10

## 2024-10-10 RX ORDER — POTASSIUM CHLORIDE 1500 MG/1
20 TABLET, EXTENDED RELEASE ORAL DAILY
Qty: 10 TABLET | Refills: 0 | Status: SHIPPED | OUTPATIENT
Start: 2024-10-10

## 2024-10-10 NOTE — PROGRESS NOTES
"Chief Complaint   Patient presents with    Hospital Follow Up Visit        Subjective   Della Gonzalez is a 64 y.o. female who presents today for the following     HPI   ER follow up. Patient went to ER for shortness of breath. She was discharged with lasix 20. Diagnosed with probable congestive heart failure with cardiomegaly and pulmonary edema.She states she is breathing better now that she is home. She states not being able to breath cause her to have anxiety and panic attacks. She has never been on any medication for anxiety/depression. She denies any suicidal or homicidal thoughts.     No Known Allergies      OBJECTIVE:  Vitals:    10/10/24 1254   BP: 137/79   BP Location: Left arm   Patient Position: Sitting   Cuff Size: Adult   Pulse: 65   Temp: 98.2 °F (36.8 °C)   SpO2: 95%   Weight: 81.6 kg (180 lb)   Height: 152.4 cm (60\")     Physical Exam  Vitals and nursing note reviewed.   Constitutional:       Appearance: Normal appearance.   Cardiovascular:      Rate and Rhythm: Normal rate and regular rhythm.   Pulmonary:      Breath sounds: Decreased breath sounds present. No wheezing or rhonchi.   Musculoskeletal:      Comments: Patient is using walker with seat.    Neurological:      Mental Status: She is alert and oriented to person, place, and time.   Psychiatric:         Mood and Affect: Mood normal.         Behavior: Behavior normal.         Thought Content: Thought content normal.                    ASSESSMENT/ PLAN:    Diagnoses and all orders for this visit:    1. Follow-up exam (Primary)    2. Encounter for screening for lung cancer  -     CT Chest Low Dose Wo; Future    3. Cardiomegaly    4. Acute pulmonary edema    5. Low blood potassium  -     Comprehensive Metabolic Panel; Future    6. Cigarette nicotine dependence without complication  -     CT Chest Low Dose Wo; Future    Other orders  -     furosemide (Lasix) 20 MG tablet; Take 1 tablet by mouth Daily.  Dispense: 10 tablet; Refill: 0  -     potassium " chloride (KLOR-CON M20) 20 MEQ CR tablet; Take 1 tablet by mouth Daily.  Dispense: 10 tablet; Refill: 0  -     sertraline (Zoloft) 25 MG tablet; Take 1 tablet by mouth Daily.  Dispense: 30 tablet; Refill: 0      Procedures       Follow-up: Return in about 5 days (around 10/15/2024) for for follow up on potassium .      Claudia Beverly, MELECIO 10/10/2024 13:28 CDT  This note was electronically signed.

## 2024-10-11 ENCOUNTER — TELEPHONE (OUTPATIENT)
Dept: CARDIOLOGY CLINIC | Age: 64
End: 2024-10-11

## 2024-10-11 NOTE — TELEPHONE ENCOUNTER
Called the patient and left a VM to give this patient he appt date and time. [facility] had called and needed the patient scheduled. We got disconnected and so I just called the patient myself. 10/11/24 AH

## 2024-10-15 ENCOUNTER — OFFICE VISIT (OUTPATIENT)
Dept: GASTROENTEROLOGY | Age: 64
End: 2024-10-15
Payer: MEDICARE

## 2024-10-15 VITALS
BODY MASS INDEX: 34.36 KG/M2 | HEIGHT: 60 IN | DIASTOLIC BLOOD PRESSURE: 80 MMHG | WEIGHT: 175 LBS | OXYGEN SATURATION: 97 % | SYSTOLIC BLOOD PRESSURE: 139 MMHG | HEART RATE: 68 BPM

## 2024-10-15 DIAGNOSIS — K74.60 CIRRHOSIS OF LIVER WITHOUT ASCITES, UNSPECIFIED HEPATIC CIRRHOSIS TYPE (HCC): ICD-10-CM

## 2024-10-15 DIAGNOSIS — K76.9 HEPATIC LESION: Primary | ICD-10-CM

## 2024-10-15 DIAGNOSIS — K76.9 HEPATIC LESION: ICD-10-CM

## 2024-10-15 LAB
AFP SERPL-MCNC: ABNORMAL NG/ML (ref 0–8.3)
ALBUMIN SERPL-MCNC: 4.1 G/DL (ref 3.5–5.2)
ALP SERPL-CCNC: 119 U/L (ref 35–104)
ALT SERPL-CCNC: 17 U/L (ref 5–33)
ANION GAP SERPL CALCULATED.3IONS-SCNC: 9 MMOL/L (ref 7–19)
AST SERPL-CCNC: 35 U/L (ref 5–32)
BILIRUB SERPL-MCNC: 0.7 MG/DL (ref 0.2–1.2)
BUN SERPL-MCNC: 15 MG/DL (ref 8–23)
CALCIUM SERPL-MCNC: 8.9 MG/DL (ref 8.8–10.2)
CHLORIDE SERPL-SCNC: 102 MMOL/L (ref 98–111)
CO2 SERPL-SCNC: 29 MMOL/L (ref 22–29)
CREAT SERPL-MCNC: 0.3 MG/DL (ref 0.5–0.9)
GLUCOSE SERPL-MCNC: 281 MG/DL (ref 70–99)
INR PPP: 1.18 (ref 0.88–1.18)
POTASSIUM SERPL-SCNC: 3.5 MMOL/L (ref 3.5–5)
PROT SERPL-MCNC: 7.2 G/DL (ref 6.4–8.3)
PROTHROMBIN TIME: 14.7 SEC (ref 12–14.6)
SODIUM SERPL-SCNC: 140 MMOL/L (ref 136–145)

## 2024-10-15 PROCEDURE — 3017F COLORECTAL CA SCREEN DOC REV: CPT | Performed by: NURSE PRACTITIONER

## 2024-10-15 PROCEDURE — 99214 OFFICE O/P EST MOD 30 MIN: CPT | Performed by: NURSE PRACTITIONER

## 2024-10-15 PROCEDURE — 3079F DIAST BP 80-89 MM HG: CPT | Performed by: NURSE PRACTITIONER

## 2024-10-15 PROCEDURE — G8427 DOCREV CUR MEDS BY ELIG CLIN: HCPCS | Performed by: NURSE PRACTITIONER

## 2024-10-15 PROCEDURE — 4004F PT TOBACCO SCREEN RCVD TLK: CPT | Performed by: NURSE PRACTITIONER

## 2024-10-15 PROCEDURE — G8417 CALC BMI ABV UP PARAM F/U: HCPCS | Performed by: NURSE PRACTITIONER

## 2024-10-15 PROCEDURE — G8400 PT W/DXA NO RESULTS DOC: HCPCS | Performed by: NURSE PRACTITIONER

## 2024-10-15 PROCEDURE — 3075F SYST BP GE 130 - 139MM HG: CPT | Performed by: NURSE PRACTITIONER

## 2024-10-15 PROCEDURE — 1090F PRES/ABSN URINE INCON ASSESS: CPT | Performed by: NURSE PRACTITIONER

## 2024-10-15 PROCEDURE — 1123F ACP DISCUSS/DSCN MKR DOCD: CPT | Performed by: NURSE PRACTITIONER

## 2024-10-15 PROCEDURE — G8484 FLU IMMUNIZE NO ADMIN: HCPCS | Performed by: NURSE PRACTITIONER

## 2024-10-15 RX ORDER — POTASSIUM CHLORIDE 1500 MG/1
20 TABLET, EXTENDED RELEASE ORAL DAILY
COMMUNITY
Start: 2024-10-10

## 2024-10-15 ASSESSMENT — ENCOUNTER SYMPTOMS
CONSTIPATION: 0
ANAL BLEEDING: 0
CHOKING: 0
BLOOD IN STOOL: 0
COUGH: 0
DIARRHEA: 0
ABDOMINAL PAIN: 1
TROUBLE SWALLOWING: 0
SHORTNESS OF BREATH: 1
VOMITING: 0
NAUSEA: 0
RECTAL PAIN: 0
ABDOMINAL DISTENTION: 0

## 2024-10-15 NOTE — PROGRESS NOTES
TRANSORAL BIOPSY SINGLE/MULTIPLE N/A 03/23/2018    Dr LUIS FELIPE Torres-Grade I esophageal varices, erosive/active gastritis-2 yr recall    UPPER GASTROINTESTINAL ENDOSCOPY  03/02/2016    Dr Perkins-gr II esoph varices; portal hyptensive gastropathy    UPPER GASTROINTESTINAL ENDOSCOPY N/A 03/03/2020    Dr LUIS FELIPE Torres-w/variceal banding x 4, 3 columns of Grade II varices, repeat in 6-8 wks    UPPER GASTROINTESTINAL ENDOSCOPY N/A 04/14/2020    Dr LUIS FELIPE Torres-w/variceal banding x 3-grade I-II varices, repeat in 8 wks    UPPER GASTROINTESTINAL ENDOSCOPY N/A 08/25/2020    Dr LUIS FELIPE Torres-w/variceal banding x 3, grade 1-2 varices, portal hypertensive gastropathy, repeat in 3 months    UPPER GASTROINTESTINAL ENDOSCOPY N/A 12/04/2020    Dr LUIS FELIPE Torres-marisa/variceal banding x 5-Grade II varices, portal hypertensive gastropathy, repeat in 3 months    UPPER GASTROINTESTINAL ENDOSCOPY N/A 03/09/2021    Dr LUIS FELIPE Torres-Changes of previous banding seen, no varices, portal hypertensive gastropathy, repeat in 6 months    UPPER GASTROINTESTINAL ENDOSCOPY N/A 04/29/2022    Dr LUIS FELIPE Torres-w/variceal banding-Small esophageal varices, portal hypertensive gastropathy, 6 month recall    UPPER GASTROINTESTINAL ENDOSCOPY N/A 11/10/2023    Dr LUIS FELIPE Torres-Scarring noted from previous banding procedures, gastritis    US GUIDED LIVER BIOPSY PERCUTANEOUS  05/07/2019    Dr. Cox; Grade 2, Stage 4, iron stain (-)       Family History   Adopted: Yes   Problem Relation Age of Onset    Liver Cancer Neg Hx     Liver Disease Neg Hx     Stomach Cancer Neg Hx     Rectal Cancer Neg Hx     Esophageal Cancer Neg Hx     Colon Cancer Neg Hx     Colon Polyps Neg Hx        Social History     Socioeconomic History    Marital status: Single   Tobacco Use    Smoking status: Every Day     Current packs/day: 0.50     Average packs/day: 0.5 packs/day for 45.0 years (22.5 ttl pk-yrs)     Types: Cigarettes    Smokeless tobacco: Never   Vaping Use    Vaping status: Never Used   Substance and Sexual Activity

## 2024-10-16 ASSESSMENT — ENCOUNTER SYMPTOMS
NAUSEA: 0
VOMITING: 0
CHEST TIGHTNESS: 1
COLOR CHANGE: 0
BACK PAIN: 0
RHINORRHEA: 0
WHEEZING: 1
SHORTNESS OF BREATH: 1
COUGH: 1
ABDOMINAL PAIN: 0

## 2024-10-17 ENCOUNTER — OFFICE VISIT (OUTPATIENT)
Dept: CARDIOLOGY CLINIC | Age: 64
End: 2024-10-17
Payer: MEDICARE

## 2024-10-17 VITALS
HEIGHT: 60 IN | BODY MASS INDEX: 34.95 KG/M2 | DIASTOLIC BLOOD PRESSURE: 88 MMHG | HEART RATE: 62 BPM | WEIGHT: 178 LBS | SYSTOLIC BLOOD PRESSURE: 126 MMHG

## 2024-10-17 DIAGNOSIS — R06.02 SHORTNESS OF BREATH: Primary | ICD-10-CM

## 2024-10-17 DIAGNOSIS — I10 ESSENTIAL HYPERTENSION: Primary | ICD-10-CM

## 2024-10-17 DIAGNOSIS — E78.2 MIXED HYPERLIPIDEMIA: ICD-10-CM

## 2024-10-17 PROCEDURE — 99204 OFFICE O/P NEW MOD 45 MIN: CPT | Performed by: INTERNAL MEDICINE

## 2024-10-17 PROCEDURE — G8400 PT W/DXA NO RESULTS DOC: HCPCS | Performed by: INTERNAL MEDICINE

## 2024-10-17 PROCEDURE — 3017F COLORECTAL CA SCREEN DOC REV: CPT | Performed by: INTERNAL MEDICINE

## 2024-10-17 PROCEDURE — 3079F DIAST BP 80-89 MM HG: CPT | Performed by: INTERNAL MEDICINE

## 2024-10-17 PROCEDURE — G8417 CALC BMI ABV UP PARAM F/U: HCPCS | Performed by: INTERNAL MEDICINE

## 2024-10-17 PROCEDURE — 1123F ACP DISCUSS/DSCN MKR DOCD: CPT | Performed by: INTERNAL MEDICINE

## 2024-10-17 PROCEDURE — G8484 FLU IMMUNIZE NO ADMIN: HCPCS | Performed by: INTERNAL MEDICINE

## 2024-10-17 PROCEDURE — G8427 DOCREV CUR MEDS BY ELIG CLIN: HCPCS | Performed by: INTERNAL MEDICINE

## 2024-10-17 PROCEDURE — 3074F SYST BP LT 130 MM HG: CPT | Performed by: INTERNAL MEDICINE

## 2024-10-17 PROCEDURE — 1090F PRES/ABSN URINE INCON ASSESS: CPT | Performed by: INTERNAL MEDICINE

## 2024-10-17 PROCEDURE — 4004F PT TOBACCO SCREEN RCVD TLK: CPT | Performed by: INTERNAL MEDICINE

## 2024-10-17 ASSESSMENT — ENCOUNTER SYMPTOMS
VOMITING: 0
SHORTNESS OF BREATH: 0
RESPIRATORY NEGATIVE: 1
GASTROINTESTINAL NEGATIVE: 1
DIARRHEA: 0
NAUSEA: 0
EYES NEGATIVE: 1

## 2024-10-17 NOTE — PROGRESS NOTES
[facility] Progress Note                            Date:  10/17/2024  Patient: Izzy Stokes  Age:  64 y.o., 1960      Reason for evaluation:         SUBJECTIVE:    Seen today new patient assessment recent emergency department visit for shortness of breath chest x-ray showed cardiomegaly and evidence of congestive heart failure.  proBNP 1132 no prior cardiac history no prior stress test or invasive assessment.  Has occasional chest discomfort with activities that she has been having for quite some time nothing particularly worse recently.  High-sensitivity troponin was 12 on 10/8/2024.  Echocardiogram 9/30/2024 showed ejection fraction 65 to 70% mild concentric hypertrophy mild TR no other abnormalities noted.  Here for further assessment.  Feeling improved since placed on additional medications.    Review of Systems   Constitutional: Negative.  Negative for chills, fever and unexpected weight change.   HENT: Negative.     Eyes: Negative.    Respiratory: Negative.  Negative for shortness of breath.    Cardiovascular: Negative.  Negative for chest pain.   Gastrointestinal: Negative.  Negative for diarrhea, nausea and vomiting.   Endocrine: Negative.    Genitourinary: Negative.    Musculoskeletal: Negative.    Skin: Negative.    Neurological: Negative.    All other systems reviewed and are negative.        OBJECTIVE:    /88   Pulse 62   Ht 1.524 m (5')   Wt 80.7 kg (178 lb)   BMI 34.76 kg/m²     Labs:   CBC: No results for input(s): \"WBC\", \"HGB\", \"HCT\", \"PLT\" in the last 72 hours.  BMP:  Recent Labs     10/15/24  1457      K 3.5   CO2 29   BUN 15   CREATININE 0.3*   LABGLOM >90   GLUCOSE 281*     BNP: No results for input(s): \"BNP\" in the last 72 hours.  PT/INR:   Recent Labs     10/15/24  1457   PROTIME 14.7*   INR 1.18     APTT:No results for input(s): \"APTT\" in the last 72 hours.  CARDIAC ENZYMES:No results for input(s): \"CKTOTAL\", \"CKMB\", \"CKMBINDEX\", \"TROPONINI\" in the last

## 2024-10-22 ENCOUNTER — TELEPHONE (OUTPATIENT)
Dept: GASTROENTEROLOGY | Age: 64
End: 2024-10-22

## 2024-10-22 NOTE — TELEPHONE ENCOUNTER
----- Message from Anna PECK sent at 10/16/2024 12:47 PM CDT -----  Pt's liver tumor marker is severely elevated.  Please forward results to her provider at [facility].  Continue with scheduled MRI.  Pt needs follow up with Hepatology         10.22.24  Tried to call patient and did not answer. Left voicemail for patient to call back.        Have tried multiply times to reach patient and have been unsuccessful. Have sent letter to patient and my chart message        Routed to Anna BREWER

## 2024-10-25 ENCOUNTER — ANESTHESIA EVENT (OUTPATIENT)
Dept: ENDOSCOPY | Age: 64
End: 2024-10-25
Payer: MEDICARE

## 2024-10-25 ENCOUNTER — ANCILLARY PROCEDURE (OUTPATIENT)
Dept: ENDOSCOPY | Age: 64
End: 2024-10-25
Attending: INTERNAL MEDICINE
Payer: MEDICARE

## 2024-10-25 ENCOUNTER — TELEPHONE (OUTPATIENT)
Dept: GASTROENTEROLOGY | Age: 64
End: 2024-10-25

## 2024-10-25 ENCOUNTER — HOSPITAL ENCOUNTER (OUTPATIENT)
Age: 64
Setting detail: OUTPATIENT SURGERY
Discharge: HOME | End: 2024-10-25
Attending: INTERNAL MEDICINE | Admitting: INTERNAL MEDICINE
Payer: MEDICARE

## 2024-10-25 ENCOUNTER — ANESTHESIA (OUTPATIENT)
Dept: ENDOSCOPY | Age: 64
End: 2024-10-25
Payer: MEDICARE

## 2024-10-25 VITALS
RESPIRATION RATE: 16 BRPM | SYSTOLIC BLOOD PRESSURE: 213 MMHG | WEIGHT: 175 LBS | HEART RATE: 62 BPM | DIASTOLIC BLOOD PRESSURE: 94 MMHG | OXYGEN SATURATION: 96 % | BODY MASS INDEX: 34.36 KG/M2 | HEIGHT: 60 IN | TEMPERATURE: 97.9 F

## 2024-10-25 DIAGNOSIS — Z87.19 HX OF ESOPHAGEAL VARICES: ICD-10-CM

## 2024-10-25 LAB
GLUCOSE BLD-MCNC: 183 MG/DL (ref 70–99)
PERFORMED ON: ABNORMAL

## 2024-10-25 PROCEDURE — 43239 EGD BIOPSY SINGLE/MULTIPLE: CPT | Performed by: INTERNAL MEDICINE

## 2024-10-25 PROCEDURE — 3700000001 HC ADD 15 MINUTES (ANESTHESIA): Performed by: INTERNAL MEDICINE

## 2024-10-25 PROCEDURE — 88305 TISSUE EXAM BY PATHOLOGIST: CPT

## 2024-10-25 PROCEDURE — 7100000011 HC PHASE II RECOVERY - ADDTL 15 MIN: Performed by: INTERNAL MEDICINE

## 2024-10-25 PROCEDURE — 82962 GLUCOSE BLOOD TEST: CPT

## 2024-10-25 PROCEDURE — 2500000003 HC RX 250 WO HCPCS: Performed by: NURSE ANESTHETIST, CERTIFIED REGISTERED

## 2024-10-25 PROCEDURE — 3700000000 HC ANESTHESIA ATTENDED CARE: Performed by: INTERNAL MEDICINE

## 2024-10-25 PROCEDURE — 88342 IMHCHEM/IMCYTCHM 1ST ANTB: CPT

## 2024-10-25 PROCEDURE — 2580000003 HC RX 258: Performed by: INTERNAL MEDICINE

## 2024-10-25 PROCEDURE — 3609012400 HC EGD TRANSORAL BIOPSY SINGLE/MULTIPLE: Performed by: INTERNAL MEDICINE

## 2024-10-25 PROCEDURE — 6360000002 HC RX W HCPCS: Performed by: ANESTHESIOLOGY

## 2024-10-25 PROCEDURE — 2709999900 HC NON-CHARGEABLE SUPPLY: Performed by: INTERNAL MEDICINE

## 2024-10-25 PROCEDURE — 6360000002 HC RX W HCPCS: Performed by: NURSE ANESTHETIST, CERTIFIED REGISTERED

## 2024-10-25 PROCEDURE — 2580000003 HC RX 258: Performed by: NURSE ANESTHETIST, CERTIFIED REGISTERED

## 2024-10-25 PROCEDURE — 7100000010 HC PHASE II RECOVERY - FIRST 15 MIN: Performed by: INTERNAL MEDICINE

## 2024-10-25 RX ORDER — DOXEPIN HYDROCHLORIDE 25 MG/1
CAPSULE ORAL NIGHTLY
COMMUNITY

## 2024-10-25 RX ORDER — HYDRALAZINE HYDROCHLORIDE 20 MG/ML
5 INJECTION INTRAMUSCULAR; INTRAVENOUS ONCE
Status: COMPLETED | OUTPATIENT
Start: 2024-10-25 | End: 2024-10-25

## 2024-10-25 RX ORDER — LIDOCAINE HYDROCHLORIDE 10 MG/ML
INJECTION, SOLUTION INFILTRATION; PERINEURAL
Status: DISCONTINUED | OUTPATIENT
Start: 2024-10-25 | End: 2024-10-25 | Stop reason: SDUPTHER

## 2024-10-25 RX ORDER — SODIUM CHLORIDE, SODIUM LACTATE, POTASSIUM CHLORIDE, CALCIUM CHLORIDE 600; 310; 30; 20 MG/100ML; MG/100ML; MG/100ML; MG/100ML
INJECTION, SOLUTION INTRAVENOUS
Status: DISCONTINUED | OUTPATIENT
Start: 2024-10-25 | End: 2024-10-25 | Stop reason: SDUPTHER

## 2024-10-25 RX ORDER — SODIUM CHLORIDE 0.9 % (FLUSH) 0.9 %
5-40 SYRINGE (ML) INJECTION 2 TIMES DAILY
Status: DISCONTINUED | OUTPATIENT
Start: 2024-10-25 | End: 2024-10-25 | Stop reason: HOSPADM

## 2024-10-25 RX ORDER — TRAZODONE HYDROCHLORIDE 50 MG/1
50 TABLET, FILM COATED ORAL NIGHTLY
COMMUNITY

## 2024-10-25 RX ORDER — PROPOFOL 10 MG/ML
INJECTION, EMULSION INTRAVENOUS
Status: DISCONTINUED | OUTPATIENT
Start: 2024-10-25 | End: 2024-10-25 | Stop reason: SDUPTHER

## 2024-10-25 RX ADMIN — LIDOCAINE HYDROCHLORIDE 50 MG: 10 INJECTION, SOLUTION INFILTRATION; PERINEURAL at 13:29

## 2024-10-25 RX ADMIN — PROPOFOL 20 MG: 10 INJECTION, EMULSION INTRAVENOUS at 13:30

## 2024-10-25 RX ADMIN — HYDRALAZINE HYDROCHLORIDE 5 MG: 20 INJECTION INTRAMUSCULAR; INTRAVENOUS at 14:32

## 2024-10-25 RX ADMIN — SODIUM CHLORIDE, SODIUM LACTATE, POTASSIUM CHLORIDE, AND CALCIUM CHLORIDE: 600; 310; 30; 20 INJECTION, SOLUTION INTRAVENOUS at 13:23

## 2024-10-25 RX ADMIN — PROPOFOL 80 MG: 10 INJECTION, EMULSION INTRAVENOUS at 13:29

## 2024-10-25 RX ADMIN — PROPOFOL 20 MG: 10 INJECTION, EMULSION INTRAVENOUS at 13:31

## 2024-10-25 RX ADMIN — SODIUM CHLORIDE, PRESERVATIVE FREE 10 ML: 5 INJECTION INTRAVENOUS at 11:37

## 2024-10-25 ASSESSMENT — PAIN - FUNCTIONAL ASSESSMENT: PAIN_FUNCTIONAL_ASSESSMENT: 0-10

## 2024-10-25 ASSESSMENT — LIFESTYLE VARIABLES: SMOKING_STATUS: 1

## 2024-10-25 NOTE — DISCHARGE INSTRUCTIONS
RECOMMENDATIONS:    1.  Await path results  2.  Repeat EGD in 1 yr  3.  Continue current meds.     Upper GI Endoscopy: What to Expect at Home  Your Recovery  You had an upper GI endoscopy. Your doctor used a thin, lighted tube that bends to look at the inside of your esophagus, your stomach, and the first part of the small intestine, called the duodenum.    How can you care for yourself at home?  Activity   Rest as much as you need to after you go home.  You should be able to go back to your usual activities the day after the test.  Due to anesthesia, no driving or operating equipment for 24 hours.  Diet   Follow your doctor's directions for eating after the test.  Drink plenty of fluids (unless your doctor has told you not to).  Medications   If you have a sore throat the day after the test, use an over-the-counter spray to numb your throat.  When should you call for help?   Call 911 anytime you think you may need emergency care. For example, call if:    You passed out (lost consciousness).     You have trouble breathing.     You pass maroon or bloody stools.   Call your doctor now or seek immediate medical care if:    You have pain that does not get better after your take pain medicine.     You have new or worse belly pain.     You have blood in your stools.     You are sick to your stomach and cannot keep fluids down.     You have a fever.     You cannot pass stools or gas.   Watch closely for changes in your health, and be sure to contact your doctor if:    Your throat still hurts after a day or two.     You do not get better as expected.

## 2024-10-25 NOTE — OP NOTE
Endoscopic Procedure Note    Patient: Izzy Stokes : 1960  Med Rec#: [id number] Acc#: [id number]     Primary Care Provider Magaly Carver APRN - CNP  Referring Provider:     Endoscopist: Steve Torres MD    Date of Procedure:  10/25/2024    Procedure:   1. EGD with biopsy    Indications:   1. Known portal hypertension with h/o variceal banding in the past.   2. Loose stools.     Anesthesia:  Sedation was administered by anesthesia who monitored the patient during the procedure.    Estimated Blood Loss: minimal    Procedure:   After reviewing the patient's chart and obtaining informed consent, the patient was placed in the left lateral decubitus position.  A forward-viewing Olympus endoscope was lubricated and inserted through the mouth into the oropharynx. Under direct visualization, the upper esophagus was intubated. The scope was advanced to the level of the third portion of duodenum. Scope was slowly withdrawn with careful inspection of the mucosal surfaces. The scope was retroflexed for inspection of the gastric fundus and incisura. Findings and maneuvers are listed in impression below. The patient tolerated the procedure well. The scope was removed. There were no immediate complications.    Findings:   Esophagus: abnormal: in the distal esophagus there appears to be evidence of previous banding. Small grade I varices noted. No high risk stigmata.   There is no hiatal hernia present.      Stomach:  abnormal: mild granular mucosal changes suggestive of gastritis noted -  Gastric biopsies were taken from the antrum and body to rule out Helicobacter pylori infection.      Duodenum: abnormal: there are diffuse white granularity throughout the duodenum. Due to history of loose stools, multiple biopsies obtained for histology and to r/o sprue.       IMPRESSION:  1. Gastritis   2. Grade I varices      RECOMMENDATIONS:    1.  Await path results  2.  Repeat EGD in 1 yr  3.  Continue current meds.     The

## 2024-10-25 NOTE — ANESTHESIA PRE PROCEDURE
Department of Anesthesiology  Preprocedure Note       Name:  Izzy Stokes   Age:  64 y.o.  :  1960                                          MRN:  [id number]         Date:  10/25/2024      Surgeon: Surgeon(s):  Steve Torres MD    Procedure: Procedure(s):  EGD ESOPHAGOGASTRODUODENOSCOPY    Medications prior to admission:   Prior to Admission medications    Medication Sig Start Date End Date Taking? Authorizing Provider   traZODone (DESYREL) 50 MG tablet Take 1 tablet by mouth nightly   Yes Provider, MD Edvin   doxepin (SINEQUAN) 25 MG capsule Take by mouth nightly   Yes Provider, MD Edvin   furosemide (LASIX) 20 MG tablet Take 1 tablet by mouth daily 10/8/24  Yes Katie Howell MD   pioglitazone (ACTOS) 30 MG tablet Take 1 tablet by mouth daily 24  Yes Provider, MD Edvin   SUMAtriptan (IMITREX) 100 MG tablet Take one tablet at onset of headache. May repeat dose one time in 2 hours if headache not relieved. 23  Yes Provider, MD Edvin   cetirizine (ZYRTEC) 10 MG tablet Take 1 tablet by mouth daily   Yes Provider, Historical, MD   levothyroxine (SYNTHROID) 50 MCG tablet Take 1 tablet by mouth Daily   Yes Provider, Historical, MD   linagliptin (TRADJENTA) 5 MG tablet Take 1 tablet by mouth daily   Yes Provider, Historical, MD   ondansetron (ZOFRAN) 4 MG tablet Take 1 tablet by mouth every 8 hours as needed for Nausea 20  Yes Steve oTrres MD   nadolol (CORGARD) 20 MG tablet TAKE ONE TABLET BY MOUTH EVERY DAY -- NEED APPOINTMENT 10/7/20  Yes Anna Hess APRN - NP   omeprazole (PRILOSEC) 20 MG delayed release capsule TAKE ONE CAPSULE BY MOUTH EVERY DAY 10/7/20  Yes Anna Hess APRN - NP   atorvastatin (LIPITOR) 10 MG tablet Take 1 tablet by mouth daily   Yes ProviderEdvin MD   Ertugliflozin L-PyroglutamicAc (STEGLATRO) 5 MG TABS Take 5 mg by mouth daily  19  Yes ProviderEdvin MD   benazepril (LOTENSIN) 20 MG tablet Take 1 tablet by mouth

## 2024-10-25 NOTE — H&P
Patient Name: Izzy Stokes  : 1960  MRN: [id number]  DATE: 10/25/24    Allergies: No Known Allergies     ENDOSCOPY  History and Physical    Procedure:    [] Diagnostic Colonoscopy       [] Screening Colonoscopy  [x] EGD      [] ERCP      [] EUS       [] Other    [x] Previous office notes/History and Physical reviewed from the patients chart. Please see EMR for further details of HPI. I have examined the patient's status immediately prior to the procedure and:      Indications/HPI:    []Abdominal Pain   []Barretts  []Screening/Surveillance   []History of Polyps  []Dysphagia            [] +Cologard/DNA testing  []Abnormal Imaging              []EOE Hx              [] Family Hx of CRC/Polyps  []Anemia                            []Food Impaction       []Recent Poor Prep  []GI Bleed             []Lymphadenopathy  []History of Polyps  []Change in bowel habits []Heartburn/Reflux  []Cancer- GI/Lung  []Chest Pain - Non Cardiac []Heme (+) Stool []Ulcers  []Constipation  []Hemoptysis  []Incontinence    []Diarrhea  []Hypoxemia  []Rectal Bleed (BRBPR)  []Nausea/Vomiting   [x] Varices  []Crohns/Colitis  []Pancreatic Cyst   [] Cirrhosis   []Pancreatitis    []Abnormal MRCP  []Elevated LFT [] Stent Removal, Previous ERCP  []Other:     Anesthesia:   [x] MAC [] Moderate Sedation   [] General   [] None     ROS: 12 pt Review of Symptoms was negative unless mentioned above    Medications:   Prior to Admission medications    Medication Sig Start Date End Date Taking? Authorizing Provider   traZODone (DESYREL) 50 MG tablet Take 1 tablet by mouth nightly   Yes Edvin Welsh MD   doxepin (SINEQUAN) 25 MG capsule Take by mouth nightly   Yes Edvin Welsh MD   furosemide (LASIX) 20 MG tablet Take 1 tablet by mouth daily 10/8/24  Yes Katie Howell MD   pioglitazone (ACTOS) 30 MG tablet Take 1 tablet by mouth daily 24  Yes Edvin Welsh MD   SUMAtriptan (IMITREX) 100 MG tablet Take one tablet at  2014?    Drug use: No     Types: Cocaine     Comment: Stopped in  2014?- Pt did not have to do any rehab       Vital Signs:   Vitals:    10/25/24 1115   BP: (!) 173/67   Pulse: 70   Resp: 20   Temp: 97.1 °F (36.2 °C)   SpO2: 95%        Physical Exam:  Cardiac:  [x]WNL  []Comments:  Pulmonary:  [x]WNL   []Comments:  Neuro/Mental Status:  [x]WNL  []Comments:  Abdominal:  [x]WNL    []Comments:  Other:   []WNL  []Comments:    Informed Consent:  The risks and benefits of the procedure have been discussed with either the patient or if they cannot consent, their representative.    Assessment:  Patient examined and appropriate for planned sedation and procedure.     Plan:  Proceed with planned sedation and procedure as above.         Steve Torres MD

## 2024-10-25 NOTE — TELEPHONE ENCOUNTER
Pt called asking if Anna could place an order or prescribe something to help her get through the MRI on 10/29 due to be claustrophobic. Please contact pt to discuss thank you.

## 2024-10-25 NOTE — ANESTHESIA POSTPROCEDURE EVALUATION
Department of Anesthesiology  Postprocedure Note    Patient: Izzy Stokes  MRN: [id number]  YOB: 1960  Date of evaluation: 10/25/2024    Procedure Summary       Date: 10/25/24 Room / Location: [place] [address] / [facility]    Anesthesia Start: 1323 Anesthesia Stop: 1336    Procedure: ESOPHAGOGASTRODUODENOSCOPY BIOPSY Diagnosis:       Hx of esophageal varices      (Hx of esophageal varices [Z87.19])    Surgeons: Steve Torres MD Responsible Provider: Gian Hassan APRN - CRNA    Anesthesia Type: general, TIVA ASA Status: 3            Anesthesia Type: No value filed.    Daniele Phase I: Daniele Score: 10    Daniele Phase II:      Anesthesia Post Evaluation    Patient location during evaluation: PACU  Patient participation: complete - patient participated  Level of consciousness: sleepy but conscious  Pain score: 0  Airway patency: patent  Nausea & Vomiting: no nausea and no vomiting  Cardiovascular status: blood pressure returned to baseline  Respiratory status: acceptable  Pain management: adequate        No notable events documented.

## 2024-10-29 ENCOUNTER — HOSPITAL ENCOUNTER (OUTPATIENT)
Dept: MRI IMAGING | Age: 64
Discharge: HOME | End: 2024-10-29
Payer: MEDICARE

## 2024-10-29 DIAGNOSIS — K76.9 HEPATIC LESION: ICD-10-CM

## 2024-10-29 LAB
CREAT SERPL-MCNC: 0.3 MG/DL (ref 0.3–1.3)
PERFORMED ON: NORMAL

## 2024-10-29 PROCEDURE — 82565 ASSAY OF CREATININE: CPT

## 2024-10-29 PROCEDURE — A9581 GADOXETATE DISODIUM INJ: HCPCS | Performed by: NURSE PRACTITIONER

## 2024-10-29 PROCEDURE — 74183 MRI ABD W/O CNTR FLWD CNTR: CPT

## 2024-10-29 PROCEDURE — 6360000004 HC RX CONTRAST MEDICATION: Performed by: NURSE PRACTITIONER

## 2024-10-29 RX ADMIN — GADOXETATE DISODIUM 10 ML: 181.43 INJECTION, SOLUTION INTRAVENOUS at 16:12

## 2024-10-30 DIAGNOSIS — E03.9 HYPOTHYROIDISM, UNSPECIFIED TYPE: ICD-10-CM

## 2024-10-30 RX ORDER — LEVOTHYROXINE SODIUM 50 UG/1
50 TABLET ORAL DAILY
Qty: 90 TABLET | Refills: 1 | Status: SHIPPED | OUTPATIENT
Start: 2024-10-30

## 2024-10-30 RX ORDER — LEVOTHYROXINE SODIUM 50 UG/1
50 TABLET ORAL DAILY
Qty: 90 TABLET | Refills: 1 | OUTPATIENT
Start: 2024-10-30

## 2024-10-30 NOTE — TELEPHONE ENCOUNTER
Caller: Della Gonzalez    Relationship: Self    Best call back number: [phone]     Requested Prescriptions:   Requested Prescriptions     Pending Prescriptions Disp Refills    levothyroxine (SYNTHROID, LEVOTHROID) 50 MCG tablet 90 tablet 1     Sig: Take 1 tablet by mouth Daily.        Pharmacy where request should be sent: [facility] [address] [facility] [phone] [facility] [phone]      Last office visit with prescribing clinician: 8/14/2024   Last telemedicine visit with prescribing clinician: Visit date not found   Next office visit with prescribing clinician: 11/18/2024     Additional details provided by patient: OUT    Does the patient have less than a 3 day supply:  [x] Yes  [] No    Would you like a call back once the refill request has been completed: [] Yes [x] No    If the office needs to give you a call back, can they leave a voicemail: [] Yes [x] No    Keiry Steele Rep   10/30/24 12:25 CDT

## 2024-11-04 ENCOUNTER — TELEPHONE (OUTPATIENT)
Dept: GASTROENTEROLOGY | Age: 64
End: 2024-11-04

## 2024-11-04 DIAGNOSIS — F41.9 ANXIETY: ICD-10-CM

## 2024-11-04 DIAGNOSIS — C22.0 HEPATIC CARCINOMA (HCC): Primary | ICD-10-CM

## 2024-11-04 RX ORDER — SERTRALINE HYDROCHLORIDE 25 MG/1
25 TABLET, FILM COATED ORAL DAILY
Qty: 30 TABLET | Refills: 5 | Status: SHIPPED | OUTPATIENT
Start: 2024-11-04

## 2024-11-04 NOTE — TELEPHONE ENCOUNTER
11- Called and LVM for patient of recommendations as per JG APRN and that she needed to be seen at a Tertiary care Center.  Also [facility].       Advised that she needed to answer her phone as that they will not continuously make as many calls as what I have trying to reach her for an apt or discuss results.            Called patient back again-LVM regarding Tertiary care needed and referral advised that she needed to answer her phone as that they will not continuously call and leave messages for her     Routed to JG APRN

## 2024-11-04 NOTE — TELEPHONE ENCOUNTER
Rx Refill Note  Requested Prescriptions     Pending Prescriptions Disp Refills    sertraline (ZOLOFT) 25 MG tablet [Pharmacy Med Name: SERTRALINE HCL 25MG TABS] 30 tablet 0     Sig: TAKE ONE TABLET BY MOUTH EVERY DAY        Cyn Still MA  11/04/24, 12:28 CST

## 2024-11-04 NOTE — TELEPHONE ENCOUNTER
----- Message from Anna PECK sent at 10/30/2024  3:46 PM CDT -----  Large hepatic mass seen, please send results on to her providers at [facility] as well please as urgent          11- 11- Called and notified patient of results and recommendations as per J APRN     Advised that results as been sent to her PCP BIBI Carver and [facility]/Dr Haris Sierra       She stated that the issues is that she doesn't have transportation to [place] and questioned if there was anything that Dr Torres or a local Doctor could do?     She is going to go ahead and call [facility] and see what they recommended.     Routed to JG APRN       11-04-24 Called patient back LVM that if she does have to travel to [place] she might want to reach out to either   [facility] Transportation -[phone]  [facility] Transportation [phone]

## 2024-11-04 NOTE — TELEPHONE ENCOUNTER
Unfortunately, this needs to be evaluated/treated at a tertiary care center.  I can get her into see Oncology here at [facility] for evaluation as well.

## 2024-11-05 ENCOUNTER — HOSPITAL ENCOUNTER (OUTPATIENT)
Dept: MRI IMAGING | Age: 64
Discharge: HOME | End: 2024-11-05
Payer: MEDICARE

## 2024-11-05 DIAGNOSIS — M47.816 LUMBAR SPONDYLOSIS: ICD-10-CM

## 2024-11-05 DIAGNOSIS — M54.16 LUMBAR RADICULOPATHY: ICD-10-CM

## 2024-11-05 DIAGNOSIS — F17.210 CIGARETTE NICOTINE DEPENDENCE, UNCOMPLICATED: ICD-10-CM

## 2024-11-05 PROCEDURE — 72148 MRI LUMBAR SPINE W/O DYE: CPT

## 2024-11-17 NOTE — PROGRESS NOTES
INITIAL ONCOLOGY CONSULTATION     Patient:  Izzy Stokes  YOB: 1960  Date of Service: 11/20/2024  MRN: [id number]   Primary Care Physician: Magaly Carver APRN - CNP  Advance Directive:  No   Referring Provider: Onur Leong MD        Subjective:     Izzy Stokes is a 64 y.o. female  referred by Anna Ortega with a diagnosis of hepatocellular carcinoma for management.      Secondary diagnoses managed by PCP - Magaly BREWER  [place] include:  T2DM with diabetic polyneuropathy   COPD , RUL nodules , smoker   CHF - cardiomegaly and pulmonary edema -  Patient of Dr Wilburn 10/17/2024  ETOH related cirrhosis - [facility] Anna BREWER      TUMOR HISTORY: Hepatocellular carcinoma  Izzy was seen in initial consultation on 11/20/2024, referred by Anna Ortega with a diagnosis of hepatocellular carcinoma for management.     Izzy has history of hepatic cirrhosis with ascites and esophageal varices managed by Anna LEE.    She was referred to Hepatology December 2023 for a hepatic lesion and elevated AFP with delays in getting evaluated.          US RIGHT UPPER QUADRANT 10/23/2023 at [facility], compared to 02/23/2023  The liver is heterogeneous and nodular, consistent with cirrhosis with stigmata of portal hypertension.  Gallstones are identified.  The gallbladder wall measures within normal limits.  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  Spleen measures 13.6 cm    MRI ABDOMEN WITH AND WITHOUT CONTRAST on 11/28/2023 at [facility], compared to Ultrasound dated 10/23/2023  8.5 mm tiny lesion in segment eight which could represent a dysplastic nodule versus small hepatoma, LR 3 lesion.  Splenomegaly 12.3 cm.       LIMITED ABDOMINAL ULTRASOUND. 5/11/2024 at [facility], compared to 11/28/2023 MRI, 10/23/2023   Cirrhotic liver morphology.  No hepatic lesions identified.  The hepatic

## 2024-11-18 ENCOUNTER — TELEPHONE (OUTPATIENT)
Dept: HEMATOLOGY | Age: 64
End: 2024-11-18

## 2024-11-18 ENCOUNTER — TELEPHONE (OUTPATIENT)
Dept: FAMILY MEDICINE CLINIC | Facility: CLINIC | Age: 64
End: 2024-11-18

## 2024-11-18 DIAGNOSIS — F17.210 CIGARETTE NICOTINE DEPENDENCE WITHOUT COMPLICATION: Primary | ICD-10-CM

## 2024-11-18 DIAGNOSIS — R91.1 PULMONARY NODULE LESS THAN 6 MM DETERMINED BY COMPUTED TOMOGRAPHY OF LUNG: ICD-10-CM

## 2024-11-18 NOTE — TELEPHONE ENCOUNTER
I called patient and left detailed voicemail about their appointment on 11/20/24. I made patient aware not to arrive any earlier than the appointment time and to come at the time of the follow up not the time of the lab appointment if it is different than the follow up appt time. I also made patient aware to eat and drink plenty of water to hydrate properly before coming to these appointments because this will make their lab draw much easier.  Made patient aware that we are now located at the [facility] at [address]. Located between [facility] and the [facility]. Front entrance faces [facility].

## 2024-11-18 NOTE — TELEPHONE ENCOUNTER
Pt is requesting an order for a chest xr for a lung screening to be done at [facility]. Pt stated she got a letter in the mail saying it was time to schedule one.

## 2024-11-20 ENCOUNTER — HOSPITAL ENCOUNTER (EMERGENCY)
Age: 64
Discharge: HOME | End: 2024-11-20
Attending: EMERGENCY MEDICINE
Payer: MEDICARE

## 2024-11-20 ENCOUNTER — APPOINTMENT (OUTPATIENT)
Dept: GENERAL RADIOLOGY | Age: 64
End: 2024-11-20
Payer: MEDICARE

## 2024-11-20 ENCOUNTER — OFFICE VISIT (OUTPATIENT)
Dept: HEMATOLOGY | Age: 64
End: 2024-11-20
Payer: MEDICARE

## 2024-11-20 ENCOUNTER — HOSPITAL ENCOUNTER (OUTPATIENT)
Dept: INFUSION THERAPY | Age: 64
Discharge: HOME | End: 2024-11-20
Payer: MEDICARE

## 2024-11-20 VITALS
TEMPERATURE: 97.7 F | BODY MASS INDEX: 34.36 KG/M2 | HEIGHT: 60 IN | OXYGEN SATURATION: 96 % | SYSTOLIC BLOOD PRESSURE: 214 MMHG | WEIGHT: 175 LBS | DIASTOLIC BLOOD PRESSURE: 102 MMHG | HEART RATE: 78 BPM

## 2024-11-20 VITALS
TEMPERATURE: 97.3 F | HEART RATE: 76 BPM | BODY MASS INDEX: 34.36 KG/M2 | DIASTOLIC BLOOD PRESSURE: 68 MMHG | OXYGEN SATURATION: 99 % | HEIGHT: 60 IN | WEIGHT: 175 LBS | SYSTOLIC BLOOD PRESSURE: 163 MMHG | RESPIRATION RATE: 22 BRPM

## 2024-11-20 DIAGNOSIS — R97.8 OTHER ABNORMAL TUMOR MARKERS: ICD-10-CM

## 2024-11-20 DIAGNOSIS — K74.60 CIRRHOSIS OF LIVER WITHOUT ASCITES, UNSPECIFIED HEPATIC CIRRHOSIS TYPE (HCC): ICD-10-CM

## 2024-11-20 DIAGNOSIS — R77.2 ELEVATED AFP: Primary | ICD-10-CM

## 2024-11-20 DIAGNOSIS — I10 UNCONTROLLED HYPERTENSION: Primary | ICD-10-CM

## 2024-11-20 DIAGNOSIS — F17.210 CIGARETTE NICOTINE DEPENDENCE WITHOUT COMPLICATION: ICD-10-CM

## 2024-11-20 DIAGNOSIS — R91.1 PULMONARY NODULE LESS THAN 6 MM DETERMINED BY COMPUTED TOMOGRAPHY OF LUNG: ICD-10-CM

## 2024-11-20 DIAGNOSIS — Z45.2 ENCOUNTER FOR INSERTION OF VENOUS ACCESS PORT: ICD-10-CM

## 2024-11-20 DIAGNOSIS — R77.2 ELEVATED AFP: ICD-10-CM

## 2024-11-20 LAB
AFP SERPL-MCNC: ABNORMAL NG/ML (ref 0–8.3)
ALBUMIN SERPL-MCNC: 4 G/DL (ref 3.5–5.2)
ALBUMIN SERPL-MCNC: 4.2 G/DL (ref 3.5–5.2)
ALP SERPL-CCNC: 144 U/L (ref 35–104)
ALP SERPL-CCNC: 162 U/L (ref 35–104)
ALT SERPL-CCNC: 18 U/L (ref 5–33)
ALT SERPL-CCNC: 19 U/L (ref 5–33)
ANION GAP SERPL CALCULATED.3IONS-SCNC: 10 MMOL/L (ref 7–19)
ANION GAP SERPL CALCULATED.3IONS-SCNC: 14 MMOL/L (ref 7–19)
AST SERPL-CCNC: 73 U/L (ref 5–32)
AST SERPL-CCNC: 88 U/L (ref 5–32)
BASOPHILS # BLD: 0.03 K/UL (ref 0.01–0.08)
BASOPHILS # BLD: 0.1 K/UL (ref 0–0.2)
BASOPHILS NFR BLD: 1.1 % (ref 0.1–1.2)
BASOPHILS NFR BLD: 1.9 % (ref 0–1)
BILIRUB SERPL-MCNC: 0.6 MG/DL (ref 0.2–1.2)
BILIRUB SERPL-MCNC: 0.6 MG/DL (ref 0–1.2)
BNP BLD-MCNC: 501 PG/ML (ref 0–124)
BUN SERPL-MCNC: 7 MG/DL (ref 8–23)
BUN SERPL-MCNC: 7 MG/DL (ref 8–23)
CALCIUM SERPL-MCNC: 9 MG/DL (ref 8.8–10.2)
CALCIUM SERPL-MCNC: 9.2 MG/DL (ref 8.8–10.2)
CEA SERPL-MCNC: 4 NG/ML (ref 0–4.7)
CHLORIDE SERPL-SCNC: 100 MMOL/L (ref 98–107)
CHLORIDE SERPL-SCNC: 100 MMOL/L (ref 98–111)
CO2 SERPL-SCNC: 25 MMOL/L (ref 22–29)
CO2 SERPL-SCNC: 29 MMOL/L (ref 22–29)
CREAT SERPL-MCNC: 0.3 MG/DL (ref 0.5–0.9)
CREAT SERPL-MCNC: <0.5 MG/DL (ref 0.5–0.9)
EOSINOPHIL # BLD: 0.17 K/UL (ref 0.04–0.54)
EOSINOPHIL # BLD: 0.2 K/UL (ref 0–0.6)
EOSINOPHIL NFR BLD: 5.8 % (ref 0–5)
EOSINOPHIL NFR BLD: 6 % (ref 0.7–7)
ERYTHROCYTE [DISTWIDTH] IN BLOOD BY AUTOMATED COUNT: 12.5 % (ref 11.5–14.5)
ERYTHROCYTE [DISTWIDTH] IN BLOOD BY AUTOMATED COUNT: 12.6 % (ref 11.7–14.4)
GLUCOSE SERPL-MCNC: 202 MG/DL (ref 70–99)
GLUCOSE SERPL-MCNC: 238 MG/DL (ref 70–99)
HCT VFR BLD AUTO: 36.2 % (ref 34.1–44.9)
HCT VFR BLD AUTO: 38.7 % (ref 37–47)
HGB BLD-MCNC: 11.8 G/DL (ref 11.2–15.7)
HGB BLD-MCNC: 12.8 G/DL (ref 12–16)
IMM GRANULOCYTES # BLD: 0 K/UL
LYMPHOCYTES # BLD: 0.58 K/UL (ref 1.18–3.74)
LYMPHOCYTES # BLD: 0.8 K/UL (ref 1.1–4.5)
LYMPHOCYTES NFR BLD: 20.4 % (ref 19.3–53.1)
LYMPHOCYTES NFR BLD: 21.2 % (ref 20–40)
MCH RBC QN AUTO: 28.9 PG (ref 25.6–32.2)
MCH RBC QN AUTO: 29.1 PG (ref 27–31)
MCHC RBC AUTO-ENTMCNC: 32.6 G/DL (ref 32.3–35.5)
MCHC RBC AUTO-ENTMCNC: 33.1 G/DL (ref 33–37)
MCV RBC AUTO: 88 FL (ref 81–99)
MCV RBC AUTO: 88.5 FL (ref 79.4–94.8)
MONOCYTES # BLD: 0.21 K/UL (ref 0.24–0.82)
MONOCYTES # BLD: 0.3 K/UL (ref 0–0.9)
MONOCYTES NFR BLD: 7.4 % (ref 4.7–12.5)
MONOCYTES NFR BLD: 8 % (ref 0–10)
NEUTROPHILS # BLD: 1.84 K/UL (ref 1.56–6.13)
NEUTROPHILS # BLD: 2.3 K/UL (ref 1.5–7.5)
NEUTS SEG NFR BLD: 62.8 % (ref 50–65)
NEUTS SEG NFR BLD: 64.7 % (ref 34–71.1)
PLATELET # BLD AUTO: 130 K/UL (ref 182–369)
PLATELET # BLD AUTO: 151 K/UL (ref 130–400)
PMV BLD AUTO: 10.5 FL (ref 7.4–10.4)
PMV BLD AUTO: 11 FL (ref 9.4–12.3)
POTASSIUM SERPL-SCNC: 3.4 MMOL/L (ref 3.5–5.1)
POTASSIUM SERPL-SCNC: 3.6 MMOL/L (ref 3.5–5)
PROT SERPL-MCNC: 7.4 G/DL (ref 6.4–8.3)
PROT SERPL-MCNC: 7.7 G/DL (ref 6.4–8.3)
RBC # BLD AUTO: 4.09 M/UL (ref 3.93–5.22)
RBC # BLD AUTO: 4.4 M/UL (ref 4.2–5.4)
SODIUM SERPL-SCNC: 139 MMOL/L (ref 136–145)
SODIUM SERPL-SCNC: 139 MMOL/L (ref 136–145)
TROPONIN, HIGH SENSITIVITY: 11 NG/L (ref 0–14)
WBC # BLD AUTO: 2.84 K/UL (ref 3.98–10.04)
WBC # BLD AUTO: 3.6 K/UL (ref 4.8–10.8)

## 2024-11-20 PROCEDURE — G8427 DOCREV CUR MEDS BY ELIG CLIN: HCPCS | Performed by: INTERNAL MEDICINE

## 2024-11-20 PROCEDURE — 83880 ASSAY OF NATRIURETIC PEPTIDE: CPT

## 2024-11-20 PROCEDURE — 3017F COLORECTAL CA SCREEN DOC REV: CPT | Performed by: INTERNAL MEDICINE

## 2024-11-20 PROCEDURE — 85025 COMPLETE CBC W/AUTO DIFF WBC: CPT

## 2024-11-20 PROCEDURE — 93005 ELECTROCARDIOGRAM TRACING: CPT | Performed by: EMERGENCY MEDICINE

## 2024-11-20 PROCEDURE — 80053 COMPREHEN METABOLIC PANEL: CPT

## 2024-11-20 PROCEDURE — 99285 EMERGENCY DEPT VISIT HI MDM: CPT

## 2024-11-20 PROCEDURE — 71045 X-RAY EXAM CHEST 1 VIEW: CPT

## 2024-11-20 PROCEDURE — 36415 COLL VENOUS BLD VENIPUNCTURE: CPT

## 2024-11-20 PROCEDURE — G8484 FLU IMMUNIZE NO ADMIN: HCPCS | Performed by: INTERNAL MEDICINE

## 2024-11-20 PROCEDURE — 99205 OFFICE O/P NEW HI 60 MIN: CPT | Performed by: INTERNAL MEDICINE

## 2024-11-20 PROCEDURE — 84484 ASSAY OF TROPONIN QUANT: CPT

## 2024-11-20 PROCEDURE — 4004F PT TOBACCO SCREEN RCVD TLK: CPT | Performed by: INTERNAL MEDICINE

## 2024-11-20 PROCEDURE — 1123F ACP DISCUSS/DSCN MKR DOCD: CPT | Performed by: INTERNAL MEDICINE

## 2024-11-20 PROCEDURE — 99214 OFFICE O/P EST MOD 30 MIN: CPT

## 2024-11-20 PROCEDURE — G8417 CALC BMI ABV UP PARAM F/U: HCPCS | Performed by: INTERNAL MEDICINE

## 2024-11-20 PROCEDURE — 6360000002 HC RX W HCPCS: Performed by: EMERGENCY MEDICINE

## 2024-11-20 PROCEDURE — 3077F SYST BP >= 140 MM HG: CPT | Performed by: INTERNAL MEDICINE

## 2024-11-20 PROCEDURE — 3080F DIAST BP >= 90 MM HG: CPT | Performed by: INTERNAL MEDICINE

## 2024-11-20 PROCEDURE — 96374 THER/PROPH/DIAG INJ IV PUSH: CPT

## 2024-11-20 PROCEDURE — 1090F PRES/ABSN URINE INCON ASSESS: CPT | Performed by: INTERNAL MEDICINE

## 2024-11-20 PROCEDURE — G8400 PT W/DXA NO RESULTS DOC: HCPCS | Performed by: INTERNAL MEDICINE

## 2024-11-20 RX ORDER — HYDRALAZINE HYDROCHLORIDE 20 MG/ML
10 INJECTION INTRAMUSCULAR; INTRAVENOUS ONCE
Status: COMPLETED | OUTPATIENT
Start: 2024-11-20 | End: 2024-11-20

## 2024-11-20 RX ORDER — HYDRALAZINE HYDROCHLORIDE 25 MG/1
25 TABLET, FILM COATED ORAL 3 TIMES DAILY
Qty: 10 TABLET | Refills: 0 | Status: SHIPPED | OUTPATIENT
Start: 2024-11-20 | End: 2024-11-24

## 2024-11-20 RX ADMIN — HYDRALAZINE HYDROCHLORIDE 10 MG: 20 INJECTION, SOLUTION INTRAMUSCULAR; INTRAVENOUS at 14:01

## 2024-11-20 ASSESSMENT — ENCOUNTER SYMPTOMS
RHINORRHEA: 0
SORE THROAT: 0
SHORTNESS OF BREATH: 0
DIARRHEA: 0
NAUSEA: 0
ABDOMINAL PAIN: 0
SINUS PRESSURE: 0
VOMITING: 0

## 2024-11-20 NOTE — ED PROVIDER NOTES
[facility] EMERGENCY DEPT  eMERGENCY dEPARTMENT eNCOUnter      Pt Name: Izzy Stokes  MRN: [id number]  Birthdate 1960  Date of evaluation: 11/20/2024  Provider: Chai Mayberry MD    CHIEF COMPLAINT       Chief Complaint   Patient presents with    Hypertension     Sent by Dr. Leong           HISTORY OF PRESENT ILLNESS   (Location/Symptom, Timing/Onset,Context/Setting, Quality, Duration, Modifying Factors, Severity)  Note limiting factors.   Izzy Stokes is a 64 y.o. female who presents to the emergency department hypertension     HPI    Is a 64-year-old white female with history of cirrhosis; COPD; asthma; esophageal evaluation; reflux disease; hepatitis C; hyperlipidemia; hypertension; who presents with asymptomatic hypertension after talking with Dr. Leong regarding her ongoing treatment for cancer.  She claims compliance with her medication she does report that sometimes she gets a little hypertensive in the setting of healthcare providers.  She had a's no nausea or vomiting; no headache; no blurred vision; no chest pain.  No shortness of breath.    NursingNotes were reviewed.    REVIEW OF SYSTEMS    (2-9 systems for level 4, 10 or more for level 5)     Review of Systems   Constitutional:  Negative for chills, diaphoresis, fatigue and fever.   HENT:  Negative for rhinorrhea, sinus pressure and sore throat.    Eyes:  Negative for visual disturbance.   Respiratory:  Negative for shortness of breath.    Cardiovascular:  Negative for chest pain.   Gastrointestinal:  Negative for abdominal pain, diarrhea, nausea and vomiting.   Genitourinary:  Negative for difficulty urinating and dysuria.   Musculoskeletal:  Negative for arthralgias and myalgias.   Skin:  Negative for rash.   Neurological:  Negative for weakness.   Psychiatric/Behavioral:  Negative for confusion.    All other systems reviewed and are negative.           PAST MEDICALHISTORY     Past Medical History:   Diagnosis Date    Arthritis     Asthma      L-PyroglutamicAc (STEGLATRO) 5 MG TABS Take 5 mg by mouth daily Historical Med      benazepril (LOTENSIN) 20 MG tablet Take 1 tablet by mouth daily, Disp-30 tablet, R-5Normal             ALLERGIES     Patient has no known allergies.    FAMILY HISTORY       Family History   Adopted: Yes   Problem Relation Age of Onset    Liver Cancer Neg Hx     Liver Disease Neg Hx     Stomach Cancer Neg Hx     Rectal Cancer Neg Hx     Esophageal Cancer Neg Hx     Colon Cancer Neg Hx     Colon Polyps Neg Hx           SOCIAL HISTORY       Social History     Socioeconomic History    Marital status: Single     Spouse name: None    Number of children: None    Years of education: None    Highest education level: None   Tobacco Use    Smoking status: Every Day     Average packs/day: 0.5 packs/day for 45.0 years (22.5 ttl pk-yrs)     Types: Cigarettes     Start date: 1979    Smokeless tobacco: Never   Vaping Use    Vaping status: Never Used   Substance and Sexual Activity    Alcohol use: No     Comment:  stopped drinking 2014?    Drug use: No     Types: Cocaine     Comment: Stopped in  2014?- Pt did not have to do any rehab    Sexual activity: Not Currently     Partners: Male     Social Determinants of Health     Food Insecurity: No Food Insecurity (9/28/2024)    Received from [facility]    Hunger Vital Sign     Worried About Running Out of Food in the Last Year: Never true     Ran Out of Food in the Last Year: Never true   Transportation Needs: No Transportation Needs (9/28/2024)    Received from [facility]    PRAPARE - Transportation     Lack of Transportation (Medical): No     Lack of Transportation (Non-Medical): No    [facility]    Family and Community Support   Intimate Partner Violence: Not At Risk (9/28/2024)    Received from [facility]    Humiliation, Afraid, Rape, and Kick questionnaire     Fear of Current or Ex-Partner: No      Emotionally Abused: No     Physically Abused: No     Sexually Abused: No    [facility]    Housing Stability       SCREENINGS    [place] Coma Scale  Eye Opening: Spontaneous  Best Verbal Response: Oriented  Best Motor Response: Obeys commands  [place] Coma Scale Score: 15        PHYSICAL EXAM    (up to 7 for level 4, 8 or more for level 5)     ED Triage Vitals [11/20/24 1248]   BP Systolic BP Percentile Diastolic BP Percentile Temp Temp src Pulse Respirations SpO2   (!) 212/115 -- -- 97.3 °F (36.3 °C) -- 71 18 99 %      Height Weight - Scale         1.524 m (5') 79.4 kg (175 lb)             Physical Exam  Vitals and nursing note reviewed.   Constitutional:       Appearance: She is well-developed.   HENT:      Head: Normocephalic and atraumatic.      Nose: Nose normal.      Mouth/Throat:      Mouth: Mucous membranes are moist.   Eyes:      General:         Right eye: No discharge.         Left eye: No discharge.      Conjunctiva/sclera: Conjunctivae normal.      Pupils: Pupils are equal, round, and reactive to light.   Cardiovascular:      Rate and Rhythm: Normal rate and regular rhythm.      Heart sounds: Normal heart sounds.   Pulmonary:      Effort: Pulmonary effort is normal. No respiratory distress.      Breath sounds: Normal breath sounds. No wheezing or rales.   Abdominal:      General: Bowel sounds are normal.      Palpations: Abdomen is soft. There is no mass.      Tenderness: There is no abdominal tenderness. There is no guarding or rebound.   Musculoskeletal:         General: No tenderness. Normal range of motion.      Cervical back: Normal range of motion and neck supple.   Skin:     General: Skin is warm and dry.      Capillary Refill: Capillary refill takes less than 2 seconds.   Neurological:      Mental Status: She is alert and oriented to person, place, and time.   Psychiatric:         Behavior: Behavior normal.         Thought Content: Thought

## 2024-11-20 NOTE — DISCHARGE INSTRUCTIONS
Monitor your blood pressure carefully at home with your regular medications.  Do not take the hydralazine if your blood pressure remains controlled and that this episode of elevated blood pressure was solely related to \"whitecoat hypertension\" which means the blood pressure goes up when you are being evaluated by a doctor.  Follow-up with your primary care doctor for further evaluation of all your medications.  Return immediately to the emergency room for any new or worsening symptoms.

## 2024-11-21 ENCOUNTER — TELEPHONE (OUTPATIENT)
Dept: SURGERY | Age: 64
End: 2024-11-21

## 2024-11-21 LAB
EKG P AXIS: 0 DEGREES
EKG P-R INTERVAL: 176 MS
EKG Q-T INTERVAL: 434 MS
EKG QRS DURATION: 88 MS
EKG QTC CALCULATION (BAZETT): 444 MS
EKG T AXIS: -7 DEGREES

## 2024-11-21 PROCEDURE — 93010 ELECTROCARDIOGRAM REPORT: CPT | Performed by: INTERNAL MEDICINE

## 2024-11-21 NOTE — TELEPHONE ENCOUNTER
11/21/2024 Patient called and was returning a call to Tania.  Requested a return call.    Callback # [phone]  alcira

## 2024-11-22 NOTE — TELEPHONE ENCOUNTER
Called patient x 2 to see if she wanted to have her port placed per Dr Medina on 12/02/24 - She has an office appt with Dr Medina scheduled for 12/03/24 and surg will be scheduled accordingly - Dr Medina notified

## 2024-11-26 ENCOUNTER — TRANSCRIBE ORDERS (OUTPATIENT)
Dept: ADMINISTRATIVE | Facility: HOSPITAL | Age: 64
End: 2024-11-26
Payer: MEDICARE

## 2024-11-26 DIAGNOSIS — K76.9 LESION OF LIVER: Primary | ICD-10-CM

## 2024-11-26 DIAGNOSIS — C22.0 HEPATOCELLULAR CARCINOMA: ICD-10-CM

## 2024-11-27 ENCOUNTER — OFFICE VISIT (OUTPATIENT)
Dept: FAMILY MEDICINE CLINIC | Facility: CLINIC | Age: 64
End: 2024-11-27
Payer: MEDICARE

## 2024-11-27 VITALS
DIASTOLIC BLOOD PRESSURE: 77 MMHG | WEIGHT: 174 LBS | SYSTOLIC BLOOD PRESSURE: 138 MMHG | HEART RATE: 60 BPM | OXYGEN SATURATION: 97 % | HEIGHT: 60 IN | TEMPERATURE: 97.3 F | BODY MASS INDEX: 34.16 KG/M2

## 2024-11-27 DIAGNOSIS — E66.811 OBESITY (BMI 30.0-34.9): ICD-10-CM

## 2024-11-27 DIAGNOSIS — F17.210 CIGARETTE NICOTINE DEPENDENCE WITHOUT COMPLICATION: ICD-10-CM

## 2024-11-27 DIAGNOSIS — M19.90 ARTHRITIS PAIN: ICD-10-CM

## 2024-11-27 DIAGNOSIS — Z00.00 MEDICARE ANNUAL WELLNESS VISIT, SUBSEQUENT: Primary | ICD-10-CM

## 2024-11-27 DIAGNOSIS — E11.42 TYPE 2 DIABETES MELLITUS WITH DIABETIC POLYNEUROPATHY, WITHOUT LONG-TERM CURRENT USE OF INSULIN: ICD-10-CM

## 2024-11-27 DIAGNOSIS — E78.2 MIXED HYPERLIPIDEMIA: ICD-10-CM

## 2024-11-27 DIAGNOSIS — J30.9 ALLERGIC SINUSITIS: ICD-10-CM

## 2024-11-27 DIAGNOSIS — I10 ESSENTIAL HYPERTENSION: ICD-10-CM

## 2024-11-27 DIAGNOSIS — E03.9 HYPOTHYROIDISM, UNSPECIFIED TYPE: ICD-10-CM

## 2024-11-27 PROBLEM — C22.0 HEPATOCELLULAR CARCINOMA (HCC): Status: ACTIVE | Noted: 2024-11-27

## 2024-11-27 PROCEDURE — 3052F HG A1C>EQUAL 8.0%<EQUAL 9.0%: CPT | Performed by: NURSE PRACTITIONER

## 2024-11-27 PROCEDURE — 1160F RVW MEDS BY RX/DR IN RCRD: CPT | Performed by: NURSE PRACTITIONER

## 2024-11-27 PROCEDURE — G0439 PPPS, SUBSEQ VISIT: HCPCS | Performed by: NURSE PRACTITIONER

## 2024-11-27 PROCEDURE — 96160 PT-FOCUSED HLTH RISK ASSMT: CPT | Performed by: NURSE PRACTITIONER

## 2024-11-27 PROCEDURE — 99213 OFFICE O/P EST LOW 20 MIN: CPT | Performed by: NURSE PRACTITIONER

## 2024-11-27 PROCEDURE — 3075F SYST BP GE 130 - 139MM HG: CPT | Performed by: NURSE PRACTITIONER

## 2024-11-27 PROCEDURE — 1170F FXNL STATUS ASSESSED: CPT | Performed by: NURSE PRACTITIONER

## 2024-11-27 PROCEDURE — 3078F DIAST BP <80 MM HG: CPT | Performed by: NURSE PRACTITIONER

## 2024-11-27 PROCEDURE — 96372 THER/PROPH/DIAG INJ SC/IM: CPT | Performed by: NURSE PRACTITIONER

## 2024-11-27 PROCEDURE — 1126F AMNT PAIN NOTED NONE PRSNT: CPT | Performed by: NURSE PRACTITIONER

## 2024-11-27 PROCEDURE — 1159F MED LIST DOCD IN RCRD: CPT | Performed by: NURSE PRACTITIONER

## 2024-11-27 RX ORDER — TRIAMCINOLONE ACETONIDE 40 MG/ML
40 INJECTION, SUSPENSION INTRA-ARTICULAR; INTRAMUSCULAR ONCE
Status: COMPLETED | OUTPATIENT
Start: 2024-11-27 | End: 2024-11-27

## 2024-11-27 RX ORDER — LORATADINE 10 MG/1
10 TABLET ORAL DAILY
Qty: 10 TABLET | Refills: 0 | Status: SHIPPED | OUTPATIENT
Start: 2024-11-27

## 2024-11-27 RX ADMIN — TRIAMCINOLONE ACETONIDE 40 MG: 40 INJECTION, SUSPENSION INTRA-ARTICULAR; INTRAMUSCULAR at 10:58

## 2024-11-27 NOTE — PROGRESS NOTES
Subjective   The ABCs of the Annual Wellness Visit  Medicare Wellness Visit      Della Gonzalez is a 64 y.o. patient who presents for a Medicare Wellness Visit.    The following portions of the patient's history were reviewed and   updated as appropriate: allergies, current medications, past family history, past medical history, past social history, past surgical history, and problem list.    Compared to one year ago, the patient's physical   health is worse. New diagnosis of hepatic carcinoma.    Compared to one year ago, the patient's mental   health is the same.    Recent Hospitalizations:  She was not admitted to the hospital during the last year.     Current Medical Providers:  Patient Care Team:  Vijaya Henao APRN as PCP - General (Family Medicine)  Salo Church MD as Consulting Physician (Oncology)  Dana Diaz APRN (Nurse Practitioner)  Sal Sweeney MD (Internal Medicine)  Victoirna Hi APRN as Nurse Practitioner (Podiatry)  Shireen Oliver DO (General Surgery)  Peewee Mays MD (Gastroenterology)    Outpatient Medications Prior to Visit   Medication Sig Dispense Refill    albuterol sulfate  (90 Base) MCG/ACT inhaler Inhale 2 puffs Every 4 (Four) Hours As Needed for Wheezing or Shortness of Air. 18 g 2    atorvastatin (LIPITOR) 10 MG tablet TAKE ONE TABLET BY MOUTH EVERY DAY 90 tablet 1    benazepril (LOTENSIN) 10 MG tablet TAKE ONE TABLET BY MOUTH EVERY DAY 90 tablet 3    Budeson-Glycopyrrol-Formoterol (BREZTRI) 160-9-4.8 MCG/ACT aerosol inhaler Inhale 2 puffs 2 (Two) Times a Day. 1 each 0    doxepin (SINEquan) 50 MG capsule Take 1 capsule by mouth Every Night.      furosemide (Lasix) 20 MG tablet Take 1 tablet by mouth Daily. 10 tablet 0    gabapentin (Neurontin) 600 MG tablet Take 1 tablet by mouth 3 (Three) Times a Day. 90 tablet 0    glucose blood test strip Use as instructed for daily BG testing 100 each 12    Lancets (onetouch ultrasoft) lancets For  daily BG testing 100 each 12    levothyroxine (SYNTHROID, LEVOTHROID) 50 MCG tablet Take 1 tablet by mouth Daily. 90 tablet 1    linagliptin (Tradjenta) 5 MG tablet tablet Take 1 tablet by mouth Daily. 90 tablet 1    magnesium oxide (MAG-OX) 400 MG tablet Take 1 tablet by mouth Daily.      mupirocin (BACTROBAN) 2 % cream Apply 1 Application topically to the appropriate area as directed 2 (Two) Times a Day. Apply on external left ear. 15 g 0    nabumetone (RELAFEN) 500 MG tablet Take 1 tablet by mouth 2 (Two) Times a Day As Needed for Moderate Pain. 60 tablet 0    nadolol (CORGARD) 20 MG tablet Take 1 tablet by mouth Daily. 90 tablet 1    omeprazole (priLOSEC) 20 MG capsule Take 1 capsule by mouth Daily. 90 capsule 1    pioglitazone (Actos) 45 MG tablet Take 1 tablet by mouth Daily. 30 tablet 3    potassium chloride (KLOR-CON M20) 20 MEQ CR tablet Take 1 tablet by mouth Daily. 10 tablet 0    promethazine (PHENERGAN) 25 MG tablet Take 1 tablet by mouth Every 6 (Six) Hours As Needed for Nausea or Vomiting.      sertraline (ZOLOFT) 25 MG tablet TAKE ONE TABLET BY MOUTH EVERY DAY 30 tablet 5    traZODone (DESYREL) 150 MG tablet Take 1 tablet by mouth Every Night.       No facility-administered medications prior to visit.     No opioid medication identified on active medication list. I have reviewed chart for other potential  high risk medication/s and harmful drug interactions in the elderly.      Aspirin is not on active medication list.  Aspirin use is not indicated based on review of current medical condition/s. Risk of harm outweighs potential benefits.  .    Patient Active Problem List   Diagnosis    Diabetes mellitus    Morbidly obese    Mixed hyperlipidemia    Cirrhosis of liver without ascites    Elevated AFP    Esophageal varices determined by endoscopy    Essential hypertension    Gastritis without bleeding    History of hepatitis C    Nonintractable headache    Portal hypertension    Gallstones     "Hypothyroidism    Cigarette nicotine dependence without complication    Post-menopausal    Gastroesophageal reflux disease    Migraine without aura and without status migrainosus, not intractable    Muscle spasms of both lower extremities    Chronic obstructive pulmonary disease    Acute hypoxic respiratory failure    Lumbar degenerative disc disease    Nicotine dependence    Esophageal varix    Gallstone    Follow-up exam    Acute pulmonary edema     Advance Care Planning Advance Directive is not on file.  ACP discussion was declined by the patient. Patient does not have an advance directive, declines further assistance.            Objective   Vitals:    11/27/24 1030 11/27/24 1051   BP: 179/74 138/77   BP Location: Left arm Left arm   Patient Position: Sitting Sitting   Cuff Size: Adult Large Adult   Pulse: 60    Temp: 97.3 °F (36.3 °C)    TempSrc: Temporal    SpO2: 97%    Weight: 78.9 kg (174 lb)    Height: 152.4 cm (60\")    PainSc: 0-No pain        Estimated body mass index is 33.98 kg/m² as calculated from the following:    Height as of this encounter: 152.4 cm (60\").    Weight as of this encounter: 78.9 kg (174 lb).            Does the patient have evidence of cognitive impairment? No  Lab Results   Component Value Date    CHLPL 143 11/18/2024    TRIG 96 11/18/2024    HDL 34 (L) 11/18/2024    LDL 91 11/18/2024    VLDL 18 11/18/2024    HGBA1C 8.3 (H) 11/18/2024    HGBA1C 7.7 (A) 10/08/2024                                                                                                Health  Risk Assessment    Smoking Status:  Social History     Tobacco Use   Smoking Status Every Day    Current packs/day: 0.50    Average packs/day: 0.5 packs/day for 40.0 years (20.0 ttl pk-yrs)    Types: Cigarettes    Passive exposure: Current   Smokeless Tobacco Never     Alcohol Consumption:  Social History     Substance and Sexual Activity   Alcohol Use No       Fall Risk Screen  STEADI Fall Risk Assessment was completed, and " patient is at LOW risk for falls.Assessment completed on:2024    Depression Screening   Little interest or pleasure in doing things? Not at all   Feeling down, depressed, or hopeless? Not at all   PHQ-2 Total Score 0      Health Habits and Functional and Cognitive Screenin/27/2024    10:30 AM   Functional & Cognitive Status   Do you have difficulty preparing food and eating? No   Do you have difficulty bathing yourself, getting dressed or grooming yourself? No   Do you have difficulty using the toilet? No   Do you have difficulty moving around from place to place? No   Do you have trouble with steps or getting out of a bed or a chair? No   Current Diet Well Balanced Diet   Dental Exam Up to date   Eye Exam Up to date   Exercise (times per week) 0 times per week   Current Exercises Include No Regular Exercise   Do you need help using the phone?  No   Are you deaf or do you have serious difficulty hearing?  No   Do you need help to go to places out of walking distance? No   Do you need help shopping? No   Do you need help preparing meals?  No   Do you need help with housework?  No   Do you need help with laundry? No   Do you need help taking your medications? No   Do you need help managing money? No   Do you ever drive or ride in a car without wearing a seat belt? No   Have you felt unusual stress, anger or loneliness in the last month? No   Who do you live with? Alone   If you need help, do you have trouble finding someone available to you? No   Have you been bothered in the last four weeks by sexual problems? No   Do you have difficulty concentrating, remembering or making decisions? No           Age-appropriate Screening Schedule:  Refer to the list below for future screening recommendations based on patient's age, sex and/or medical conditions. Orders for these recommended tests are listed in the plan section. The patient has been provided with a written plan.    Health Maintenance List  Health  "Maintenance   Topic Date Due    COVID-19 Vaccine (5 - 2024-25 season) 11/29/2024 (Originally 9/1/2024)    DIABETIC EYE EXAM  01/15/2025 (Originally 9/21/2024)    PAP SMEAR  11/25/2025 (Originally 10/26/2024)    Hepatitis B (1 of 3 - Risk 3-dose series) 11/27/2025 (Originally 4/17/2019)    TDAP/TD VACCINES (1 - Tdap) 11/27/2025 (Originally 4/17/1979)    HEMOGLOBIN A1C  05/18/2025    BMI FOLLOWUP  09/24/2025    DXA SCAN  11/15/2025    LIPID PANEL  11/18/2025    LUNG CANCER SCREENING  11/20/2025    ANNUAL WELLNESS VISIT  11/27/2025    MAMMOGRAM  01/23/2026    COLORECTAL CANCER SCREENING  03/09/2031    HEPATITIS C SCREENING  Completed    INFLUENZA VACCINE  Completed    Pneumococcal Vaccine 65+  Completed    ZOSTER VACCINE  Completed    URINE MICROALBUMIN  Discontinued                                                                                                                                                CMS Preventative Services Quick Reference  Risk Factors Identified During Encounter  Immunizations Discussed/Encouraged: Tdap and Hepatitis B Vaccine/Series  Inadequate Social Support, Isolation, Loneliness, Lack of Transportation, Financial Difficulties, or Caregiver Stress: patient utilizes PACS for medical transportation and this is working well for her. She does have family locally but little support is provided. \"They have too many medical problems of their own.\"  Inactivity/Sedentary: Patient was advised to exercise at least 150 minutes a week per CDC recommendations.  Polypharmacy: Medication List reviewed and Medications are appropriate for patient  Tobacco Use/Dependance Risk (use dotphrase .tobaccocessation for documentation)    The above risks/problems have been discussed with the patient.  Pertinent information has been shared with the patient in the After Visit Summary.  An After Visit Summary and PPPS were made available to the patient.  Della Gonzalez  reports that she has been smoking cigarettes. She " "has a 20 pack-year smoking history. She has been exposed to tobacco smoke. She has never used smokeless tobacco. I have educated her on the risk of diseases from using tobacco products such as cancer, COPD, heart disease, and cataracts.     I advised her to quit and she is not willing to quit.    I spent 3  minutes counseling the patient.          Follow Up:   Next Medicare Wellness visit to be scheduled in 1 year.         Additional E&M Note during same encounter follows:  Patient has additional, significant, and separately identifiable condition(s)/problem(s) that require work above and beyond the Medicare Wellness Visit     Chief Complaint  Medicare Wellness-subsequent    Subjective   HPI  Della is also being seen today for additional medical problem/s. She complains of sore throat and runny nose. She is not taking a antihistamine. She denies signs of infection such as fever, chills or purulent mucus. She states symptoms have been present x 5-6 days. No OTC remedy has been attempted.                Objective   Vital Signs:  /77 (BP Location: Left arm, Patient Position: Sitting, Cuff Size: Large Adult)   Pulse 60   Temp 97.3 °F (36.3 °C) (Temporal)   Ht 152.4 cm (60\")   Wt 78.9 kg (174 lb)   SpO2 97%   BMI 33.98 kg/m²   Physical Exam  Vitals and nursing note reviewed.   Constitutional:       General: She is not in acute distress.     Appearance: Normal appearance. She is obese. She is not ill-appearing.   HENT:      Head: Normocephalic and atraumatic.      Right Ear: Tympanic membrane and ear canal normal.      Left Ear: Tympanic membrane and ear canal normal.      Nose: Rhinorrhea present.      Mouth/Throat:      Mouth: Mucous membranes are moist.      Pharynx: Oropharynx is clear. Posterior oropharyngeal erythema present.      Comments: Post oropharyngeal erythema consistent with post nasal drainage.  Neck:      Vascular: No carotid bruit.   Cardiovascular:      Rate and Rhythm: Normal rate and " regular rhythm.      Pulses: Normal pulses.           Dorsalis pedis pulses are 2+ on the right side and 2+ on the left side.        Posterior tibial pulses are 2+ on the right side and 2+ on the left side.      Heart sounds: Normal heart sounds. No murmur heard.  Pulmonary:      Effort: Pulmonary effort is normal.      Breath sounds: Normal breath sounds.   Abdominal:      General: Bowel sounds are normal.      Palpations: Abdomen is soft.   Musculoskeletal:         General: Normal range of motion.      Right lower leg: No edema.      Left lower leg: No edema.   Feet:      Right foot:      Protective Sensation: 5 sites tested.  4 sites sensed.      Skin integrity: Skin integrity normal.      Toenail Condition: Right toenails are abnormally thick.      Left foot:      Protective Sensation: 5 sites tested.  2 sites sensed.      Skin integrity: Skin integrity normal.      Toenail Condition: Left toenails are abnormally thick.   Skin:     General: Skin is warm and dry.   Neurological:      Mental Status: She is alert and oriented to person, place, and time.                       Assessment and Plan            Medicare annual wellness visit, subsequent         Arthritis pain    Orders:    triamcinolone acetonide (KENALOG-40) injection 40 mg    Hypothyroidism, unspecified type         Type 2 diabetes mellitus with diabetic polyneuropathy, without long-term current use of insulin           Essential hypertension           Mixed hyperlipidemia            Allergic sinusitis    Orders:    triamcinolone acetonide (KENALOG-40) injection 40 mg    loratadine (Claritin) 10 MG tablet; Take 1 tablet by mouth Daily.    Cigarette nicotine dependence without complication  Tobacco use is unchanged.  Smoking cessation counseling was provided.  Tobacco use will be reassessed in 3 months.         Obesity (BMI 30.0-34.9)  Patient's (Body mass index is 33.98 kg/m².) indicates that they are obese (BMI >30) with health conditions that include  hypertension, coronary heart disease, diabetes mellitus, dyslipidemias, GERD, and osteoarthritis . Weight is improving with lifestyle modifications. BMI  is above average; BMI management plan is completed. We discussed low calorie, low carb based diet program, portion control, and increasing exercise.        Patient encouraged to partake of healthy diet rich in fresh fruits and vegetables as well as lean proteins.  Patient encouraged to participate in daily exercise with goal of 30 min sustained activity.          Follow Up   Return in about 3 months (around 2/27/2025) for diabetes follow up with labs prior.  Patient was given instructions and counseling regarding her condition or for health maintenance advice. Please see specific information pulled into the AVS if appropriate.

## 2024-11-27 NOTE — PROGRESS NOTES
Script sent to pharmacy.    Left message on answering machine for patient to call back to the RN hotline at [phone].    Need to inform pt of medication change.    Patrica Lu RN  [facility]     to contact me if I can be of any further assistance.

## 2024-11-27 NOTE — ASSESSMENT & PLAN NOTE
Tobacco use is unchanged.  Smoking cessation counseling was provided.  Tobacco use will be reassessed in 3 months.

## 2024-12-02 ENCOUNTER — TRANSCRIBE ORDERS (OUTPATIENT)
Dept: ADMINISTRATIVE | Facility: HOSPITAL | Age: 64
End: 2024-12-02
Payer: MEDICARE

## 2024-12-02 ENCOUNTER — OFFICE VISIT (OUTPATIENT)
Dept: HEMATOLOGY | Age: 64
End: 2024-12-02
Payer: MEDICARE

## 2024-12-02 ENCOUNTER — HOSPITAL ENCOUNTER (OUTPATIENT)
Dept: INFUSION THERAPY | Age: 64
Discharge: HOME | End: 2024-12-02
Payer: MEDICARE

## 2024-12-02 VITALS
TEMPERATURE: 97.9 F | HEART RATE: 68 BPM | HEIGHT: 60 IN | DIASTOLIC BLOOD PRESSURE: 88 MMHG | SYSTOLIC BLOOD PRESSURE: 148 MMHG | OXYGEN SATURATION: 98 % | BODY MASS INDEX: 34.04 KG/M2 | WEIGHT: 173.4 LBS

## 2024-12-02 DIAGNOSIS — R16.0 LIVER MASS: ICD-10-CM

## 2024-12-02 DIAGNOSIS — C22.0 HEPATOCELLULAR CARCINOMA (HCC): ICD-10-CM

## 2024-12-02 DIAGNOSIS — R91.8 LUNG NODULES: ICD-10-CM

## 2024-12-02 DIAGNOSIS — R77.2 ELEVATED AFP: ICD-10-CM

## 2024-12-02 DIAGNOSIS — R77.2 ELEVATED AFP: Primary | ICD-10-CM

## 2024-12-02 DIAGNOSIS — R97.8 OTHER ABNORMAL TUMOR MARKERS: ICD-10-CM

## 2024-12-02 DIAGNOSIS — R97.8 ABNORMAL TUMOR MARKERS: ICD-10-CM

## 2024-12-02 DIAGNOSIS — R53.83 FATIGUE, UNSPECIFIED TYPE: ICD-10-CM

## 2024-12-02 DIAGNOSIS — C22.0 HEPATOCELLULAR CARCINOMA: ICD-10-CM

## 2024-12-02 DIAGNOSIS — C22.0 HEPATOCELLULAR CARCINOMA (HCC): Primary | ICD-10-CM

## 2024-12-02 DIAGNOSIS — K74.60 CIRRHOSIS OF LIVER WITHOUT ASCITES, UNSPECIFIED HEPATIC CIRRHOSIS TYPE (HCC): Primary | ICD-10-CM

## 2024-12-02 DIAGNOSIS — K74.60 CIRRHOSIS OF LIVER WITHOUT ASCITES, UNSPECIFIED HEPATIC CIRRHOSIS TYPE (HCC): ICD-10-CM

## 2024-12-02 LAB
AFP SERPL-MCNC: ABNORMAL NG/ML (ref 0–8.3)
ALBUMIN SERPL-MCNC: 4.1 G/DL (ref 3.5–5.2)
ALP SERPL-CCNC: 207 U/L (ref 35–104)
ALT SERPL-CCNC: 50 U/L (ref 5–33)
ANION GAP SERPL CALCULATED.3IONS-SCNC: 10 MMOL/L (ref 7–19)
AST SERPL-CCNC: 85 U/L (ref 5–32)
BASOPHILS # BLD: 0.06 K/UL (ref 0.01–0.08)
BASOPHILS NFR BLD: 1.2 % (ref 0.1–1.2)
BILIRUB SERPL-MCNC: 0.6 MG/DL (ref 0–1.2)
BUN SERPL-MCNC: 16 MG/DL (ref 8–23)
CALCIUM SERPL-MCNC: 9.3 MG/DL (ref 8.8–10.2)
CANCER AG19-9 SERPL-ACNC: 74 U/ML (ref 0–35)
CEA SERPL-MCNC: 5.2 NG/ML (ref 0–4.7)
CHLORIDE SERPL-SCNC: 97 MMOL/L (ref 98–107)
CO2 SERPL-SCNC: 27 MMOL/L (ref 22–29)
CORTIS AM PEAK SERPL-MCNC: 1.1 UG/DL (ref 4.8–19.5)
CREAT SERPL-MCNC: <0.5 MG/DL (ref 0.5–0.9)
EOSINOPHIL # BLD: 0.17 K/UL (ref 0.04–0.54)
EOSINOPHIL NFR BLD: 3.5 % (ref 0.7–7)
ERYTHROCYTE [DISTWIDTH] IN BLOOD BY AUTOMATED COUNT: 12.6 % (ref 11.7–14.4)
GLUCOSE SERPL-MCNC: 411 MG/DL (ref 70–99)
HAV IGM SERPL QL IA: ABNORMAL
HBV CORE IGM SERPL QL IA: ABNORMAL
HBV SURFACE AG SERPL QL IA: ABNORMAL
HCT VFR BLD AUTO: 37.5 % (ref 34.1–44.9)
HCV AB SERPL QL IA: REACTIVE
HGB BLD-MCNC: 12.4 G/DL (ref 11.2–15.7)
LYMPHOCYTES # BLD: 0.71 K/UL (ref 1.18–3.74)
LYMPHOCYTES NFR BLD: 14.5 % (ref 19.3–53.1)
MCH RBC QN AUTO: 28.4 PG (ref 25.6–32.2)
MCHC RBC AUTO-ENTMCNC: 33.1 G/DL (ref 32.3–35.5)
MCV RBC AUTO: 85.8 FL (ref 79.4–94.8)
MONOCYTES # BLD: 0.33 K/UL (ref 0.24–0.82)
MONOCYTES NFR BLD: 6.7 % (ref 4.7–12.5)
NEUTROPHILS # BLD: 3.61 K/UL (ref 1.56–6.13)
NEUTS SEG NFR BLD: 73.5 % (ref 34–71.1)
PLATELET # BLD AUTO: 179 K/UL (ref 182–369)
PMV BLD AUTO: 10.8 FL (ref 7.4–10.4)
POTASSIUM SERPL-SCNC: 3.9 MMOL/L (ref 3.5–5.1)
PROT SERPL-MCNC: 7.5 G/DL (ref 6.4–8.3)
RBC # BLD AUTO: 4.37 M/UL (ref 3.93–5.22)
SODIUM SERPL-SCNC: 134 MMOL/L (ref 136–145)
TSH SERPL DL<=0.005 MIU/L-ACNC: 5.55 UIU/ML (ref 0.27–4.2)
WBC # BLD AUTO: 4.91 K/UL (ref 3.98–10.04)

## 2024-12-02 PROCEDURE — 36415 COLL VENOUS BLD VENIPUNCTURE: CPT

## 2024-12-02 PROCEDURE — 3017F COLORECTAL CA SCREEN DOC REV: CPT | Performed by: INTERNAL MEDICINE

## 2024-12-02 PROCEDURE — G2211 COMPLEX E/M VISIT ADD ON: HCPCS | Performed by: INTERNAL MEDICINE

## 2024-12-02 PROCEDURE — 4004F PT TOBACCO SCREEN RCVD TLK: CPT | Performed by: INTERNAL MEDICINE

## 2024-12-02 PROCEDURE — 3079F DIAST BP 80-89 MM HG: CPT | Performed by: INTERNAL MEDICINE

## 2024-12-02 PROCEDURE — 80053 COMPREHEN METABOLIC PANEL: CPT

## 2024-12-02 PROCEDURE — 99214 OFFICE O/P EST MOD 30 MIN: CPT

## 2024-12-02 PROCEDURE — G8417 CALC BMI ABV UP PARAM F/U: HCPCS | Performed by: INTERNAL MEDICINE

## 2024-12-02 PROCEDURE — G8400 PT W/DXA NO RESULTS DOC: HCPCS | Performed by: INTERNAL MEDICINE

## 2024-12-02 PROCEDURE — 1090F PRES/ABSN URINE INCON ASSESS: CPT | Performed by: INTERNAL MEDICINE

## 2024-12-02 PROCEDURE — G8427 DOCREV CUR MEDS BY ELIG CLIN: HCPCS | Performed by: INTERNAL MEDICINE

## 2024-12-02 PROCEDURE — 3077F SYST BP >= 140 MM HG: CPT | Performed by: INTERNAL MEDICINE

## 2024-12-02 PROCEDURE — 99215 OFFICE O/P EST HI 40 MIN: CPT | Performed by: INTERNAL MEDICINE

## 2024-12-02 PROCEDURE — 85025 COMPLETE CBC W/AUTO DIFF WBC: CPT

## 2024-12-02 PROCEDURE — G8484 FLU IMMUNIZE NO ADMIN: HCPCS | Performed by: INTERNAL MEDICINE

## 2024-12-02 PROCEDURE — 1123F ACP DISCUSS/DSCN MKR DOCD: CPT | Performed by: INTERNAL MEDICINE

## 2024-12-02 NOTE — PROGRESS NOTES
Plan submitted for insurance authorization per Dr Leong for Atezolizumab 1200mg + Bevacizumab 15 mg/kg Q 21 days as indicated for hepatocellular carcinoma per NCCN guidelines:    Plan to start ASAP when authorization is received.   Consultation for port placement with Dr Medina 12/3/2024.

## 2024-12-03 ENCOUNTER — OFFICE VISIT (OUTPATIENT)
Dept: SURGERY | Age: 64
End: 2024-12-03
Payer: MEDICARE

## 2024-12-03 ENCOUNTER — TELEPHONE (OUTPATIENT)
Dept: HEMATOLOGY | Age: 64
End: 2024-12-03

## 2024-12-03 VITALS
OXYGEN SATURATION: 97 % | WEIGHT: 174 LBS | HEART RATE: 73 BPM | HEIGHT: 60 IN | TEMPERATURE: 97.9 F | BODY MASS INDEX: 34.16 KG/M2

## 2024-12-03 DIAGNOSIS — C22.0 HEPATOCELLULAR CARCINOMA (HCC): Primary | ICD-10-CM

## 2024-12-03 DIAGNOSIS — K21.9 GASTROESOPHAGEAL REFLUX DISEASE, UNSPECIFIED WHETHER ESOPHAGITIS PRESENT: ICD-10-CM

## 2024-12-03 DIAGNOSIS — E11.65 TYPE 2 DIABETES MELLITUS WITH HYPERGLYCEMIA, WITH LONG-TERM CURRENT USE OF INSULIN: ICD-10-CM

## 2024-12-03 DIAGNOSIS — R76.8 HEPATITIS C ANTIBODY TEST POSITIVE: Primary | ICD-10-CM

## 2024-12-03 DIAGNOSIS — Z79.4 TYPE 2 DIABETES MELLITUS WITH HYPERGLYCEMIA, WITH LONG-TERM CURRENT USE OF INSULIN: ICD-10-CM

## 2024-12-03 PROCEDURE — G8484 FLU IMMUNIZE NO ADMIN: HCPCS | Performed by: SURGERY

## 2024-12-03 PROCEDURE — 1123F ACP DISCUSS/DSCN MKR DOCD: CPT | Performed by: SURGERY

## 2024-12-03 PROCEDURE — G8427 DOCREV CUR MEDS BY ELIG CLIN: HCPCS | Performed by: SURGERY

## 2024-12-03 PROCEDURE — 3017F COLORECTAL CA SCREEN DOC REV: CPT | Performed by: SURGERY

## 2024-12-03 PROCEDURE — 4004F PT TOBACCO SCREEN RCVD TLK: CPT | Performed by: SURGERY

## 2024-12-03 PROCEDURE — 99203 OFFICE O/P NEW LOW 30 MIN: CPT | Performed by: SURGERY

## 2024-12-03 PROCEDURE — G8417 CALC BMI ABV UP PARAM F/U: HCPCS | Performed by: SURGERY

## 2024-12-03 PROCEDURE — 1090F PRES/ABSN URINE INCON ASSESS: CPT | Performed by: SURGERY

## 2024-12-03 PROCEDURE — G8400 PT W/DXA NO RESULTS DOC: HCPCS | Performed by: SURGERY

## 2024-12-03 RX ORDER — SODIUM CHLORIDE, SODIUM LACTATE, POTASSIUM CHLORIDE, CALCIUM CHLORIDE 600; 310; 30; 20 MG/100ML; MG/100ML; MG/100ML; MG/100ML
INJECTION, SOLUTION INTRAVENOUS CONTINUOUS
OUTPATIENT
Start: 2024-12-03

## 2024-12-03 RX ORDER — SODIUM CHLORIDE 0.9 % (FLUSH) 0.9 %
5-40 SYRINGE (ML) INJECTION PRN
OUTPATIENT
Start: 2024-12-03

## 2024-12-03 RX ORDER — SODIUM CHLORIDE 0.9 % (FLUSH) 0.9 %
5-40 SYRINGE (ML) INJECTION EVERY 12 HOURS SCHEDULED
OUTPATIENT
Start: 2024-12-03

## 2024-12-03 RX ORDER — SODIUM CHLORIDE 9 MG/ML
INJECTION, SOLUTION INTRAVENOUS PRN
OUTPATIENT
Start: 2024-12-03

## 2024-12-03 RX ORDER — DOXEPIN HYDROCHLORIDE 50 MG/1
50 CAPSULE ORAL NIGHTLY
COMMUNITY

## 2024-12-03 RX ORDER — CELECOXIB 100 MG/1
100 CAPSULE ORAL ONCE
OUTPATIENT
Start: 2024-12-03 | End: 2024-12-03

## 2024-12-03 ASSESSMENT — ENCOUNTER SYMPTOMS
APNEA: 1
EYE ITCHING: 1
RHINORRHEA: 1
WHEEZING: 1
BACK PAIN: 1

## 2024-12-03 NOTE — TELEPHONE ENCOUNTER
Izzy Stokes called and left voicemail this and wants to keep her appointment with Dr. Leong on 12/16/24 she no longer wants to change that appointment and I let  know as well, I made Dr. Leong's Nurses aware also and Rosina seen my message.

## 2024-12-03 NOTE — TELEPHONE ENCOUNTER
Rx Refill Note  Requested Prescriptions     Pending Prescriptions Disp Refills    omeprazole (priLOSEC) 20 MG capsule [Pharmacy Med Name: OMEPRAZOLE 20MG CPDR] 90 capsule 1     Sig: TAKE ONE CAPSULE BY MOUTH EVERY DAY        Cyn Still MA  12/03/24, 14:19 CST

## 2024-12-03 NOTE — TELEPHONE ENCOUNTER
Rx Refill Note  Requested Prescriptions     Pending Prescriptions Disp Refills    pioglitazone (ACTOS) 45 MG tablet [Pharmacy Med Name: PIOGLITAZONE HCL 45MG TABS] 30 tablet 3     Sig: TAKE ONE TABLET BY MOUTH EVERY DAY          Cyn Still MA  12/03/24, 14:20 CST

## 2024-12-03 NOTE — PROGRESS NOTES
Referral placed per protocol to [facility] per Dr Leong's request to address reactive Hep C value collected 12/2/2024 in workup for hepatocellular cancer/treatment.

## 2024-12-03 NOTE — H&P (VIEW-ONLY)
SUBJECTIVE:  Ms. Stokes is a 64 y.o. female who presents today with a recent diagnosis of  Stage IIIA (T3 Nx Mx) hepatocellular carcinoma.  She is due to start chemotherapy for 12/16 and is in need of port placement.    She has a known history of cirrhosis, but denies any bleeding complications. She has not had a drink in 10 years.      Past Medical History:   Diagnosis Date    Arthritis     Asthma     Cirrhosis (HCC)     COPD (chronic obstructive pulmonary disease) (HCC)     Esophageal varices (HCC)     GERD (gastroesophageal reflux disease)     Hepatitis C     Hyperlipidemia     Hypertension      Past Surgical History:   Procedure Laterality Date    COLONOSCOPY  03/02/2016    Dr Perkins-non bleeding internal hemorrhoids o/w normal; fair prep (5 yr)    COLONOSCOPY N/A 03/09/2021    Dr LUIS FELIPE Torres-HP, 5 yr recall    COLONOSCOPY  2023    [facility] TRANSORAL BI[facility]SY SINGLE/MULTIPLE N/A 02/07/2017    Dr LUIS FELIPE Torres-Small Grade I esophageal varices, gastritis/gastropathy    [facility] TRANSORAL BIOPSY SINGLE/MULTIPLE N/A 03/23/2018    Dr LUIS FELIPE Torres-Grade I esophageal varices, erosive/active gastritis-2 yr recall    UPPER GASTROINTESTINAL ENDOSCOPY  03/02/2016    Dr Perkins-gr II esoph varices; portal hyptensive gastropathy    UPPER GASTROINTESTINAL ENDOSCOPY N/A 03/03/2020    Dr LUIS FELIPE Torres-w/variceal banding x 4, 3 columns of Grade II varices, repeat in 6-8 wks    UPPER GASTROINTESTINAL ENDOSCOPY N/A 04/14/2020    Dr LUIS FELIPE Torres-marisa/variceal banding x 3-grade I-II varices, repeat in 8 wks    UPPER GASTROINTESTINAL ENDOSCOPY N/A 08/25/2020    Dr LUIS FELIPE Torres-w/variceal banding x 3, grade 1-2 varices, portal hypertensive gastropathy, repeat in 3 months    UPPER GASTROINTESTINAL ENDOSCOPY N/A 12/04/2020    Dr LUIS FELIPE Torres-marisa/variceal banding x 5-Grade II varices, portal hypertensive gastropathy, repeat in 3 months    UPPER GASTROINTESTINAL ENDOSCOPY N/A 03/09/2021    Dr LUIS FELIPE Torres-Changes of previous banding seen, no varices, portal hypertensive gastropathy, repeat  in 6 months    UPPER GASTROINTESTINAL ENDOSCOPY N/A 04/29/2022    Dr LUIS FELIPE Torres-w/variceal banding-Small esophageal varices, portal hypertensive gastropathy, 6 month recall    UPPER GASTROINTESTINAL ENDOSCOPY N/A 11/10/2023    Dr LUIS FELIPE Torres-Scarring noted from previous banding procedures, gastritis    UPPER GASTROINTESTINAL ENDOSCOPY N/A 10/25/2024    Dr LUIS FELIPE Torres-Evidence of previous banding in the distal esophagus, small grade I varices noted, gastritis, no sprue or h pylori, 1 yr recall    [facility] GUIDED LIVER BIOPSY PERCUTANEOUS  05/07/2019    Dr. Cox; Grade 2, Stage 4, iron stain (-)     Current Outpatient Medications   Medication Sig Dispense Refill    doxepin (SINEQUAN) 50 MG capsule Take 1 capsule by mouth nightly      tiZANidine (ZANAFLEX) 4 MG tablet Take 1 tablet by mouth every 8 hours as needed      traZODone (DESYREL) 50 MG tablet Take 1 tablet by mouth nightly      doxepin (SINEQUAN) 25 MG capsule Take 2 capsules by mouth nightly      albuterol sulfate HFA (VENTOLIN HFA) 108 (90 Base) MCG/ACT inhaler Inhale 2 puffs into the lungs 4 times daily as needed for Wheezing 18 g 0    pioglitazone (ACTOS) 30 MG tablet Take 1 tablet by mouth daily      cetirizine (ZYRTEC) 10 MG tablet Take 1 tablet by mouth daily      levothyroxine (SYNTHROID) 50 MCG tablet Take 1 tablet by mouth Daily      linagliptin (TRADJENTA) 5 MG tablet Take 1 tablet by mouth daily      ondansetron (ZOFRAN) 4 MG tablet Take 1 tablet by mouth every 8 hours as needed for Nausea 45 tablet 2    nadolol (CORGARD) 20 MG tablet TAKE ONE TABLET BY MOUTH EVERY DAY -- NEED APPOINTMENT 90 tablet 1    omeprazole (PRILOSEC) 20 MG delayed release capsule TAKE ONE CAPSULE BY MOUTH EVERY DAY 90 capsule 1    atorvastatin (LIPITOR) 10 MG tablet Take 1 tablet by mouth daily      Ertugliflozin L-PyroglutamicAc (STEGLATRO) 5 MG TABS Take 5 mg by mouth daily       benazepril (LOTENSIN) 20 MG tablet Take 1 tablet by mouth daily 30 tablet 5    hydrALAZINE (APRESOLINE) 25 MG

## 2024-12-03 NOTE — PROGRESS NOTES
in 6 months    UPPER GASTROINTESTINAL ENDOSCOPY N/A 04/29/2022    Dr LUIS FELIPE Torres-w/variceal banding-Small esophageal varices, portal hypertensive gastropathy, 6 month recall    UPPER GASTROINTESTINAL ENDOSCOPY N/A 11/10/2023    Dr LUIS FELIPE Torres-Scarring noted from previous banding procedures, gastritis    UPPER GASTROINTESTINAL ENDOSCOPY N/A 10/25/2024    Dr LUIS FELIPE Torres-Evidence of previous banding in the distal esophagus, small grade I varices noted, gastritis, no sprue or h pylori, 1 yr recall    [place] GUIDED LIVER BIOPSY PERCUTANEOUS  05/07/2019    Dr. Cox; Grade 2, Stage 4, iron stain (-)     Current Outpatient Medications   Medication Sig Dispense Refill    doxepin (SINEQUAN) 50 MG capsule Take 1 capsule by mouth nightly      tiZANidine (ZANAFLEX) 4 MG tablet Take 1 tablet by mouth every 8 hours as needed      traZODone (DESYREL) 50 MG tablet Take 1 tablet by mouth nightly      doxepin (SINEQUAN) 25 MG capsule Take 2 capsules by mouth nightly      albuterol sulfate HFA (VENTOLIN HFA) 108 (90 Base) MCG/ACT inhaler Inhale 2 puffs into the lungs 4 times daily as needed for Wheezing 18 g 0    pioglitazone (ACTOS) 30 MG tablet Take 1 tablet by mouth daily      cetirizine (ZYRTEC) 10 MG tablet Take 1 tablet by mouth daily      levothyroxine (SYNTHROID) 50 MCG tablet Take 1 tablet by mouth Daily      linagliptin (TRADJENTA) 5 MG tablet Take 1 tablet by mouth daily      ondansetron (ZOFRAN) 4 MG tablet Take 1 tablet by mouth every 8 hours as needed for Nausea 45 tablet 2    nadolol (CORGARD) 20 MG tablet TAKE ONE TABLET BY MOUTH EVERY DAY -- NEED APPOINTMENT 90 tablet 1    omeprazole (PRILOSEC) 20 MG delayed release capsule TAKE ONE CAPSULE BY MOUTH EVERY DAY 90 capsule 1    atorvastatin (LIPITOR) 10 MG tablet Take 1 tablet by mouth daily      Ertugliflozin L-PyroglutamicAc (STEGLATRO) 5 MG TABS Take 5 mg by mouth daily       benazepril (LOTENSIN) 20 MG tablet Take 1 tablet by mouth daily 30 tablet 5    hydrALAZINE (APRESOLINE) 25 MG

## 2024-12-04 RX ORDER — PIOGLITAZONE 45 MG/1
45 TABLET ORAL DAILY
Qty: 90 TABLET | Refills: 1 | Status: SHIPPED | OUTPATIENT
Start: 2024-12-04

## 2024-12-05 ENCOUNTER — TELEPHONE (OUTPATIENT)
Dept: FAMILY MEDICINE CLINIC | Facility: CLINIC | Age: 64
End: 2024-12-05
Payer: MEDICARE

## 2024-12-05 DIAGNOSIS — F51.01 PRIMARY INSOMNIA: Primary | ICD-10-CM

## 2024-12-05 DIAGNOSIS — T78.40XD ALLERGY, SUBSEQUENT ENCOUNTER: ICD-10-CM

## 2024-12-05 RX ORDER — CETIRIZINE HYDROCHLORIDE 10 MG/1
10 TABLET ORAL DAILY
Qty: 90 TABLET | Refills: 1 | Status: SHIPPED | OUTPATIENT
Start: 2024-12-05

## 2024-12-05 RX ORDER — DOXEPIN HYDROCHLORIDE 50 MG/1
50 CAPSULE ORAL NIGHTLY
Qty: 90 CAPSULE | Refills: 1 | Status: SHIPPED | OUTPATIENT
Start: 2024-12-05

## 2024-12-06 ENCOUNTER — CLINICAL DOCUMENTATION (OUTPATIENT)
Facility: HOSPITAL | Age: 64
End: 2024-12-06

## 2024-12-06 NOTE — PROGRESS NOTES
Pt has [facility] Medicare and [facility] Medicaid. No assistance is needed at this time.

## 2024-12-09 ENCOUNTER — HOSPITAL ENCOUNTER (OUTPATIENT)
Dept: CT IMAGING | Facility: HOSPITAL | Age: 64
Discharge: HOME | End: 2024-12-09
Payer: MEDICARE

## 2024-12-09 DIAGNOSIS — R16.0 LIVER MASS: ICD-10-CM

## 2024-12-09 DIAGNOSIS — R97.8 ABNORMAL TUMOR MARKERS: ICD-10-CM

## 2024-12-09 DIAGNOSIS — R91.8 LUNG NODULES: ICD-10-CM

## 2024-12-09 DIAGNOSIS — C22.0 HEPATOCELLULAR CARCINOMA: ICD-10-CM

## 2024-12-09 DIAGNOSIS — R77.2 ELEVATED AFP: ICD-10-CM

## 2024-12-10 ENCOUNTER — OFFICE VISIT (OUTPATIENT)
Dept: GASTROENTEROLOGY | Age: 64
End: 2024-12-10
Payer: MEDICARE

## 2024-12-10 ENCOUNTER — TELEPHONE (OUTPATIENT)
Dept: FAMILY MEDICINE CLINIC | Facility: CLINIC | Age: 64
End: 2024-12-10
Payer: MEDICARE

## 2024-12-10 VITALS
HEART RATE: 88 BPM | WEIGHT: 169 LBS | HEIGHT: 60 IN | OXYGEN SATURATION: 98 % | DIASTOLIC BLOOD PRESSURE: 88 MMHG | BODY MASS INDEX: 33.18 KG/M2 | SYSTOLIC BLOOD PRESSURE: 138 MMHG

## 2024-12-10 DIAGNOSIS — C22.0 HEPATOCELLULAR CARCINOMA (HCC): ICD-10-CM

## 2024-12-10 DIAGNOSIS — R76.8 HEPATITIS C ANTIBODY POSITIVE IN BLOOD: ICD-10-CM

## 2024-12-10 DIAGNOSIS — R76.8 HEPATITIS C ANTIBODY POSITIVE IN BLOOD: Primary | ICD-10-CM

## 2024-12-10 PROCEDURE — G8400 PT W/DXA NO RESULTS DOC: HCPCS | Performed by: NURSE PRACTITIONER

## 2024-12-10 PROCEDURE — G8417 CALC BMI ABV UP PARAM F/U: HCPCS | Performed by: NURSE PRACTITIONER

## 2024-12-10 PROCEDURE — 3017F COLORECTAL CA SCREEN DOC REV: CPT | Performed by: NURSE PRACTITIONER

## 2024-12-10 PROCEDURE — 1123F ACP DISCUSS/DSCN MKR DOCD: CPT | Performed by: NURSE PRACTITIONER

## 2024-12-10 PROCEDURE — 4004F PT TOBACCO SCREEN RCVD TLK: CPT | Performed by: NURSE PRACTITIONER

## 2024-12-10 PROCEDURE — G8427 DOCREV CUR MEDS BY ELIG CLIN: HCPCS | Performed by: NURSE PRACTITIONER

## 2024-12-10 PROCEDURE — G8484 FLU IMMUNIZE NO ADMIN: HCPCS | Performed by: NURSE PRACTITIONER

## 2024-12-10 PROCEDURE — 3079F DIAST BP 80-89 MM HG: CPT | Performed by: NURSE PRACTITIONER

## 2024-12-10 PROCEDURE — 99213 OFFICE O/P EST LOW 20 MIN: CPT | Performed by: NURSE PRACTITIONER

## 2024-12-10 PROCEDURE — 1090F PRES/ABSN URINE INCON ASSESS: CPT | Performed by: NURSE PRACTITIONER

## 2024-12-10 PROCEDURE — 3075F SYST BP GE 130 - 139MM HG: CPT | Performed by: NURSE PRACTITIONER

## 2024-12-10 ASSESSMENT — ENCOUNTER SYMPTOMS
SHORTNESS OF BREATH: 0
DIARRHEA: 0
TROUBLE SWALLOWING: 0
ABDOMINAL DISTENTION: 0
CONSTIPATION: 0
COUGH: 0
ANAL BLEEDING: 0
BLOOD IN STOOL: 0
CHOKING: 0
RECTAL PAIN: 0
ABDOMINAL PAIN: 0
NAUSEA: 0
VOMITING: 0

## 2024-12-10 NOTE — PROGRESS NOTES
Subjective:     Patient ID: Izzy Stokes is a 64 y.o. female  PCP: Magaly Carver APRN - CNP  Referring Provider: Onur Leong MD    HPI  Patient presents to the office today with the following complaints: Other (Hep c)      Pt seen today for (+) Hepatitis C antibody.  Pt states she was treated several years ago for Hepatitis C.  She is following Oncology for HCC.  Hx cirrhosis.  She will be starting chemo next week.       Last EGD 11/10/2023 - Scarring noted from previous banding procedures, gastritis   Last Colonoscopy 3/9/2021 - HP, 5 year recall   Denies any family hx colon cancer/colon polyps     Assessment:     1. Hepatitis C antibody positive in blood    2. Hepatocellular carcinoma (HCC)              Plan:   - Check Hepatitis C RNA QNT to ensure this is not active  - Keep scheduled follow up in January  - Call with any questions or concerns       Orders  Orders Placed This Encounter   Procedures    Hepatitis C RNA Qnt w Genotype Reflex     Standing Status:   Future     Number of Occurrences:   1     Standing Expiration Date:   12/10/2025     Medications  No orders of the defined types were placed in this encounter.        Patient History:     Past Medical History:   Diagnosis Date    Arthritis     Asthma     Cirrhosis (HCC)     COPD (chronic obstructive pulmonary disease) (HCC)     Esophageal varices (HCC)     GERD (gastroesophageal reflux disease)     Hepatitis C     Hyperlipidemia     Hypertension        Past Surgical History:   Procedure Laterality Date    COLONOSCOPY  03/02/2016    Dr Perkins-non bleeding internal hemorrhoids o/w normal; fair prep (5 yr)    COLONOSCOPY N/A 03/09/2021    Dr LUIS FELIPE Torres-HP, 5 yr recall    COLONOSCOPY  2023    [place] EGD TRANSORAL BIOPSY SINGLE/MULTIPLE N/A 02/07/2017    Dr LUIS FELIPE Torres-Small Grade I esophageal varices, gastritis/gastropathy    [place] EGD TRANSORAL BIOPSY SINGLE/MULTIPLE N/A 03/23/2018    Dr LUIS FELIPE Torres-Grade I esophageal varices, erosive/active gastritis-2 yr recall

## 2024-12-11 ENCOUNTER — HOSPITAL ENCOUNTER (OUTPATIENT)
Dept: NUCLEAR MEDICINE | Age: 64
Discharge: HOME | End: 2024-12-13
Attending: INTERNAL MEDICINE
Payer: MEDICARE

## 2024-12-11 ENCOUNTER — HOSPITAL ENCOUNTER (OUTPATIENT)
Dept: PREADMISSION TESTING | Age: 64
Discharge: HOME | End: 2024-12-15
Payer: MEDICARE

## 2024-12-11 ENCOUNTER — OFFICE VISIT (OUTPATIENT)
Dept: FAMILY MEDICINE CLINIC | Facility: CLINIC | Age: 64
End: 2024-12-11
Payer: MEDICARE

## 2024-12-11 DIAGNOSIS — R06.02 SHORTNESS OF BREATH: ICD-10-CM

## 2024-12-11 DIAGNOSIS — Z79.4 TYPE 2 DIABETES MELLITUS WITH HYPERGLYCEMIA, WITH LONG-TERM CURRENT USE OF INSULIN: Primary | ICD-10-CM

## 2024-12-11 DIAGNOSIS — E11.65 TYPE 2 DIABETES MELLITUS WITH HYPERGLYCEMIA, WITH LONG-TERM CURRENT USE OF INSULIN: Primary | ICD-10-CM

## 2024-12-11 LAB
NUC STRESS EJECTION FRACTION: 64 %
STRESS BASELINE DIAS BP: 67 MMHG
STRESS BASELINE HR: 54 BPM
STRESS BASELINE SYS BP: 133 MMHG
STRESS ESTIMATED WORKLOAD: 1 METS
STRESS EXERCISE DUR MIN: 5 MIN
STRESS EXERCISE DUR SEC: 0 SEC
STRESS PEAK DIAS BP: 70 MMHG
STRESS PEAK SYS BP: 169 MMHG
STRESS PERCENT HR ACHIEVED: 55 %
STRESS POST PEAK HR: 86 BPM
STRESS RATE PRESSURE PRODUCT: NORMAL BPM*MMHG
STRESS TARGET HR: 155 BPM

## 2024-12-11 PROCEDURE — 1159F MED LIST DOCD IN RCRD: CPT | Performed by: NURSE PRACTITIONER

## 2024-12-11 PROCEDURE — 1126F AMNT PAIN NOTED NONE PRSNT: CPT | Performed by: NURSE PRACTITIONER

## 2024-12-11 PROCEDURE — 78452 HT MUSCLE IMAGE SPECT MULT: CPT | Performed by: INTERNAL MEDICINE

## 2024-12-11 PROCEDURE — 93018 CV STRESS TEST I&R ONLY: CPT | Performed by: INTERNAL MEDICINE

## 2024-12-11 PROCEDURE — 3079F DIAST BP 80-89 MM HG: CPT | Performed by: NURSE PRACTITIONER

## 2024-12-11 PROCEDURE — 3052F HG A1C>EQUAL 8.0%<EQUAL 9.0%: CPT | Performed by: NURSE PRACTITIONER

## 2024-12-11 PROCEDURE — 6360000002 HC RX W HCPCS: Performed by: INTERNAL MEDICINE

## 2024-12-11 PROCEDURE — A9502 TC99M TETROFOSMIN: HCPCS | Performed by: INTERNAL MEDICINE

## 2024-12-11 PROCEDURE — 3075F SYST BP GE 130 - 139MM HG: CPT | Performed by: NURSE PRACTITIONER

## 2024-12-11 PROCEDURE — 99213 OFFICE O/P EST LOW 20 MIN: CPT | Performed by: NURSE PRACTITIONER

## 2024-12-11 PROCEDURE — 93017 CV STRESS TEST TRACING ONLY: CPT

## 2024-12-11 PROCEDURE — 93016 CV STRESS TEST SUPVJ ONLY: CPT | Performed by: INTERNAL MEDICINE

## 2024-12-11 PROCEDURE — 3430000000 HC RX DIAGNOSTIC RADIOPHARMACEUTICAL: Performed by: INTERNAL MEDICINE

## 2024-12-11 PROCEDURE — 1160F RVW MEDS BY RX/DR IN RCRD: CPT | Performed by: NURSE PRACTITIONER

## 2024-12-11 RX ORDER — PEN NEEDLE, DIABETIC 30 GX3/16"
1 NEEDLE, DISPOSABLE MISCELLANEOUS DAILY
Qty: 100 EACH | Refills: 1 | Status: SHIPPED | OUTPATIENT
Start: 2024-12-11

## 2024-12-11 RX ORDER — REGADENOSON 0.08 MG/ML
0.4 INJECTION, SOLUTION INTRAVENOUS
Status: COMPLETED | OUTPATIENT
Start: 2024-12-11 | End: 2024-12-11

## 2024-12-11 RX ADMIN — TETROFOSMIN 24 MILLICURIE: 0.23 INJECTION, POWDER, LYOPHILIZED, FOR SOLUTION INTRAVENOUS at 11:21

## 2024-12-11 RX ADMIN — TETROFOSMIN 8 MILLICURIE: 0.23 INJECTION, POWDER, LYOPHILIZED, FOR SOLUTION INTRAVENOUS at 11:21

## 2024-12-11 RX ADMIN — REGADENOSON 0.4 MG: 0.08 INJECTION, SOLUTION INTRAVENOUS at 09:20

## 2024-12-11 NOTE — PROGRESS NOTES
"Chief Complaint  Blood Sugar Problem (High readings )    Subjective        Della Gonzalez presents to [facility]  History of Present Illness  Patient is undergoing testing and treatment for newly diagnosed hepatic cancer. She is unsure what she has taken. Poor historian due to lack of understanding of medical jargon and diagnoses. She denies symptoms of hyperglycemia; however, she has noted BG readings over 400 while in for testing at [facility]. Her PET scan was cancelled due to BG reading >200. She has been compliant with medication and diet is stable. She gets physical activity walking thru town daily. She has experienced a weight loss.    Objective   Vital Signs:  /82 (BP Location: Left arm, Patient Position: Sitting, Cuff Size: Large Adult)   Pulse 68   Temp 97.1 °F (36.2 °C) (Temporal)   Ht 152.4 cm (60\")   Wt 76.7 kg (169 lb)   SpO2 98%   BMI 33.01 kg/m²   Estimated body mass index is 33.01 kg/m² as calculated from the following:    Height as of this encounter: 152.4 cm (60\").    Weight as of this encounter: 76.7 kg (169 lb).             Physical Exam  Vitals and nursing note reviewed.   Constitutional:       General: She is not in acute distress.     Appearance: Normal appearance. She is obese. She is not ill-appearing.   HENT:      Head: Normocephalic and atraumatic.   Cardiovascular:      Rate and Rhythm: Normal rate and regular rhythm.      Heart sounds: Normal heart sounds. No murmur heard.  Pulmonary:      Effort: Pulmonary effort is normal.      Breath sounds: Normal breath sounds.   Musculoskeletal:      Right lower leg: No edema.      Left lower leg: No edema.   Lymphadenopathy:      Cervical: No cervical adenopathy.   Skin:     General: Skin is warm and dry.   Neurological:      Mental Status: She is alert and oriented to person, place, and time.        Result Review :  The following data was reviewed by: MELECIO Thorne on " 12/11/2024:  Multiple glucose readings from testing episodes at [facility].            Assessment and Plan   Diagnoses and all orders for this visit:    1. Type 2 diabetes mellitus with hyperglycemia, with long-term current use of insulin (Primary)  -     Insulin Glargine (LANTUS SOLOSTAR) 100 UNIT/ML injection pen; Inject 20 Units under the skin into the appropriate area as directed Daily.  Dispense: 9 mL; Refill: 2  -     Insulin Pen Needle (Pen Needles) 31G X 5 MM misc; Use 1 each Daily.  Dispense: 100 each; Refill: 1    Explained need for insulin to patient. She is agreeable.         Follow Up   Return if symptoms worsen or fail to improve (keep scheduled appt).  Patient was given instructions and counseling regarding her condition or for health maintenance advice. Please see specific information pulled into the AVS if appropriate.     MELECIO Thorne  This note is electronically signed.

## 2024-12-12 ENCOUNTER — TELEPHONE (OUTPATIENT)
Dept: HEMATOLOGY | Age: 64
End: 2024-12-12

## 2024-12-12 NOTE — TELEPHONE ENCOUNTER

## 2024-12-13 ENCOUNTER — HOSPITAL ENCOUNTER (OUTPATIENT)
Age: 64
Setting detail: OUTPATIENT SURGERY
Discharge: HOME | End: 2024-12-13
Attending: SURGERY | Admitting: SURGERY
Payer: MEDICARE

## 2024-12-13 ENCOUNTER — ANESTHESIA EVENT (OUTPATIENT)
Dept: OPERATING ROOM | Age: 64
End: 2024-12-13
Payer: MEDICARE

## 2024-12-13 ENCOUNTER — ANESTHESIA (OUTPATIENT)
Dept: OPERATING ROOM | Age: 64
End: 2024-12-13
Payer: MEDICARE

## 2024-12-13 ENCOUNTER — APPOINTMENT (OUTPATIENT)
Dept: GENERAL RADIOLOGY | Age: 64
End: 2024-12-13
Attending: SURGERY
Payer: MEDICARE

## 2024-12-13 VITALS
DIASTOLIC BLOOD PRESSURE: 82 MMHG | BODY MASS INDEX: 33.18 KG/M2 | WEIGHT: 169 LBS | OXYGEN SATURATION: 98 % | HEART RATE: 68 BPM | SYSTOLIC BLOOD PRESSURE: 137 MMHG | TEMPERATURE: 97.1 F | HEIGHT: 60 IN

## 2024-12-13 VITALS
BODY MASS INDEX: 33.18 KG/M2 | TEMPERATURE: 98.4 F | SYSTOLIC BLOOD PRESSURE: 168 MMHG | RESPIRATION RATE: 20 BRPM | OXYGEN SATURATION: 100 % | DIASTOLIC BLOOD PRESSURE: 77 MMHG | WEIGHT: 169 LBS | HEART RATE: 67 BPM | HEIGHT: 60 IN

## 2024-12-13 DIAGNOSIS — M54.31 RIGHT SCIATIC NERVE PAIN: ICD-10-CM

## 2024-12-13 DIAGNOSIS — M62.838 MUSCLE SPASMS OF BOTH LOWER EXTREMITIES: ICD-10-CM

## 2024-12-13 LAB
GLUCOSE BLD-MCNC: 214 MG/DL (ref 70–99)
GLUCOSE BLD-MCNC: 228 MG/DL (ref 70–99)
HCV RNA SERPL NAA+PROBE-ACNC: NOT DETECTED IU/ML
HCV RNA SERPL NAA+PROBE-LOG IU: NOT DETECTED LOG IU/ML
HCV RNA SERPL QL NAA+PROBE: NOT DETECTED
PERFORMED ON: ABNORMAL
PERFORMED ON: ABNORMAL

## 2024-12-13 PROCEDURE — 82962 GLUCOSE BLOOD TEST: CPT

## 2024-12-13 PROCEDURE — 6360000002 HC RX W HCPCS: Performed by: SURGERY

## 2024-12-13 PROCEDURE — 6370000000 HC RX 637 (ALT 250 FOR IP): Performed by: SURGERY

## 2024-12-13 PROCEDURE — 7100000011 HC PHASE II RECOVERY - ADDTL 15 MIN: Performed by: SURGERY

## 2024-12-13 PROCEDURE — 77001 FLUOROGUIDE FOR VEIN DEVICE: CPT | Performed by: SURGERY

## 2024-12-13 PROCEDURE — 2709999900 HC NON-CHARGEABLE SUPPLY: Performed by: SURGERY

## 2024-12-13 PROCEDURE — 3600000013 HC SURGERY LEVEL 3 ADDTL 15MIN: Performed by: SURGERY

## 2024-12-13 PROCEDURE — 6360000002 HC RX W HCPCS: Performed by: ANESTHESIOLOGY

## 2024-12-13 PROCEDURE — 3700000001 HC ADD 15 MINUTES (ANESTHESIA): Performed by: SURGERY

## 2024-12-13 PROCEDURE — 76937 US GUIDE VASCULAR ACCESS: CPT | Performed by: SURGERY

## 2024-12-13 PROCEDURE — 2580000003 HC RX 258: Performed by: SURGERY

## 2024-12-13 PROCEDURE — C1788 PORT, INDWELLING, IMP: HCPCS | Performed by: SURGERY

## 2024-12-13 PROCEDURE — 71045 X-RAY EXAM CHEST 1 VIEW: CPT

## 2024-12-13 PROCEDURE — 3700000000 HC ANESTHESIA ATTENDED CARE: Performed by: SURGERY

## 2024-12-13 PROCEDURE — 7100000000 HC PACU RECOVERY - FIRST 15 MIN: Performed by: SURGERY

## 2024-12-13 PROCEDURE — 6360000002 HC RX W HCPCS: Performed by: NURSE ANESTHETIST, CERTIFIED REGISTERED

## 2024-12-13 PROCEDURE — 3600000003 HC SURGERY LEVEL 3 BASE: Performed by: SURGERY

## 2024-12-13 PROCEDURE — 7100000010 HC PHASE II RECOVERY - FIRST 15 MIN: Performed by: SURGERY

## 2024-12-13 PROCEDURE — 7100000001 HC PACU RECOVERY - ADDTL 15 MIN: Performed by: SURGERY

## 2024-12-13 PROCEDURE — 36561 INSERT TUNNELED CV CATH: CPT | Performed by: SURGERY

## 2024-12-13 DEVICE — PORT INFUS PLAS SGL LUMN W/ 9.6FR SIL CATH AIRGUARD VLV: Type: IMPLANTABLE DEVICE | Site: CHEST | Status: FUNCTIONAL

## 2024-12-13 RX ORDER — SODIUM CHLORIDE 0.9 % (FLUSH) 0.9 %
5-40 SYRINGE (ML) INJECTION EVERY 12 HOURS SCHEDULED
Status: CANCELLED | OUTPATIENT
Start: 2024-12-13

## 2024-12-13 RX ORDER — NALOXONE HYDROCHLORIDE 0.4 MG/ML
INJECTION, SOLUTION INTRAMUSCULAR; INTRAVENOUS; SUBCUTANEOUS PRN
Status: CANCELLED | OUTPATIENT
Start: 2024-12-13

## 2024-12-13 RX ORDER — HEPARIN SODIUM,PORCINE/PF 10 UNIT/ML
SYRINGE (ML) INTRAVENOUS PRN
Status: DISCONTINUED | OUTPATIENT
Start: 2024-12-13 | End: 2024-12-13 | Stop reason: ALTCHOICE

## 2024-12-13 RX ORDER — SODIUM CHLORIDE, SODIUM LACTATE, POTASSIUM CHLORIDE, CALCIUM CHLORIDE 600; 310; 30; 20 MG/100ML; MG/100ML; MG/100ML; MG/100ML
INJECTION, SOLUTION INTRAVENOUS CONTINUOUS
Status: DISCONTINUED | OUTPATIENT
Start: 2024-12-13 | End: 2024-12-13 | Stop reason: HOSPADM

## 2024-12-13 RX ORDER — SODIUM CHLORIDE 9 MG/ML
INJECTION, SOLUTION INTRAVENOUS PRN
Status: DISCONTINUED | OUTPATIENT
Start: 2024-12-13 | End: 2024-12-13 | Stop reason: HOSPADM

## 2024-12-13 RX ORDER — SODIUM CHLORIDE 0.9 % (FLUSH) 0.9 %
5-40 SYRINGE (ML) INJECTION EVERY 12 HOURS SCHEDULED
Status: DISCONTINUED | OUTPATIENT
Start: 2024-12-13 | End: 2024-12-13 | Stop reason: HOSPADM

## 2024-12-13 RX ORDER — LIDOCAINE HYDROCHLORIDE 10 MG/ML
1 INJECTION, SOLUTION EPIDURAL; INFILTRATION; INTRACAUDAL; PERINEURAL
Status: DISCONTINUED | OUTPATIENT
Start: 2024-12-13 | End: 2024-12-13 | Stop reason: HOSPADM

## 2024-12-13 RX ORDER — HYDRALAZINE HYDROCHLORIDE 20 MG/ML
5 INJECTION INTRAMUSCULAR; INTRAVENOUS EVERY 10 MIN PRN
Status: COMPLETED | OUTPATIENT
Start: 2024-12-13 | End: 2024-12-13

## 2024-12-13 RX ORDER — SODIUM CHLORIDE 0.9 % (FLUSH) 0.9 %
5-40 SYRINGE (ML) INJECTION PRN
Status: CANCELLED | OUTPATIENT
Start: 2024-12-13

## 2024-12-13 RX ORDER — BUPIVACAINE HYDROCHLORIDE 2.5 MG/ML
INJECTION, SOLUTION INFILTRATION; PERINEURAL PRN
Status: DISCONTINUED | OUTPATIENT
Start: 2024-12-13 | End: 2024-12-13 | Stop reason: ALTCHOICE

## 2024-12-13 RX ORDER — SODIUM CHLORIDE 0.9 % (FLUSH) 0.9 %
5-40 SYRINGE (ML) INJECTION PRN
Status: DISCONTINUED | OUTPATIENT
Start: 2024-12-13 | End: 2024-12-13 | Stop reason: HOSPADM

## 2024-12-13 RX ORDER — DIPHENHYDRAMINE HYDROCHLORIDE 50 MG/ML
12.5 INJECTION INTRAMUSCULAR; INTRAVENOUS
Status: DISCONTINUED | OUTPATIENT
Start: 2024-12-13 | End: 2024-12-13 | Stop reason: HOSPADM

## 2024-12-13 RX ORDER — DEXAMETHASONE SODIUM PHOSPHATE 10 MG/ML
8 INJECTION, SOLUTION INTRAMUSCULAR; INTRAVENOUS ONCE
Status: DISCONTINUED | OUTPATIENT
Start: 2024-12-13 | End: 2024-12-13 | Stop reason: HOSPADM

## 2024-12-13 RX ORDER — GABAPENTIN 600 MG/1
600 TABLET ORAL 3 TIMES DAILY
Qty: 90 TABLET | Refills: 2 | Status: SHIPPED | OUTPATIENT
Start: 2024-12-13

## 2024-12-13 RX ORDER — FENTANYL CITRATE 50 UG/ML
25 INJECTION, SOLUTION INTRAMUSCULAR; INTRAVENOUS EVERY 5 MIN PRN
Status: DISCONTINUED | OUTPATIENT
Start: 2024-12-13 | End: 2024-12-13 | Stop reason: HOSPADM

## 2024-12-13 RX ORDER — PROCHLORPERAZINE EDISYLATE 5 MG/ML
5 INJECTION INTRAMUSCULAR; INTRAVENOUS
Status: DISCONTINUED | OUTPATIENT
Start: 2024-12-13 | End: 2024-12-13 | Stop reason: HOSPADM

## 2024-12-13 RX ORDER — SODIUM CHLORIDE 9 MG/ML
INJECTION, SOLUTION INTRAVENOUS PRN
Status: CANCELLED | OUTPATIENT
Start: 2024-12-13

## 2024-12-13 RX ORDER — GABAPENTIN 600 MG/1
600 TABLET ORAL 3 TIMES DAILY
Qty: 90 TABLET | Refills: 0 | OUTPATIENT
Start: 2024-12-13

## 2024-12-13 RX ORDER — FENTANYL CITRATE 50 UG/ML
INJECTION, SOLUTION INTRAMUSCULAR; INTRAVENOUS
Status: DISCONTINUED | OUTPATIENT
Start: 2024-12-13 | End: 2024-12-13 | Stop reason: SDUPTHER

## 2024-12-13 RX ORDER — ONDANSETRON 2 MG/ML
4 INJECTION INTRAMUSCULAR; INTRAVENOUS
Status: DISCONTINUED | OUTPATIENT
Start: 2024-12-13 | End: 2024-12-13 | Stop reason: HOSPADM

## 2024-12-13 RX ORDER — FENTANYL CITRATE 50 UG/ML
50 INJECTION, SOLUTION INTRAMUSCULAR; INTRAVENOUS EVERY 5 MIN PRN
Status: DISCONTINUED | OUTPATIENT
Start: 2024-12-13 | End: 2024-12-13 | Stop reason: HOSPADM

## 2024-12-13 RX ORDER — PROPOFOL 10 MG/ML
INJECTION, EMULSION INTRAVENOUS
Status: DISCONTINUED | OUTPATIENT
Start: 2024-12-13 | End: 2024-12-13 | Stop reason: SDUPTHER

## 2024-12-13 RX ORDER — MIDAZOLAM HYDROCHLORIDE 2 MG/2ML
2 INJECTION, SOLUTION INTRAMUSCULAR; INTRAVENOUS
Status: COMPLETED | OUTPATIENT
Start: 2024-12-13 | End: 2024-12-13

## 2024-12-13 RX ORDER — CELECOXIB 100 MG/1
100 CAPSULE ORAL ONCE
Status: COMPLETED | OUTPATIENT
Start: 2024-12-13 | End: 2024-12-13

## 2024-12-13 RX ORDER — LIDOCAINE HYDROCHLORIDE 10 MG/ML
INJECTION, SOLUTION INFILTRATION; PERINEURAL
Status: DISCONTINUED | OUTPATIENT
Start: 2024-12-13 | End: 2024-12-13 | Stop reason: SDUPTHER

## 2024-12-13 RX ADMIN — SODIUM CHLORIDE, POTASSIUM CHLORIDE, SODIUM LACTATE AND CALCIUM CHLORIDE: 600; 310; 30; 20 INJECTION, SOLUTION INTRAVENOUS at 07:57

## 2024-12-13 RX ADMIN — MIDAZOLAM 2 MG: 1 INJECTION INTRAMUSCULAR; INTRAVENOUS at 07:47

## 2024-12-13 RX ADMIN — CEFAZOLIN 2000 MG: 2 INJECTION, POWDER, FOR SOLUTION INTRAMUSCULAR; INTRAVENOUS at 08:46

## 2024-12-13 RX ADMIN — CELECOXIB 100 MG: 100 CAPSULE ORAL at 07:49

## 2024-12-13 RX ADMIN — FENTANYL CITRATE 25 MCG: 0.05 INJECTION, SOLUTION INTRAMUSCULAR; INTRAVENOUS at 08:43

## 2024-12-13 RX ADMIN — FENTANYL CITRATE 50 MCG: 0.05 INJECTION, SOLUTION INTRAMUSCULAR; INTRAVENOUS at 08:35

## 2024-12-13 RX ADMIN — HYDRALAZINE HYDROCHLORIDE 5 MG: 20 INJECTION INTRAMUSCULAR; INTRAVENOUS at 09:52

## 2024-12-13 RX ADMIN — HYDRALAZINE HYDROCHLORIDE 5 MG: 20 INJECTION INTRAMUSCULAR; INTRAVENOUS at 09:43

## 2024-12-13 RX ADMIN — FENTANYL CITRATE 25 MCG: 0.05 INJECTION, SOLUTION INTRAMUSCULAR; INTRAVENOUS at 08:49

## 2024-12-13 RX ADMIN — LIDOCAINE HYDROCHLORIDE 50 MG: 10 INJECTION, SOLUTION INFILTRATION; PERINEURAL at 08:35

## 2024-12-13 RX ADMIN — PROPOFOL 150 MCG/KG/MIN: 10 INJECTION, EMULSION INTRAVENOUS at 08:35

## 2024-12-13 ASSESSMENT — LIFESTYLE VARIABLES: SMOKING_STATUS: 1

## 2024-12-13 ASSESSMENT — PAIN - FUNCTIONAL ASSESSMENT
PAIN_FUNCTIONAL_ASSESSMENT: NONE - DENIES PAIN

## 2024-12-13 NOTE — DISCHARGE INSTR - ACTIVITY
No heavy lifting.  May shower tomorrow.   Do not soak in tub.     If you have signs of infection, call you doctor. These include:   -Increased pain, swelling, warmth, or redness  -Red streaks leading from the incision  -Pus draining from the incision  -A fever > 100.4

## 2024-12-13 NOTE — TELEPHONE ENCOUNTER
UPCOMING APPTS  With Family Medicine ([facility])  02/24/2025 at 9:15 AM  LAST OFFICE VISIT - THIS DEPT  12/11/2024 Vijaya Henao, MELECIO  LAST TELEMEDICINE - THIS DEPT  None in last 365 days

## 2024-12-13 NOTE — PROGRESS NOTES
INITIAL ONCOLOGY CONSULTATION     Patient:  Izzy Stokes  YOB: 1960  Date of Service: 12/16/2024  MRN: [id number]   Primary Care Physician: Magaly Carver APRN - CNP  Advance Directive:  No current  Referring Provider: Onur Leong MD        Subjective:     Izzy Stokes is a 64 y.o. female with a primary and secondary diagnoses:  At least Stage IIIA (T3 Nx Mx) hepatocellular carcinoma 8/28/2024  PETER  NAFLD    At her initial medical oncology consultation 11/20/2024, Izzy was referred immediately to the ER due to a very elevated and persistent blood pressure.  Repeat BP in the ED was 211/115 and upon discharge was down to 163/68.     Secondary diagnoses managed by PCP - Magaly SUÁREZ include:  T2DM with diabetic polyneuropathy   COPD , RUL nodules , smoker   CHF - cardiomegaly and pulmonary edema -  Patient of Dr Wilburn 10/17/2024  ETOH related cirrhosis - Love BREWER    Still pending in workup:  PET scan at [facility] stat, next available  Triple phase CT liver      TARGET HEPATOCELLULAR CARCINOMA SITES  Alpha-fetoprotein of  129.9 on 5/10/2024 pretreatment  Alpha-fetoprotein of  60,139.0 on 11/20/2024-posttreatment  1.4 x 1.3 cm enlarging lesion in hepatic segment eight pre-treatment at [facility] 6/13/2024  7.2 x 7.1 x 7.4 cm large mildly T2 hyperintense lesion right hepatic lobe post-treatment MRI at [facility] 10/29/2024        TUMOR HISTORY: At least stage IIIA (T3 Nx Mx) hepatocellular carcinoma 8/28/2024  Izzy was seen in initial consultation on 11/20/2024, referred by Anna Ortega with a diagnosis of hepatocellular carcinoma for management.     Izzy has history of hepatic cirrhosis with ascites and esophageal varices managed by Anna LEE.    She was referred to Hepatology December 2023 for a hepatic lesion and elevated AFP with delays in getting evaluated.           US RIGHT UPPER QUADRANT 10/23/2023 at [facility], compared to

## 2024-12-13 NOTE — INTERVAL H&P NOTE
Update History & Physical    The patient's History and Physical of December 3, 2024 was reviewed with the patient and I examined the patient. There was no change. The surgical site was confirmed by the patient and me.     Plan: The risks, benefits, expected outcome, and alternative to the recommended procedure have been discussed with the patient. Patient understands and wants to proceed with the procedure.     Electronically signed by Cande Medina DO on 12/13/2024 at 10:33 AM

## 2024-12-13 NOTE — ANESTHESIA POSTPROCEDURE EVALUATION
Department of Anesthesiology  Postprocedure Note    Patient: Izzy Stokes  MRN: [id number]  YOB: 1960  Date of evaluation: 12/13/2024    Procedure Summary       Date: 12/13/24 Room / Location: [address]    Anesthesia Start: 0831 Anesthesia Stop: 0918    Procedure: SINGLE LUMEN POWER PORT INSERTION WITH ULTRASOUND & FLUOROSCOPY (Chest) Diagnosis:       Hepatocellular carcinoma (HCC)      (Hepatocellular carcinoma (HCC) [C22.0])    Surgeons: Cande Medina DO Responsible Provider: Cristobal Sullivan APRN - CRNA    Anesthesia Type: TIVA, MAC ASA Status: 3            Anesthesia Type: No value filed.    Daniele Phase I: Daniele Score: 10    Daniele Phase II:      Anesthesia Post Evaluation    Patient location during evaluation: PACU  Patient participation: complete - patient participated  Level of consciousness: sleepy but conscious  Pain score: 0  Airway patency: patent  Nausea & Vomiting: no nausea and no vomiting  Cardiovascular status: hemodynamically stable  Respiratory status: acceptable, spontaneous ventilation and room air  Hydration status: euvolemic  Pain management: adequate    No notable events documented.

## 2024-12-13 NOTE — OP NOTE
Operative Note      Patient: Izzy Stokes  YOB: 1960  MRN: [id number]    Date of Procedure: 12/13/2024    Pre-Op Diagnosis Codes:      * Hepatocellular carcinoma (HCC) [C22.0]    Post-Op Diagnosis: Same       Procedure(s):  SINGLE LUMEN POWER PORT INSERTION WITH ULTRASOUND & FLUOROSCOPY    Surgeon(s):  Cande Medina DO    Assistant:   * No surgical staff found *    Anesthesia: Monitor Anesthesia Care    Estimated Blood Loss (mL): Minimal    Complications: None    Specimens:   * No specimens in log *    Implants:  Implant Name Type Inv. Item Serial No.  Lot No. LRB No. Used Action   PORT INFUS PLAS SGL LUMN W/ 9.6FR CHIDI CATH AIRGUARD VLV - [id number]  PORT INFUS PLAS SGL LUMN W/ 9.6FR CHIDI CATH AIRGUARD VLV  [employer] [id number] Right 1 Implanted         Drains: * No LDAs found *    Findings:  Infection Present At Time Of Surgery (PATOS) (choose all levels that have infection present):  No infection present  Other Findings: port placed     Detailed Description of Procedure:       Patient was taken to the main operating room and placed on the operating table supine. After the administration of appropriate IV sedation, the upper  chest and neck were prepped and draped to a sterile field. Timeout was undertaken.    The right internal jugular vein was cannulated on the 1st pass with an 18-gauge thin-wall needle via sonogram guidance. Through this a flexible-tipped guidewire was advanced without problem. Appropriate positioning was confirmed by fluoroscopy.  A peel-away sheath and dilator assembly were advanced over the guidewire and the guidewire and dilator removed.    A small transverse incision was then made in the skin of the right upper chest and a subcutaneous pocket created for the port reservoir.     A Power Port had been selected and this was delivered onto the field. The catheter was measured and cut to appropriate length. It was then assembled to the port

## 2024-12-13 NOTE — ANESTHESIA PRE PROCEDURE
Department of Anesthesiology  Preprocedure Note       Name:  Izzy Stokes   Age:  64 y.o.  :  1960                                          MRN:  [id number]         Date:  2024      Surgeon: Surgeon(s):  Cande Medina DO    Procedure: Procedure(s):  EGD ESOPHAGOGASTRODUODENOSCOPY    Medications prior to admission:   Prior to Admission medications    Medication Sig Start Date End Date Taking? Authorizing Provider   doxepin (SINEQUAN) 50 MG capsule Take 1 capsule by mouth nightly   Yes Provider, Historical, MD   tiZANidine (ZANAFLEX) 4 MG tablet Take 1 tablet by mouth every 8 hours as needed (pain)   Yes Provider, MD Edvin   hydrALAZINE (APRESOLINE) 25 MG tablet Take 1 tablet by mouth 3 times daily for 10 doses Take only as needed for additional blood pressure control when you are systolic blood pressure is over 200. 24 Yes Chai Mayberry MD   doxepin (SINEQUAN) 25 MG capsule Take 2 capsules by mouth nightly   Yes Provider, MD Edvin   albuterol sulfate HFA (VENTOLIN HFA) 108 (90 Base) MCG/ACT inhaler Inhale 2 puffs into the lungs 4 times daily as needed for Wheezing 10/8/24  Yes Katie Howell MD   pioglitazone (ACTOS) 30 MG tablet Take 1 tablet by mouth daily 24  Yes ProviderEdvin MD   cetirizine (ZYRTEC) 10 MG tablet Take 1 tablet by mouth daily   Yes ProviderEdvin MD   levothyroxine (SYNTHROID) 50 MCG tablet Take 1 tablet by mouth Daily   Yes Provider Historical, MD   ondansetron (ZOFRAN) 4 MG tablet Take 1 tablet by mouth every 8 hours as needed for Nausea 20  Yes Steve Torres MD   nadolol (CORGARD) 20 MG tablet TAKE ONE TABLET BY MOUTH EVERY DAY -- NEED APPOINTMENT  Patient taking differently: Take 1 tablet by mouth daily 10/7/20  Yes Anna Hess, APRN - NP   omeprazole (PRILOSEC) 20 MG delayed release capsule TAKE ONE CAPSULE BY MOUTH EVERY DAY  Patient taking differently: Take 1 capsule by mouth Daily TAKE ONE CAPSULE BY MOUTH

## 2024-12-16 ENCOUNTER — HOSPITAL ENCOUNTER (OUTPATIENT)
Dept: INFUSION THERAPY | Age: 64
Discharge: HOME | End: 2024-12-16
Payer: MEDICARE

## 2024-12-16 ENCOUNTER — OFFICE VISIT (OUTPATIENT)
Dept: HEMATOLOGY | Age: 64
End: 2024-12-16
Payer: MEDICARE

## 2024-12-16 VITALS
DIASTOLIC BLOOD PRESSURE: 66 MMHG | WEIGHT: 171.8 LBS | SYSTOLIC BLOOD PRESSURE: 128 MMHG | HEIGHT: 60 IN | HEART RATE: 61 BPM | OXYGEN SATURATION: 97 % | BODY MASS INDEX: 33.73 KG/M2 | TEMPERATURE: 96.9 F

## 2024-12-16 DIAGNOSIS — R53.0 NEOPLASTIC (MALIGNANT) RELATED FATIGUE: ICD-10-CM

## 2024-12-16 DIAGNOSIS — R97.8 OTHER ABNORMAL TUMOR MARKERS: ICD-10-CM

## 2024-12-16 DIAGNOSIS — R94.5 ABNORMAL RESULTS OF LIVER FUNCTION STUDIES: ICD-10-CM

## 2024-12-16 DIAGNOSIS — Z51.11 ENCOUNTER FOR ANTINEOPLASTIC CHEMOTHERAPY AND IMMUNOTHERAPY: ICD-10-CM

## 2024-12-16 DIAGNOSIS — C22.0 HEPATOCELLULAR CARCINOMA (HCC): Primary | ICD-10-CM

## 2024-12-16 DIAGNOSIS — Z51.12 ENCOUNTER FOR ANTINEOPLASTIC CHEMOTHERAPY AND IMMUNOTHERAPY: ICD-10-CM

## 2024-12-16 DIAGNOSIS — R77.2 ELEVATED AFP: ICD-10-CM

## 2024-12-16 DIAGNOSIS — C22.0 HEPATOCELLULAR CARCINOMA (HCC): ICD-10-CM

## 2024-12-16 LAB
AFP SERPL-MCNC: ABNORMAL NG/ML (ref 0–8.3)
ALBUMIN SERPL-MCNC: 3.9 G/DL (ref 3.5–5.2)
ALP SERPL-CCNC: 204 U/L (ref 35–104)
ALT SERPL-CCNC: 70 U/L (ref 5–33)
ANION GAP SERPL CALCULATED.3IONS-SCNC: 9 MMOL/L (ref 7–19)
AST SERPL-CCNC: 106 U/L (ref 5–32)
BASOPHILS # BLD: 0.04 K/UL (ref 0.01–0.08)
BASOPHILS NFR BLD: 1.2 % (ref 0.1–1.2)
BILIRUB SERPL-MCNC: 0.6 MG/DL (ref 0–1.2)
BUN SERPL-MCNC: 16 MG/DL (ref 8–23)
CALCIUM SERPL-MCNC: 8.8 MG/DL (ref 8.8–10.2)
CANCER AG19-9 SERPL-ACNC: 61 U/ML (ref 0–35)
CEA SERPL-MCNC: 6.5 NG/ML (ref 0–4.7)
CHLORIDE SERPL-SCNC: 102 MMOL/L (ref 98–107)
CO2 SERPL-SCNC: 25 MMOL/L (ref 22–29)
CORTIS AM PEAK SERPL-MCNC: 3 UG/DL (ref 4.8–19.5)
CREAT SERPL-MCNC: <0.5 MG/DL (ref 0.5–0.9)
EOSINOPHIL # BLD: 0.15 K/UL (ref 0.04–0.54)
EOSINOPHIL NFR BLD: 4.5 % (ref 0.7–7)
ERYTHROCYTE [DISTWIDTH] IN BLOOD BY AUTOMATED COUNT: 13.4 % (ref 11.7–14.4)
GLUCOSE SERPL-MCNC: 281 MG/DL (ref 70–99)
HAV IGM SERPL QL IA: ABNORMAL
HBV CORE IGM SERPL QL IA: ABNORMAL
HBV SURFACE AG SERPL QL IA: ABNORMAL
HCT VFR BLD AUTO: 36.7 % (ref 34.1–44.9)
HCV AB SERPL QL IA: REACTIVE
HGB BLD-MCNC: 11.9 G/DL (ref 11.2–15.7)
LYMPHOCYTES # BLD: 0.68 K/UL (ref 1.18–3.74)
LYMPHOCYTES NFR BLD: 20.2 % (ref 19.3–53.1)
MCH RBC QN AUTO: 27.6 PG (ref 25.6–32.2)
MCHC RBC AUTO-ENTMCNC: 32.4 G/DL (ref 32.3–35.5)
MCV RBC AUTO: 85.2 FL (ref 79.4–94.8)
MONOCYTES # BLD: 0.29 K/UL (ref 0.24–0.82)
MONOCYTES NFR BLD: 8.6 % (ref 4.7–12.5)
NEUTROPHILS # BLD: 2.19 K/UL (ref 1.56–6.13)
NEUTS SEG NFR BLD: 65.2 % (ref 34–71.1)
PLATELET # BLD AUTO: 108 K/UL (ref 182–369)
PMV BLD AUTO: 10.5 FL (ref 7.4–10.4)
POTASSIUM SERPL-SCNC: 3.5 MMOL/L (ref 3.5–5.1)
PROT SERPL-MCNC: 6.9 G/DL (ref 6.4–8.3)
RBC # BLD AUTO: 4.31 M/UL (ref 3.93–5.22)
SODIUM SERPL-SCNC: 136 MMOL/L (ref 136–145)
TSH SERPL DL<=0.005 MIU/L-ACNC: 2.3 UIU/ML (ref 0.27–4.2)
WBC # BLD AUTO: 3.36 K/UL (ref 3.98–10.04)

## 2024-12-16 PROCEDURE — 1123F ACP DISCUSS/DSCN MKR DOCD: CPT | Performed by: INTERNAL MEDICINE

## 2024-12-16 PROCEDURE — G8400 PT W/DXA NO RESULTS DOC: HCPCS | Performed by: INTERNAL MEDICINE

## 2024-12-16 PROCEDURE — G8484 FLU IMMUNIZE NO ADMIN: HCPCS | Performed by: INTERNAL MEDICINE

## 2024-12-16 PROCEDURE — 85025 COMPLETE CBC W/AUTO DIFF WBC: CPT

## 2024-12-16 PROCEDURE — G8427 DOCREV CUR MEDS BY ELIG CLIN: HCPCS | Performed by: INTERNAL MEDICINE

## 2024-12-16 PROCEDURE — 3017F COLORECTAL CA SCREEN DOC REV: CPT | Performed by: INTERNAL MEDICINE

## 2024-12-16 PROCEDURE — 99213 OFFICE O/P EST LOW 20 MIN: CPT

## 2024-12-16 PROCEDURE — 4004F PT TOBACCO SCREEN RCVD TLK: CPT | Performed by: INTERNAL MEDICINE

## 2024-12-16 PROCEDURE — 36415 COLL VENOUS BLD VENIPUNCTURE: CPT

## 2024-12-16 PROCEDURE — G8417 CALC BMI ABV UP PARAM F/U: HCPCS | Performed by: INTERNAL MEDICINE

## 2024-12-16 PROCEDURE — 3074F SYST BP LT 130 MM HG: CPT | Performed by: INTERNAL MEDICINE

## 2024-12-16 PROCEDURE — 1090F PRES/ABSN URINE INCON ASSESS: CPT | Performed by: INTERNAL MEDICINE

## 2024-12-16 PROCEDURE — 99215 OFFICE O/P EST HI 40 MIN: CPT | Performed by: INTERNAL MEDICINE

## 2024-12-16 PROCEDURE — 3078F DIAST BP <80 MM HG: CPT | Performed by: INTERNAL MEDICINE

## 2024-12-16 PROCEDURE — 80053 COMPREHEN METABOLIC PANEL: CPT

## 2024-12-16 PROCEDURE — G2211 COMPLEX E/M VISIT ADD ON: HCPCS | Performed by: INTERNAL MEDICINE

## 2024-12-17 ENCOUNTER — APPOINTMENT (OUTPATIENT)
Dept: CT IMAGING | Facility: HOSPITAL | Age: 64
End: 2024-12-17
Payer: MEDICARE

## 2024-12-17 ENCOUNTER — HOSPITAL ENCOUNTER (OUTPATIENT)
Dept: MRI IMAGING | Facility: HOSPITAL | Age: 64
Discharge: HOME | End: 2024-12-17
Admitting: INTERNAL MEDICINE
Payer: MEDICARE

## 2024-12-17 ENCOUNTER — TELEPHONE (OUTPATIENT)
Dept: HEMATOLOGY | Age: 64
End: 2024-12-17

## 2024-12-17 DIAGNOSIS — C22.0 HEPATOCELLULAR CARCINOMA: ICD-10-CM

## 2024-12-17 PROCEDURE — 25510000001 GADOPICLENOL 0.5 MMOL/ML SOLUTION: Performed by: INTERNAL MEDICINE

## 2024-12-17 PROCEDURE — 82565 ASSAY OF CREATININE: CPT

## 2024-12-17 PROCEDURE — 74183 MRI ABD W/O CNTR FLWD CNTR: CPT

## 2024-12-17 PROCEDURE — A9579 GAD-BASE MR CONTRAST NOS,1ML: HCPCS | Performed by: INTERNAL MEDICINE

## 2024-12-17 RX ADMIN — GADOPICLENOL 7.5 ML: 485.1 INJECTION INTRAVENOUS at 15:50

## 2024-12-17 NOTE — TELEPHONE ENCOUNTER
Nesha Murphy. [facility]    Prior Authorization:    Tracking [id number]    PET Scan is in peer to peer.    Peer to Peer required. Can Fax clinicals or call and talk with representative.    Fax: [phone]    Phone: [phone]

## 2024-12-18 LAB — CREAT BLDA-MCNC: 0.4 MG/DL (ref 0.6–1.3)

## 2025-01-03 ENCOUNTER — TELEPHONE (OUTPATIENT)
Dept: HEMATOLOGY | Age: 65
End: 2025-01-03

## 2025-01-03 NOTE — TELEPHONE ENCOUNTER
I called patient and left detailed voicemail about their appointment on 1/07/2025. I made patient aware not to arrive any earlier than the appointment time and to come at the time of the follow up not the time of the lab appointment if it is different than the follow up appt time. I also made patient aware to eat a meal (Our labs are not fasting labs) and drink plenty of water to hydrate their veins properly before coming to these appointments because this will make their lab draw much easier. Made patient aware that we are now located at the [facility] at [address]. Located between [facility] and the [facility]. Front entrance faces [place].     (We did tell them about the possible inclement weather and how our office will take care of that.)

## 2025-01-16 ENCOUNTER — TELEPHONE (OUTPATIENT)
Dept: HEMATOLOGY | Age: 65
End: 2025-01-16

## 2025-01-16 NOTE — TELEPHONE ENCOUNTER
I called patient and left detailed voicemail about their appointment on 01/21/2025. I made patient aware not to arrive any earlier than the appointment time and to come at the time of the follow up not the time of the lab appointment if it is different than the follow up appt time. I also made patient aware to eat and drink plenty of water to hydrate properly before coming to these appointments because this will make their lab draw much easier. Made patient aware that we are now located at the [facility] at [address] [place] [address]. Located between [facility] and the [facility].

## 2025-01-21 ENCOUNTER — HOSPITAL ENCOUNTER (OUTPATIENT)
Dept: INFUSION THERAPY | Age: 65
End: 2025-01-21

## (undated) DEVICE — PROVE COVER: Brand: UNBRANDED

## (undated) DEVICE — LIGATOR ENDOSCP DIA8.6-11.5MM MULT DISP SPDBND LIGATOR SUP

## (undated) DEVICE — ENDO KIT,LOURDES HOSPITAL: Brand: MEDLINE INDUSTRIES, INC.

## (undated) DEVICE — INTENDED FOR TISSUE SEPARATION, AND OTHER PROCEDURES THAT REQUIRE A SHARP SURGICAL BLADE TO PUNCTURE OR CUT.: Brand: BARD-PARKER ® CARBON RIB-BACK BLADES

## (undated) DEVICE — MULTIPLE BAND LIGATOR: Brand: SPEEDBAND SUPERVIEW SUPER 7

## (undated) DEVICE — PACK,UNIVERSAL,NO GOWNS: Brand: MEDLINE

## (undated) DEVICE — GLOVE SURG SZ 7 CRM LTX FREE POLYISOPRENE POLYMER BEAD ANTI

## (undated) DEVICE — FORCEPS BX L L240CM DIA2.4MM RAD JAW 4 HOT FOR POLYP DISP

## (undated) DEVICE — NEPTUNE E-SEP SMOKE EVACUATION PENCIL, COATED, 70MM BLADE, ROCKER SWITCH: Brand: NEPTUNE E-SEP

## (undated) DEVICE — FORCEPS BX L240CM JAW DIA2.8MM L CAP W/ NDL MIC MESH TOOTH

## (undated) DEVICE — GOWN, ORBIS, XLONG/XLARGE, STERILE: Brand: MEDLINE

## (undated) DEVICE — MINOR CDS: Brand: MEDLINE INDUSTRIES, INC.

## (undated) DEVICE — LIQUIBAND RAPID ADHESIVE 36/CS 0.8ML: Brand: MEDLINE

## (undated) DEVICE — SUTURE VICRYL + SZ 3-0 L27IN ABSRB UD L26MM SH 1/2 CIR VCP416H

## (undated) DEVICE — FORCEPS BX L240CM JAW DIA2.4MM ORNG L CAP W/ NDL DISP RAD

## (undated) DEVICE — GLOVE ORTHO 8   MSG9480

## (undated) DEVICE — SYRINGE MED 10ML LUERLOCK TIP W/O SFTY DISP

## (undated) DEVICE — SUTURE PROL SZ 2-0 L36IN NONABSORBABLE BLU V-7 L26MM 1/2 8977H

## (undated) DEVICE — SUTURE MONOCRYL SZ 4-0 L18IN ABSRB UD L19MM PS-2 3/8 CIR PRIM Y496G

## (undated) DEVICE — C-ARM: Brand: UNBRANDED